# Patient Record
Sex: FEMALE | Race: WHITE | Employment: OTHER | URBAN - METROPOLITAN AREA
[De-identification: names, ages, dates, MRNs, and addresses within clinical notes are randomized per-mention and may not be internally consistent; named-entity substitution may affect disease eponyms.]

---

## 2017-07-19 ENCOUNTER — HOSPITAL ENCOUNTER (OUTPATIENT)
Dept: OTHER | Age: 82
Discharge: HOME | End: 2017-07-19
Attending: INTERNAL MEDICINE | Admitting: INTERNAL MEDICINE

## 2017-07-19 DIAGNOSIS — M25.552 PAIN IN LEFT HIP: ICD-10-CM

## 2017-08-01 ENCOUNTER — TELEPHONE (OUTPATIENT)
Dept: INTERNAL MEDICINE CLINIC | Age: 82
End: 2017-08-01

## 2018-03-15 ENCOUNTER — OFFICE VISIT (OUTPATIENT)
Dept: FAMILY MEDICINE CLINIC | Age: 83
End: 2018-03-15

## 2018-03-15 VITALS
SYSTOLIC BLOOD PRESSURE: 120 MMHG | DIASTOLIC BLOOD PRESSURE: 72 MMHG | BODY MASS INDEX: 29.52 KG/M2 | WEIGHT: 160.4 LBS | HEIGHT: 62 IN

## 2018-03-15 DIAGNOSIS — M85.89 OSTEOPENIA OF MULTIPLE SITES: ICD-10-CM

## 2018-03-15 DIAGNOSIS — I10 ESSENTIAL HYPERTENSION: Primary | ICD-10-CM

## 2018-03-15 DIAGNOSIS — E55.9 VITAMIN D DEFICIENCY: ICD-10-CM

## 2018-03-15 DIAGNOSIS — I71.21 THORACIC ASCENDING AORTIC ANEURYSM: ICD-10-CM

## 2018-03-15 DIAGNOSIS — L40.9 PSORIASIS: ICD-10-CM

## 2018-03-15 DIAGNOSIS — M19.90 ARTHRITIS: ICD-10-CM

## 2018-03-15 DIAGNOSIS — K21.9 GASTROESOPHAGEAL REFLUX DISEASE, ESOPHAGITIS PRESENCE NOT SPECIFIED: ICD-10-CM

## 2018-03-15 DIAGNOSIS — Z13.220 LIPID SCREENING: ICD-10-CM

## 2018-03-15 DIAGNOSIS — E53.8 B12 DEFICIENCY: ICD-10-CM

## 2018-03-15 DIAGNOSIS — Z13.820 OSTEOPOROSIS SCREENING: ICD-10-CM

## 2018-03-15 DIAGNOSIS — N30.10 INTERSTITIAL CYSTITIS: ICD-10-CM

## 2018-03-15 DIAGNOSIS — M15.9 PRIMARY OSTEOARTHRITIS INVOLVING MULTIPLE JOINTS: ICD-10-CM

## 2018-03-15 PROBLEM — M15.0 PRIMARY OSTEOARTHRITIS INVOLVING MULTIPLE JOINTS: Status: ACTIVE | Noted: 2018-03-15

## 2018-03-15 PROCEDURE — 4040F PNEUMOC VAC/ADMIN/RCVD: CPT | Performed by: FAMILY MEDICINE

## 2018-03-15 PROCEDURE — G8399 PT W/DXA RESULTS DOCUMENT: HCPCS | Performed by: FAMILY MEDICINE

## 2018-03-15 PROCEDURE — G8427 DOCREV CUR MEDS BY ELIG CLIN: HCPCS | Performed by: FAMILY MEDICINE

## 2018-03-15 PROCEDURE — 1090F PRES/ABSN URINE INCON ASSESS: CPT | Performed by: FAMILY MEDICINE

## 2018-03-15 PROCEDURE — 1123F ACP DISCUSS/DSCN MKR DOCD: CPT | Performed by: FAMILY MEDICINE

## 2018-03-15 PROCEDURE — G8419 CALC BMI OUT NRM PARAM NOF/U: HCPCS | Performed by: FAMILY MEDICINE

## 2018-03-15 PROCEDURE — 99204 OFFICE O/P NEW MOD 45 MIN: CPT | Performed by: FAMILY MEDICINE

## 2018-03-15 PROCEDURE — G8482 FLU IMMUNIZE ORDER/ADMIN: HCPCS | Performed by: FAMILY MEDICINE

## 2018-03-15 PROCEDURE — 1036F TOBACCO NON-USER: CPT | Performed by: FAMILY MEDICINE

## 2018-03-15 RX ORDER — CLOTRIMAZOLE AND BETAMETHASONE DIPROPIONATE 10; .64 MG/G; MG/G
CREAM TOPICAL PRN
COMMUNITY
End: 2020-10-27

## 2018-03-15 RX ORDER — CLOTRIMAZOLE AND BETAMETHASONE DIPROPIONATE 10; .64 MG/G; MG/G
CREAM TOPICAL
Qty: 30 G | Refills: 0 | Status: SHIPPED | OUTPATIENT
Start: 2018-03-15 | End: 2018-05-03 | Stop reason: ALTCHOICE

## 2018-03-15 ASSESSMENT — PATIENT HEALTH QUESTIONNAIRE - PHQ9
SUM OF ALL RESPONSES TO PHQ QUESTIONS 1-9: 0
1. LITTLE INTEREST OR PLEASURE IN DOING THINGS: 0
2. FEELING DOWN, DEPRESSED OR HOPELESS: 0
SUM OF ALL RESPONSES TO PHQ9 QUESTIONS 1 & 2: 0

## 2018-03-15 NOTE — PROGRESS NOTES
Gastroesophageal reflux disease, esophagitis presence not specified     4. Psoriasis     5. Arthritis     6. Interstitial cystitis     7.  Thoracic ascending aortic aneurysm (HCC)             Plan:      bp stable on toprol/ maxzide -no change  ppi prn for gerd - stacy precautions  Diet / exercise dw/ pt - IC diet w/o other tx presently for ic    apap 500 tid routinely  b12 supplement  Fasting labs soon  Refills as needed  Check for RA -   sx'atic tx for uri sx  Lidex cream for psoriasis  dexa scan soon - on vit d replacement regularly for osteopenia

## 2018-03-26 ENCOUNTER — HOSPITAL ENCOUNTER (OUTPATIENT)
Dept: GENERAL RADIOLOGY | Age: 83
Discharge: HOME | End: 2018-03-26
Attending: FAMILY MEDICINE | Admitting: FAMILY MEDICINE

## 2018-03-26 DIAGNOSIS — M85.89 OSTEOPENIA OF MULTIPLE SITES: ICD-10-CM

## 2018-03-26 DIAGNOSIS — M85.89 OTHER SPECIFIED DISORDERS OF BONE DENSITY AND STRUCTURE, MULTIPLE SITES: ICD-10-CM

## 2018-03-26 DIAGNOSIS — Z13.820 OSTEOPOROSIS SCREENING: ICD-10-CM

## 2018-03-26 RX ORDER — ALENDRONATE SODIUM 70 MG/1
70 TABLET ORAL
Qty: 4 TABLET | Refills: 11 | Status: SHIPPED | OUTPATIENT
Start: 2018-03-26 | End: 2018-05-09 | Stop reason: ALTCHOICE

## 2018-04-03 ENCOUNTER — NURSE ONLY (OUTPATIENT)
Dept: FAMILY MEDICINE CLINIC | Age: 83
End: 2018-04-03

## 2018-04-03 DIAGNOSIS — Z13.220 LIPID SCREENING: ICD-10-CM

## 2018-04-03 DIAGNOSIS — M85.89 OSTEOPENIA OF MULTIPLE SITES: ICD-10-CM

## 2018-04-03 DIAGNOSIS — E55.9 VITAMIN D DEFICIENCY: ICD-10-CM

## 2018-04-03 DIAGNOSIS — I10 ESSENTIAL HYPERTENSION: ICD-10-CM

## 2018-04-03 DIAGNOSIS — M19.90 ARTHRITIS: ICD-10-CM

## 2018-04-03 DIAGNOSIS — E53.8 B12 DEFICIENCY: ICD-10-CM

## 2018-04-03 LAB
A/G RATIO: 2.2 (ref 1.1–2.2)
ALBUMIN SERPL-MCNC: 4.4 G/DL (ref 3.4–5)
ALP BLD-CCNC: 62 U/L (ref 40–129)
ALT SERPL-CCNC: 20 U/L (ref 10–40)
ANION GAP SERPL CALCULATED.3IONS-SCNC: 13 MMOL/L (ref 3–16)
AST SERPL-CCNC: 18 U/L (ref 15–37)
BASOPHILS ABSOLUTE: 0 K/UL (ref 0–0.2)
BASOPHILS RELATIVE PERCENT: 0.9 %
BILIRUB SERPL-MCNC: 1.3 MG/DL (ref 0–1)
BUN BLDV-MCNC: 15 MG/DL (ref 7–20)
CALCIUM SERPL-MCNC: 9.6 MG/DL (ref 8.3–10.6)
CHLORIDE BLD-SCNC: 101 MMOL/L (ref 99–110)
CHOLESTEROL, TOTAL: 251 MG/DL (ref 0–199)
CO2: 28 MMOL/L (ref 21–32)
CREAT SERPL-MCNC: 0.6 MG/DL (ref 0.6–1.2)
EOSINOPHILS ABSOLUTE: 0.1 K/UL (ref 0–0.6)
EOSINOPHILS RELATIVE PERCENT: 1.6 %
GFR AFRICAN AMERICAN: >60
GFR NON-AFRICAN AMERICAN: >60
GLOBULIN: 2 G/DL
GLUCOSE BLD-MCNC: 93 MG/DL (ref 70–99)
HCT VFR BLD CALC: 41.9 % (ref 36–48)
HDLC SERPL-MCNC: 68 MG/DL (ref 40–60)
HEMOGLOBIN: 14.2 G/DL (ref 12–16)
LDL CHOLESTEROL CALCULATED: 148 MG/DL
LYMPHOCYTES ABSOLUTE: 2.4 K/UL (ref 1–5.1)
LYMPHOCYTES RELATIVE PERCENT: 46.9 %
MCH RBC QN AUTO: 31 PG (ref 26–34)
MCHC RBC AUTO-ENTMCNC: 33.8 G/DL (ref 31–36)
MCV RBC AUTO: 91.7 FL (ref 80–100)
MONOCYTES ABSOLUTE: 0.4 K/UL (ref 0–1.3)
MONOCYTES RELATIVE PERCENT: 8.2 %
NEUTROPHILS ABSOLUTE: 2.2 K/UL (ref 1.7–7.7)
NEUTROPHILS RELATIVE PERCENT: 42.4 %
PDW BLD-RTO: 13.9 % (ref 12.4–15.4)
PLATELET # BLD: 223 K/UL (ref 135–450)
PMV BLD AUTO: 9.3 FL (ref 5–10.5)
POTASSIUM SERPL-SCNC: 4 MMOL/L (ref 3.5–5.1)
RBC # BLD: 4.57 M/UL (ref 4–5.2)
RHEUMATOID FACTOR: <10 IU/ML
SEDIMENTATION RATE, ERYTHROCYTE: 9 MM/HR (ref 0–30)
SODIUM BLD-SCNC: 142 MMOL/L (ref 136–145)
TOTAL PROTEIN: 6.4 G/DL (ref 6.4–8.2)
TRIGL SERPL-MCNC: 177 MG/DL (ref 0–150)
VITAMIN B-12: 409 PG/ML (ref 211–911)
VITAMIN D 25-HYDROXY: 50.2 NG/ML
VLDLC SERPL CALC-MCNC: 35 MG/DL
WBC # BLD: 5.1 K/UL (ref 4–11)

## 2018-04-03 PROCEDURE — 36415 COLL VENOUS BLD VENIPUNCTURE: CPT | Performed by: FAMILY MEDICINE

## 2018-04-09 ENCOUNTER — TELEPHONE (OUTPATIENT)
Dept: FAMILY MEDICINE CLINIC | Age: 83
End: 2018-04-09

## 2018-04-23 ENCOUNTER — TELEPHONE (OUTPATIENT)
Dept: FAMILY MEDICINE CLINIC | Age: 83
End: 2018-04-23

## 2018-04-23 RX ORDER — TRIAMTERENE AND HYDROCHLOROTHIAZIDE 37.5; 25 MG/1; MG/1
1 TABLET ORAL DAILY
Qty: 30 TABLET | Refills: 2 | Status: SHIPPED | OUTPATIENT
Start: 2018-04-23 | End: 2018-05-09 | Stop reason: SDUPTHER

## 2018-04-23 RX ORDER — METOPROLOL SUCCINATE 50 MG/1
50 TABLET, EXTENDED RELEASE ORAL DAILY
Qty: 30 TABLET | Refills: 2 | Status: SHIPPED | OUTPATIENT
Start: 2018-04-23 | End: 2018-05-09 | Stop reason: SDUPTHER

## 2018-05-03 ENCOUNTER — OFFICE VISIT (OUTPATIENT)
Dept: FAMILY MEDICINE CLINIC | Age: 83
End: 2018-05-03

## 2018-05-03 VITALS
DIASTOLIC BLOOD PRESSURE: 86 MMHG | OXYGEN SATURATION: 97 % | WEIGHT: 156.2 LBS | BODY MASS INDEX: 28.74 KG/M2 | HEIGHT: 62 IN | RESPIRATION RATE: 16 BRPM | HEART RATE: 61 BPM | SYSTOLIC BLOOD PRESSURE: 136 MMHG

## 2018-05-03 DIAGNOSIS — R06.02 SOB (SHORTNESS OF BREATH): ICD-10-CM

## 2018-05-03 DIAGNOSIS — R07.89 OTHER CHEST PAIN: Primary | ICD-10-CM

## 2018-05-03 PROBLEM — R07.9 CHEST PAIN: Status: ACTIVE | Noted: 2018-05-03

## 2018-05-03 PROCEDURE — G8427 DOCREV CUR MEDS BY ELIG CLIN: HCPCS | Performed by: NURSE PRACTITIONER

## 2018-05-03 PROCEDURE — G8399 PT W/DXA RESULTS DOCUMENT: HCPCS | Performed by: NURSE PRACTITIONER

## 2018-05-03 PROCEDURE — 99214 OFFICE O/P EST MOD 30 MIN: CPT | Performed by: NURSE PRACTITIONER

## 2018-05-03 PROCEDURE — 4040F PNEUMOC VAC/ADMIN/RCVD: CPT | Performed by: NURSE PRACTITIONER

## 2018-05-03 PROCEDURE — 1123F ACP DISCUSS/DSCN MKR DOCD: CPT | Performed by: NURSE PRACTITIONER

## 2018-05-03 PROCEDURE — 93000 ELECTROCARDIOGRAM COMPLETE: CPT | Performed by: NURSE PRACTITIONER

## 2018-05-03 PROCEDURE — 1090F PRES/ABSN URINE INCON ASSESS: CPT | Performed by: NURSE PRACTITIONER

## 2018-05-03 PROCEDURE — G8419 CALC BMI OUT NRM PARAM NOF/U: HCPCS | Performed by: NURSE PRACTITIONER

## 2018-05-03 PROCEDURE — 1036F TOBACCO NON-USER: CPT | Performed by: NURSE PRACTITIONER

## 2018-05-09 ENCOUNTER — OFFICE VISIT (OUTPATIENT)
Dept: FAMILY MEDICINE CLINIC | Age: 83
End: 2018-05-09

## 2018-05-09 VITALS
HEIGHT: 62 IN | DIASTOLIC BLOOD PRESSURE: 72 MMHG | HEART RATE: 82 BPM | BODY MASS INDEX: 27.97 KG/M2 | WEIGHT: 152 LBS | SYSTOLIC BLOOD PRESSURE: 116 MMHG

## 2018-05-09 DIAGNOSIS — K63.5 POLYP OF COLON, UNSPECIFIED PART OF COLON, UNSPECIFIED TYPE: ICD-10-CM

## 2018-05-09 DIAGNOSIS — Z23 NEED FOR PNEUMOCOCCAL VACCINATION: ICD-10-CM

## 2018-05-09 DIAGNOSIS — N30.10 INTERSTITIAL CYSTITIS: ICD-10-CM

## 2018-05-09 DIAGNOSIS — M85.89 OSTEOPENIA OF MULTIPLE SITES: ICD-10-CM

## 2018-05-09 DIAGNOSIS — R07.9 CHEST PAIN, UNSPECIFIED TYPE: ICD-10-CM

## 2018-05-09 DIAGNOSIS — I25.110 CORONARY ARTERY DISEASE INVOLVING NATIVE CORONARY ARTERY OF NATIVE HEART WITH UNSTABLE ANGINA PECTORIS (HCC): Primary | ICD-10-CM

## 2018-05-09 DIAGNOSIS — N39.0 RECURRENT UTI: ICD-10-CM

## 2018-05-09 LAB
BILIRUBIN, POC: ABNORMAL
BLOOD URINE, POC: ABNORMAL
CLARITY, POC: CLEAR
COLOR, POC: CLEAR
GLUCOSE URINE, POC: ABNORMAL
KETONES, POC: ABNORMAL
LEUKOCYTE EST, POC: ABNORMAL
NITRITE, POC: ABNORMAL
PH, POC: 7.5
PROTEIN, POC: ABNORMAL
SPECIFIC GRAVITY, POC: 1.02
UROBILINOGEN, POC: 0.2

## 2018-05-09 PROCEDURE — 90670 PCV13 VACCINE IM: CPT | Performed by: FAMILY MEDICINE

## 2018-05-09 PROCEDURE — 4040F PNEUMOC VAC/ADMIN/RCVD: CPT | Performed by: FAMILY MEDICINE

## 2018-05-09 PROCEDURE — G8419 CALC BMI OUT NRM PARAM NOF/U: HCPCS | Performed by: FAMILY MEDICINE

## 2018-05-09 PROCEDURE — 1123F ACP DISCUSS/DSCN MKR DOCD: CPT | Performed by: FAMILY MEDICINE

## 2018-05-09 PROCEDURE — G8427 DOCREV CUR MEDS BY ELIG CLIN: HCPCS | Performed by: FAMILY MEDICINE

## 2018-05-09 PROCEDURE — G8598 ASA/ANTIPLAT THER USED: HCPCS | Performed by: FAMILY MEDICINE

## 2018-05-09 PROCEDURE — 1090F PRES/ABSN URINE INCON ASSESS: CPT | Performed by: FAMILY MEDICINE

## 2018-05-09 PROCEDURE — 99214 OFFICE O/P EST MOD 30 MIN: CPT | Performed by: FAMILY MEDICINE

## 2018-05-09 PROCEDURE — 81002 URINALYSIS NONAUTO W/O SCOPE: CPT | Performed by: FAMILY MEDICINE

## 2018-05-09 PROCEDURE — G8399 PT W/DXA RESULTS DOCUMENT: HCPCS | Performed by: FAMILY MEDICINE

## 2018-05-09 PROCEDURE — 1036F TOBACCO NON-USER: CPT | Performed by: FAMILY MEDICINE

## 2018-05-09 PROCEDURE — G0009 ADMIN PNEUMOCOCCAL VACCINE: HCPCS | Performed by: FAMILY MEDICINE

## 2018-05-09 RX ORDER — METOPROLOL SUCCINATE 50 MG/1
50 TABLET, EXTENDED RELEASE ORAL DAILY
Qty: 90 TABLET | Refills: 3 | Status: SHIPPED | OUTPATIENT
Start: 2018-05-09 | End: 2018-10-29 | Stop reason: SDUPTHER

## 2018-05-09 RX ORDER — TRIMETHOPRIM 100 MG/1
100 TABLET ORAL DAILY
Qty: 90 TABLET | Refills: 1 | Status: SHIPPED | OUTPATIENT
Start: 2018-05-09 | End: 2018-05-19

## 2018-05-09 RX ORDER — TRIAMTERENE AND HYDROCHLOROTHIAZIDE 37.5; 25 MG/1; MG/1
1 TABLET ORAL DAILY
Qty: 90 TABLET | Refills: 3 | Status: ON HOLD | OUTPATIENT
Start: 2018-05-09 | End: 2018-06-05 | Stop reason: HOSPADM

## 2018-05-11 LAB — URINE CULTURE, ROUTINE: NORMAL

## 2018-05-16 ENCOUNTER — TELEPHONE (OUTPATIENT)
Dept: FAMILY MEDICINE CLINIC | Age: 83
End: 2018-05-16

## 2018-05-16 RX ORDER — PANTOPRAZOLE SODIUM 40 MG/1
40 TABLET, DELAYED RELEASE ORAL
Qty: 90 TABLET | Refills: 1 | Status: SHIPPED | OUTPATIENT
Start: 2018-05-16 | End: 2018-07-19

## 2018-05-29 PROBLEM — N30.10 INTERSTITIAL CYSTITIS: Status: RESOLVED | Noted: 2018-03-15 | Resolved: 2018-05-29

## 2018-05-29 PROBLEM — I21.4 NSTEMI (NON-ST ELEVATED MYOCARDIAL INFARCTION) (HCC): Status: ACTIVE | Noted: 2018-05-29

## 2018-06-01 PROBLEM — I25.10 CAD IN NATIVE ARTERY: Status: ACTIVE | Noted: 2018-06-01

## 2018-06-06 ENCOUNTER — CARE COORDINATION (OUTPATIENT)
Dept: CASE MANAGEMENT | Age: 83
End: 2018-06-06

## 2018-06-06 ENCOUNTER — TELEPHONE (OUTPATIENT)
Dept: CARDIOTHORACIC SURGERY | Age: 83
End: 2018-06-06

## 2018-06-06 DIAGNOSIS — R07.9 CHEST PAIN, UNSPECIFIED TYPE: Primary | ICD-10-CM

## 2018-06-06 PROCEDURE — 1111F DSCHRG MED/CURRENT MED MERGE: CPT | Performed by: FAMILY MEDICINE

## 2018-06-08 ENCOUNTER — TELEPHONE (OUTPATIENT)
Dept: CARDIOTHORACIC SURGERY | Age: 83
End: 2018-06-08

## 2018-06-08 ENCOUNTER — HOSPITAL ENCOUNTER (OUTPATIENT)
Dept: OTHER | Age: 83
Discharge: HOME | End: 2018-06-08
Attending: THORACIC SURGERY (CARDIOTHORACIC VASCULAR SURGERY) | Admitting: THORACIC SURGERY (CARDIOTHORACIC VASCULAR SURGERY)

## 2018-06-08 LAB
ANION GAP SERPL CALCULATED.3IONS-SCNC: 13 MMOL/L (ref 3–16)
BUN BLDV-MCNC: 13 MG/DL (ref 7–20)
CALCIUM SERPL-MCNC: 9.6 MG/DL (ref 8.3–10.6)
CHLORIDE BLD-SCNC: 97 MMOL/L (ref 99–110)
CO2: 24 MMOL/L (ref 21–32)
CREAT SERPL-MCNC: <0.5 MG/DL (ref 0.6–1.2)
GFR AFRICAN AMERICAN: >60
GFR NON-AFRICAN AMERICAN: >60
GLUCOSE BLD-MCNC: 100 MG/DL (ref 70–99)
MAGNESIUM: 1.9 MG/DL (ref 1.8–2.4)
POTASSIUM SERPL-SCNC: 3.8 MMOL/L (ref 3.5–5.1)
SODIUM BLD-SCNC: 134 MMOL/L (ref 136–145)

## 2018-06-11 ENCOUNTER — HOSPITAL ENCOUNTER (OUTPATIENT)
Dept: OTHER | Age: 83
Discharge: HOME | End: 2018-06-11
Attending: THORACIC SURGERY (CARDIOTHORACIC VASCULAR SURGERY) | Admitting: THORACIC SURGERY (CARDIOTHORACIC VASCULAR SURGERY)

## 2018-06-11 ENCOUNTER — TELEPHONE (OUTPATIENT)
Dept: CARDIOTHORACIC SURGERY | Age: 83
End: 2018-06-11

## 2018-06-11 DIAGNOSIS — R06.02 SHORTNESS OF BREATH: ICD-10-CM

## 2018-06-11 DIAGNOSIS — R06.02 SHORTNESS OF BREATH: Primary | ICD-10-CM

## 2018-06-11 DIAGNOSIS — R50.9 FEVER, UNSPECIFIED FEVER CAUSE: ICD-10-CM

## 2018-06-11 DIAGNOSIS — N39.0 URINARY TRACT INFECTION WITHOUT HEMATURIA, SITE UNSPECIFIED: ICD-10-CM

## 2018-06-11 DIAGNOSIS — J90 PLEURAL EFFUSION: ICD-10-CM

## 2018-06-11 DIAGNOSIS — R30.0 DYSURIA: ICD-10-CM

## 2018-06-11 LAB
BACTERIA: ABNORMAL /HPF
BILIRUBIN URINE: NEGATIVE
BLOOD, URINE: ABNORMAL
CLARITY: ABNORMAL
COLOR: YELLOW
EPITHELIAL CELLS, UA: 4 /HPF (ref 0–5)
GLUCOSE URINE: NEGATIVE MG/DL
HYALINE CASTS: 1 /LPF (ref 0–8)
KETONES, URINE: NEGATIVE MG/DL
LEUKOCYTE ESTERASE, URINE: ABNORMAL
MICROSCOPIC EXAMINATION: YES
NITRITE, URINE: NEGATIVE
PH UA: 6.5
PROTEIN UA: NEGATIVE MG/DL
RBC UA: 17 /HPF (ref 0–4)
SPECIFIC GRAVITY UA: 1.01
URINE REFLEX TO CULTURE: YES
URINE TYPE: ABNORMAL
UROBILINOGEN, URINE: 0.2 E.U./DL
WBC UA: 6 /HPF (ref 0–5)

## 2018-06-11 RX ORDER — FUROSEMIDE 20 MG/1
20 TABLET ORAL DAILY
Qty: 7 TABLET | Refills: 0 | Status: SHIPPED | OUTPATIENT
Start: 2018-06-11 | End: 2018-07-19

## 2018-06-11 RX ORDER — POTASSIUM CHLORIDE 20 MEQ/1
20 TABLET, EXTENDED RELEASE ORAL DAILY
Qty: 7 TABLET | Refills: 0 | Status: SHIPPED | OUTPATIENT
Start: 2018-06-11 | End: 2018-07-19

## 2018-06-11 RX ORDER — SULFAMETHOXAZOLE AND TRIMETHOPRIM 800; 160 MG/1; MG/1
1 TABLET ORAL 2 TIMES DAILY
Qty: 10 TABLET | Refills: 0 | Status: SHIPPED | OUTPATIENT
Start: 2018-06-11 | End: 2018-06-16

## 2018-06-12 LAB — URINE CULTURE, ROUTINE: NORMAL

## 2018-06-13 ENCOUNTER — OFFICE VISIT (OUTPATIENT)
Dept: CARDIOTHORACIC SURGERY | Age: 83
End: 2018-06-13

## 2018-06-13 VITALS
SYSTOLIC BLOOD PRESSURE: 104 MMHG | TEMPERATURE: 97.6 F | BODY MASS INDEX: 28.6 KG/M2 | HEIGHT: 62 IN | WEIGHT: 155.4 LBS | DIASTOLIC BLOOD PRESSURE: 60 MMHG | OXYGEN SATURATION: 96 % | HEART RATE: 72 BPM

## 2018-06-13 DIAGNOSIS — Z09 FOLLOW-UP EXAMINATION FOLLOWING SURGERY: Primary | ICD-10-CM

## 2018-06-13 PROCEDURE — 99024 POSTOP FOLLOW-UP VISIT: CPT | Performed by: THORACIC SURGERY (CARDIOTHORACIC VASCULAR SURGERY)

## 2018-06-15 ENCOUNTER — CARE COORDINATION (OUTPATIENT)
Dept: CASE MANAGEMENT | Age: 83
End: 2018-06-15

## 2018-06-19 ENCOUNTER — CARE COORDINATION (OUTPATIENT)
Dept: CASE MANAGEMENT | Age: 83
End: 2018-06-19

## 2018-06-19 ENCOUNTER — HOSPITAL ENCOUNTER (OUTPATIENT)
Dept: OTHER | Age: 83
Discharge: HOME | End: 2018-06-19
Attending: NURSE PRACTITIONER | Admitting: NURSE PRACTITIONER

## 2018-06-19 ENCOUNTER — OFFICE VISIT (OUTPATIENT)
Dept: CARDIOLOGY CLINIC | Age: 83
End: 2018-06-19

## 2018-06-19 VITALS
OXYGEN SATURATION: 97 % | HEART RATE: 94 BPM | BODY MASS INDEX: 27.25 KG/M2 | WEIGHT: 149 LBS | SYSTOLIC BLOOD PRESSURE: 120 MMHG | DIASTOLIC BLOOD PRESSURE: 62 MMHG

## 2018-06-19 DIAGNOSIS — I25.10 CAD IN NATIVE ARTERY: ICD-10-CM

## 2018-06-19 DIAGNOSIS — I10 ESSENTIAL HYPERTENSION: ICD-10-CM

## 2018-06-19 DIAGNOSIS — E78.2 MIXED HYPERLIPIDEMIA: ICD-10-CM

## 2018-06-19 DIAGNOSIS — I21.4 NSTEMI (NON-ST ELEVATED MYOCARDIAL INFARCTION) (HCC): ICD-10-CM

## 2018-06-19 DIAGNOSIS — I48.91 ATRIAL FIBRILLATION, UNSPECIFIED TYPE (HCC): Primary | ICD-10-CM

## 2018-06-19 LAB
ANION GAP SERPL CALCULATED.3IONS-SCNC: 16 MMOL/L (ref 3–16)
BUN BLDV-MCNC: 18 MG/DL (ref 7–20)
CALCIUM SERPL-MCNC: 10 MG/DL (ref 8.3–10.6)
CHLORIDE BLD-SCNC: 103 MMOL/L (ref 99–110)
CO2: 22 MMOL/L (ref 21–32)
CREAT SERPL-MCNC: 0.6 MG/DL (ref 0.6–1.2)
GFR AFRICAN AMERICAN: >60
GFR NON-AFRICAN AMERICAN: >60
GLUCOSE BLD-MCNC: 93 MG/DL (ref 70–99)
HCT VFR BLD CALC: 35 % (ref 36–48)
HEMOGLOBIN: 11.7 G/DL (ref 12–16)
MAGNESIUM: 1.8 MG/DL (ref 1.8–2.4)
MCH RBC QN AUTO: 30.4 PG (ref 26–34)
MCHC RBC AUTO-ENTMCNC: 33.3 G/DL (ref 31–36)
MCV RBC AUTO: 91.2 FL (ref 80–100)
PDW BLD-RTO: 14.8 % (ref 12.4–15.4)
PLATELET # BLD: 708 K/UL (ref 135–450)
PMV BLD AUTO: 7.5 FL (ref 5–10.5)
POTASSIUM SERPL-SCNC: 4.1 MMOL/L (ref 3.5–5.1)
RBC # BLD: 3.84 M/UL (ref 4–5.2)
SODIUM BLD-SCNC: 141 MMOL/L (ref 136–145)
WBC # BLD: 9.4 K/UL (ref 4–11)

## 2018-06-19 PROCEDURE — 93000 ELECTROCARDIOGRAM COMPLETE: CPT | Performed by: NURSE PRACTITIONER

## 2018-06-19 PROCEDURE — 4040F PNEUMOC VAC/ADMIN/RCVD: CPT | Performed by: NURSE PRACTITIONER

## 2018-06-19 PROCEDURE — 1111F DSCHRG MED/CURRENT MED MERGE: CPT | Performed by: NURSE PRACTITIONER

## 2018-06-19 PROCEDURE — G8427 DOCREV CUR MEDS BY ELIG CLIN: HCPCS | Performed by: NURSE PRACTITIONER

## 2018-06-19 PROCEDURE — 1123F ACP DISCUSS/DSCN MKR DOCD: CPT | Performed by: NURSE PRACTITIONER

## 2018-06-19 PROCEDURE — 1036F TOBACCO NON-USER: CPT | Performed by: NURSE PRACTITIONER

## 2018-06-19 PROCEDURE — G8419 CALC BMI OUT NRM PARAM NOF/U: HCPCS | Performed by: NURSE PRACTITIONER

## 2018-06-19 PROCEDURE — 99214 OFFICE O/P EST MOD 30 MIN: CPT | Performed by: NURSE PRACTITIONER

## 2018-06-19 PROCEDURE — G8598 ASA/ANTIPLAT THER USED: HCPCS | Performed by: NURSE PRACTITIONER

## 2018-06-19 PROCEDURE — G8399 PT W/DXA RESULTS DOCUMENT: HCPCS | Performed by: NURSE PRACTITIONER

## 2018-06-19 PROCEDURE — 1090F PRES/ABSN URINE INCON ASSESS: CPT | Performed by: NURSE PRACTITIONER

## 2018-06-19 RX ORDER — TRAMADOL HYDROCHLORIDE 50 MG/1
50 TABLET ORAL EVERY 6 HOURS PRN
COMMUNITY
End: 2018-08-31 | Stop reason: ALTCHOICE

## 2018-06-26 ENCOUNTER — TELEPHONE (OUTPATIENT)
Dept: CARDIOLOGY CLINIC | Age: 83
End: 2018-06-26

## 2018-07-05 ENCOUNTER — TELEPHONE (OUTPATIENT)
Dept: CARDIOLOGY CLINIC | Age: 83
End: 2018-07-05

## 2018-07-05 DIAGNOSIS — I48.0 PAROXYSMAL ATRIAL FIBRILLATION (HCC): Primary | ICD-10-CM

## 2018-07-05 DIAGNOSIS — T46.2X5A ADVERSE EFFECT OF AMIODARONE, INITIAL ENCOUNTER: ICD-10-CM

## 2018-07-05 DIAGNOSIS — E78.2 MIXED HYPERLIPIDEMIA: ICD-10-CM

## 2018-07-07 ENCOUNTER — HOSPITAL ENCOUNTER (OUTPATIENT)
Dept: OTHER | Age: 83
Discharge: HOME | End: 2018-07-07
Attending: NURSE PRACTITIONER | Admitting: NURSE PRACTITIONER

## 2018-07-07 DIAGNOSIS — I48.0 PAROXYSMAL ATRIAL FIBRILLATION (HCC): ICD-10-CM

## 2018-07-07 DIAGNOSIS — E78.2 MIXED HYPERLIPIDEMIA: ICD-10-CM

## 2018-07-07 LAB
A/G RATIO: 1.6 (ref 1.1–2.2)
ALBUMIN SERPL-MCNC: 4 G/DL (ref 3.4–5)
ALP BLD-CCNC: 87 U/L (ref 40–129)
ALT SERPL-CCNC: 22 U/L (ref 10–40)
ANION GAP SERPL CALCULATED.3IONS-SCNC: 13 MMOL/L (ref 3–16)
AST SERPL-CCNC: 21 U/L (ref 15–37)
BILIRUB SERPL-MCNC: 0.6 MG/DL (ref 0–1)
BUN BLDV-MCNC: 12 MG/DL (ref 7–20)
CALCIUM SERPL-MCNC: 9.7 MG/DL (ref 8.3–10.6)
CHLORIDE BLD-SCNC: 107 MMOL/L (ref 99–110)
CHOLESTEROL, TOTAL: 130 MG/DL (ref 0–199)
CO2: 24 MMOL/L (ref 21–32)
CREAT SERPL-MCNC: 0.6 MG/DL (ref 0.6–1.2)
GFR AFRICAN AMERICAN: >60
GFR NON-AFRICAN AMERICAN: >60
GLOBULIN: 2.5 G/DL
GLUCOSE BLD-MCNC: 87 MG/DL (ref 70–99)
HCT VFR BLD CALC: 33.6 % (ref 36–48)
HDLC SERPL-MCNC: 59 MG/DL (ref 40–60)
HEMOGLOBIN: 11.5 G/DL (ref 12–16)
LDL CHOLESTEROL CALCULATED: 50 MG/DL
MCH RBC QN AUTO: 31.1 PG (ref 26–34)
MCHC RBC AUTO-ENTMCNC: 34.2 G/DL (ref 31–36)
MCV RBC AUTO: 90.9 FL (ref 80–100)
PDW BLD-RTO: 16.4 % (ref 12.4–15.4)
PLATELET # BLD: 287 K/UL (ref 135–450)
PMV BLD AUTO: 8.8 FL (ref 5–10.5)
POTASSIUM SERPL-SCNC: 3.9 MMOL/L (ref 3.5–5.1)
RBC # BLD: 3.69 M/UL (ref 4–5.2)
SODIUM BLD-SCNC: 144 MMOL/L (ref 136–145)
TOTAL PROTEIN: 6.5 G/DL (ref 6.4–8.2)
TRIGL SERPL-MCNC: 107 MG/DL (ref 0–150)
TSH SERPL DL<=0.05 MIU/L-ACNC: 4.59 UIU/ML (ref 0.27–4.2)
VLDLC SERPL CALC-MCNC: 21 MG/DL
WBC # BLD: 4.9 K/UL (ref 4–11)

## 2018-07-17 ENCOUNTER — OFFICE VISIT (OUTPATIENT)
Dept: FAMILY MEDICINE CLINIC | Age: 83
End: 2018-07-17

## 2018-07-17 ENCOUNTER — TELEPHONE (OUTPATIENT)
Dept: CARDIOTHORACIC SURGERY | Age: 83
End: 2018-07-17

## 2018-07-17 VITALS
BODY MASS INDEX: 27.94 KG/M2 | HEIGHT: 62 IN | OXYGEN SATURATION: 96 % | DIASTOLIC BLOOD PRESSURE: 84 MMHG | HEART RATE: 67 BPM | WEIGHT: 151.8 LBS | SYSTOLIC BLOOD PRESSURE: 128 MMHG | TEMPERATURE: 98.2 F | RESPIRATION RATE: 18 BRPM

## 2018-07-17 DIAGNOSIS — J06.9 VIRAL URI: Primary | ICD-10-CM

## 2018-07-17 PROCEDURE — 1101F PT FALLS ASSESS-DOCD LE1/YR: CPT | Performed by: NURSE PRACTITIONER

## 2018-07-17 PROCEDURE — G8419 CALC BMI OUT NRM PARAM NOF/U: HCPCS | Performed by: NURSE PRACTITIONER

## 2018-07-17 PROCEDURE — 1123F ACP DISCUSS/DSCN MKR DOCD: CPT | Performed by: NURSE PRACTITIONER

## 2018-07-17 PROCEDURE — G8598 ASA/ANTIPLAT THER USED: HCPCS | Performed by: NURSE PRACTITIONER

## 2018-07-17 PROCEDURE — 99213 OFFICE O/P EST LOW 20 MIN: CPT | Performed by: NURSE PRACTITIONER

## 2018-07-17 PROCEDURE — 1090F PRES/ABSN URINE INCON ASSESS: CPT | Performed by: NURSE PRACTITIONER

## 2018-07-17 PROCEDURE — G8399 PT W/DXA RESULTS DOCUMENT: HCPCS | Performed by: NURSE PRACTITIONER

## 2018-07-17 PROCEDURE — 1036F TOBACCO NON-USER: CPT | Performed by: NURSE PRACTITIONER

## 2018-07-17 PROCEDURE — G8427 DOCREV CUR MEDS BY ELIG CLIN: HCPCS | Performed by: NURSE PRACTITIONER

## 2018-07-17 PROCEDURE — 4040F PNEUMOC VAC/ADMIN/RCVD: CPT | Performed by: NURSE PRACTITIONER

## 2018-07-17 ASSESSMENT — ENCOUNTER SYMPTOMS
COUGH: 1
WHEEZING: 0

## 2018-07-17 NOTE — PATIENT INSTRUCTIONS
avoid nausea. Avoid Ibuprofen if you have high blood pressure, CHF, or kidney problems. 6.Gargle: Gargle in the back of the throat with the head tilted back and to the sides with a strong mouthwash ( Listerine or Scope) after meals and at bedtime at least 4 -5 times a day. This helps kill bacteria and viruses in the back of the throat and will shorten the duration and decrease the severity of your symptoms: sore throat, cough, ear popping,/ear pain, and possibly dizziness. 7. Smoking: Avoid smoking or exposure to second hand smoke. 8. Zinc: Zinc lozenges such as Cold [place], or Basic will help shorten the duration and lessen symptoms such as sore throat, cough, nasal congestion, runny nose, and post nasal drip. Use 1 lozenge every 2-4 hours ( after meals if stomach is sensitive). Children can use 10-15 mg. Or less 3-4 times a day or Zinc lollypops. In pregnancy limit to 50-60 mg. A day for 7 days as prenatals have Zinc also. With diarrhea use zinc pills 50 mg 1/2 to 1 pill 2x/day. 9. Vitamins: Vitamin C 500 mg. With breakfast and dinner. Children and pregnant women should drink citrus juices. This speeds healing and strengthens immune system. 10. Chest Symptoms: Vicks Vapor rub to the chest at bedtime. 11. Decongestants: Avoid all decongestants and antihistamine cold preparations in children. Try all of the above starting with day 1 of symptoms. If Strep throat symptoms appear call to be seen in the office as soon as possible and don't gargle on that day. Newborns, infants, or anyone with earaches or influenza may need to be seen quickly. Adults with fevers over 103 degrees or shortness of breath should call the office immediately. - doxycycline (ADOXA) 100 MG tablet; Take 1 tablet by mouth 2 times daily for 7 days. Use if not getting better. Tobacco abuse   Cut back as much as you can.

## 2018-07-17 NOTE — PROGRESS NOTES
SUBJECTIVE:    Patient ID: Jacky Claude is a 82 y.o. y.o. female. HPI      Review of Systems     OBJECTIVE:    Hospital Outpatient Visit on 07/07/2018   Component Date Value Ref Range Status    WBC 07/07/2018 4.9  4.0 - 11.0 K/uL Final    RBC 07/07/2018 3.69* 4.00 - 5.20 M/uL Final    Hemoglobin 07/07/2018 11.5* 12.0 - 16.0 g/dL Final    Hematocrit 07/07/2018 33.6* 36.0 - 48.0 % Final    MCV 07/07/2018 90.9  80.0 - 100.0 fL Final    MCH 07/07/2018 31.1  26.0 - 34.0 pg Final    MCHC 07/07/2018 34.2  31.0 - 36.0 g/dL Final    RDW 07/07/2018 16.4* 12.4 - 15.4 % Final    Platelets 41/52/9981 287  135 - 450 K/uL Final    MPV 07/07/2018 8.8  5.0 - 10.5 fL Final    Sodium 07/07/2018 144  136 - 145 mmol/L Final    Potassium 07/07/2018 3.9  3.5 - 5.1 mmol/L Final    Chloride 07/07/2018 107  99 - 110 mmol/L Final    CO2 07/07/2018 24  21 - 32 mmol/L Final    Anion Gap 07/07/2018 13  3 - 16 Final    Glucose 07/07/2018 87  70 - 99 mg/dL Final    BUN 07/07/2018 12  7 - 20 mg/dL Final    CREATININE 07/07/2018 0.6  0.6 - 1.2 mg/dL Final    GFR Non- 07/07/2018 >60  >60 Final    Comment: >60 mL/min/1.73m2 EGFR, calc. for ages 25 and older using the  MDRD formula (not corrected for weight), is valid for stable  renal function.  GFR  07/07/2018 >60  >60 Final    Comment: Chronic Kidney Disease: less than 60 ml/min/1.73 sq.m. Kidney Failure: less than 15 ml/min/1.73 sq.m. Results valid for patients 18 years and older.       Calcium 07/07/2018 9.7  8.3 - 10.6 mg/dL Final    Total Protein 07/07/2018 6.5  6.4 - 8.2 g/dL Final    Alb 07/07/2018 4.0  3.4 - 5.0 g/dL Final    Albumin/Globulin Ratio 07/07/2018 1.6  1.1 - 2.2 Final    Total Bilirubin 07/07/2018 0.6  0.0 - 1.0 mg/dL Final    Alkaline Phosphatase 07/07/2018 87  40 - 129 U/L Final    ALT 07/07/2018 22  10 - 40 U/L Final    AST 07/07/2018 21  15 - 37 U/L Final    Globulin 07/07/2018 2.5  g/dL Final

## 2018-07-17 NOTE — PROGRESS NOTES
SUBJECTIVE:    Patient ID: Monika Feng is a 82 y.o. y.o. female. HPI   cough   for the last several weeks worse the last two days- was told her lungs were clear and oxygen level was 95- 96- had bypass graft seven weeks ago and the cough is hurting her chest- she does have pnd- she does not take anythng for allergies she has not tried anything at home    Review of Systems   Respiratory: Positive for cough. Negative for wheezing. All other systems reviewed and are negative. OBJECTIVE:    Physical Exam   Constitutional: Vital signs are normal. She appears well-developed and well-nourished. She is active. HENT:   Mouth/Throat: Uvula is midline, oropharynx is clear and moist and mucous membranes are normal.   Pulmonary/Chest: Effort normal.   Lymphadenopathy:        Head (right side): No submental, no submandibular and no tonsillar adenopathy present. Head (left side): No submental, no submandibular and no tonsillar adenopathy present. Neurological: She is alert. Psychiatric: She has a normal mood and affect. Her speech is normal and behavior is normal. Judgment and thought content normal.       ASSESSMENT:     Diagnosis Orders   1. Viral URI       PLAN:  Melani Hood was seen today for uri.     Diagnoses and all orders for this visit:    Viral URI  RESP handout reviewed with pt - she is instructed to continue treating the symptoms  I informed pt that viral illnesses will not get any better with abx-and the risk of resistance  increases when not used properly  If symptoms persist or worsens > 14 days I will consider an abx ( day 2 at appt)  Pt instructed to continue treating the symptoms

## 2018-07-19 ENCOUNTER — OFFICE VISIT (OUTPATIENT)
Dept: CARDIOLOGY CLINIC | Age: 83
End: 2018-07-19

## 2018-07-19 VITALS
SYSTOLIC BLOOD PRESSURE: 118 MMHG | DIASTOLIC BLOOD PRESSURE: 68 MMHG | WEIGHT: 151 LBS | OXYGEN SATURATION: 98 % | BODY MASS INDEX: 27.79 KG/M2 | HEIGHT: 62 IN | HEART RATE: 67 BPM

## 2018-07-19 DIAGNOSIS — R06.02 SOB (SHORTNESS OF BREATH): Primary | ICD-10-CM

## 2018-07-19 PROCEDURE — 4040F PNEUMOC VAC/ADMIN/RCVD: CPT | Performed by: NURSE PRACTITIONER

## 2018-07-19 PROCEDURE — G8427 DOCREV CUR MEDS BY ELIG CLIN: HCPCS | Performed by: NURSE PRACTITIONER

## 2018-07-19 PROCEDURE — 1090F PRES/ABSN URINE INCON ASSESS: CPT | Performed by: NURSE PRACTITIONER

## 2018-07-19 PROCEDURE — G8598 ASA/ANTIPLAT THER USED: HCPCS | Performed by: NURSE PRACTITIONER

## 2018-07-19 PROCEDURE — 1123F ACP DISCUSS/DSCN MKR DOCD: CPT | Performed by: NURSE PRACTITIONER

## 2018-07-19 PROCEDURE — 99214 OFFICE O/P EST MOD 30 MIN: CPT | Performed by: NURSE PRACTITIONER

## 2018-07-19 PROCEDURE — G8419 CALC BMI OUT NRM PARAM NOF/U: HCPCS | Performed by: NURSE PRACTITIONER

## 2018-07-19 PROCEDURE — G8399 PT W/DXA RESULTS DOCUMENT: HCPCS | Performed by: NURSE PRACTITIONER

## 2018-07-19 PROCEDURE — 1036F TOBACCO NON-USER: CPT | Performed by: NURSE PRACTITIONER

## 2018-07-19 PROCEDURE — 1101F PT FALLS ASSESS-DOCD LE1/YR: CPT | Performed by: NURSE PRACTITIONER

## 2018-07-19 RX ORDER — GUAIFENESIN 600 MG/1
1200 TABLET, EXTENDED RELEASE ORAL 2 TIMES DAILY
COMMUNITY
End: 2018-08-29

## 2018-07-19 NOTE — PROGRESS NOTES
[facility]     Outpatient Follow Up Note    CHIEF COMPLAINT / HPI: Follow Up secondary to CAD s/p CABG/ hypertension/  Hyperlipidemia/ and atrial fibrillation     Rogerio Flores is 82 y.o. female who presents today for a routine follow up  related to the above mentioned issues. Subjective:   Since the time of last office visit, the patient admits their symptoms have changed. Patient is being seen for a follow up visit secondary to CAD s/p CABG/ HTN/ hyperlipidemia and post op atrial fibrillation. Patient stopped amiodarone on her own due to fatigue, generalized weakness, and hair loss. She stop wearing the event monitor. She is currently in regular rhythm. She denies any chest pain, palpitations, SOB , dizziness, or edema. With regard to medication therapy the patient has been compliant with prescribed regimen. They have tolerated therapy to date. Past Medical History:   Diagnosis Date    Arthritis     Cystitis, interstitial     GERD (gastroesophageal reflux disease)     GI bleeding     was a frequent aspirin user at that time    Heart burn     Hypertension     Interstitial cystitis     Patient is [employer]     no blood products    Psoriasis     Thoracic aortic aneurysm [facility])     cardiologist watching     Social History:    History   Smoking Status    Former Smoker    Years: 7.00    Types: Cigarettes    Quit date: 5/3/1960   Smokeless Tobacco    Never Used     Comment: AS A TEENAGER FOR ABOUT 6 YEARS      Current Medications:  Current Outpatient Prescriptions   Medication Sig Dispense Refill    traMADol (ULTRAM) 50 MG tablet Take 50 mg by mouth every 6 hours as needed for Pain. Sonny Carmona furosemide (LASIX) 20 MG tablet Take 1 tablet by mouth daily 7 tablet 0    potassium chloride (KLOR-CON M) 20 MEQ extended release tablet Take 1 tablet by mouth daily 7 tablet 0    atorvastatin (LIPITOR) 80 MG tablet Take 1 tablet by mouth nightly 30 tablet 3    lisinopril (PRINIVIL;ZESTRIL) 2.5 MG tablet Take 1 tablet by mouth daily 30 tablet 3    ferrous gluconate 324 (37.5 Fe) MG TABS Take 1 tablet by mouth daily (with breakfast) 825 tablet 0    folic acid (FOLVITE) 1 MG tablet Take 1 tablet by mouth daily 60 tablet 0    vitamin B-12 100 MCG tablet Take 1 tablet by mouth daily 60 tablet 0    docusate sodium (COLACE, DULCOLAX) 100 MG CAPS Take 100 mg by mouth 2 times daily 60 capsule 0    clopidogrel (PLAVIX) 75 MG tablet Take 1 tablet by mouth daily 30 tablet 3    vitamin E 1000 units capsule Take 1,000 Units by mouth daily      Cholecalciferol (VITAMIN D3) 2000 units CAPS Take 2,000 Units by mouth      Flaxseed, Linseed, (FLAX SEEDS PO) Take 1 tablet by mouth      pantoprazole (PROTONIX) 40 MG tablet Take 1 tablet by mouth every morning (before breakfast) 90 tablet 1    metoprolol succinate (TOPROL XL) 50 MG extended release tablet Take 1 tablet by mouth daily 90 tablet 3    aspirin 81 MG chewable tablet Take 1 tablet by mouth daily 30 tablet 3    clotrimazole-betamethasone (LOTRISONE) 1-0.05 % cream Apply topically as needed Apply topically 2 times daily.  Glucos-Chondroit-Collag-Hyal (GLUCOSAMINE CHONDROIT-COLLAGEN PO) Take  by mouth. No current facility-administered medications for this visit. REVIEW OF SYSTEMS:   CONSTITUTIONAL: No major weight gain or loss, fatigue, weakness, night sweats or fever. There's been no change in energy level, sleep pattern, or activity level. HEENT: No new vision difficulties or ringing in the ears. RESPIRATORY: No new SOB, PND, orthopnea or cough. CARDIOVASCULAR: See HPI  GI: No nausea, vomiting, diarrhea, constipation, abdominal pain or changes in bowel habits. : No urinary frequency, urgency, incontinence hematuria or dysuria. SKIN: No cyanosis or skin lesions. MUSCULOSKELETAL: No new muscle or joint pain. NEUROLOGICAL: No syncope or TIA-like symptoms.   PSYCHIATRIC: No anxiety, pain, insomnia or depression    Objective:   PHYSICAL EXAM:        VITALS:   Vitals:    07/19/18 1023   BP: 118/68   Pulse: 67   SpO2: 98%           CONSTITUTIONAL: Cooperative, no apparent distress, and appears well nourished / developed  NEUROLOGIC:  Awake and orientated to person, place and time. PSYCH: Calm affect. SKIN: Warm and dry. HEENT: Sclera non-icteric, normocephalic, neck supple, no elevation of JVP, normal carotid pulses with no bruits and thyroid normal size. LUNGS:  No increased work of breathing and clear to auscultation, no crackles or wheezing. CARDIOVASCULAR:  Regular rate and rhythm with no murmurs, gallops, rubs, or abnormal heart sounds, normal PMI. The apical impulses not displaced. Heart tones are crisp and normal    Cervical veins are not engorged                 JVP less than 8 cm H2O                                                                              The carotid upstroke is normal in amplitude and contour without delay or bruit    ABDOMEN:  Normal bowel sounds, non-distended and non-tender to palpation   EXT: No edema, no calf tenderness. Pulses are present bilaterally.     DATA:    Lab Results   Component Value Date    ALT 22 07/07/2018    AST 21 07/07/2018    ALKPHOS 87 07/07/2018    BILITOT 0.6 07/07/2018     Lab Results   Component Value Date    CREATININE 0.6 07/07/2018    BUN 12 07/07/2018     07/07/2018    K 3.9 07/07/2018     07/07/2018    CO2 24 07/07/2018     Lab Results   Component Value Date    TSH 4.59 (H) 07/07/2018    E7SIAVJ 10.0 12/10/2012     Lab Results   Component Value Date    WBC 4.9 07/07/2018    HGB 11.5 (L) 07/07/2018    HCT 33.6 (L) 07/07/2018    MCV 90.9 07/07/2018     07/07/2018     No components found for: CHLPL  Lab Results   Component Value Date    TRIG 107 07/07/2018    TRIG 177 (H) 04/03/2018    TRIG 170 (H) 12/21/2016     Lab Results   Component Value Date    HDL 59 07/07/2018    HDL 66 (H) 05/29/2018    HDL 68 (H) 2018     Lab Results   Component Value Date    LDLCALC 50 2018    LDLCALC 122 (H) 2018    LDLCALC 148 (H) 2018     Lab Results   Component Value Date    LABVLDL 21 2018    LABVLDL 28 2018    LABVLDL 35 2018     Radiology Review:  Pertinent images / reports were reviewed as a part of this visit and reveals the followin/30/18: Procedure:    Off pump CABG  X  2  Lima to LAD, SVG to D2    Diagnostics:    Procedure:     [facility] 18  Indication:      NSTEMI  Consent:        Verbal and/or written consent obtained prior to procedure. Sedation:        Minimal conscious sedation utilized for comfort. Complic:         None  EBL:                <10cc  Specimens:    None  Fluoro:            4.6 min  Contrast:        42 cc  Access:          RCF  Findings:                      LM       Normal              LAD     Mid 99% trifurcation involving D1 and D2 with GILBERTO 2 flow,               Cx        Normal              RCA     20% mid              LVG     50%, apical hk              EDP     17 mmHg  Intervention:  None  Recs:              Urgent CABG                          No procedural complications meriting CABG. Due to ongoing cp and severe LAD disease, IABP placed with improvement in cp     Last ECHO: 18   Ejection fraction is visually estimated to be 40%.  There is apical   hypokinesis noted.   Mild mitral regurgitation.   Mild-to-moderate tricuspid regurgitation.  Alexx Alexis is a trivial pericardial effusion noted.       Last EC18 sinus rhythm with T wave inversion anterior leads - patient denies any anginal symptoms and unable to schedule plain GXT due to mobility ( she walks with a cane)    Assessment:     ~ Coronary artery disease      s/p CABG X  2  Lima to LAD, SVG to D2   60%  50% LM-nl, Cx-nl, LAD-nl, RCA-nl  Severe trifurcational LAD disease   On ASA, Plavix, Lisinopril, Toprol XL, and Lipitor  Patient denies any anginal symptoms   Plan: continue current medications, Refer patient to cardiac rehab    ~ Hypertension     Today's B/P- 118/68     Plan: continue current medications    ~ Hyperlipidemia      On Lipitor 80 mg daily       5/30/18: HDL: 66, LDL: 122     ~ Post op Atrial Fibrillation      Patient stopped amiodarone due to side effects       On ASA and Plavix        Currently in regular rhythm       Event monitor results- pending        Plan : continue current meidcations    ~ [place]         Patient  is stable since hospital discharge. Plan:   Continue current medications  Refer patient to cardiac rehab  Follow up in three months     I have addressed the patient's cardiac risk factors and adjusted pharmacologic treatment as needed. In addition, I have reinforced the need for patient directed risk factor modification. Further evaluation will be based upon the patient's clinical course and testing results. All questions and concerns were addressed to the patient/family. Alternatives to  treatment were discussed. The patient  currently  is not smoking. The risks related to smoking were reviewed with the patient. Recommend maintaining a smoke-free lifestyle. Products available for smoking cessation were discussed. Daily weight, low sodium diet were discussed. Patient instructed to call the office with a weight gain: > 3 lbs over night or 5 lbs in one week; swelling, SOB/orthopnea/PND    Dual Antiplatelet therapy has been prescribed for this patient. Education conducted on adverse reactions including bleeding was discussed. The patient verbalizes understanding. Pt is on a BB  Pt is on an ace-i/ARB  Pt is on a statin    Saturated fat diet discussed  Exercise program discussed    Thank you for allowing to us to participate in the care of Arely Ramirez CNP

## 2018-07-23 PROCEDURE — 93228 REMOTE 30 DAY ECG REV/REPORT: CPT | Performed by: INTERNAL MEDICINE

## 2018-07-24 ENCOUNTER — TELEPHONE (OUTPATIENT)
Dept: CARDIOLOGY CLINIC | Age: 83
End: 2018-07-24

## 2018-07-24 DIAGNOSIS — I48.91 ATRIAL FIBRILLATION, UNSPECIFIED TYPE (HCC): ICD-10-CM

## 2018-08-01 RX ORDER — LISINOPRIL 2.5 MG/1
2.5 TABLET ORAL DAILY
Qty: 90 TABLET | Refills: 3 | Status: SHIPPED | OUTPATIENT
Start: 2018-08-01 | End: 2018-08-29 | Stop reason: SINTOL

## 2018-08-01 RX ORDER — ATORVASTATIN CALCIUM 80 MG/1
80 TABLET, FILM COATED ORAL NIGHTLY
Qty: 90 TABLET | Refills: 3 | Status: SHIPPED | OUTPATIENT
Start: 2018-08-01 | End: 2018-11-02 | Stop reason: SDUPTHER

## 2018-08-15 ENCOUNTER — OFFICE VISIT (OUTPATIENT)
Dept: CARDIOTHORACIC SURGERY | Age: 83
End: 2018-08-15

## 2018-08-15 VITALS
WEIGHT: 152.2 LBS | DIASTOLIC BLOOD PRESSURE: 70 MMHG | SYSTOLIC BLOOD PRESSURE: 110 MMHG | BODY MASS INDEX: 28.01 KG/M2 | HEART RATE: 56 BPM | TEMPERATURE: 98.3 F | OXYGEN SATURATION: 98 % | HEIGHT: 62 IN

## 2018-08-15 DIAGNOSIS — Z09 FOLLOW-UP EXAMINATION FOLLOWING SURGERY: Primary | ICD-10-CM

## 2018-08-15 PROCEDURE — 99024 POSTOP FOLLOW-UP VISIT: CPT | Performed by: THORACIC SURGERY (CARDIOTHORACIC VASCULAR SURGERY)

## 2018-08-15 RX ORDER — PANTOPRAZOLE SODIUM 40 MG/1
40 TABLET, DELAYED RELEASE ORAL DAILY
COMMUNITY
End: 2018-08-29

## 2018-08-27 ENCOUNTER — TELEPHONE (OUTPATIENT)
Dept: CARDIOLOGY CLINIC | Age: 83
End: 2018-08-27

## 2018-08-27 NOTE — TELEPHONE ENCOUNTER
Called cardiac rehab spoke with [facility] states that the patient's BP was 158/90  At rehab and that when she took her BP at home her BP was running  157/104, 153/93, and 157/95 before she came to rehab. States that her furosemide  has been discontinued.  Please advise

## 2018-08-27 NOTE — TELEPHONE ENCOUNTER
Nurse Calling states pt has gained 5lbs since last week and has been experiencing foot swelling, and also has concerns with pts blood pressure. Nurse wants to know what to do. Please call to advise,Thank You!

## 2018-08-29 ENCOUNTER — OFFICE VISIT (OUTPATIENT)
Dept: CARDIOLOGY CLINIC | Age: 83
End: 2018-08-29

## 2018-08-29 VITALS
SYSTOLIC BLOOD PRESSURE: 150 MMHG | OXYGEN SATURATION: 98 % | DIASTOLIC BLOOD PRESSURE: 90 MMHG | WEIGHT: 153.3 LBS | HEIGHT: 62 IN | BODY MASS INDEX: 28.21 KG/M2 | HEART RATE: 64 BPM

## 2018-08-29 DIAGNOSIS — I10 ESSENTIAL HYPERTENSION: Primary | ICD-10-CM

## 2018-08-29 DIAGNOSIS — E78.2 MIXED HYPERLIPIDEMIA: ICD-10-CM

## 2018-08-29 DIAGNOSIS — I25.10 CAD IN NATIVE ARTERY: ICD-10-CM

## 2018-08-29 DIAGNOSIS — I48.0 PAROXYSMAL ATRIAL FIBRILLATION (HCC): ICD-10-CM

## 2018-08-29 PROCEDURE — 1123F ACP DISCUSS/DSCN MKR DOCD: CPT | Performed by: NURSE PRACTITIONER

## 2018-08-29 PROCEDURE — 1101F PT FALLS ASSESS-DOCD LE1/YR: CPT | Performed by: NURSE PRACTITIONER

## 2018-08-29 PROCEDURE — G8427 DOCREV CUR MEDS BY ELIG CLIN: HCPCS | Performed by: NURSE PRACTITIONER

## 2018-08-29 PROCEDURE — G8399 PT W/DXA RESULTS DOCUMENT: HCPCS | Performed by: NURSE PRACTITIONER

## 2018-08-29 PROCEDURE — 4040F PNEUMOC VAC/ADMIN/RCVD: CPT | Performed by: NURSE PRACTITIONER

## 2018-08-29 PROCEDURE — G8419 CALC BMI OUT NRM PARAM NOF/U: HCPCS | Performed by: NURSE PRACTITIONER

## 2018-08-29 PROCEDURE — 99214 OFFICE O/P EST MOD 30 MIN: CPT | Performed by: NURSE PRACTITIONER

## 2018-08-29 PROCEDURE — 1036F TOBACCO NON-USER: CPT | Performed by: NURSE PRACTITIONER

## 2018-08-29 PROCEDURE — G8598 ASA/ANTIPLAT THER USED: HCPCS | Performed by: NURSE PRACTITIONER

## 2018-08-29 PROCEDURE — 1090F PRES/ABSN URINE INCON ASSESS: CPT | Performed by: NURSE PRACTITIONER

## 2018-08-29 RX ORDER — AMLODIPINE BESYLATE 5 MG/1
5 TABLET ORAL DAILY
Qty: 30 TABLET | Refills: 3 | Status: SHIPPED | OUTPATIENT
Start: 2018-08-29 | End: 2018-08-29

## 2018-08-29 RX ORDER — TRIAMTERENE AND HYDROCHLOROTHIAZIDE 37.5; 25 MG/1; MG/1
1 TABLET ORAL DAILY
Qty: 90 TABLET | Refills: 3
Start: 2018-08-29 | End: 2019-03-14 | Stop reason: DRUGHIGH

## 2018-08-29 NOTE — PROGRESS NOTES
atorvastatin (LIPITOR) 80 MG tablet Take 1 tablet by mouth nightly 90 tablet 3    guaiFENesin (MUCINEX) 600 MG extended release tablet Take 1,200 mg by mouth 2 times daily      traMADol (ULTRAM) 50 MG tablet Take 50 mg by mouth every 6 hours as needed for Pain. Thalia Dwyer ferrous gluconate 324 (37.5 Fe) MG TABS Take 1 tablet by mouth daily (with breakfast) (Patient taking differently: Take 324 mg by mouth ) 936 tablet 0    folic acid (FOLVITE) 1 MG tablet Take 1 tablet by mouth daily 60 tablet 0    vitamin B-12 100 MCG tablet Take 1 tablet by mouth daily 60 tablet 0    docusate sodium (COLACE, DULCOLAX) 100 MG CAPS Take 100 mg by mouth 2 times daily 60 capsule 0    clopidogrel (PLAVIX) 75 MG tablet Take 1 tablet by mouth daily 30 tablet 3    vitamin E 1000 units capsule Take 1,000 Units by mouth daily      Cholecalciferol (VITAMIN D3) 2000 units CAPS Take 2,000 Units by mouth      Flaxseed, Linseed, (FLAX SEEDS PO) Take 1 tablet by mouth      metoprolol succinate (TOPROL XL) 50 MG extended release tablet Take 1 tablet by mouth daily 90 tablet 3    aspirin 81 MG chewable tablet Take 1 tablet by mouth daily 30 tablet 3    clotrimazole-betamethasone (LOTRISONE) 1-0.05 % cream Apply topically as needed Apply topically 2 times daily.  Glucos-Chondroit-Collag-Hyal (GLUCOSAMINE CHONDROIT-COLLAGEN PO) Take  by mouth. No current facility-administered medications for this visit. REVIEW OF SYSTEMS:   CONSTITUTIONAL: No major weight gain or loss, fatigue, weakness, night sweats or fever. There's been no change in energy level, sleep pattern, or activity level. HEENT: No new vision difficulties or ringing in the ears. RESPIRATORY: No new SOB, PND, orthopnea or cough. CARDIOVASCULAR: See HPI  GI: No nausea, vomiting, diarrhea, constipation, abdominal pain or changes in bowel habits. : No urinary frequency, urgency, incontinence hematuria or dysuria.   SKIN: No cyanosis or skin

## 2018-08-31 ENCOUNTER — OFFICE VISIT (OUTPATIENT)
Dept: FAMILY MEDICINE CLINIC | Age: 83
End: 2018-08-31

## 2018-08-31 VITALS
HEART RATE: 78 BPM | SYSTOLIC BLOOD PRESSURE: 144 MMHG | BODY MASS INDEX: 27.79 KG/M2 | WEIGHT: 151 LBS | HEIGHT: 62 IN | DIASTOLIC BLOOD PRESSURE: 82 MMHG | RESPIRATION RATE: 16 BRPM

## 2018-08-31 DIAGNOSIS — R60.0 PEDAL EDEMA: ICD-10-CM

## 2018-08-31 DIAGNOSIS — I25.10 CORONARY ARTERY DISEASE INVOLVING NATIVE CORONARY ARTERY OF NATIVE HEART WITHOUT ANGINA PECTORIS: Primary | ICD-10-CM

## 2018-08-31 DIAGNOSIS — E78.00 HYPERCHOLESTEREMIA: ICD-10-CM

## 2018-08-31 DIAGNOSIS — R79.89 ELEVATED TSH: ICD-10-CM

## 2018-08-31 DIAGNOSIS — K59.00 CONSTIPATION, UNSPECIFIED CONSTIPATION TYPE: ICD-10-CM

## 2018-08-31 DIAGNOSIS — D64.9 MILD ANEMIA: ICD-10-CM

## 2018-08-31 DIAGNOSIS — R41.3 MEMORY CHANGE: ICD-10-CM

## 2018-08-31 DIAGNOSIS — R07.89 CHEST WALL PAIN: ICD-10-CM

## 2018-08-31 DIAGNOSIS — K21.9 GASTROESOPHAGEAL REFLUX DISEASE, ESOPHAGITIS PRESENCE NOT SPECIFIED: ICD-10-CM

## 2018-08-31 DIAGNOSIS — I10 ESSENTIAL HYPERTENSION: ICD-10-CM

## 2018-08-31 LAB
A/G RATIO: 2 (ref 1.1–2.2)
ALBUMIN SERPL-MCNC: 4.6 G/DL (ref 3.4–5)
ALP BLD-CCNC: 95 U/L (ref 40–129)
ALT SERPL-CCNC: 24 U/L (ref 10–40)
ANION GAP SERPL CALCULATED.3IONS-SCNC: 15 MMOL/L (ref 3–16)
AST SERPL-CCNC: 24 U/L (ref 15–37)
BASOPHILS ABSOLUTE: 0.1 K/UL (ref 0–0.2)
BASOPHILS RELATIVE PERCENT: 1 %
BILIRUB SERPL-MCNC: 1.1 MG/DL (ref 0–1)
BUN BLDV-MCNC: 12 MG/DL (ref 7–20)
CALCIUM SERPL-MCNC: 10.2 MG/DL (ref 8.3–10.6)
CHLORIDE BLD-SCNC: 101 MMOL/L (ref 99–110)
CO2: 23 MMOL/L (ref 21–32)
CREAT SERPL-MCNC: <0.5 MG/DL (ref 0.6–1.2)
EOSINOPHILS ABSOLUTE: 0.2 K/UL (ref 0–0.6)
EOSINOPHILS RELATIVE PERCENT: 2.8 %
FERRITIN: 187 NG/ML (ref 15–150)
GFR AFRICAN AMERICAN: >60
GFR NON-AFRICAN AMERICAN: >60
GLOBULIN: 2.3 G/DL
GLUCOSE BLD-MCNC: 91 MG/DL (ref 70–99)
HCT VFR BLD CALC: 39.3 % (ref 36–48)
HEMOGLOBIN: 13.1 G/DL (ref 12–16)
IRON SATURATION: 18 % (ref 15–50)
IRON: 53 UG/DL (ref 37–145)
LYMPHOCYTES ABSOLUTE: 2 K/UL (ref 1–5.1)
LYMPHOCYTES RELATIVE PERCENT: 34.7 %
MCH RBC QN AUTO: 29.9 PG (ref 26–34)
MCHC RBC AUTO-ENTMCNC: 33.4 G/DL (ref 31–36)
MCV RBC AUTO: 89.5 FL (ref 80–100)
MONOCYTES ABSOLUTE: 0.4 K/UL (ref 0–1.3)
MONOCYTES RELATIVE PERCENT: 7.2 %
NEUTROPHILS ABSOLUTE: 3.1 K/UL (ref 1.7–7.7)
NEUTROPHILS RELATIVE PERCENT: 54.3 %
PDW BLD-RTO: 16.4 % (ref 12.4–15.4)
PLATELET # BLD: 252 K/UL (ref 135–450)
PMV BLD AUTO: 8.5 FL (ref 5–10.5)
POTASSIUM SERPL-SCNC: 3.9 MMOL/L (ref 3.5–5.1)
RBC # BLD: 4.39 M/UL (ref 4–5.2)
SODIUM BLD-SCNC: 139 MMOL/L (ref 136–145)
T4 FREE: 1.4 NG/DL (ref 0.9–1.8)
TOTAL IRON BINDING CAPACITY: 293 UG/DL (ref 260–445)
TOTAL PROTEIN: 6.9 G/DL (ref 6.4–8.2)
TSH SERPL DL<=0.05 MIU/L-ACNC: 3.5 UIU/ML (ref 0.27–4.2)
VITAMIN B-12: 1267 PG/ML (ref 211–911)
WBC # BLD: 5.7 K/UL (ref 4–11)

## 2018-08-31 PROCEDURE — G8399 PT W/DXA RESULTS DOCUMENT: HCPCS | Performed by: FAMILY MEDICINE

## 2018-08-31 PROCEDURE — 1036F TOBACCO NON-USER: CPT | Performed by: FAMILY MEDICINE

## 2018-08-31 PROCEDURE — 36415 COLL VENOUS BLD VENIPUNCTURE: CPT | Performed by: FAMILY MEDICINE

## 2018-08-31 PROCEDURE — 1101F PT FALLS ASSESS-DOCD LE1/YR: CPT | Performed by: FAMILY MEDICINE

## 2018-08-31 PROCEDURE — 1123F ACP DISCUSS/DSCN MKR DOCD: CPT | Performed by: FAMILY MEDICINE

## 2018-08-31 PROCEDURE — 4040F PNEUMOC VAC/ADMIN/RCVD: CPT | Performed by: FAMILY MEDICINE

## 2018-08-31 PROCEDURE — G8598 ASA/ANTIPLAT THER USED: HCPCS | Performed by: FAMILY MEDICINE

## 2018-08-31 PROCEDURE — G8419 CALC BMI OUT NRM PARAM NOF/U: HCPCS | Performed by: FAMILY MEDICINE

## 2018-08-31 PROCEDURE — G8427 DOCREV CUR MEDS BY ELIG CLIN: HCPCS | Performed by: FAMILY MEDICINE

## 2018-08-31 PROCEDURE — 1090F PRES/ABSN URINE INCON ASSESS: CPT | Performed by: FAMILY MEDICINE

## 2018-08-31 PROCEDURE — 99214 OFFICE O/P EST MOD 30 MIN: CPT | Performed by: FAMILY MEDICINE

## 2018-08-31 RX ORDER — AMLODIPINE BESYLATE 5 MG/1
5 TABLET ORAL DAILY
COMMUNITY
End: 2018-11-06 | Stop reason: SDUPTHER

## 2018-08-31 NOTE — PROGRESS NOTES
Subjective:      Patient ID: Agustin Arce is a 82 y.o. female. HPI  Chief Complaint   Patient presents with    Hypertension     HTN ROUTINE FOLLOW UP BLOOD PRESSURE WAS HIGH A FEW TIMES THIS MONTH 8/28/ 177/96  AND THAT EVENING IT WENT /87 THEY NEXT DAY IT /102 CAME DAY /96     Other     FOLLOW UP FROM BIPASS SURGERY IN MAY EVERYTHING IS GOING WELL      Back to cardio rehab  Had 2 vessel bypass  Chest is very sore  Recent 2 vessel cabg in may for ami  Very slow recovery - very gradually increasing activity - put off cardiac rehab until now -just started  Slouching - pressure right of sternum - moves around a little. Stress test prior to hospitalization was negative  Seen by cardiologist earlier this week  bp had been running high  BP Readings from Last 3 Encounters:   08/31/18 (!) 144/82   08/29/18 (!) 150/90   08/15/18 110/70     Pulse Readings from Last 3 Encounters:   08/31/18 78   08/29/18 64   08/15/18 56     Wt Readings from Last 3 Encounters:   08/31/18 151 lb (68.5 kg)   08/29/18 153 lb 4.8 oz (69.5 kg)   08/15/18 152 lb 3.2 oz (69 kg)     Worried about stroke w/ high bp  Brief bout of afib after surgery  On acei, asa, toprol, plavix, lipitor - took off lisinopril - changed toprol to bid dose? Cough w/ acei/ arb  Started on maxzide 2 days ago - started amlodipine 5mg daily along w/ toprol xl 50 once daily now. Weight down couple pounds.   Limiting salt in diet  Still feels fatigued  Falls asleep late afternoon  Taking colace now for constipation - occ uses suppository  Not taking iron daily - takes twice weekly  Diarrhea from metamucil  Some swelling in feet  Some leg pain   Current Outpatient Prescriptions   Medication Sig Dispense Refill    amLODIPine (NORVASC) 5 MG tablet Take 5 mg by mouth daily      Fexofenadine HCl (ALLEGRA PO) Take by mouth      Calcium Polycarbophil (FIBER-CAPS PO) Take by mouth      triamterene-hydrochlorothiazide (MAXZIDE-25) 37.5-25 MG per tablet Take 1 tablet by mouth daily 90 tablet 3    Acetaminophen (TYLENOL 8 HOUR ARTHRITIS PAIN PO) Take 500 mg by mouth 4 times daily      atorvastatin (LIPITOR) 80 MG tablet Take 1 tablet by mouth nightly 90 tablet 3    docusate sodium (COLACE, DULCOLAX) 100 MG CAPS Take 100 mg by mouth 2 times daily 60 capsule 0    Cholecalciferol (VITAMIN D3) 2000 units CAPS Take 2,000 Units by mouth      metoprolol succinate (TOPROL XL) 50 MG extended release tablet Take 1 tablet by mouth daily (Patient taking differently: Take 50 mg by mouth 2 times daily ) 90 tablet 3    aspirin 81 MG chewable tablet Take 1 tablet by mouth daily 30 tablet 3    clotrimazole-betamethasone (LOTRISONE) 1-0.05 % cream Apply topically as needed Apply topically 2 times daily.  ferrous gluconate 324 (37.5 Fe) MG TABS Take 1 tablet by mouth daily (with breakfast) (Patient taking differently: Take 324 mg by mouth ) 709 tablet 0    folic acid (FOLVITE) 1 MG tablet Take 1 tablet by mouth daily 60 tablet 0    vitamin B-12 100 MCG tablet Take 1 tablet by mouth daily 60 tablet 0    vitamin E 1000 units capsule Take 1,000 Units by mouth daily      Glucos-Chondroit-Collag-Hyal (GLUCOSAMINE CHONDROIT-COLLAGEN PO) Take  by mouth. No current facility-administered medications for this visit. ? Peripheral neuropathy  Memory not as good  Denies depression/ anxiety - some stress in family - grandson  of od - son clean for 21 months  Another son w/ anxiety medicine - pt wants to avoid meds for now  Review of Systems    Objective:   Physical Exam   Constitutional: She is oriented to person, place, and time. She appears well-developed and well-nourished. No distress. HENT:   Head: Normocephalic and atraumatic. Mouth/Throat: Oropharynx is clear and moist.   Eyes: Conjunctivae are normal. No scleral icterus. Cardiovascular: Normal rate, regular rhythm and normal heart sounds. No murmur heard.   Pulmonary/Chest: Effort normal and breath sounds normal. No respiratory distress. She has no wheezes. She has no rales. She exhibits tenderness (tender right of sternum). Abdominal: Soft. Bowel sounds are normal. She exhibits no distension. There is no tenderness. Musculoskeletal: She exhibits edema (trace swelling feet/ ankles). Neurological: She is alert and oriented to person, place, and time. Skin: Skin is warm and dry. Psychiatric: She has a normal mood and affect. Assessment:       Diagnosis Orders   1. Coronary artery disease involving native coronary artery of native heart without angina pectoris     2. Chest wall pain     3. Essential hypertension  Comprehensive Metabolic Panel   4. Elevated TSH  TSH without Reflex    T4, Free   5. Hypercholesteremia     6. Mild anemia  CBC Auto Differential    Vitamin B12    Iron and TIBC    Ferritin   7. Constipation, unspecified constipation type     8. Pedal edema     9.  Gastroesophageal reflux disease, esophagitis presence not specified             Plan:      Cont toprol xl 50/d w/ maxzide 25/37.5 and norvasc 5/d - bp mildly high but overall better  F/u cardiology 1 week for bp check  Labs today    Diet/ exercise d/w pt - low salt - gradually increasing exercise/ cardiac rehab  Chest wall pain dw pt - likely post op pain  Metamucil low dose each day w/ colace prn  Off protonix - using zantac otc w/ plavix - seems to be working okay  On iron twice weekly - b12 500 daily pending lab testing  Elevate feet/ compression stockings for swelling -no overt chf findings  mmse today for memory change  Stress outlets d/w pt - active in [place]  Bhaskar Zeng MD

## 2018-09-01 ENCOUNTER — HOSPITAL ENCOUNTER (OUTPATIENT)
Dept: OTHER | Age: 83
Discharge: HOME | End: 2018-09-01
Attending: INTERNAL MEDICINE | Admitting: INTERNAL MEDICINE

## 2018-09-05 ENCOUNTER — NURSE ONLY (OUTPATIENT)
Dept: CARDIOLOGY CLINIC | Age: 83
End: 2018-09-05

## 2018-09-05 VITALS — DIASTOLIC BLOOD PRESSURE: 80 MMHG | SYSTOLIC BLOOD PRESSURE: 100 MMHG

## 2018-09-05 DIAGNOSIS — I10 ESSENTIAL HYPERTENSION: Primary | ICD-10-CM

## 2018-09-24 ENCOUNTER — HOSPITAL ENCOUNTER (OUTPATIENT)
Dept: CARDIAC REHAB | Age: 83
Setting detail: THERAPIES SERIES
Discharge: HOME | End: 2018-09-24
Payer: MEDICARE

## 2018-09-24 PROCEDURE — 93798 PHYS/QHP OP CAR RHAB W/ECG: CPT

## 2018-09-26 ENCOUNTER — APPOINTMENT (OUTPATIENT)
Dept: CARDIAC REHAB | Age: 83
End: 2018-09-26
Payer: MEDICARE

## 2018-09-28 ENCOUNTER — HOSPITAL ENCOUNTER (OUTPATIENT)
Dept: CARDIAC REHAB | Age: 83
Setting detail: THERAPIES SERIES
Discharge: HOME | End: 2018-09-28
Payer: MEDICARE

## 2018-09-28 PROCEDURE — 93798 PHYS/QHP OP CAR RHAB W/ECG: CPT

## 2018-10-01 ENCOUNTER — HOSPITAL ENCOUNTER (OUTPATIENT)
Dept: CARDIAC REHAB | Age: 83
Setting detail: THERAPIES SERIES
Discharge: HOME | End: 2018-10-01
Payer: MEDICARE

## 2018-10-01 PROCEDURE — 93798 PHYS/QHP OP CAR RHAB W/ECG: CPT

## 2018-10-03 ENCOUNTER — HOSPITAL ENCOUNTER (OUTPATIENT)
Dept: CARDIAC REHAB | Age: 83
Setting detail: THERAPIES SERIES
Discharge: HOME | End: 2018-10-03
Payer: MEDICARE

## 2018-10-03 PROCEDURE — 93798 PHYS/QHP OP CAR RHAB W/ECG: CPT

## 2018-10-04 ENCOUNTER — OFFICE VISIT (OUTPATIENT)
Dept: FAMILY MEDICINE CLINIC | Age: 83
End: 2018-10-04
Payer: MEDICARE

## 2018-10-04 VITALS
OXYGEN SATURATION: 97 % | TEMPERATURE: 98.7 F | BODY MASS INDEX: 28.12 KG/M2 | RESPIRATION RATE: 16 BRPM | HEIGHT: 62 IN | HEART RATE: 64 BPM | DIASTOLIC BLOOD PRESSURE: 70 MMHG | SYSTOLIC BLOOD PRESSURE: 130 MMHG | WEIGHT: 152.8 LBS

## 2018-10-04 DIAGNOSIS — I25.10 CAD IN NATIVE ARTERY: ICD-10-CM

## 2018-10-04 DIAGNOSIS — M15.9 PRIMARY OSTEOARTHRITIS INVOLVING MULTIPLE JOINTS: ICD-10-CM

## 2018-10-04 DIAGNOSIS — M67.432 GANGLION CYST OF DORSUM OF LEFT WRIST: Primary | ICD-10-CM

## 2018-10-04 PROCEDURE — G8399 PT W/DXA RESULTS DOCUMENT: HCPCS | Performed by: FAMILY MEDICINE

## 2018-10-04 PROCEDURE — 1036F TOBACCO NON-USER: CPT | Performed by: FAMILY MEDICINE

## 2018-10-04 PROCEDURE — G8419 CALC BMI OUT NRM PARAM NOF/U: HCPCS | Performed by: FAMILY MEDICINE

## 2018-10-04 PROCEDURE — 1101F PT FALLS ASSESS-DOCD LE1/YR: CPT | Performed by: FAMILY MEDICINE

## 2018-10-04 PROCEDURE — G8510 SCR DEP NEG, NO PLAN REQD: HCPCS | Performed by: FAMILY MEDICINE

## 2018-10-04 PROCEDURE — G8598 ASA/ANTIPLAT THER USED: HCPCS | Performed by: FAMILY MEDICINE

## 2018-10-04 PROCEDURE — G8427 DOCREV CUR MEDS BY ELIG CLIN: HCPCS | Performed by: FAMILY MEDICINE

## 2018-10-04 PROCEDURE — G8484 FLU IMMUNIZE NO ADMIN: HCPCS | Performed by: FAMILY MEDICINE

## 2018-10-04 PROCEDURE — 99213 OFFICE O/P EST LOW 20 MIN: CPT | Performed by: FAMILY MEDICINE

## 2018-10-04 PROCEDURE — 4040F PNEUMOC VAC/ADMIN/RCVD: CPT | Performed by: FAMILY MEDICINE

## 2018-10-04 PROCEDURE — 1090F PRES/ABSN URINE INCON ASSESS: CPT | Performed by: FAMILY MEDICINE

## 2018-10-04 PROCEDURE — 1123F ACP DISCUSS/DSCN MKR DOCD: CPT | Performed by: FAMILY MEDICINE

## 2018-10-04 RX ORDER — TRAMADOL HYDROCHLORIDE 50 MG/1
50 TABLET ORAL EVERY 8 HOURS PRN
Qty: 30 TABLET | Refills: 0 | Status: SHIPPED | OUTPATIENT
Start: 2018-10-04 | End: 2018-11-03

## 2018-10-04 ASSESSMENT — PATIENT HEALTH QUESTIONNAIRE - PHQ9
SUM OF ALL RESPONSES TO PHQ QUESTIONS 1-9: 0
2. FEELING DOWN, DEPRESSED OR HOPELESS: 0
1. LITTLE INTEREST OR PLEASURE IN DOING THINGS: 0
SUM OF ALL RESPONSES TO PHQ9 QUESTIONS 1 & 2: 0
SUM OF ALL RESPONSES TO PHQ QUESTIONS 1-9: 0

## 2018-10-04 NOTE — PATIENT INSTRUCTIONS
increasing pain.     · Your ganglion is getting larger.     · You still have pain or numbness from a ganglion. Where can you learn more? Go to [email or web address] and sign in to your [employer] account. Enter E058 in the [employer] box to learn more about \"Ganglions: Care Instructions. \"     If you do not have an account, please click on the \"Sign Up Now\" link. Current as of: November 29, 2017  Content Version: 11.7  © 9397-1258 [employer]. Care instructions adapted under license by [employer]. If you have questions about a medical condition or this instruction, always ask your healthcare professional. [address] any warranty or liability for your use of this information.

## 2018-10-05 ENCOUNTER — HOSPITAL ENCOUNTER (OUTPATIENT)
Dept: CARDIAC REHAB | Age: 83
Setting detail: THERAPIES SERIES
Discharge: HOME | End: 2018-10-05
Payer: MEDICARE

## 2018-10-05 PROCEDURE — 93798 PHYS/QHP OP CAR RHAB W/ECG: CPT

## 2018-10-08 ENCOUNTER — HOSPITAL ENCOUNTER (OUTPATIENT)
Dept: CARDIAC REHAB | Age: 83
Setting detail: THERAPIES SERIES
Discharge: HOME | End: 2018-10-08
Payer: MEDICARE

## 2018-10-08 PROCEDURE — 93798 PHYS/QHP OP CAR RHAB W/ECG: CPT

## 2018-10-10 ENCOUNTER — HOSPITAL ENCOUNTER (OUTPATIENT)
Dept: CARDIAC REHAB | Age: 83
Setting detail: THERAPIES SERIES
Discharge: HOME | End: 2018-10-10
Payer: MEDICARE

## 2018-10-10 PROCEDURE — 93798 PHYS/QHP OP CAR RHAB W/ECG: CPT

## 2018-10-12 ENCOUNTER — HOSPITAL ENCOUNTER (OUTPATIENT)
Dept: CARDIAC REHAB | Age: 83
Setting detail: THERAPIES SERIES
Discharge: HOME | End: 2018-10-12
Payer: MEDICARE

## 2018-10-12 PROCEDURE — 93798 PHYS/QHP OP CAR RHAB W/ECG: CPT

## 2018-10-15 ENCOUNTER — HOSPITAL ENCOUNTER (OUTPATIENT)
Dept: CARDIAC REHAB | Age: 83
Setting detail: THERAPIES SERIES
Discharge: HOME | End: 2018-10-15
Payer: MEDICARE

## 2018-10-15 PROCEDURE — 93798 PHYS/QHP OP CAR RHAB W/ECG: CPT

## 2018-10-17 ENCOUNTER — HOSPITAL ENCOUNTER (OUTPATIENT)
Dept: CARDIAC REHAB | Age: 83
Setting detail: THERAPIES SERIES
Discharge: HOME | End: 2018-10-17
Payer: MEDICARE

## 2018-10-17 PROCEDURE — 93798 PHYS/QHP OP CAR RHAB W/ECG: CPT

## 2018-10-19 ENCOUNTER — HOSPITAL ENCOUNTER (OUTPATIENT)
Dept: CARDIAC REHAB | Age: 83
Setting detail: THERAPIES SERIES
Discharge: HOME | End: 2018-10-19
Payer: MEDICARE

## 2018-10-19 PROCEDURE — 93798 PHYS/QHP OP CAR RHAB W/ECG: CPT

## 2018-10-22 ENCOUNTER — HOSPITAL ENCOUNTER (OUTPATIENT)
Dept: CARDIAC REHAB | Age: 83
Setting detail: THERAPIES SERIES
Discharge: HOME | End: 2018-10-22
Payer: MEDICARE

## 2018-10-22 ENCOUNTER — OFFICE VISIT (OUTPATIENT)
Dept: CARDIOLOGY CLINIC | Age: 83
End: 2018-10-22
Payer: MEDICARE

## 2018-10-22 VITALS
HEIGHT: 62 IN | HEART RATE: 61 BPM | WEIGHT: 154.4 LBS | OXYGEN SATURATION: 96 % | SYSTOLIC BLOOD PRESSURE: 132 MMHG | BODY MASS INDEX: 28.41 KG/M2 | DIASTOLIC BLOOD PRESSURE: 72 MMHG

## 2018-10-22 DIAGNOSIS — I48.0 PAROXYSMAL ATRIAL FIBRILLATION (HCC): Primary | ICD-10-CM

## 2018-10-22 DIAGNOSIS — I10 ESSENTIAL HYPERTENSION: ICD-10-CM

## 2018-10-22 DIAGNOSIS — E78.2 MIXED HYPERLIPIDEMIA: ICD-10-CM

## 2018-10-22 DIAGNOSIS — I25.10 CAD IN NATIVE ARTERY: ICD-10-CM

## 2018-10-22 PROCEDURE — 4040F PNEUMOC VAC/ADMIN/RCVD: CPT | Performed by: NURSE PRACTITIONER

## 2018-10-22 PROCEDURE — G8598 ASA/ANTIPLAT THER USED: HCPCS | Performed by: NURSE PRACTITIONER

## 2018-10-22 PROCEDURE — 1123F ACP DISCUSS/DSCN MKR DOCD: CPT | Performed by: NURSE PRACTITIONER

## 2018-10-22 PROCEDURE — G8399 PT W/DXA RESULTS DOCUMENT: HCPCS | Performed by: NURSE PRACTITIONER

## 2018-10-22 PROCEDURE — 99214 OFFICE O/P EST MOD 30 MIN: CPT | Performed by: NURSE PRACTITIONER

## 2018-10-22 PROCEDURE — G8419 CALC BMI OUT NRM PARAM NOF/U: HCPCS | Performed by: NURSE PRACTITIONER

## 2018-10-22 PROCEDURE — G8427 DOCREV CUR MEDS BY ELIG CLIN: HCPCS | Performed by: NURSE PRACTITIONER

## 2018-10-22 PROCEDURE — 1101F PT FALLS ASSESS-DOCD LE1/YR: CPT | Performed by: NURSE PRACTITIONER

## 2018-10-22 PROCEDURE — 1090F PRES/ABSN URINE INCON ASSESS: CPT | Performed by: NURSE PRACTITIONER

## 2018-10-22 PROCEDURE — G8484 FLU IMMUNIZE NO ADMIN: HCPCS | Performed by: NURSE PRACTITIONER

## 2018-10-22 PROCEDURE — 1036F TOBACCO NON-USER: CPT | Performed by: NURSE PRACTITIONER

## 2018-10-22 PROCEDURE — 93798 PHYS/QHP OP CAR RHAB W/ECG: CPT

## 2018-10-22 NOTE — PROGRESS NOTES
symptoms. PSYCHIATRIC: No anxiety, pain, insomnia or depression    Objective:   PHYSICAL EXAM:        VITALS:   Vitals:    10/22/18 1126   BP: 132/72   Pulse: 61   SpO2: 96%               CONSTITUTIONAL: Cooperative, no apparent distress, and appears well nourished / developed  NEUROLOGIC:  Awake and orientated to person, place and time. PSYCH: Calm affect. SKIN: Warm and dry. HEENT: Sclera non-icteric, normocephalic, neck supple, no elevation of JVP, normal carotid pulses with no bruits and thyroid normal size. LUNGS:  No increased work of breathing and clear to auscultation, no crackles or wheezing. CARDIOVASCULAR:  Regular rate and rhythm with no murmurs, gallops, rubs, or abnormal heart sounds, normal PMI. The apical impulses not displaced. Heart tones are crisp and normal    Cervical veins are not engorged                 JVP less than 8 cm H2O                                                                              The carotid upstroke is normal in amplitude and contour without delay or bruit    ABDOMEN:  Normal bowel sounds, non-distended and non-tender to palpation   EXT: No edema, no calf tenderness. Pulses are present bilaterally.     DATA:    Lab Results   Component Value Date    ALT 24 08/31/2018    AST 24 08/31/2018    ALKPHOS 95 08/31/2018    BILITOT 1.1 (H) 08/31/2018     Lab Results   Component Value Date    CREATININE <0.5 (L) 08/31/2018    BUN 12 08/31/2018     08/31/2018    K 3.9 08/31/2018     08/31/2018    CO2 23 08/31/2018     Lab Results   Component Value Date    TSH 3.50 08/31/2018    G1CNHFS 10.0 12/10/2012     Lab Results   Component Value Date    WBC 5.7 08/31/2018    HGB 13.1 08/31/2018    HCT 39.3 08/31/2018    MCV 89.5 08/31/2018     08/31/2018     No components found for: CHLPL  Lab Results   Component Value Date    TRIG 107 07/07/2018    TRIG 177 (H) 04/03/2018    TRIG 170 (H) 12/21/2016     Lab Results   Component Value Date HDL 59 2018    HDL 66 (H) 2018    HDL 68 (H) 2018     Lab Results   Component Value Date    LDLCALC 50 2018    LDLCALC 122 (H) 2018    LDLCALC 148 (H) 2018     Lab Results   Component Value Date    LABVLDL 21 2018    LABVLDL 28 2018    LABVLDL 35 2018     Radiology Review:  Pertinent images / reports were reviewed as a part of this visit and reveals the followin/30/18: Procedure:    Off pump CABG  X  2  Lima to LAD, SVG to D2    Diagnostics:    Procedure:     [facility] 18  Indication:      NSTEMI  Consent:        Verbal and/or written consent obtained prior to procedure. Sedation:        Minimal conscious sedation utilized for comfort. Complic:         None  EBL:                <10cc  Specimens:    None  Fluoro:            4.6 min  Contrast:        42 cc  Access:          RCF  Findings:                      LM       Normal              LAD     Mid 99% trifurcation involving D1 and D2 with GILBERTO 2 flow,               Cx        Normal              RCA     20% mid              LVG     50%, apical hk              EDP     17 mmHg  Intervention:  None  Recs:              Urgent CABG                          No procedural complications meriting CABG. Due to ongoing cp and severe LAD disease, IABP placed with improvement in cp     Last ECHO: 18   Ejection fraction is visually estimated to be 40%.  There is apical   hypokinesis noted.   Mild mitral regurgitation.   Mild-to-moderate tricuspid regurgitation.  Marla Peoples is a trivial pericardial effusion noted.       Last EC18 sinus rhythm with T wave inversion anterior leads - patient denies any anginal symptoms and unable to schedule plain GXT due to mobility ( she walks with a cane)    Assessment:     ~ Coronary artery disease      s/p CABG X  2  Lima to LAD, SVG to D2   60%  50% LM-nl, Cx-nl, LAD-nl, RCA-nl  Severe trifurcational LAD disease   On ASA, Toprol XL, and

## 2018-10-23 ENCOUNTER — TELEPHONE (OUTPATIENT)
Dept: FAMILY MEDICINE CLINIC | Age: 83
End: 2018-10-23

## 2018-10-24 ENCOUNTER — HOSPITAL ENCOUNTER (OUTPATIENT)
Dept: CARDIAC REHAB | Age: 83
Setting detail: THERAPIES SERIES
Discharge: HOME | End: 2018-10-24
Payer: MEDICARE

## 2018-10-24 PROCEDURE — 93798 PHYS/QHP OP CAR RHAB W/ECG: CPT

## 2018-10-26 ENCOUNTER — HOSPITAL ENCOUNTER (OUTPATIENT)
Dept: CARDIAC REHAB | Age: 83
Setting detail: THERAPIES SERIES
Discharge: HOME | End: 2018-10-26
Payer: MEDICARE

## 2018-10-26 PROCEDURE — 93798 PHYS/QHP OP CAR RHAB W/ECG: CPT

## 2018-10-29 ENCOUNTER — TELEPHONE (OUTPATIENT)
Dept: FAMILY MEDICINE CLINIC | Age: 83
End: 2018-10-29

## 2018-10-29 ENCOUNTER — HOSPITAL ENCOUNTER (OUTPATIENT)
Dept: CARDIAC REHAB | Age: 83
Setting detail: THERAPIES SERIES
Discharge: HOME | End: 2018-10-29
Payer: MEDICARE

## 2018-10-29 PROCEDURE — 93798 PHYS/QHP OP CAR RHAB W/ECG: CPT

## 2018-10-29 RX ORDER — METOPROLOL SUCCINATE 50 MG/1
50 TABLET, EXTENDED RELEASE ORAL DAILY
Qty: 14 TABLET | Refills: 0 | Status: SHIPPED | OUTPATIENT
Start: 2018-10-29 | End: 2019-03-14 | Stop reason: DRUGHIGH

## 2018-10-31 ENCOUNTER — HOSPITAL ENCOUNTER (OUTPATIENT)
Dept: CARDIAC REHAB | Age: 83
Setting detail: THERAPIES SERIES
Discharge: HOME | End: 2018-10-31
Payer: MEDICARE

## 2018-10-31 PROCEDURE — 93798 PHYS/QHP OP CAR RHAB W/ECG: CPT

## 2018-11-02 ENCOUNTER — HOSPITAL ENCOUNTER (OUTPATIENT)
Dept: CARDIAC REHAB | Age: 83
Setting detail: THERAPIES SERIES
Discharge: HOME | End: 2018-11-02
Payer: MEDICARE

## 2018-11-02 PROCEDURE — 93798 PHYS/QHP OP CAR RHAB W/ECG: CPT

## 2018-11-02 RX ORDER — ATORVASTATIN CALCIUM 80 MG/1
80 TABLET, FILM COATED ORAL NIGHTLY
Qty: 90 TABLET | Refills: 2 | Status: SHIPPED | OUTPATIENT
Start: 2018-11-02 | End: 2019-10-17 | Stop reason: SDUPTHER

## 2018-11-05 ENCOUNTER — HOSPITAL ENCOUNTER (OUTPATIENT)
Dept: CARDIAC REHAB | Age: 83
Setting detail: THERAPIES SERIES
Discharge: HOME | End: 2018-11-05
Payer: MEDICARE

## 2018-11-06 RX ORDER — AMLODIPINE BESYLATE 5 MG/1
5 TABLET ORAL DAILY
Qty: 90 TABLET | Refills: 3 | Status: SHIPPED | OUTPATIENT
Start: 2018-11-06 | End: 2018-11-15 | Stop reason: ALTCHOICE

## 2018-11-07 ENCOUNTER — HOSPITAL ENCOUNTER (OUTPATIENT)
Dept: CARDIAC REHAB | Age: 83
Setting detail: THERAPIES SERIES
Discharge: HOME | End: 2018-11-07
Payer: MEDICARE

## 2018-11-07 PROCEDURE — 93798 PHYS/QHP OP CAR RHAB W/ECG: CPT

## 2018-11-09 ENCOUNTER — HOSPITAL ENCOUNTER (OUTPATIENT)
Dept: CARDIAC REHAB | Age: 83
Setting detail: THERAPIES SERIES
Discharge: HOME | End: 2018-11-09
Payer: MEDICARE

## 2018-11-09 PROCEDURE — 93798 PHYS/QHP OP CAR RHAB W/ECG: CPT

## 2018-11-12 ENCOUNTER — HOSPITAL ENCOUNTER (OUTPATIENT)
Dept: CARDIAC REHAB | Age: 83
Setting detail: THERAPIES SERIES
Discharge: HOME | End: 2018-11-12
Payer: MEDICARE

## 2018-11-12 PROCEDURE — 93798 PHYS/QHP OP CAR RHAB W/ECG: CPT

## 2018-11-14 ENCOUNTER — HOSPITAL ENCOUNTER (OUTPATIENT)
Dept: CARDIAC REHAB | Age: 83
Setting detail: THERAPIES SERIES
Discharge: HOME | End: 2018-11-14
Payer: MEDICARE

## 2018-11-15 ENCOUNTER — OFFICE VISIT (OUTPATIENT)
Dept: FAMILY MEDICINE CLINIC | Age: 83
End: 2018-11-15
Payer: MEDICARE

## 2018-11-15 VITALS
SYSTOLIC BLOOD PRESSURE: 128 MMHG | HEART RATE: 72 BPM | RESPIRATION RATE: 20 BRPM | BODY MASS INDEX: 28.56 KG/M2 | HEIGHT: 62 IN | DIASTOLIC BLOOD PRESSURE: 84 MMHG | OXYGEN SATURATION: 98 % | WEIGHT: 155.2 LBS

## 2018-11-15 DIAGNOSIS — I25.10 CORONARY ARTERY DISEASE INVOLVING NATIVE CORONARY ARTERY OF NATIVE HEART WITHOUT ANGINA PECTORIS: Primary | ICD-10-CM

## 2018-11-15 DIAGNOSIS — I73.9 INTERMITTENT CLAUDICATION (HCC): ICD-10-CM

## 2018-11-15 DIAGNOSIS — R07.89 CHEST WALL PAIN: ICD-10-CM

## 2018-11-15 DIAGNOSIS — M85.80 OSTEOPENIA, UNSPECIFIED LOCATION: ICD-10-CM

## 2018-11-15 DIAGNOSIS — M19.90 ARTHRITIS: ICD-10-CM

## 2018-11-15 DIAGNOSIS — L40.9 PSORIASIS: ICD-10-CM

## 2018-11-15 DIAGNOSIS — I10 ESSENTIAL HYPERTENSION: ICD-10-CM

## 2018-11-15 PROCEDURE — G8399 PT W/DXA RESULTS DOCUMENT: HCPCS | Performed by: FAMILY MEDICINE

## 2018-11-15 PROCEDURE — 99214 OFFICE O/P EST MOD 30 MIN: CPT | Performed by: FAMILY MEDICINE

## 2018-11-15 PROCEDURE — G8598 ASA/ANTIPLAT THER USED: HCPCS | Performed by: FAMILY MEDICINE

## 2018-11-15 PROCEDURE — 1123F ACP DISCUSS/DSCN MKR DOCD: CPT | Performed by: FAMILY MEDICINE

## 2018-11-15 PROCEDURE — 1090F PRES/ABSN URINE INCON ASSESS: CPT | Performed by: FAMILY MEDICINE

## 2018-11-15 PROCEDURE — G8419 CALC BMI OUT NRM PARAM NOF/U: HCPCS | Performed by: FAMILY MEDICINE

## 2018-11-15 PROCEDURE — G8427 DOCREV CUR MEDS BY ELIG CLIN: HCPCS | Performed by: FAMILY MEDICINE

## 2018-11-15 PROCEDURE — G8482 FLU IMMUNIZE ORDER/ADMIN: HCPCS | Performed by: FAMILY MEDICINE

## 2018-11-15 PROCEDURE — 1101F PT FALLS ASSESS-DOCD LE1/YR: CPT | Performed by: FAMILY MEDICINE

## 2018-11-15 PROCEDURE — 4040F PNEUMOC VAC/ADMIN/RCVD: CPT | Performed by: FAMILY MEDICINE

## 2018-11-15 PROCEDURE — 1036F TOBACCO NON-USER: CPT | Performed by: FAMILY MEDICINE

## 2018-11-15 RX ORDER — LISINOPRIL 2.5 MG/1
1 TABLET ORAL DAILY
COMMUNITY
Start: 2018-11-06 | End: 2019-02-15

## 2018-11-15 ASSESSMENT — PATIENT HEALTH QUESTIONNAIRE - PHQ9
SUM OF ALL RESPONSES TO PHQ9 QUESTIONS 1 & 2: 0
2. FEELING DOWN, DEPRESSED OR HOPELESS: 0
1. LITTLE INTEREST OR PLEASURE IN DOING THINGS: 0
SUM OF ALL RESPONSES TO PHQ QUESTIONS 1-9: 0
SUM OF ALL RESPONSES TO PHQ QUESTIONS 1-9: 0

## 2018-11-16 ENCOUNTER — APPOINTMENT (OUTPATIENT)
Dept: CARDIAC REHAB | Age: 83
End: 2018-11-16
Payer: MEDICARE

## 2018-11-19 ENCOUNTER — APPOINTMENT (OUTPATIENT)
Dept: CARDIAC REHAB | Age: 83
End: 2018-11-19
Payer: MEDICARE

## 2018-11-21 ENCOUNTER — HOSPITAL ENCOUNTER (OUTPATIENT)
Dept: VASCULAR LAB | Age: 83
Discharge: HOME | End: 2018-11-21
Payer: MEDICARE

## 2018-11-21 ENCOUNTER — APPOINTMENT (OUTPATIENT)
Dept: CARDIAC REHAB | Age: 83
End: 2018-11-21
Payer: MEDICARE

## 2018-11-21 DIAGNOSIS — I73.9 INTERMITTENT CLAUDICATION (HCC): ICD-10-CM

## 2018-11-21 PROCEDURE — 93922 UPR/L XTREMITY ART 2 LEVELS: CPT

## 2018-11-26 ENCOUNTER — APPOINTMENT (OUTPATIENT)
Dept: CARDIAC REHAB | Age: 83
End: 2018-11-26
Payer: MEDICARE

## 2018-11-27 ENCOUNTER — TELEPHONE (OUTPATIENT)
Dept: FAMILY MEDICINE CLINIC | Age: 83
End: 2018-11-27

## 2018-11-28 ENCOUNTER — APPOINTMENT (OUTPATIENT)
Dept: CARDIAC REHAB | Age: 83
End: 2018-11-28
Payer: MEDICARE

## 2018-11-30 ENCOUNTER — APPOINTMENT (OUTPATIENT)
Dept: CARDIAC REHAB | Age: 83
End: 2018-11-30
Payer: MEDICARE

## 2019-02-15 ENCOUNTER — OFFICE VISIT (OUTPATIENT)
Dept: FAMILY MEDICINE CLINIC | Age: 84
End: 2019-02-15
Payer: MEDICARE

## 2019-02-15 VITALS
SYSTOLIC BLOOD PRESSURE: 128 MMHG | BODY MASS INDEX: 28.71 KG/M2 | WEIGHT: 156 LBS | HEIGHT: 62 IN | DIASTOLIC BLOOD PRESSURE: 76 MMHG | HEART RATE: 66 BPM | RESPIRATION RATE: 14 BRPM

## 2019-02-15 DIAGNOSIS — E80.6 HYPERBILIRUBINEMIA: ICD-10-CM

## 2019-02-15 DIAGNOSIS — G89.29 CHRONIC LEFT-SIDED LOW BACK PAIN WITHOUT SCIATICA: ICD-10-CM

## 2019-02-15 DIAGNOSIS — M70.61 TROCHANTERIC BURSITIS OF BOTH HIPS: ICD-10-CM

## 2019-02-15 DIAGNOSIS — I10 ESSENTIAL HYPERTENSION: ICD-10-CM

## 2019-02-15 DIAGNOSIS — M19.90 ARTHRITIS: ICD-10-CM

## 2019-02-15 DIAGNOSIS — I48.0 PAROXYSMAL ATRIAL FIBRILLATION (HCC): ICD-10-CM

## 2019-02-15 DIAGNOSIS — M70.62 TROCHANTERIC BURSITIS OF BOTH HIPS: ICD-10-CM

## 2019-02-15 DIAGNOSIS — M25.473 ANKLE SWELLING, UNSPECIFIED LATERALITY: ICD-10-CM

## 2019-02-15 DIAGNOSIS — I71.21 THORACIC ASCENDING AORTIC ANEURYSM: ICD-10-CM

## 2019-02-15 DIAGNOSIS — I25.10 CORONARY ARTERY DISEASE INVOLVING NATIVE CORONARY ARTERY OF NATIVE HEART WITHOUT ANGINA PECTORIS: ICD-10-CM

## 2019-02-15 DIAGNOSIS — M54.50 CHRONIC LEFT-SIDED LOW BACK PAIN WITHOUT SCIATICA: ICD-10-CM

## 2019-02-15 DIAGNOSIS — E78.00 HYPERCHOLESTEREMIA: Primary | ICD-10-CM

## 2019-02-15 LAB
A/G RATIO: 2.4 (ref 1.1–2.2)
ALBUMIN SERPL-MCNC: 4.8 G/DL (ref 3.4–5)
ALP BLD-CCNC: 70 U/L (ref 40–129)
ALT SERPL-CCNC: 17 U/L (ref 10–40)
ANION GAP SERPL CALCULATED.3IONS-SCNC: 13 MMOL/L (ref 3–16)
AST SERPL-CCNC: 20 U/L (ref 15–37)
BILIRUB SERPL-MCNC: 1.2 MG/DL (ref 0–1)
BUN BLDV-MCNC: 13 MG/DL (ref 7–20)
CALCIUM SERPL-MCNC: 10.2 MG/DL (ref 8.3–10.6)
CHLORIDE BLD-SCNC: 101 MMOL/L (ref 99–110)
CHOLESTEROL, TOTAL: 160 MG/DL (ref 0–199)
CO2: 26 MMOL/L (ref 21–32)
CREAT SERPL-MCNC: 0.6 MG/DL (ref 0.6–1.2)
GFR AFRICAN AMERICAN: >60
GFR NON-AFRICAN AMERICAN: >60
GLOBULIN: 2 G/DL
GLUCOSE BLD-MCNC: 86 MG/DL (ref 70–99)
HDLC SERPL-MCNC: 79 MG/DL (ref 40–60)
LDL CHOLESTEROL CALCULATED: 48 MG/DL
POTASSIUM SERPL-SCNC: 4 MMOL/L (ref 3.5–5.1)
SODIUM BLD-SCNC: 140 MMOL/L (ref 136–145)
TOTAL PROTEIN: 6.8 G/DL (ref 6.4–8.2)
TRIGL SERPL-MCNC: 167 MG/DL (ref 0–150)
VLDLC SERPL CALC-MCNC: 33 MG/DL

## 2019-02-15 PROCEDURE — 36415 COLL VENOUS BLD VENIPUNCTURE: CPT | Performed by: FAMILY MEDICINE

## 2019-02-15 PROCEDURE — 99214 OFFICE O/P EST MOD 30 MIN: CPT | Performed by: FAMILY MEDICINE

## 2019-02-15 PROCEDURE — 1101F PT FALLS ASSESS-DOCD LE1/YR: CPT | Performed by: FAMILY MEDICINE

## 2019-02-15 PROCEDURE — 4040F PNEUMOC VAC/ADMIN/RCVD: CPT | Performed by: FAMILY MEDICINE

## 2019-02-15 PROCEDURE — 1036F TOBACCO NON-USER: CPT | Performed by: FAMILY MEDICINE

## 2019-02-15 PROCEDURE — G8399 PT W/DXA RESULTS DOCUMENT: HCPCS | Performed by: FAMILY MEDICINE

## 2019-02-15 PROCEDURE — G8419 CALC BMI OUT NRM PARAM NOF/U: HCPCS | Performed by: FAMILY MEDICINE

## 2019-02-15 PROCEDURE — G8427 DOCREV CUR MEDS BY ELIG CLIN: HCPCS | Performed by: FAMILY MEDICINE

## 2019-02-15 PROCEDURE — 1123F ACP DISCUSS/DSCN MKR DOCD: CPT | Performed by: FAMILY MEDICINE

## 2019-02-15 PROCEDURE — G8482 FLU IMMUNIZE ORDER/ADMIN: HCPCS | Performed by: FAMILY MEDICINE

## 2019-02-15 PROCEDURE — 1090F PRES/ABSN URINE INCON ASSESS: CPT | Performed by: FAMILY MEDICINE

## 2019-02-15 PROCEDURE — G8599 NO ASA/ANTIPLAT THER USE RNG: HCPCS | Performed by: FAMILY MEDICINE

## 2019-02-15 RX ORDER — METOPROLOL SUCCINATE 100 MG/1
100 TABLET, EXTENDED RELEASE ORAL DAILY
Qty: 30 TABLET | Refills: 2 | Status: SHIPPED | OUTPATIENT
Start: 2019-02-15 | End: 2019-03-14 | Stop reason: SDUPTHER

## 2019-02-15 ASSESSMENT — PATIENT HEALTH QUESTIONNAIRE - PHQ9
SUM OF ALL RESPONSES TO PHQ QUESTIONS 1-9: 0
SUM OF ALL RESPONSES TO PHQ QUESTIONS 1-9: 0
2. FEELING DOWN, DEPRESSED OR HOPELESS: 0
SUM OF ALL RESPONSES TO PHQ9 QUESTIONS 1 & 2: 0
1. LITTLE INTEREST OR PLEASURE IN DOING THINGS: 0

## 2019-03-12 ENCOUNTER — TELEPHONE (OUTPATIENT)
Dept: FAMILY MEDICINE CLINIC | Age: 84
End: 2019-03-12

## 2019-03-14 ENCOUNTER — OFFICE VISIT (OUTPATIENT)
Dept: FAMILY MEDICINE CLINIC | Age: 84
End: 2019-03-14
Payer: MEDICARE

## 2019-03-14 VITALS
HEIGHT: 62 IN | SYSTOLIC BLOOD PRESSURE: 126 MMHG | HEART RATE: 53 BPM | WEIGHT: 164.8 LBS | BODY MASS INDEX: 30.33 KG/M2 | DIASTOLIC BLOOD PRESSURE: 74 MMHG | OXYGEN SATURATION: 98 %

## 2019-03-14 DIAGNOSIS — I10 ESSENTIAL HYPERTENSION: ICD-10-CM

## 2019-03-14 PROCEDURE — G8482 FLU IMMUNIZE ORDER/ADMIN: HCPCS | Performed by: FAMILY MEDICINE

## 2019-03-14 PROCEDURE — 1123F ACP DISCUSS/DSCN MKR DOCD: CPT | Performed by: FAMILY MEDICINE

## 2019-03-14 PROCEDURE — 1101F PT FALLS ASSESS-DOCD LE1/YR: CPT | Performed by: FAMILY MEDICINE

## 2019-03-14 PROCEDURE — 4040F PNEUMOC VAC/ADMIN/RCVD: CPT | Performed by: FAMILY MEDICINE

## 2019-03-14 PROCEDURE — G8417 CALC BMI ABV UP PARAM F/U: HCPCS | Performed by: FAMILY MEDICINE

## 2019-03-14 PROCEDURE — 99213 OFFICE O/P EST LOW 20 MIN: CPT | Performed by: FAMILY MEDICINE

## 2019-03-14 PROCEDURE — 1036F TOBACCO NON-USER: CPT | Performed by: FAMILY MEDICINE

## 2019-03-14 PROCEDURE — G8427 DOCREV CUR MEDS BY ELIG CLIN: HCPCS | Performed by: FAMILY MEDICINE

## 2019-03-14 PROCEDURE — G8399 PT W/DXA RESULTS DOCUMENT: HCPCS | Performed by: FAMILY MEDICINE

## 2019-03-14 PROCEDURE — 1090F PRES/ABSN URINE INCON ASSESS: CPT | Performed by: FAMILY MEDICINE

## 2019-03-14 PROCEDURE — G8599 NO ASA/ANTIPLAT THER USE RNG: HCPCS | Performed by: FAMILY MEDICINE

## 2019-03-14 RX ORDER — TRIAMTERENE AND HYDROCHLOROTHIAZIDE 37.5; 25 MG/1; MG/1
1 TABLET ORAL EVERY MORNING
Qty: 90 TABLET | Refills: 3 | Status: SHIPPED
Start: 2019-03-14 | End: 2019-08-08 | Stop reason: SDUPTHER

## 2019-03-14 RX ORDER — METOPROLOL SUCCINATE 50 MG/1
50 TABLET, EXTENDED RELEASE ORAL NIGHTLY
Qty: 30 TABLET | Refills: 0 | Status: SHIPPED
Start: 2019-03-14 | End: 2019-08-08 | Stop reason: SDUPTHER

## 2019-03-14 ASSESSMENT — ENCOUNTER SYMPTOMS
DIARRHEA: 0
SHORTNESS OF BREATH: 0
VOMITING: 0
ABDOMINAL PAIN: 0
COUGH: 0

## 2019-03-21 ENCOUNTER — OFFICE VISIT (OUTPATIENT)
Dept: FAMILY MEDICINE CLINIC | Age: 84
End: 2019-03-21
Payer: MEDICARE

## 2019-03-21 VITALS
HEART RATE: 60 BPM | HEIGHT: 62 IN | SYSTOLIC BLOOD PRESSURE: 130 MMHG | BODY MASS INDEX: 30.18 KG/M2 | WEIGHT: 164 LBS | RESPIRATION RATE: 16 BRPM | DIASTOLIC BLOOD PRESSURE: 74 MMHG

## 2019-03-21 DIAGNOSIS — M17.10 ARTHRITIS OF KNEE: ICD-10-CM

## 2019-03-21 DIAGNOSIS — I25.10 CORONARY ARTERY DISEASE INVOLVING NATIVE CORONARY ARTERY OF NATIVE HEART WITHOUT ANGINA PECTORIS: ICD-10-CM

## 2019-03-21 DIAGNOSIS — F51.04 CHRONIC INSOMNIA: ICD-10-CM

## 2019-03-21 DIAGNOSIS — R63.5 WEIGHT GAIN: ICD-10-CM

## 2019-03-21 DIAGNOSIS — I10 ESSENTIAL HYPERTENSION: Primary | ICD-10-CM

## 2019-03-21 DIAGNOSIS — R07.89 CHEST WALL PAIN: ICD-10-CM

## 2019-03-21 DIAGNOSIS — R60.0 PEDAL EDEMA: ICD-10-CM

## 2019-03-21 DIAGNOSIS — J31.0 CHRONIC RHINITIS: ICD-10-CM

## 2019-03-21 PROCEDURE — 99214 OFFICE O/P EST MOD 30 MIN: CPT | Performed by: FAMILY MEDICINE

## 2019-03-21 PROCEDURE — G8599 NO ASA/ANTIPLAT THER USE RNG: HCPCS | Performed by: FAMILY MEDICINE

## 2019-03-21 PROCEDURE — 4040F PNEUMOC VAC/ADMIN/RCVD: CPT | Performed by: FAMILY MEDICINE

## 2019-03-21 PROCEDURE — 1101F PT FALLS ASSESS-DOCD LE1/YR: CPT | Performed by: FAMILY MEDICINE

## 2019-03-21 PROCEDURE — G8427 DOCREV CUR MEDS BY ELIG CLIN: HCPCS | Performed by: FAMILY MEDICINE

## 2019-03-21 PROCEDURE — 1123F ACP DISCUSS/DSCN MKR DOCD: CPT | Performed by: FAMILY MEDICINE

## 2019-03-21 PROCEDURE — G8482 FLU IMMUNIZE ORDER/ADMIN: HCPCS | Performed by: FAMILY MEDICINE

## 2019-03-21 PROCEDURE — G8417 CALC BMI ABV UP PARAM F/U: HCPCS | Performed by: FAMILY MEDICINE

## 2019-03-21 PROCEDURE — 1090F PRES/ABSN URINE INCON ASSESS: CPT | Performed by: FAMILY MEDICINE

## 2019-03-21 PROCEDURE — 1036F TOBACCO NON-USER: CPT | Performed by: FAMILY MEDICINE

## 2019-03-21 PROCEDURE — G8399 PT W/DXA RESULTS DOCUMENT: HCPCS | Performed by: FAMILY MEDICINE

## 2019-04-22 ENCOUNTER — OFFICE VISIT (OUTPATIENT)
Dept: CARDIOLOGY CLINIC | Age: 84
End: 2019-04-22
Payer: MEDICARE

## 2019-04-22 VITALS
HEIGHT: 62 IN | SYSTOLIC BLOOD PRESSURE: 130 MMHG | BODY MASS INDEX: 31.28 KG/M2 | DIASTOLIC BLOOD PRESSURE: 70 MMHG | WEIGHT: 170 LBS | HEART RATE: 61 BPM | OXYGEN SATURATION: 98 %

## 2019-04-22 DIAGNOSIS — I25.10 CAD IN NATIVE ARTERY: ICD-10-CM

## 2019-04-22 DIAGNOSIS — I10 ESSENTIAL HYPERTENSION: Primary | ICD-10-CM

## 2019-04-22 DIAGNOSIS — E78.2 MIXED HYPERLIPIDEMIA: ICD-10-CM

## 2019-04-22 PROCEDURE — 1036F TOBACCO NON-USER: CPT | Performed by: NURSE PRACTITIONER

## 2019-04-22 PROCEDURE — 1090F PRES/ABSN URINE INCON ASSESS: CPT | Performed by: NURSE PRACTITIONER

## 2019-04-22 PROCEDURE — G8599 NO ASA/ANTIPLAT THER USE RNG: HCPCS | Performed by: NURSE PRACTITIONER

## 2019-04-22 PROCEDURE — G8417 CALC BMI ABV UP PARAM F/U: HCPCS | Performed by: NURSE PRACTITIONER

## 2019-04-22 PROCEDURE — 1123F ACP DISCUSS/DSCN MKR DOCD: CPT | Performed by: NURSE PRACTITIONER

## 2019-04-22 PROCEDURE — 4040F PNEUMOC VAC/ADMIN/RCVD: CPT | Performed by: NURSE PRACTITIONER

## 2019-04-22 PROCEDURE — 99214 OFFICE O/P EST MOD 30 MIN: CPT | Performed by: NURSE PRACTITIONER

## 2019-04-22 PROCEDURE — G8399 PT W/DXA RESULTS DOCUMENT: HCPCS | Performed by: NURSE PRACTITIONER

## 2019-04-22 PROCEDURE — G8427 DOCREV CUR MEDS BY ELIG CLIN: HCPCS | Performed by: NURSE PRACTITIONER

## 2019-04-22 RX ORDER — ERYTHROMYCIN 5 MG/G
OINTMENT OPHTHALMIC NIGHTLY
COMMUNITY
End: 2019-06-21 | Stop reason: ALTCHOICE

## 2019-04-22 NOTE — PROGRESS NOTES
Guerita Thorne     Outpatient Follow Up Note    CHIEF COMPLAINT / HPI: Follow Up secondary to CAD s/p CABG/ hypertension/  Hyperlipidemia/ and atrial fibrillation     Chapo Molina is 82 y.o. female who presents today for a routine follow up  related to the above mentioned issues. Subjective:   Since the time of last office visit, the patient admits their symptoms have not changed. Patient is being seen for a follow up visit secondary to CAD s/p CABG/ HTN/ hyperlipidemia and post op atrial fibrillation. At prior office visit on 18 patient stopped amiodarone on her own due to fatigue, generalized weakness, and hair loss. She stop wearing the event monitor. She is currently in regular rhythm. She denies any chest pain, palpitations, SOB , dizziness, or edema. With regard to medication therapy the patient has been compliant with prescribed regimen. They have tolerated therapy to date.      Past Medical History:   Diagnosis Date    Arthritis     Cystitis, interstitial     GERD (gastroesophageal reflux disease)     GI bleeding     was a frequent aspirin user at that time    Heart burn     Hypertension     Interstitial cystitis     Patient is Islam     no blood products    Psoriasis     Thoracic aortic aneurysm [facility])     cardiologist watching     Social History:    Social History     Tobacco Use   Smoking Status Former Smoker    Years: 7.00    Types: Cigarettes    Last attempt to quit: 5/3/1960    Years since quittin.0   Smokeless Tobacco Never Used   Tobacco Comment    AS A TEENAGER FOR ABOUT 6 YEARS      Current Medications:  Current Outpatient Medications   Medication Sig Dispense Refill    erythromycin (ROMYCIN) 5 MG/GM ophthalmic ointment Place into both eyes nightly      metoprolol succinate (TOPROL XL) 50 MG extended release tablet Take 1 tablet by mouth nightly 30 tablet 0    triamterene-hydrochlorothiazide (MAXZIDE-25) 37.5-25 MG per tablet Take 1 tablet by mouth every morning 90 tablet 3    atorvastatin (LIPITOR) 80 MG tablet Take 1 tablet by mouth nightly 90 tablet 2    Calcium Polycarbophil (FIBER-CAPS PO) Take by mouth      Acetaminophen (TYLENOL 8 HOUR ARTHRITIS PAIN PO) Take 500 mg by mouth 4 times daily      folic acid (FOLVITE) 1 MG tablet Take 1 tablet by mouth daily 60 tablet 0    vitamin B-12 100 MCG tablet Take 1 tablet by mouth daily 60 tablet 0    docusate sodium (COLACE, DULCOLAX) 100 MG CAPS Take 100 mg by mouth 2 times daily 60 capsule 0    vitamin E 1000 units capsule Take 1,000 Units by mouth daily      Cholecalciferol (VITAMIN D3) 2000 units CAPS Take 2,000 Units by mouth      aspirin 81 MG chewable tablet Take 1 tablet by mouth daily 30 tablet 3    clotrimazole-betamethasone (LOTRISONE) 1-0.05 % cream Apply topically as needed Apply topically 2 times daily. No current facility-administered medications for this visit. REVIEW OF SYSTEMS:   CONSTITUTIONAL: No major weight gain or loss, fatigue, weakness, night sweats or fever. There's been no change in energy level, sleep pattern, or activity level. HEENT: No new vision difficulties or ringing in the ears. RESPIRATORY: No new SOB, PND, orthopnea or cough. CARDIOVASCULAR: See HPI  GI: No nausea, vomiting, diarrhea, constipation, abdominal pain or changes in bowel habits. : No urinary frequency, urgency, incontinence hematuria or dysuria. SKIN: No cyanosis or skin lesions. MUSCULOSKELETAL: No new muscle or joint pain. NEUROLOGICAL: No syncope or TIA-like symptoms. PSYCHIATRIC: No anxiety, pain, insomnia or depression    Objective:   PHYSICAL EXAM:        VITALS:   Vitals:    04/22/19 0959   BP: 130/70   Pulse: 61   SpO2: 98%               CONSTITUTIONAL: Cooperative, no apparent distress, and appears well nourished / developed  NEUROLOGIC:  Awake and orientated to person, place and time. PSYCH: Calm affect. SKIN: Warm and dry.   HEENT: Sclera non-icteric, normocephalic, neck supple, no elevation of JVP, normal carotid pulses with no bruits and thyroid normal size. LUNGS:  No increased work of breathing and clear to auscultation, no crackles or wheezing. CARDIOVASCULAR:  Regular rate and rhythm with no murmurs, gallops, rubs, or abnormal heart sounds, normal PMI. The apical impulses not displaced. Heart tones are crisp and normal    Cervical veins are not engorged                 JVP less than 8 cm H2O                                                                              The carotid upstroke is normal in amplitude and contour without delay or bruit    ABDOMEN:  Normal bowel sounds, non-distended and non-tender to palpation   EXT: No edema, no calf tenderness. Pulses are present bilaterally.     DATA:    Lab Results   Component Value Date    ALT 17 02/15/2019    AST 20 02/15/2019    ALKPHOS 70 02/15/2019    BILITOT 1.2 (H) 02/15/2019     Lab Results   Component Value Date    CREATININE 0.6 02/15/2019    BUN 13 02/15/2019     02/15/2019    K 4.0 02/15/2019     02/15/2019    CO2 26 02/15/2019     Lab Results   Component Value Date    TSH 3.50 08/31/2018    [id number] 10.0 12/10/2012     Lab Results   Component Value Date    WBC 5.7 08/31/2018    HGB 13.1 08/31/2018    HCT 39.3 08/31/2018    MCV 89.5 08/31/2018     08/31/2018     No components found for: CHLPL  Lab Results   Component Value Date    TRIG 167 (H) 02/15/2019    TRIG 107 07/07/2018    TRIG 177 (H) 04/03/2018     Lab Results   Component Value Date    HDL 79 (H) 02/15/2019    HDL 59 07/07/2018    HDL 66 (H) 05/29/2018     Lab Results   Component Value Date    LDLCALC 48 02/15/2019    LDLCALC 50 07/07/2018    LDLCALC 122 (H) 05/29/2018     Lab Results   Component Value Date    LABVLDL 33 02/15/2019    LABVLDL 21 07/07/2018    LABVLDL 28 05/29/2018     Radiology Review:  Pertinent images / reports were reviewed as a part of this visit and reveals the afib        Plan : continue ASA     ~ Confucianism         Patient  is stable since last office visit. Plan:   Continue current medications  Follow up in six months     I have addressed the patient's cardiac risk factors and adjusted pharmacologic treatment as needed. In addition, I have reinforced the need for patient directed risk factor modification. Further evaluation will be based upon the patient's clinical course and testing results. All questions and concerns were addressed to the patient/family. Alternatives to  treatment were discussed. The patient  currently  is not smoking. The risks related to smoking were reviewed with the patient. Recommend maintaining a smoke-free lifestyle. Products available for smoking cessation were discussed. Daily weight, low sodium diet were discussed. Patient instructed to call the office with a weight gain: > 3 lbs over night or 5 lbs in one week; swelling, SOB/orthopnea/PND    Dual Antiplatelet therapy has been prescribed for this patient. Education conducted on adverse reactions including bleeding was discussed. The patient verbalizes understanding. Pt is on a BB  Pt is not on an ace-i/ARB- cough   Pt is on a statin    Saturated fat diet discussed  Exercise program discussed    Thank you for allowing to us to participate in the care of Jarad Ramires.       Ashley NIELSON

## 2019-04-22 NOTE — PROGRESS NOTES
[address]     Outpatient Follow Up Note    CHIEF COMPLAINT / HPI: Follow Up secondary to CAD s/p CABG/ hypertension/  Hyperlipidemia/ and atrial fibrillation     Chapo Molina is 82 y.o. female who presents today for a routine follow up  related to the above mentioned issues. Subjective:   Since the time of last office visit, the patient admits their symptoms have not changed. Patient is being seen for a follow up visit secondary to CAD s/p CABG/ HTN/ hyperlipidemia and post op atrial fibrillation. At prior office visit on 18 patient stopped amiodarone on her own due to fatigue, generalized weakness, and hair loss. She stop wearing the event monitor. She is currently in regular rhythm. She denies any chest pain, palpitations, SOB , dizziness, or edema. With regard to medication therapy the patient has been compliant with prescribed regimen. They have tolerated therapy to date.      Past Medical History:   Diagnosis Date    Arthritis     Cystitis, interstitial     GERD (gastroesophageal reflux disease)     GI bleeding     was a frequent aspirin user at that time    Heart burn     Hypertension     Interstitial cystitis     Patient is Voodoo     no blood products    Psoriasis     Thoracic aortic aneurysm [facility])     cardiologist watching     Social History:    Social History     Tobacco Use   Smoking Status Former Smoker    Years: 7.00    Types: Cigarettes    Last attempt to quit: 5/3/1960    Years since quittin.0   Smokeless Tobacco Never Used   Tobacco Comment    AS A TEENAGER FOR ABOUT 6 YEARS      Current Medications:  Current Outpatient Medications   Medication Sig Dispense Refill    erythromycin (ROMYCIN) 5 MG/GM ophthalmic ointment Place into both eyes nightly      metoprolol succinate (TOPROL XL) 50 MG extended release tablet Take 1 tablet by mouth nightly 30 tablet 0    triamterene-hydrochlorothiazide (MAXZIDE-25) 37.5-25 MG per tablet Take 1 tablet by mouth every morning 90 tablet 3    atorvastatin (LIPITOR) 80 MG tablet Take 1 tablet by mouth nightly 90 tablet 2    Calcium Polycarbophil (FIBER-CAPS PO) Take by mouth      Acetaminophen (TYLENOL 8 HOUR ARTHRITIS PAIN PO) Take 500 mg by mouth 4 times daily      folic acid (FOLVITE) 1 MG tablet Take 1 tablet by mouth daily 60 tablet 0    vitamin B-12 100 MCG tablet Take 1 tablet by mouth daily 60 tablet 0    docusate sodium (COLACE, DULCOLAX) 100 MG CAPS Take 100 mg by mouth 2 times daily 60 capsule 0    vitamin E 1000 units capsule Take 1,000 Units by mouth daily      Cholecalciferol (VITAMIN D3) 2000 units CAPS Take 2,000 Units by mouth      aspirin 81 MG chewable tablet Take 1 tablet by mouth daily 30 tablet 3    clotrimazole-betamethasone (LOTRISONE) 1-0.05 % cream Apply topically as needed Apply topically 2 times daily. No current facility-administered medications for this visit. REVIEW OF SYSTEMS:   CONSTITUTIONAL: No major weight gain or loss, fatigue, weakness, night sweats or fever. There's been no change in energy level, sleep pattern, or activity level. HEENT: No new vision difficulties or ringing in the ears. RESPIRATORY: No new SOB, PND, orthopnea or cough. CARDIOVASCULAR: See HPI  GI: No nausea, vomiting, diarrhea, constipation, abdominal pain or changes in bowel habits. : No urinary frequency, urgency, incontinence hematuria or dysuria. SKIN: No cyanosis or skin lesions. MUSCULOSKELETAL: No new muscle or joint pain. NEUROLOGICAL: No syncope or TIA-like symptoms. PSYCHIATRIC: No anxiety, pain, insomnia or depression    Objective:   PHYSICAL EXAM:        VITALS:   Vitals:    04/22/19 0959   BP: 130/70   Pulse: 61   SpO2: 98%               CONSTITUTIONAL: Cooperative, no apparent distress, and appears well nourished / developed  NEUROLOGIC:  Awake and orientated to person, place and time. PSYCH: Calm affect. SKIN: Warm and dry.   HEENT: Sclera non-icteric, normocephalic, neck supple, no elevation of JVP, normal carotid pulses with no bruits and thyroid normal size. LUNGS:  No increased work of breathing and clear to auscultation, no crackles or wheezing. CARDIOVASCULAR:  Regular rate and rhythm with no murmurs, gallops, rubs, or abnormal heart sounds, normal PMI. The apical impulses not displaced. Heart tones are crisp and normal    Cervical veins are not engorged                 JVP less than 8 cm H2O                                                                              The carotid upstroke is normal in amplitude and contour without delay or bruit    ABDOMEN:  Normal bowel sounds, non-distended and non-tender to palpation   EXT: No edema, no calf tenderness. Pulses are present bilaterally.     DATA:    Lab Results   Component Value Date    ALT 17 02/15/2019    AST 20 02/15/2019    ALKPHOS 70 02/15/2019    BILITOT 1.2 (H) 02/15/2019     Lab Results   Component Value Date    CREATININE 0.6 02/15/2019    BUN 13 02/15/2019     02/15/2019    K 4.0 02/15/2019     02/15/2019    CO2 26 02/15/2019     Lab Results   Component Value Date    TSH 3.50 08/31/2018    [id number] 10.0 12/10/2012     Lab Results   Component Value Date    WBC 5.7 08/31/2018    HGB 13.1 08/31/2018    HCT 39.3 08/31/2018    MCV 89.5 08/31/2018     08/31/2018     No components found for: CHLPL  Lab Results   Component Value Date    TRIG 167 (H) 02/15/2019    TRIG 107 07/07/2018    TRIG 177 (H) 04/03/2018     Lab Results   Component Value Date    HDL 79 (H) 02/15/2019    HDL 59 07/07/2018    HDL 66 (H) 05/29/2018     Lab Results   Component Value Date    LDLCALC 48 02/15/2019    LDLCALC 50 07/07/2018    LDLCALC 122 (H) 05/29/2018     Lab Results   Component Value Date    LABVLDL 33 02/15/2019    LABVLDL 21 07/07/2018    LABVLDL 28 05/29/2018     Radiology Review:  Pertinent images / reports were reviewed as a part of this visit and reveals the followin/30/18: Procedure:    Off pump CABG  X  2  Lima to LAD, SVG to D2    Diagnostics:    Procedure:     [facility] 18  Indication:      NSTEMI  Consent:        Verbal and/or written consent obtained prior to procedure. Sedation:        Minimal conscious sedation utilized for comfort. Complic:         None  EBL:                <10cc  Specimens:    None  Fluoro:            4.6 min  Contrast:        42 cc  Access:          RCF  Findings:                      LM       Normal              LAD     Mid 99% trifurcation involving D1 and D2 with GILBERTO 2 flow,               Cx        Normal              RCA     20% mid              LVG     50%, apical hk              EDP     17 mmHg  Intervention:  None  Recs:              Urgent CABG                          No procedural complications meriting CABG. Due to ongoing cp and severe LAD disease, IABP placed with improvement in cp     Last ECHO: 18   Ejection fraction is visually estimated to be 40%.  There is apical   hypokinesis noted.   Mild mitral regurgitation.   Mild-to-moderate tricuspid regurgitation.  Jerrica Thien is a trivial pericardial effusion noted.       Last EC18 sinus rhythm with T wave inversion anterior leads - patient denies any anginal symptoms and unable to schedule plain GXT due to mobility ( she walks with a cane)    Assessment:     ~ Coronary artery disease      s/p CABG X  2  Lima to LAD, SVG to D2   60%  50% LM-nl, Cx-nl, LAD-nl, RCA-nl  Severe trifurcational LAD disease   On ASA, Toprol XL, and Lipitor  Patient denies any anginal symptoms   Plan: continue current medications    ~ Hypertension     Today's B/P- 130/70     Plan: continue current medications    ~ Hyperlipidemia      On Lipitor 80 mg daily       : HDL: 59, LDL: 50     ~ Post op Atrial Fibrillation      Patient stopped amiodarone due to side effects       On ASA       Currently in regular rhythm       Event monitor results- no afib

## 2019-06-21 ENCOUNTER — OFFICE VISIT (OUTPATIENT)
Dept: FAMILY MEDICINE CLINIC | Age: 84
End: 2019-06-21
Payer: MEDICARE

## 2019-06-21 VITALS
HEIGHT: 62 IN | RESPIRATION RATE: 12 BRPM | SYSTOLIC BLOOD PRESSURE: 128 MMHG | BODY MASS INDEX: 30.91 KG/M2 | WEIGHT: 168 LBS | HEART RATE: 76 BPM | DIASTOLIC BLOOD PRESSURE: 74 MMHG

## 2019-06-21 DIAGNOSIS — E78.2 MIXED HYPERLIPIDEMIA: ICD-10-CM

## 2019-06-21 DIAGNOSIS — N30.10 INTERSTITIAL CYSTITIS: ICD-10-CM

## 2019-06-21 DIAGNOSIS — R20.9 DISTURBANCE OF SKIN SENSATION: ICD-10-CM

## 2019-06-21 DIAGNOSIS — I25.10 CORONARY ARTERY DISEASE INVOLVING NATIVE CORONARY ARTERY OF NATIVE HEART WITHOUT ANGINA PECTORIS: ICD-10-CM

## 2019-06-21 DIAGNOSIS — K59.00 CONSTIPATION, UNSPECIFIED CONSTIPATION TYPE: ICD-10-CM

## 2019-06-21 DIAGNOSIS — I10 ESSENTIAL HYPERTENSION: Primary | ICD-10-CM

## 2019-06-21 DIAGNOSIS — I48.0 PAROXYSMAL ATRIAL FIBRILLATION (HCC): ICD-10-CM

## 2019-06-21 DIAGNOSIS — L57.0 ACTINIC KERATOSES: ICD-10-CM

## 2019-06-21 DIAGNOSIS — I71.21 THORACIC ASCENDING AORTIC ANEURYSM: ICD-10-CM

## 2019-06-21 PROCEDURE — G8417 CALC BMI ABV UP PARAM F/U: HCPCS | Performed by: FAMILY MEDICINE

## 2019-06-21 PROCEDURE — G8427 DOCREV CUR MEDS BY ELIG CLIN: HCPCS | Performed by: FAMILY MEDICINE

## 2019-06-21 PROCEDURE — G8599 NO ASA/ANTIPLAT THER USE RNG: HCPCS | Performed by: FAMILY MEDICINE

## 2019-06-21 PROCEDURE — 17000 DESTRUCT PREMALG LESION: CPT | Performed by: FAMILY MEDICINE

## 2019-06-21 PROCEDURE — 1090F PRES/ABSN URINE INCON ASSESS: CPT | Performed by: FAMILY MEDICINE

## 2019-06-21 PROCEDURE — 4040F PNEUMOC VAC/ADMIN/RCVD: CPT | Performed by: FAMILY MEDICINE

## 2019-06-21 PROCEDURE — 99214 OFFICE O/P EST MOD 30 MIN: CPT | Performed by: FAMILY MEDICINE

## 2019-06-21 PROCEDURE — G8399 PT W/DXA RESULTS DOCUMENT: HCPCS | Performed by: FAMILY MEDICINE

## 2019-06-21 PROCEDURE — 1036F TOBACCO NON-USER: CPT | Performed by: FAMILY MEDICINE

## 2019-06-21 PROCEDURE — 1123F ACP DISCUSS/DSCN MKR DOCD: CPT | Performed by: FAMILY MEDICINE

## 2019-06-21 PROCEDURE — 17003 DESTRUCT PREMALG LES 2-14: CPT | Performed by: FAMILY MEDICINE

## 2019-06-21 NOTE — PROGRESS NOTES
Subjective:      Patient ID: Chari Phipps is a 82 y.o. female. HPI  Chief Complaint   Patient presents with    Hypertension     6 MO HTN ROUTINE FOLLOW UP [facility] GOAL UTD     Other     DISCUSSED AWV WITH PATIENT SHE DOES NOT WANT TO SCHEDULE THIS SHE HAS ALREADY TOLD HER INSURANCE SHE DOES NOT WANT TO      NOTHING NEW - ABOUT THE SAME AS LAST VISIT  SEEN BY EYE DOCTOR - SEES RETINA SPECIALIST IN AUGUST - OD - VISION ESS UNCHANGED.   BP Readings from Last 3 Encounters:   06/21/19 128/74   04/22/19 130/70   03/21/19 130/74     Pulse Readings from Last 3 Encounters:   06/21/19 76   04/22/19 61   03/21/19 60     Wt Readings from Last 3 Encounters:   06/21/19 168 lb (76.2 kg)   04/22/19 170 lb (77.1 kg)   03/21/19 164 lb (74.4 kg)     Using eye drops for inflammation  Not big eater - fairly good balance - veggies daily w/ meat - cereal for breakfast.  Bread w/ lunch - like chocolate - oft dark chocolate  Taking fiber daily - w/ colace nightly to maintain bm - uses suppository daily - has bm every other day  Gets low abd pain from IC - chronic - drinking fair amt of water  Review of Systems  No palpitations  Still some soreness left chest occ radiates to left axilla - random - nonexertional - since surgery 1 year ago  Past Surgical History:   Procedure Laterality Date    CARDIAC CATHETERIZATION  05/29/2018    Dr. Traci Reece - w/placement of IABP    CARDIAC CATHETERIZATION  2012    COLONOSCOPY  07/31/2015    Dr. Ana Maria Mata - w/polypectomy    CORONARY ARTERY BYPASS GRAFT  05/29/2018    Dr. Hardin Fall - off pump x2 (LIMA-LAD, L SVG-D2)    KNEE ARTHROSCOPY Right 02/2015    MOHS SURGERY Left     facial for skin CA    PARTIAL HYSTERECTOMY      ROTATOR CUFF REPAIR Right 2014    TRANSESOPHAGEAL ECHOCARDIOGRAM  05/29/2018    during CABG   had colonoscopy 5 years ago - senna caused cramping  Some swelling in right foot/ ankle  Seeing podiatry regularly  oa in feet - some pain w/ problems w/ nails  Still pain left buttocks area - can't lie on left side   Using otc muscle rub - biomed too expensive  Didn't take belsomra 2/2 to concerns w/ se  Taking melatonin 3mg daily and tylenol 500mg hs - sleeps good 4 hours of sleep  Keratosis right cheek growing and spot on nose - one spot bled  Unable to see derm for awhile  Needs to see ent regarding hearing issues  Worry about dm though last sugar normal  Noting decrease in memory  Objective:   Physical Exam   Constitutional: She is oriented to person, place, and time. She appears well-developed and well-nourished. No distress. HENT:   Head: Normocephalic and atraumatic. Eyes: Conjunctivae are normal. No scleral icterus. Cardiovascular: Normal rate, regular rhythm, normal heart sounds and intact distal pulses. No murmur heard. Pulmonary/Chest: Effort normal and breath sounds normal. No respiratory distress. She has no wheezes. She has no rales. Abdominal: Soft. Bowel sounds are normal. She exhibits no distension. There is no tenderness. Musculoskeletal: She exhibits edema (swelling right > left ankle 1+). Neurological: She is alert and oriented to person, place, and time. Skin: Skin is warm and dry. Psychiatric: She has a normal mood and affect. Assessment:       Diagnosis Orders   1. Essential hypertension     2. Constipation, unspecified constipation type     3. Paroxysmal atrial fibrillation (HCC)     4. Thoracic ascending aortic aneurysm ([address]     5. Coronary artery disease involving native coronary artery of native heart without angina pectoris     6. Mixed hyperlipidemia     7. Interstitial cystitis     8.  Actinic keratoses             Plan:      Fiber/ fluids and continue colace/ suppository nitroglycerin for bm   Stable  IC fairly stable - no specific treatment  Cont statin    F/u cardiology soon for check up  bp good on maxzide, metoprolol  F/u podiatry - support hose w/ elevation/ salt restriction  Thoracic aneurysm - more ectatic vessel - 4.1cm - unchanged from 2009  Melatonin 3 to 6mg nightly - sleep hygiene d/ w pt  Tylenol/ topical otc analgesics for pain  F/u derm/ ent  Cryo to ak on nose/ right Gwen Bustos MD

## 2019-08-07 ENCOUNTER — TELEPHONE (OUTPATIENT)
Dept: FAMILY MEDICINE CLINIC | Age: 84
End: 2019-08-07

## 2019-08-07 DIAGNOSIS — R17 JAUNDICE: Primary | ICD-10-CM

## 2019-08-08 ENCOUNTER — HOSPITAL ENCOUNTER (OUTPATIENT)
Age: 84
Discharge: HOME | End: 2019-08-08
Payer: MEDICARE

## 2019-08-08 DIAGNOSIS — I10 ESSENTIAL HYPERTENSION: ICD-10-CM

## 2019-08-08 DIAGNOSIS — R17 JAUNDICE: ICD-10-CM

## 2019-08-08 LAB
A/G RATIO: 2 (ref 1.1–2.2)
ALBUMIN SERPL-MCNC: 4.4 G/DL (ref 3.4–5)
ALP BLD-CCNC: 60 U/L (ref 40–129)
ALT SERPL-CCNC: 24 U/L (ref 10–40)
ANION GAP SERPL CALCULATED.3IONS-SCNC: 12 MMOL/L (ref 3–16)
AST SERPL-CCNC: 23 U/L (ref 15–37)
BILIRUB SERPL-MCNC: 1.1 MG/DL (ref 0–1)
BUN BLDV-MCNC: 13 MG/DL (ref 7–20)
CALCIUM SERPL-MCNC: 9.6 MG/DL (ref 8.3–10.6)
CHLORIDE BLD-SCNC: 107 MMOL/L (ref 99–110)
CO2: 24 MMOL/L (ref 21–32)
CREAT SERPL-MCNC: <0.5 MG/DL (ref 0.6–1.2)
GFR AFRICAN AMERICAN: >60
GFR NON-AFRICAN AMERICAN: >60
GLOBULIN: 2.2 G/DL
GLUCOSE BLD-MCNC: 89 MG/DL (ref 70–99)
POTASSIUM SERPL-SCNC: 4 MMOL/L (ref 3.5–5.1)
SODIUM BLD-SCNC: 143 MMOL/L (ref 136–145)
TOTAL PROTEIN: 6.6 G/DL (ref 6.4–8.2)

## 2019-08-08 PROCEDURE — 80053 COMPREHEN METABOLIC PANEL: CPT

## 2019-08-08 PROCEDURE — 36415 COLL VENOUS BLD VENIPUNCTURE: CPT

## 2019-08-08 RX ORDER — TRIAMTERENE AND HYDROCHLOROTHIAZIDE 37.5; 25 MG/1; MG/1
TABLET ORAL
Qty: 90 TABLET | Refills: 3 | Status: SHIPPED | OUTPATIENT
Start: 2019-08-08 | End: 2020-01-17 | Stop reason: SDUPTHER

## 2019-08-08 RX ORDER — METOPROLOL SUCCINATE 50 MG/1
TABLET, EXTENDED RELEASE ORAL
Qty: 90 TABLET | Refills: 3 | Status: SHIPPED | OUTPATIENT
Start: 2019-08-08 | End: 2020-01-17 | Stop reason: SDUPTHER

## 2019-10-21 RX ORDER — ATORVASTATIN CALCIUM 80 MG/1
TABLET, FILM COATED ORAL
Qty: 90 TABLET | Refills: 1 | Status: SHIPPED | OUTPATIENT
Start: 2019-10-21 | End: 2020-02-12 | Stop reason: SDUPTHER

## 2019-10-22 ENCOUNTER — OFFICE VISIT (OUTPATIENT)
Dept: FAMILY MEDICINE CLINIC | Age: 84
End: 2019-10-22
Payer: MEDICARE

## 2019-10-22 VITALS
WEIGHT: 162 LBS | HEIGHT: 62 IN | SYSTOLIC BLOOD PRESSURE: 120 MMHG | RESPIRATION RATE: 14 BRPM | BODY MASS INDEX: 29.81 KG/M2 | HEART RATE: 80 BPM | OXYGEN SATURATION: 99 % | DIASTOLIC BLOOD PRESSURE: 76 MMHG

## 2019-10-22 DIAGNOSIS — E78.2 MIXED HYPERLIPIDEMIA: ICD-10-CM

## 2019-10-22 DIAGNOSIS — L57.0 ACTINIC KERATOSES: ICD-10-CM

## 2019-10-22 DIAGNOSIS — Z00.00 ROUTINE GENERAL MEDICAL EXAMINATION AT A HEALTH CARE FACILITY: Primary | ICD-10-CM

## 2019-10-22 DIAGNOSIS — H91.93 BILATERAL HEARING LOSS, UNSPECIFIED HEARING LOSS TYPE: ICD-10-CM

## 2019-10-22 DIAGNOSIS — L40.9 PSORIASIS: ICD-10-CM

## 2019-10-22 DIAGNOSIS — I25.10 CORONARY ARTERY DISEASE INVOLVING NATIVE CORONARY ARTERY OF NATIVE HEART WITHOUT ANGINA PECTORIS: ICD-10-CM

## 2019-10-22 DIAGNOSIS — N30.10 INTERSTITIAL CYSTITIS: ICD-10-CM

## 2019-10-22 DIAGNOSIS — I10 ESSENTIAL HYPERTENSION: ICD-10-CM

## 2019-10-22 DIAGNOSIS — H92.03 OTALGIA OF BOTH EARS: ICD-10-CM

## 2019-10-22 DIAGNOSIS — M15.9 PRIMARY OSTEOARTHRITIS INVOLVING MULTIPLE JOINTS: ICD-10-CM

## 2019-10-22 DIAGNOSIS — M85.80 OSTEOPENIA, UNSPECIFIED LOCATION: ICD-10-CM

## 2019-10-22 DIAGNOSIS — K21.9 GASTROESOPHAGEAL REFLUX DISEASE, ESOPHAGITIS PRESENCE NOT SPECIFIED: ICD-10-CM

## 2019-10-22 PROCEDURE — 17000 DESTRUCT PREMALG LESION: CPT | Performed by: FAMILY MEDICINE

## 2019-10-22 PROCEDURE — 4040F PNEUMOC VAC/ADMIN/RCVD: CPT | Performed by: FAMILY MEDICINE

## 2019-10-22 PROCEDURE — 17003 DESTRUCT PREMALG LES 2-14: CPT | Performed by: FAMILY MEDICINE

## 2019-10-22 PROCEDURE — 1123F ACP DISCUSS/DSCN MKR DOCD: CPT | Performed by: FAMILY MEDICINE

## 2019-10-22 PROCEDURE — G8599 NO ASA/ANTIPLAT THER USE RNG: HCPCS | Performed by: FAMILY MEDICINE

## 2019-10-22 PROCEDURE — G0439 PPPS, SUBSEQ VISIT: HCPCS | Performed by: FAMILY MEDICINE

## 2019-10-22 PROCEDURE — G8482 FLU IMMUNIZE ORDER/ADMIN: HCPCS | Performed by: FAMILY MEDICINE

## 2019-10-22 RX ORDER — PANTOPRAZOLE SODIUM 40 MG/1
40 TABLET, DELAYED RELEASE ORAL
Qty: 30 TABLET | Refills: 5 | Status: SHIPPED | OUTPATIENT
Start: 2019-10-22 | End: 2020-02-13 | Stop reason: SDUPTHER

## 2019-10-22 ASSESSMENT — PATIENT HEALTH QUESTIONNAIRE - PHQ9
SUM OF ALL RESPONSES TO PHQ QUESTIONS 1-9: 0
SUM OF ALL RESPONSES TO PHQ QUESTIONS 1-9: 0

## 2019-10-22 ASSESSMENT — LIFESTYLE VARIABLES: HOW OFTEN DO YOU HAVE A DRINK CONTAINING ALCOHOL: 0

## 2020-01-14 ENCOUNTER — TELEPHONE (OUTPATIENT)
Dept: FAMILY MEDICINE CLINIC | Age: 85
End: 2020-01-14

## 2020-01-17 RX ORDER — TRIAMTERENE AND HYDROCHLOROTHIAZIDE 37.5; 25 MG/1; MG/1
1 TABLET ORAL DAILY
Qty: 90 TABLET | Refills: 3 | Status: ON HOLD | OUTPATIENT
Start: 2020-01-17 | End: 2020-02-20 | Stop reason: HOSPADM

## 2020-01-17 RX ORDER — METOPROLOL SUCCINATE 50 MG/1
TABLET, EXTENDED RELEASE ORAL
Qty: 90 TABLET | Refills: 3 | Status: SHIPPED | OUTPATIENT
Start: 2020-01-17 | End: 2020-12-15

## 2020-02-12 ENCOUNTER — HOSPITAL ENCOUNTER (OUTPATIENT)
Age: 85
Discharge: HOME | End: 2020-02-12
Payer: MEDICARE

## 2020-02-12 ENCOUNTER — HOSPITAL ENCOUNTER (OUTPATIENT)
Dept: GENERAL RADIOLOGY | Age: 85
Discharge: HOME | End: 2020-02-12
Payer: MEDICARE

## 2020-02-12 ENCOUNTER — OFFICE VISIT (OUTPATIENT)
Dept: FAMILY MEDICINE CLINIC | Age: 85
End: 2020-02-12
Payer: MEDICARE

## 2020-02-12 VITALS
SYSTOLIC BLOOD PRESSURE: 118 MMHG | HEART RATE: 66 BPM | RESPIRATION RATE: 18 BRPM | OXYGEN SATURATION: 97 % | BODY MASS INDEX: 30.29 KG/M2 | TEMPERATURE: 99.9 F | DIASTOLIC BLOOD PRESSURE: 76 MMHG | WEIGHT: 164.6 LBS | HEIGHT: 62 IN

## 2020-02-12 LAB
INFLUENZA A ANTIBODY: NORMAL
INFLUENZA B ANTIBODY: NORMAL
PRO-BNP: 204 PG/ML (ref 0–449)
S PYO AG THROAT QL: NORMAL

## 2020-02-12 PROCEDURE — 1123F ACP DISCUSS/DSCN MKR DOCD: CPT | Performed by: NURSE PRACTITIONER

## 2020-02-12 PROCEDURE — G8399 PT W/DXA RESULTS DOCUMENT: HCPCS | Performed by: NURSE PRACTITIONER

## 2020-02-12 PROCEDURE — 1036F TOBACCO NON-USER: CPT | Performed by: NURSE PRACTITIONER

## 2020-02-12 PROCEDURE — 87804 INFLUENZA ASSAY W/OPTIC: CPT | Performed by: NURSE PRACTITIONER

## 2020-02-12 PROCEDURE — 71046 X-RAY EXAM CHEST 2 VIEWS: CPT

## 2020-02-12 PROCEDURE — G8427 DOCREV CUR MEDS BY ELIG CLIN: HCPCS | Performed by: NURSE PRACTITIONER

## 2020-02-12 PROCEDURE — G8482 FLU IMMUNIZE ORDER/ADMIN: HCPCS | Performed by: NURSE PRACTITIONER

## 2020-02-12 PROCEDURE — 1090F PRES/ABSN URINE INCON ASSESS: CPT | Performed by: NURSE PRACTITIONER

## 2020-02-12 PROCEDURE — 36415 COLL VENOUS BLD VENIPUNCTURE: CPT | Performed by: NURSE PRACTITIONER

## 2020-02-12 PROCEDURE — 4040F PNEUMOC VAC/ADMIN/RCVD: CPT | Performed by: NURSE PRACTITIONER

## 2020-02-12 PROCEDURE — 87880 STREP A ASSAY W/OPTIC: CPT | Performed by: NURSE PRACTITIONER

## 2020-02-12 PROCEDURE — 99214 OFFICE O/P EST MOD 30 MIN: CPT | Performed by: NURSE PRACTITIONER

## 2020-02-12 PROCEDURE — G8417 CALC BMI ABV UP PARAM F/U: HCPCS | Performed by: NURSE PRACTITIONER

## 2020-02-12 RX ORDER — ATORVASTATIN CALCIUM 80 MG/1
80 TABLET, FILM COATED ORAL DAILY
Qty: 90 TABLET | Refills: 1 | Status: SHIPPED | OUTPATIENT
Start: 2020-02-12 | End: 2020-05-07 | Stop reason: SDUPTHER

## 2020-02-12 RX ORDER — PREDNISONE 10 MG/1
TABLET ORAL
Qty: 20 TABLET | Refills: 0 | Status: SHIPPED | OUTPATIENT
Start: 2020-02-12 | End: 2020-02-18 | Stop reason: ALTCHOICE

## 2020-02-12 RX ORDER — AZITHROMYCIN 250 MG/1
250 TABLET, FILM COATED ORAL SEE ADMIN INSTRUCTIONS
Qty: 6 TABLET | Refills: 0 | Status: SHIPPED | OUTPATIENT
Start: 2020-02-12 | End: 2020-02-17

## 2020-02-12 ASSESSMENT — ENCOUNTER SYMPTOMS
COUGH: 1
SHORTNESS OF BREATH: 0

## 2020-02-12 ASSESSMENT — PATIENT HEALTH QUESTIONNAIRE - PHQ9
SUM OF ALL RESPONSES TO PHQ QUESTIONS 1-9: 0
SUM OF ALL RESPONSES TO PHQ QUESTIONS 1-9: 0
1. LITTLE INTEREST OR PLEASURE IN DOING THINGS: 0
SUM OF ALL RESPONSES TO PHQ9 QUESTIONS 1 & 2: 0
2. FEELING DOWN, DEPRESSED OR HOPELESS: 0

## 2020-02-12 NOTE — PROGRESS NOTES
mg/dL Final    Alkaline Phosphatase 08/08/2019 60  40 - 129 U/L Final    ALT 08/08/2019 24  10 - 40 U/L Final    AST 08/08/2019 23  15 - 37 U/L Final    Globulin 08/08/2019 2.2  g/dL Final       Physical Exam  Constitutional:       General: She is awake. She is not in acute distress. Appearance: Normal appearance. She is well-developed, well-groomed and normal weight. She is not ill-appearing, toxic-appearing or diaphoretic. HENT:      Right Ear: Tympanic membrane normal.      Left Ear: Tympanic membrane normal.      Mouth/Throat:      Mouth: Mucous membranes are moist.      Pharynx: Oropharynx is clear. Uvula midline. Pulmonary:      Effort: Pulmonary effort is normal.      Breath sounds: Normal breath sounds and air entry. Lymphadenopathy:      Head:      Right side of head: No submental, submandibular or tonsillar adenopathy. Left side of head: No submental, submandibular or tonsillar adenopathy. Neurological:      Mental Status: She is alert. Psychiatric:         Attention and Perception: Attention normal.         Mood and Affect: Mood normal.         Speech: Speech normal.         Behavior: Behavior normal. Behavior is cooperative. Thought Content: Thought content normal.         Cognition and Memory: Cognition normal.         Judgment: Judgment normal.     Sarah Jay was seen today for influenza. Diagnoses and all orders for this visit:    Viral URI  SOB (shortness of breath)  Cough  -     azithromycin (ZITHROMAX) 250 MG tablet; Take 1 tablet by mouth See Admin Instructions for 5 days 500mg on day 1 followed by 250mg on days 2 - 5  -     predniSONE (DELTASONE) 10 MG tablet; 3 tabs x 2 d 2 tabs x 2 d 1  tab x 2 d in am with food  avoid NSAIDs while on this medication   Brain Natriuretic Peptide; Future    Sore throat  -     POCT rapid strep A NEGATIVE      -     Brain Natriuretic Peptide; Future      Mixed hyperlipidemia  -     atorvastatin (LIPITOR) 80 MG tablet;  Take 1 tablet by

## 2020-02-12 NOTE — PATIENT INSTRUCTIONS
Instructions for Respiratory Infections (SAVE THIS SHEET)   For the first 7-14 days of symptoms follow instructions below, even before being seen in the office or even during treatment with antibiotics, until symptom free. 1. Water: Drink 1 ounce of water for every 2 pounds of body weight for adults need 64 Ounces of water per day. This will loosen mucus in the head and chest & improve the weak feeling of dehydration, allow the body to get germ fighting resources to the infection. Half can be juice or sugar free Crystal Light. Don't count drinks with caffeine or carbonation. Infants can have Pedialyte liquid or freezer pops. Avoid salt if you have high Blood Pressure, swelling in the feet or ankles or have heart problems. 2. Humidity: Humidify the air to 35-50% ( or until the windows fog over slightly). Can use a humidifier, vaporizer, boil water on the stove or put a coffee can full of water on the heater vents. This will loosen mucus from infections and allergies. 3. Sleep: Get 8-10 hours a night and rest during the evening after work or school. If you have trouble sleeping, adults can take Melatonin 3mg up to 3 tabs at bedtime ( not for children or pregnant women). If Mono is suspected then sleep during 9PM to 9AM time span (if possible.)   4. Cough: Take cough medicines with Guaifenesin ( to loosen chest or head congestion) and Dextromethorphan ( to decrease excess cough). Robitussin D.M. Syrup every 4-6 hrs or Mucinex D.M. Pills twice a day. Use the pediatric formulations for children over 6 months making sure they are alcohol & sugar free for children, pregnant women, and diabetics. 5. Pain And Fevers: Take Acetaminophen ( Tylenol) for fevers, aches, and headaches. 2-500 mg every 8 hours for adults. Appropriate doses at bedtime for children may help them sleep better. If pregnant take 1 -500 mg. (Tylenol) every 8 hours as needed.  Ibuprofen may be used if not pregnant, but should be given with food to avoid nausea. Avoid Ibuprofen if you have high blood pressure, CHF, or kidney problems. 6.Gargle: Gargle in the back of the throat with the head tilted back and to the sides with a strong mouthwash ( Listerine or Scope) after meals and at bedtime at least 4 -5 times a day. This helps kill bacteria and viruses in the back of the throat and will shorten the duration and decrease the severity of your symptoms: sore throat, cough, ear popping,/ear pain, and possibly dizziness. 7. Smoking: Avoid smoking or exposure to second hand smoke. 8. Zinc: Zinc lozenges such as Cold [place], or Basic will help shorten the duration and lessen symptoms such as sore throat, cough, nasal congestion, runny nose, and post nasal drip. Use 1 lozenge every 2-4 hours ( after meals if stomach is sensitive). Children can use 10-15 mg. Or less 3-4 times a day or Zinc lollypops. In pregnancy limit to 50-60 mg. A day for 7 days as prenatals have Zinc also. With diarrhea use zinc pills 50 mg 1/2 to 1 pill 2x/day. 9. Vitamins: Vitamin C 500 mg. With breakfast and dinner. Children and pregnant women should drink citrus juices. This speeds healing and strengthens immune system. 10. Chest Symptoms: Vicks Vapor rub to the chest at bedtime. 11. Decongestants: Avoid all decongestants and antihistamine cold preparations in children. Try all of the above starting with day 1 of symptoms. If Strep throat symptoms appear call to be seen in the office as soon as possible and don't gargle on that day. Newborns, infants, or anyone with earaches or influenza may need to be seen quickly. Adults with fevers over 103 degrees or shortness of breath should call the office immediately.

## 2020-02-13 RX ORDER — PANTOPRAZOLE SODIUM 40 MG/1
40 TABLET, DELAYED RELEASE ORAL
Qty: 30 TABLET | Refills: 5 | Status: SHIPPED | OUTPATIENT
Start: 2020-02-13 | End: 2020-08-14

## 2020-02-18 ENCOUNTER — OFFICE VISIT (OUTPATIENT)
Dept: FAMILY MEDICINE CLINIC | Age: 85
End: 2020-02-18
Payer: MEDICARE

## 2020-02-18 ENCOUNTER — APPOINTMENT (OUTPATIENT)
Dept: GENERAL RADIOLOGY | Age: 85
DRG: 641 | End: 2020-02-18
Payer: MEDICARE

## 2020-02-18 ENCOUNTER — APPOINTMENT (OUTPATIENT)
Dept: CT IMAGING | Age: 85
DRG: 641 | End: 2020-02-18
Payer: MEDICARE

## 2020-02-18 ENCOUNTER — HOSPITAL ENCOUNTER (INPATIENT)
Age: 85
LOS: 1 days | Discharge: HOME HEALTH | DRG: 641 | End: 2020-02-20
Attending: EMERGENCY MEDICINE | Admitting: INTERNAL MEDICINE
Payer: MEDICARE

## 2020-02-18 VITALS
RESPIRATION RATE: 24 BRPM | DIASTOLIC BLOOD PRESSURE: 86 MMHG | BODY MASS INDEX: 30.18 KG/M2 | OXYGEN SATURATION: 98 % | WEIGHT: 164 LBS | HEIGHT: 62 IN | HEART RATE: 64 BPM | SYSTOLIC BLOOD PRESSURE: 144 MMHG

## 2020-02-18 LAB
ANION GAP SERPL CALCULATED.3IONS-SCNC: 18 MMOL/L (ref 3–16)
BASOPHILS ABSOLUTE: 0.1 K/UL (ref 0–0.2)
BASOPHILS RELATIVE PERCENT: 0.8 %
BUN BLDV-MCNC: 14 MG/DL (ref 7–20)
CALCIUM SERPL-MCNC: 10.3 MG/DL (ref 8.3–10.6)
CHLORIDE BLD-SCNC: 99 MMOL/L (ref 99–110)
CO2: 21 MMOL/L (ref 21–32)
CREAT SERPL-MCNC: 0.6 MG/DL (ref 0.6–1.2)
EKG ATRIAL RATE: 60 BPM
EKG DIAGNOSIS: NORMAL
EKG Q-T INTERVAL: 440 MS
EKG QRS DURATION: 136 MS
EKG QTC CALCULATION (BAZETT): 442 MS
EKG R AXIS: -45 DEGREES
EKG T AXIS: 90 DEGREES
EKG VENTRICULAR RATE: 61 BPM
EOSINOPHILS ABSOLUTE: 0.1 K/UL (ref 0–0.6)
EOSINOPHILS RELATIVE PERCENT: 0.6 %
GFR AFRICAN AMERICAN: >60
GFR NON-AFRICAN AMERICAN: >60
GLUCOSE BLD-MCNC: 85 MG/DL (ref 70–99)
HCT VFR BLD CALC: 45.3 % (ref 36–48)
HEMOGLOBIN: 15.3 G/DL (ref 12–16)
LACTIC ACID, SEPSIS: 2.8 MMOL/L (ref 0.4–1.9)
LACTIC ACID, SEPSIS: 3.2 MMOL/L (ref 0.4–1.9)
LYMPHOCYTES ABSOLUTE: 4.3 K/UL (ref 1–5.1)
LYMPHOCYTES RELATIVE PERCENT: 36.2 %
MCH RBC QN AUTO: 31.1 PG (ref 26–34)
MCHC RBC AUTO-ENTMCNC: 33.9 G/DL (ref 31–36)
MCV RBC AUTO: 91.9 FL (ref 80–100)
MONOCYTES ABSOLUTE: 0.9 K/UL (ref 0–1.3)
MONOCYTES RELATIVE PERCENT: 7.2 %
NEUTROPHILS ABSOLUTE: 6.6 K/UL (ref 1.7–7.7)
NEUTROPHILS RELATIVE PERCENT: 55.2 %
PDW BLD-RTO: 13.8 % (ref 12.4–15.4)
PLATELET # BLD: 279 K/UL (ref 135–450)
PMV BLD AUTO: 8.2 FL (ref 5–10.5)
POTASSIUM SERPL-SCNC: 2.8 MMOL/L (ref 3.5–5.1)
PRO-BNP: 241 PG/ML (ref 0–449)
RAPID INFLUENZA  B AGN: NEGATIVE
RAPID INFLUENZA A AGN: NEGATIVE
RBC # BLD: 4.93 M/UL (ref 4–5.2)
SODIUM BLD-SCNC: 138 MMOL/L (ref 136–145)
TROPONIN: <0.01 NG/ML
WBC # BLD: 12 K/UL (ref 4–11)

## 2020-02-18 PROCEDURE — 1036F TOBACCO NON-USER: CPT | Performed by: FAMILY MEDICINE

## 2020-02-18 PROCEDURE — 87040 BLOOD CULTURE FOR BACTERIA: CPT

## 2020-02-18 PROCEDURE — 4040F PNEUMOC VAC/ADMIN/RCVD: CPT | Performed by: FAMILY MEDICINE

## 2020-02-18 PROCEDURE — 6370000000 HC RX 637 (ALT 250 FOR IP): Performed by: NURSE PRACTITIONER

## 2020-02-18 PROCEDURE — 80048 BASIC METABOLIC PNL TOTAL CA: CPT

## 2020-02-18 PROCEDURE — 71046 X-RAY EXAM CHEST 2 VIEWS: CPT

## 2020-02-18 PROCEDURE — G0378 HOSPITAL OBSERVATION PER HR: HCPCS

## 2020-02-18 PROCEDURE — G8399 PT W/DXA RESULTS DOCUMENT: HCPCS | Performed by: FAMILY MEDICINE

## 2020-02-18 PROCEDURE — 84484 ASSAY OF TROPONIN QUANT: CPT

## 2020-02-18 PROCEDURE — 93000 ELECTROCARDIOGRAM COMPLETE: CPT | Performed by: FAMILY MEDICINE

## 2020-02-18 PROCEDURE — 85025 COMPLETE CBC W/AUTO DIFF WBC: CPT

## 2020-02-18 PROCEDURE — 94761 N-INVAS EAR/PLS OXIMETRY MLT: CPT

## 2020-02-18 PROCEDURE — 96375 TX/PRO/DX INJ NEW DRUG ADDON: CPT

## 2020-02-18 PROCEDURE — 87804 INFLUENZA ASSAY W/OPTIC: CPT

## 2020-02-18 PROCEDURE — 2580000003 HC RX 258: Performed by: INTERNAL MEDICINE

## 2020-02-18 PROCEDURE — G8417 CALC BMI ABV UP PARAM F/U: HCPCS | Performed by: FAMILY MEDICINE

## 2020-02-18 PROCEDURE — 6360000002 HC RX W HCPCS: Performed by: NURSE PRACTITIONER

## 2020-02-18 PROCEDURE — G8427 DOCREV CUR MEDS BY ELIG CLIN: HCPCS | Performed by: FAMILY MEDICINE

## 2020-02-18 PROCEDURE — 1090F PRES/ABSN URINE INCON ASSESS: CPT | Performed by: FAMILY MEDICINE

## 2020-02-18 PROCEDURE — 99213 OFFICE O/P EST LOW 20 MIN: CPT | Performed by: FAMILY MEDICINE

## 2020-02-18 PROCEDURE — 96365 THER/PROPH/DIAG IV INF INIT: CPT

## 2020-02-18 PROCEDURE — 70450 CT HEAD/BRAIN W/O DYE: CPT

## 2020-02-18 PROCEDURE — G8482 FLU IMMUNIZE ORDER/ADMIN: HCPCS | Performed by: FAMILY MEDICINE

## 2020-02-18 PROCEDURE — 6370000000 HC RX 637 (ALT 250 FOR IP): Performed by: INTERNAL MEDICINE

## 2020-02-18 PROCEDURE — 2580000003 HC RX 258: Performed by: NURSE PRACTITIONER

## 2020-02-18 PROCEDURE — 93005 ELECTROCARDIOGRAM TRACING: CPT | Performed by: EMERGENCY MEDICINE

## 2020-02-18 PROCEDURE — 83880 ASSAY OF NATRIURETIC PEPTIDE: CPT

## 2020-02-18 PROCEDURE — 99285 EMERGENCY DEPT VISIT HI MDM: CPT

## 2020-02-18 PROCEDURE — 71250 CT THORAX DX C-: CPT

## 2020-02-18 PROCEDURE — 83605 ASSAY OF LACTIC ACID: CPT

## 2020-02-18 PROCEDURE — 1123F ACP DISCUSS/DSCN MKR DOCD: CPT | Performed by: FAMILY MEDICINE

## 2020-02-18 PROCEDURE — 36415 COLL VENOUS BLD VENIPUNCTURE: CPT

## 2020-02-18 PROCEDURE — 6370000000 HC RX 637 (ALT 250 FOR IP): Performed by: EMERGENCY MEDICINE

## 2020-02-18 PROCEDURE — 6360000002 HC RX W HCPCS: Performed by: INTERNAL MEDICINE

## 2020-02-18 PROCEDURE — 93010 ELECTROCARDIOGRAM REPORT: CPT | Performed by: INTERNAL MEDICINE

## 2020-02-18 PROCEDURE — 94640 AIRWAY INHALATION TREATMENT: CPT

## 2020-02-18 PROCEDURE — 96372 THER/PROPH/DIAG INJ SC/IM: CPT

## 2020-02-18 RX ORDER — ONDANSETRON 2 MG/ML
4 INJECTION INTRAMUSCULAR; INTRAVENOUS EVERY 6 HOURS PRN
Status: DISCONTINUED | OUTPATIENT
Start: 2020-02-18 | End: 2020-02-20 | Stop reason: HOSPADM

## 2020-02-18 RX ORDER — IPRATROPIUM BROMIDE AND ALBUTEROL SULFATE 2.5; .5 MG/3ML; MG/3ML
1 SOLUTION RESPIRATORY (INHALATION) ONCE
Status: COMPLETED | OUTPATIENT
Start: 2020-02-18 | End: 2020-02-18

## 2020-02-18 RX ORDER — CALCIUM POLYCARBOPHIL 625 MG 625 MG/1
625 TABLET ORAL DAILY PRN
Status: DISCONTINUED | OUTPATIENT
Start: 2020-02-18 | End: 2020-02-20 | Stop reason: HOSPADM

## 2020-02-18 RX ORDER — ACETAMINOPHEN 500 MG
500 TABLET ORAL EVERY 6 HOURS PRN
COMMUNITY

## 2020-02-18 RX ORDER — SODIUM CHLORIDE 0.9 % (FLUSH) 0.9 %
10 SYRINGE (ML) INJECTION EVERY 12 HOURS SCHEDULED
Status: DISCONTINUED | OUTPATIENT
Start: 2020-02-18 | End: 2020-02-20 | Stop reason: HOSPADM

## 2020-02-18 RX ORDER — ASPIRIN 325 MG
325 TABLET ORAL ONCE
Status: DISCONTINUED | OUTPATIENT
Start: 2020-02-18 | End: 2020-02-18

## 2020-02-18 RX ORDER — ASPIRIN 81 MG/1
324 TABLET, CHEWABLE ORAL ONCE
Status: COMPLETED | OUTPATIENT
Start: 2020-02-18 | End: 2020-02-18

## 2020-02-18 RX ORDER — DOXYCYCLINE HYCLATE 100 MG
100 TABLET ORAL EVERY 12 HOURS SCHEDULED
Status: DISCONTINUED | OUTPATIENT
Start: 2020-02-18 | End: 2020-02-19

## 2020-02-18 RX ORDER — 0.9 % SODIUM CHLORIDE 0.9 %
1000 INTRAVENOUS SOLUTION INTRAVENOUS ONCE
Status: COMPLETED | OUTPATIENT
Start: 2020-02-18 | End: 2020-02-18

## 2020-02-18 RX ORDER — DOCUSATE SODIUM 100 MG/1
100 CAPSULE, LIQUID FILLED ORAL 2 TIMES DAILY
Status: DISCONTINUED | OUTPATIENT
Start: 2020-02-18 | End: 2020-02-20 | Stop reason: HOSPADM

## 2020-02-18 RX ORDER — POTASSIUM CHLORIDE 750 MG/1
40 TABLET, FILM COATED, EXTENDED RELEASE ORAL ONCE
Status: COMPLETED | OUTPATIENT
Start: 2020-02-18 | End: 2020-02-18

## 2020-02-18 RX ORDER — NITROGLYCERIN 0.4 MG/1
0.4 TABLET SUBLINGUAL EVERY 5 MIN PRN
Status: DISCONTINUED | OUTPATIENT
Start: 2020-02-18 | End: 2020-02-18

## 2020-02-18 RX ORDER — POTASSIUM CHLORIDE 7.45 MG/ML
10 INJECTION INTRAVENOUS ONCE
Status: COMPLETED | OUTPATIENT
Start: 2020-02-18 | End: 2020-02-18

## 2020-02-18 RX ORDER — ATORVASTATIN CALCIUM 80 MG/1
80 TABLET, FILM COATED ORAL NIGHTLY
Status: DISCONTINUED | OUTPATIENT
Start: 2020-02-18 | End: 2020-02-20 | Stop reason: HOSPADM

## 2020-02-18 RX ORDER — METOPROLOL SUCCINATE 50 MG/1
50 TABLET, EXTENDED RELEASE ORAL DAILY
Status: DISCONTINUED | OUTPATIENT
Start: 2020-02-18 | End: 2020-02-20 | Stop reason: HOSPADM

## 2020-02-18 RX ORDER — ALBUTEROL SULFATE 2.5 MG/3ML
5 SOLUTION RESPIRATORY (INHALATION) ONCE
Status: DISCONTINUED | OUTPATIENT
Start: 2020-02-18 | End: 2020-02-18

## 2020-02-18 RX ORDER — SODIUM CHLORIDE 0.9 % (FLUSH) 0.9 %
10 SYRINGE (ML) INJECTION PRN
Status: DISCONTINUED | OUTPATIENT
Start: 2020-02-18 | End: 2020-02-20 | Stop reason: HOSPADM

## 2020-02-18 RX ORDER — VITAMIN B COMPLEX
2000 TABLET ORAL DAILY
Status: DISCONTINUED | OUTPATIENT
Start: 2020-02-18 | End: 2020-02-20 | Stop reason: HOSPADM

## 2020-02-18 RX ORDER — POTASSIUM CHLORIDE 20 MEQ/1
20 TABLET, EXTENDED RELEASE ORAL 2 TIMES DAILY WITH MEALS
Status: DISCONTINUED | OUTPATIENT
Start: 2020-02-18 | End: 2020-02-20 | Stop reason: HOSPADM

## 2020-02-18 RX ORDER — ASPIRIN 81 MG/1
81 TABLET, CHEWABLE ORAL DAILY
Status: DISCONTINUED | OUTPATIENT
Start: 2020-02-19 | End: 2020-02-20 | Stop reason: HOSPADM

## 2020-02-18 RX ORDER — UBIDECARENONE 75 MG
100 CAPSULE ORAL DAILY
Status: DISCONTINUED | OUTPATIENT
Start: 2020-02-18 | End: 2020-02-20 | Stop reason: HOSPADM

## 2020-02-18 RX ORDER — FOLIC ACID 1 MG/1
1 TABLET ORAL DAILY
Status: DISCONTINUED | OUTPATIENT
Start: 2020-02-18 | End: 2020-02-20 | Stop reason: HOSPADM

## 2020-02-18 RX ORDER — PANTOPRAZOLE SODIUM 40 MG/1
40 TABLET, DELAYED RELEASE ORAL
Status: DISCONTINUED | OUTPATIENT
Start: 2020-02-19 | End: 2020-02-20 | Stop reason: HOSPADM

## 2020-02-18 RX ORDER — BENZONATATE 100 MG/1
100 CAPSULE ORAL 3 TIMES DAILY PRN
Status: DISCONTINUED | OUTPATIENT
Start: 2020-02-18 | End: 2020-02-20 | Stop reason: HOSPADM

## 2020-02-18 RX ORDER — TRIAMTERENE AND HYDROCHLOROTHIAZIDE 37.5; 25 MG/1; MG/1
1 TABLET ORAL DAILY
Status: DISCONTINUED | OUTPATIENT
Start: 2020-02-18 | End: 2020-02-18

## 2020-02-18 RX ADMIN — VITAMIN D, TAB 1000IU (100/BT) 2000 UNITS: 25 TAB at 22:47

## 2020-02-18 RX ADMIN — FOLIC ACID 1 MG: 1 TABLET ORAL at 22:46

## 2020-02-18 RX ADMIN — ENOXAPARIN SODIUM 40 MG: 40 INJECTION SUBCUTANEOUS at 22:46

## 2020-02-18 RX ADMIN — POTASSIUM CHLORIDE 40 MEQ: 750 TABLET, FILM COATED, EXTENDED RELEASE ORAL at 17:10

## 2020-02-18 RX ADMIN — DOCUSATE SODIUM 100 MG: 100 CAPSULE ORAL at 22:47

## 2020-02-18 RX ADMIN — ONDANSETRON 4 MG: 2 INJECTION INTRAMUSCULAR; INTRAVENOUS at 22:56

## 2020-02-18 RX ADMIN — ASPIRIN 81 MG 324 MG: 81 TABLET ORAL at 15:12

## 2020-02-18 RX ADMIN — POTASSIUM CHLORIDE 20 MEQ: 1500 TABLET, EXTENDED RELEASE ORAL at 22:46

## 2020-02-18 RX ADMIN — IPRATROPIUM BROMIDE AND ALBUTEROL SULFATE 1 AMPULE: .5; 3 SOLUTION RESPIRATORY (INHALATION) at 14:39

## 2020-02-18 RX ADMIN — DOXYCYCLINE HYCLATE 100 MG: 100 TABLET, COATED ORAL at 22:47

## 2020-02-18 RX ADMIN — METOPROLOL SUCCINATE 50 MG: 50 TABLET, EXTENDED RELEASE ORAL at 22:47

## 2020-02-18 RX ADMIN — Medication 10 ML: at 22:47

## 2020-02-18 RX ADMIN — Medication 100 MCG: at 22:46

## 2020-02-18 RX ADMIN — ALBUTEROL SULFATE 5 MG: 2.5 SOLUTION RESPIRATORY (INHALATION) at 14:39

## 2020-02-18 RX ADMIN — POTASSIUM CHLORIDE 10 MEQ: 7.46 INJECTION, SOLUTION INTRAVENOUS at 15:14

## 2020-02-18 RX ADMIN — NITROGLYCERIN 0.4 MG: 0.4 TABLET, ORALLY DISINTEGRATING SUBLINGUAL at 17:11

## 2020-02-18 RX ADMIN — SODIUM CHLORIDE 1000 ML: 9 INJECTION, SOLUTION INTRAVENOUS at 15:14

## 2020-02-18 RX ADMIN — ATORVASTATIN CALCIUM 80 MG: 80 TABLET, FILM COATED ORAL at 22:46

## 2020-02-18 ASSESSMENT — ENCOUNTER SYMPTOMS
SHORTNESS OF BREATH: 1
SORE THROAT: 0
VOMITING: 0
RHINORRHEA: 0
ABDOMINAL PAIN: 0
DIARRHEA: 0
BLOOD IN STOOL: 0
NAUSEA: 0
CONSTIPATION: 0

## 2020-02-18 ASSESSMENT — PAIN SCALES - GENERAL
PAINLEVEL_OUTOF10: 2
PAINLEVEL_OUTOF10: 2
PAINLEVEL_OUTOF10: 4

## 2020-02-18 ASSESSMENT — PAIN DESCRIPTION - LOCATION
LOCATION: CHEST

## 2020-02-18 ASSESSMENT — PAIN DESCRIPTION - PAIN TYPE
TYPE: ACUTE PAIN

## 2020-02-18 ASSESSMENT — PAIN DESCRIPTION - DESCRIPTORS: DESCRIPTORS: PRESSURE

## 2020-02-18 NOTE — ED NOTES
Roudned on pt for comfort & care. Lab drawn off iv per protocol. Dinner ordered from low sodium diet. Water provided. Denies further needs.        Claudean Coho, RN  02/18/20 [id number]

## 2020-02-18 NOTE — H&P
admitted for further evaluation          REVIEW OF SYSTEMS:     10 point ROS was completed and negative unless noted above    Past Medical History:   has a past medical history of Arthritis, Cystitis, interstitial, GERD (gastroesophageal reflux disease), GI bleeding, Heart burn, Hypertension, Interstitial cystitis, Patient is Buddhism, Psoriasis, and Thoracic aortic aneurysm ([facility] [address] Past Surgical History:   has a past surgical history that includes partial hysterectomy (cervix not removed); Knee arthroscopy (Right, 02/2015); Rotator cuff repair (Right, 2014); Coronary artery bypass graft (05/29/2018); transesophageal echocardiogram (05/29/2018); Cardiac catheterization (05/29/2018); Colonoscopy (07/31/2015); Mohs surgery (Left); and Cardiac catheterization (2012). Medications:  No current facility-administered medications on file prior to encounter.       Current Outpatient Medications on File Prior to Encounter   Medication Sig Dispense Refill    acetaminophen (TYLENOL) 500 MG tablet Take 500 mg by mouth every 6 hours as needed for Pain      pantoprazole (PROTONIX) 40 MG tablet Take 1 tablet by mouth every morning (before breakfast) 30 tablet 5    atorvastatin (LIPITOR) 80 MG tablet Take 1 tablet by mouth daily 90 tablet 1    metoprolol succinate (TOPROL XL) 50 MG extended release tablet TAKE 1 TABLET EVERY DAY 90 tablet 3    triamterene-hydrochlorothiazide (MAXZIDE-25) 37.5-25 MG per tablet Take 1 tablet by mouth daily 90 tablet 3    Calcium Polycarbophil (FIBER-CAPS PO) Take 1 capsule by mouth daily as needed       folic acid (FOLVITE) 1 MG tablet Take 1 tablet by mouth daily 60 tablet 0    vitamin B-12 100 MCG tablet Take 1 tablet by mouth daily 60 tablet 0    docusate sodium (COLACE, DULCOLAX) 100 MG CAPS Take 100 mg by mouth 2 times daily 60 capsule 0    vitamin E 1000 units capsule Take 1,000 Units by mouth daily      Cholecalciferol (VITAMIN D3) 2000 units CAPS Take 2,000 Units by mouth daily       aspirin 81 MG chewable tablet Take 1 tablet by mouth daily 30 tablet 3    clotrimazole-betamethasone (LOTRISONE) 1-0.05 % cream Apply topically as needed          Allergies: Allergies   Allergen Reactions    Latex Itching    Mucinex [Guaifenesin Er] Anaphylaxis     Coughs more, dry cough, feels like she has bronchitis    Tetanus Toxoids      HAS REACTION- TOLD TO TAKE ALTERNATIVE    Redness and swelling to site    Morphine Nausea And Vomiting    Pneumococcal Vaccines Rash        Social History:  Patient Lives    reports that she quit smoking about 59 years ago. Her smoking use included cigarettes. She quit after 7.00 years of use. She has never used smokeless tobacco. She reports that she does not drink alcohol or use drugs. Family History:  family history includes Breast Cancer in her brother, father, and mother; COPD in her father; Tuberculosis in her brother and father. ,     Physical Exam:  /66   Pulse 59   Temp 97 °F (36.1 °C) (Oral)   Resp 23   Ht 5' 2\" (1.575 m)   Wt 160 lb (72.6 kg)   LMP  (Exact Date)   SpO2 96%   BMI 29.26 kg/m²     General appearance:  Appears comfortable. Well nourished  Eyes: Sclera clear, pupils equal  ENT: Moist mucus membranes, no thrush. Trachea midline. Cardiovascular: SCAR of CABG  Respiratory: Clear to auscultation bilaterally, no wheeze, good inspiratory effort  Gastrointestinal: Abdomen soft, non-tender, not distended, normal bowel sounds  Musculoskeletal: No cyanosis in digits, neck supple  Neurology: Cranial nerves grossly intact. Alert and oriented in time, place and person. No speech or motor deficits  Psychiatry: Appropriate affect.  Not agitated  Skin: Warm, dry, normal turgor, no rash  Brisk capillary refill, peripheral pulses palpable   Labs:  CBC:   Lab Results   Component Value Date    WBC 12.0 02/18/2020    RBC 4.93 02/18/2020    HGB 15.3 02/18/2020    HCT 45.3 02/18/2020    MCV 91.9 02/18/2020    MCH 31.1 02/18/2020

## 2020-02-18 NOTE — ED PROVIDER NOTES
aspirin chewable tablet 324 mg (324 mg Oral Given 2/18/20 1512)   potassium chloride 10 mEq/100 mL IVPB (Peripheral Line) (0 mEq Intravenous Stopped 2/18/20 1616)   ipratropium-albuterol (DUONEB) nebulizer solution 1 ampule (1 ampule Inhalation Given 2/18/20 1439)   0.9 % sodium chloride bolus (0 mLs Intravenous Stopped 2/18/20 1623)   albuterol (PROVENTIL) nebulizer solution 5 mg (5 mg Nebulization Given 2/18/20 1439)   potassium chloride (KLOR-CON) extended release tablet 40 mEq (40 mEq Oral Given 2/18/20 1710)     FINAL IMPRESSION:    1. Chest pain, unspecified type    2.  Hypokalemia       DISPOSITION Admitted 02/18/2020 06:07:45 PM       Deyanira Alanis MD  02/18/20 [id number]

## 2020-02-18 NOTE — ED PROVIDER NOTES
status: None    Intimate partner violence:     Fear of current or ex partner: None     Emotionally abused: None     Physically abused: None     Forced sexual activity: None   Other Topics Concern    None   Social History Narrative    None       SCREENINGS             PHYSICAL EXAM  (up to 7 for level 4, 8 or more for level 5)     ED Triage Vitals [02/18/20 1255]   BP Temp Temp Source Pulse Resp SpO2 Height Weight   (!) 180/67 97 °F (36.1 °C) Oral 74 20 97 % 5' 2\" (1.575 m) 160 lb (72.6 kg)       Physical Exam  Vitals signs and nursing note reviewed. Constitutional:       Appearance: She is well-developed. She is not diaphoretic. HENT:      Head: Normocephalic and atraumatic. Eyes:      General: No scleral icterus. Right eye: No discharge. Left eye: No discharge. Neck:      Musculoskeletal: Normal range of motion and neck supple. Cardiovascular:      Rate and Rhythm: Regular rhythm. Bradycardia present. Heart sounds: Normal heart sounds. No murmur. No friction rub. No gallop. Pulmonary:      Effort: Pulmonary effort is normal. No respiratory distress. Breath sounds: No stridor. Wheezing present. No rales. Chest:      Chest wall: No tenderness. Abdominal:      General: Bowel sounds are normal. There is no distension. Palpations: Abdomen is soft. There is no mass. Tenderness: There is no abdominal tenderness. There is no guarding or rebound. Musculoskeletal: Normal range of motion. General: No tenderness. Skin:     General: Skin is warm and dry. Coloration: Skin is not pale. Neurological:      Mental Status: She is alert and oriented to person, place, and time.       Coordination: Coordination normal.   Psychiatric:         Behavior: Behavior normal.         DIAGNOSTIC RESULTS   LABS:    Labs Reviewed   BASIC METABOLIC PANEL - Abnormal; Notable for the following components:       Result Value    Potassium 2.8 (*)     Anion Gap 18 (*)     All other werewithin normal range or not returned as of this dictation. EKG: All EKG's are interpreted by the Emergency Department Physician who either signs or Co-signs this chart in the absence of acardiologist.  Please see their note for interpretation of EKG. RADIOLOGY:   Interpretation per the Radiologist below, if available at the time of this note:    CT CHEST WO CONTRAST   Final Result   No acute abnormality visualized. XR CHEST STANDARD (2 VW)   Preliminary Result   No acute cardiopulmonary disease. NM MYOCARDIAL SPECT REST EXERCISE OR RX    (Results Pending)   CT HEAD WO CONTRAST    (Results Pending)     Xr Chest Standard (2 Vw)    Result Date: 2/18/2020  EXAMINATION: TWO X-RAY VIEWS OF THE CHEST 2/18/2020 1:14 pm COMPARISON: 02/12/2020, 06/11/2018 HISTORY: ORDERING SYSTEM PROVIDED HISTORY: Chest pain TECHNOLOGIST PROVIDED HISTORY: Reason for exam:-> Chest pain Reason for Exam: Chest Pain (Patient comes in today by [facility] for complaints of chest pain. Patient was at [address] [place] [address] when they called 911. Patient reports she always has chest pain but today got worse. Patient reports some trouble breathing with some shaking). Acuity: Unknown Type of Exam: Unknown FINDINGS: Frontal and lateral views of the chest were obtained. There is no acute skeletal abnormality. There is chronic bilateral shoulder arthrosis. The patient is status post CABG. The heart size and mediastinal contours are stable, and within limits. There is stable chronic biapical pleural-parenchymal scarring. Stable scattered calcified granulomata are noted. The lungs are otherwise clear, without evidence of acute airspace consolidation, pneumothorax, or pleural effusion. No acute cardiopulmonary disease.      Ct Chest Wo Contrast    Result Date: 2/18/2020  EXAMINATION: CT OF THE CHEST WITHOUT CONTRAST 2/18/2020 2:17 pm TECHNIQUE: CT of the chest was performed without the administration of intravenous contrast. Multiplanar reformatted images are provided for review. Dose modulation, iterative reconstruction, and/or weight based adjustment of the mA/kV was utilized to reduce the radiation dose to as low as reasonably achievable. COMPARISON: None. HISTORY: ORDERING SYSTEM PROVIDED HISTORY: cp TECHNOLOGIST PROVIDED HISTORY: Reason for exam:->cp Reason for Exam: Chest Pain (Pt. comes in today by [facility] for complaints of chest pain. Pt. was at [facility] when they called 911. Pt. reports she always has chest pain but today got worse. Pt. reports some trouble breathing with some shaking. ) Acuity: Unknown Type of Exam: Unknown FINDINGS: Mediastinum: The heart size is normal.  The patient is status post CABG. No pathologically enlarged lymph nodes are detected. A mild hiatal hernia is present. Lungs/pleura: No infiltrate or consolidation or effusion is identified. Upper Abdomen: There appears to be a left renal stone. Soft Tissues/Bones: Degenerative changes of the spine are present. No acute abnormality visualized. PROCEDURES   Unless otherwise noted below, none     Procedures    CRITICAL CARE TIME     There was a high probability of life-threatening clinical deterioration in the patient's condition requiring my urgent intervention. The total critical care time spent while evaluating and treating this patient was at least 32 minutes. This excludes time spent doing separately billable procedures. This includes time at the bedside, data interpretation, medication management, obtaining critical history from collateral sources if the patient is unable to provide it directly, and physician consultation. Specifics of interventions taken and potentially life-threatening diagnostic considerations are listed in the medical decision making.       CONSULTS:  IP CONSULT TO HOSPITALIST      EMERGENCY DEPARTMENT COURSE and DIFFERENTIAL DIAGNOSIS/MDM:   Vitals:    Vitals:    02/18/20 1630 02/18/20 1700 02/18/20 1707 02/18/20 1730   BP: 131/62 (!) 151/58 (!) 144/75 137/66   Pulse: 62 62 57 59   Resp: 24 15 18 23   Temp:       TempSrc:       SpO2: 98% 98% 98% 96%   Weight:       Height:           Aaron Ventura was given the following medications:  Medications   nitroGLYCERIN (NITROSTAT) SL tablet 0.4 mg (0.4 mg Sublingual Given 2/18/20 1711)   sodium chloride flush 0.9 % injection 10 mL (has no administration in time range)   sodium chloride flush 0.9 % injection 10 mL (has no administration in time range)   magnesium hydroxide (MILK OF MAGNESIA) 400 MG/5ML suspension 30 mL (has no administration in time range)   ondansetron (ZOFRAN) injection 4 mg (has no administration in time range)   aspirin chewable tablet 81 mg (has no administration in time range)   enoxaparin (LOVENOX) injection 40 mg (has no administration in time range)   benzonatate (TESSALON) capsule 100 mg (has no administration in time range)   doxycycline hyclate (VIBRA-TABS) tablet 100 mg (has no administration in time range)   aspirin chewable tablet 324 mg (324 mg Oral Given 2/18/20 1512)   potassium chloride 10 mEq/100 mL IVPB (Peripheral Line) (0 mEq Intravenous Stopped 2/18/20 1616)   ipratropium-albuterol (DUONEB) nebulizer solution 1 ampule (1 ampule Inhalation Given 2/18/20 1439)   0.9 % sodium chloride bolus (0 mLs Intravenous Stopped 2/18/20 1623)   albuterol (PROVENTIL) nebulizer solution 5 mg (5 mg Nebulization Given 2/18/20 1439)   potassium chloride (KLOR-CON) extended release tablet 40 mEq (40 mEq Oral Given 2/18/20 1710)       Aaron Ventura was evaluated in the emergency department with concern for chest pain and shortness of breath. According to the EMS team she was hypoxic when they arrived but she is not at this time on my evaluation. She is satting 99% on room air. Slight end expiratory wheeze on exam.  She will be treated with breathing treatments for this. She is also identified to be hypokalemic.

## 2020-02-18 NOTE — PROGRESS NOTES
Dr. Yvonne Jett 15, [place], [address]  Phone: [phone]    HPI:  Chief Complaint   Patient presents with    Shortness of Breath     SOB CONGESION SEEN ON 2/12 GIVEN STERIODS MESSED UP TAKING THEM SHE STARTED OFF ONLY TAKING 3 MEMORY AFTER THAT IS NOT VERY GOOD ON WHAT SHE TOOK SHE KNOWS SHE DID NOT TAKE ANY ON FRIDAY OR SATURDAY AND THEN DECIDED TO RESTART SUNDAY BY TAKING 2 BUT SHE HAS 7 PILLS, SHE IS DIZZY WEAK CANNOT GET UP AND DOWN FEELS FOGGY CANNOT SEE CLEARLY     Chest Pain     CHEST PAIN SHARP STABBING PAIN ONLY IN LEFT SIDE IT TAKES BREATH AWAY WHEN BREATHING PT IS DRIVING HER SELF BUT SHE HIT HER MAILBOX YESTERDAY        Jonathan Scanlon is a 82 y.o. female here for evaluation of shortness of breath and chest pain. Patient notes that she feels lightheaded and short of breath currently. She reports that she has a sharp chest pain in her left chest that is worse with taking a deep breath in. Patient notes that she had unstable angina at a hospital visit in May 2018 and visited the hospital again later in May for elevated troponin. She states that at the hospital visit on May 28, 2018, she was taken to the cath lab and it revealed multi-vessel CAD and she underwent CABG X 2 on 5/30/18 and she was in the hospital for 5 days after that. Patient reports that since that hospital visit, she has had a discomfort in her chest but not a pain. She reports that her current chest pain is made worse when bending over to  objects and with her sleeping position. Patient notes that her chest pain has moved from the center of her chest to the left side of her chest. She reports that she was seen in the office on 2/12 and she was given steroids and she messed up taking them. Patient notes that her memory is not good currently. She reports that she has a history of an thoracic ascending aortic aneurysm.      Vitals:  BP Readings from Last 3 Encounters: 02/18/20 (!) 144/86   02/12/20 118/76   10/22/19 120/76       Pulse Readings from Last 3 Encounters:   02/18/20 64   02/12/20 66   10/22/19 80        Wt Readings from Last 3 Encounters:   02/18/20 164 lb (74.4 kg)   02/12/20 164 lb 9.6 oz (74.7 kg)   10/22/19 162 lb (73.5 kg)        Medication Review:  Current Outpatient Medications   Medication Sig Dispense Refill    pantoprazole (PROTONIX) 40 MG tablet Take 1 tablet by mouth every morning (before breakfast) 30 tablet 5    atorvastatin (LIPITOR) 80 MG tablet Take 1 tablet by mouth daily 90 tablet 1    predniSONE (DELTASONE) 10 MG tablet 3 tabs x 2 d 2 tabs x 2 d 1  tab x 2 d in am with food  avoid NSAIDs while on this medication 20 tablet 0    metoprolol succinate (TOPROL XL) 50 MG extended release tablet TAKE 1 TABLET EVERY DAY 90 tablet 3    triamterene-hydrochlorothiazide (MAXZIDE-25) 37.5-25 MG per tablet Take 1 tablet by mouth daily 90 tablet 3    Calcium Polycarbophil (FIBER-CAPS PO) Take by mouth      Acetaminophen (TYLENOL 8 HOUR ARTHRITIS PAIN PO) Take 500 mg by mouth 4 times daily      vitamin B-12 100 MCG tablet Take 1 tablet by mouth daily 60 tablet 0    docusate sodium (COLACE, DULCOLAX) 100 MG CAPS Take 100 mg by mouth 2 times daily 60 capsule 0    vitamin E 1000 units capsule Take 1,000 Units by mouth daily      Cholecalciferol (VITAMIN D3) 2000 units CAPS Take 2,000 Units by mouth      aspirin 81 MG chewable tablet Take 1 tablet by mouth daily 30 tablet 3    clotrimazole-betamethasone (LOTRISONE) 1-0.05 % cream Apply topically as needed Apply topically 2 times daily.  folic acid (FOLVITE) 1 MG tablet Take 1 tablet by mouth daily 60 tablet 0     No current facility-administered medications for this visit. Review of Systems:   All others are negative, except as noted in the HPI.     Patient History:  Past Medical History:   Diagnosis Date    Arthritis     Cystitis, interstitial     GERD (gastroesophageal reflux disease)  GI bleeding     was a frequent aspirin user at that time    Heart burn     Hypertension     Interstitial cystitis     Patient is Faith     no blood products    Psoriasis     Thoracic aortic aneurysm [facility])     cardiologist watching        Past Surgical History:   Procedure Laterality Date    CARDIAC CATHETERIZATION  2018    Dr. Luc Rockwell w/placement of IABP    CARDIAC CATHETERIZATION  2012    COLONOSCOPY  2015    Dr. Jodi davis/polypectomy    CORONARY ARTERY BYPASS GRAFT  2018    Dr. Mary Kay Winters - off pump x2 (LIMA-LAD, L SVG-D2)    KNEE ARTHROSCOPY Right 2015    MOHS SURGERY Left     facial for skin CA    PARTIAL HYSTERECTOMY      ROTATOR CUFF REPAIR Right     TRANSESOPHAGEAL ECHOCARDIOGRAM  2018    during CABG        Social History     Socioeconomic History    Marital status:       Spouse name: Not on file    Number of children: Not on file    Years of education: Not on file    Highest education level: Not on file   Occupational History    Not on file   Social Needs    Financial resource strain: Not on file    Food insecurity:     Worry: Not on file     Inability: Not on file    Transportation needs:     Medical: Not on file     Non-medical: Not on file   Tobacco Use    Smoking status: Former Smoker     Years: 7.00     Types: Cigarettes     Last attempt to quit: 5/3/1960     Years since quittin.8    Smokeless tobacco: Never Used    Tobacco comment: AS A TEENAGER FOR ABOUT 6 YEARS    Substance and Sexual Activity    Alcohol use: No    Drug use: No    Sexual activity: Not on file   Lifestyle    Physical activity:     Days per week: Not on file     Minutes per session: Not on file    Stress: Not on file   Relationships    Social connections:     Talks on phone: Not on file     Gets together: Not on file     Attends Restoration service: Not on file     Active member of club or organization: Not on file     Attends meetings of clubs or organizations: Not on file     Relationship status: Not on file    Intimate partner violence:     Fear of current or ex partner: Not on file     Emotionally abused: Not on file     Physically abused: Not on file     Forced sexual activity: Not on file   Other Topics Concern    Not on file   Social History Narrative    Not on file        Family History   Problem Relation Age of Onset    Breast Cancer Mother         LUNG    Breast Cancer Father         LUNG    COPD Father     Tuberculosis Father     Breast Cancer Brother         LUNG    Tuberculosis Brother           Physical Exam:  Physical Exam  Vitals signs and nursing note reviewed. Constitutional:       General: She is not in acute distress. Appearance: She is well-developed. HENT:      Head: Normocephalic and atraumatic. Right Ear: External ear normal.      Left Ear: External ear normal.      Mouth/Throat:      Pharynx: No oropharyngeal exudate. Eyes:      General: No scleral icterus. Conjunctiva/sclera: Conjunctivae normal.   Cardiovascular:      Rate and Rhythm: Normal rate and regular rhythm. Heart sounds: Normal heart sounds. No murmur. Pulmonary:      Effort: Pulmonary effort is normal. No respiratory distress. Breath sounds: Normal breath sounds. No wheezing or rales. Comments: Increased respiratory effort  Chest:      Chest wall: Tenderness (Left chest) present. Abdominal:      General: There is no distension. Palpations: Abdomen is soft. Tenderness: There is no abdominal tenderness. Skin:     General: Skin is warm and dry. Neurological:      Mental Status: She is alert and oriented to person, place, and time.             Laboratory Studies:  Lab Results   Component Value Date    LABA1C 5.3 05/29/2018        Lab Results   Component Value Date    WBC 5.7 08/31/2018    HGB 13.1 08/31/2018    HCT 39.3 08/31/2018    MCV 89.5 08/31/2018     08/31/2018        Lab Results   Component Value Date     08/08/2019    K 4.0 08/08/2019     08/08/2019    CO2 24 08/08/2019    BUN 13 08/08/2019    CREATININE <0.5 (L) 08/08/2019    GLUCOSE 89 08/08/2019    CALCIUM 9.6 08/08/2019    PROT 6.6 08/08/2019    LABALBU 4.4 08/08/2019    BILITOT 1.1 (H) 08/08/2019    ALKPHOS 60 08/08/2019    AST 23 08/08/2019    ALT 24 08/08/2019    LABGLOM >60 08/08/2019    GFRAA >60 08/08/2019    AGRATIO 2.0 08/08/2019    GLOB 2.2 08/08/2019       Lab Results   Component Value Date    CHOL 160 02/15/2019    TRIG 167 (H) 02/15/2019    HDL 79 (H) 02/15/2019    LDLCALC 48 02/15/2019    LABVLDL 33 02/15/2019       Lab Results   Component Value Date    TSH 3.50 08/31/2018    S7MMPTP 10.0 12/10/2012    T4FREE 1.4 08/31/2018        Lab Results   Component Value Date    VITD25 50.2 04/03/2018    VITD25 24 (L) 08/25/2011            Health Maintenance Review:  Health Maintenance Due   Topic Date Due    Shingles Vaccine (1 of 2) 03/05/1987    Lipid screen  02/15/2020         Immunizations:  Immunization History   Administered Date(s) Administered    Influenza, High Dose (Fluzone 65 yrs and older) 09/01/2017, 09/28/2018, 09/04/2019    Pneumococcal Conjugate 13-valent ([id number]) 05/09/2018         Assessment:   1. Chest pain, unspecified type    2. SOB (shortness of breath)    3. Dizzy    4. Coronary artery disease involving native heart with angina pectoris, unspecified vessel or lesion type ([facility] [address]    5.  CHF (congestive heart failure), NYHA class I, acute on chronic, systolic (HCC)           Plan:  Chest pain, unspecified type  -     EKG 12 Lead  - Visit ER for further evaluation - ems called and tranported to hospital  No ekg concerns but concern w/ pt hx of cad/chf w/ sob/ chest pain - needs further urgent evaluation  Will notify daughter in law    SOB (shortness of breath)  - Visit ER for further evaluation     Dizzy  - Visit ER for further evaluation     Coronary artery disease involving native heart with angina pectoris, unspecified vessel or lesion type ([address]  -Per cardiology - stable on statin, bb, asa s/p CABG    CHF (congestive heart failure), NYHA class I, acute on chronic, systolic ([address]  - Visit ER for further evaluation               I have reviewed patient's pertinent medical history, relevant laboratory and imaging studies, and past/future health maintenance. Patient's medications have been reviewed and were discussed with the patient. Refills up-to-date. Discussed with the patient the importance of adhering to their current medication regimen as directed. Advised the patient that they should continue to work on eating a healthy balanced diet and staying active by exercising within their personal limits. Patient was advised to keep future appointments with their respective specialty care team(s). Patient had the opportunity to ask questions, all of which were answered to the best of my ability and with patient satisfaction. Patient advised to schedule a return visit for reevaluation in as needed. Patient understands and is agreeable with the care plan following today's visit. Patient is to schedule an appointment for any new or worsening symptoms. Requested Prescriptions      No prescriptions requested or ordered in this encounter      Orders Placed This Encounter   Procedures    EKG 12 Lead     Order Specific Question:   Reason for Exam?     Answer:   Chest pain           By signing my name below, I, Yonny Adams, attest that this documentation has been prepared under the direction and in the presence of Myles Aguillon MD.   Electronically Signed: Cameron Bone, 2/18/2020, 12:28 PM.      Hola Acevedo MD, personally performed the services described in this documentation. All medical record entries made by the cameron were at my direction and in my presence.   I have reviewed the chart and discharge instructions (if applicable) and agree that the record reflects my personal performance and is accurate and complete.  Leti Lala MD, 2/18/2020, 1:35 PM.

## 2020-02-19 ENCOUNTER — APPOINTMENT (OUTPATIENT)
Dept: MRI IMAGING | Age: 85
DRG: 641 | End: 2020-02-19
Payer: MEDICARE

## 2020-02-19 PROBLEM — E87.20 LACTIC ACIDOSIS: Status: ACTIVE | Noted: 2020-02-19

## 2020-02-19 PROBLEM — Z78.9 FAILURE OF OUTPATIENT TREATMENT: Status: ACTIVE | Noted: 2020-02-19

## 2020-02-19 PROBLEM — N39.0 UTI (URINARY TRACT INFECTION): Status: ACTIVE | Noted: 2020-02-19

## 2020-02-19 PROBLEM — D72.829 LEUKOCYTOSIS: Status: ACTIVE | Noted: 2020-02-19

## 2020-02-19 PROBLEM — E87.6 HYPOKALEMIA: Status: ACTIVE | Noted: 2020-02-19

## 2020-02-19 PROBLEM — J20.9 ACUTE BRONCHITIS: Status: ACTIVE | Noted: 2020-02-19

## 2020-02-19 LAB
ANION GAP SERPL CALCULATED.3IONS-SCNC: 10 MMOL/L (ref 3–16)
BILIRUBIN URINE: NEGATIVE
BLOOD, URINE: NEGATIVE
BUN BLDV-MCNC: 14 MG/DL (ref 7–20)
CALCIUM SERPL-MCNC: 9.1 MG/DL (ref 8.3–10.6)
CHLORIDE BLD-SCNC: 108 MMOL/L (ref 99–110)
CLARITY: CLEAR
CO2: 23 MMOL/L (ref 21–32)
COLOR: YELLOW
CREAT SERPL-MCNC: 0.6 MG/DL (ref 0.6–1.2)
EKG ATRIAL RATE: 50 BPM
EKG DIAGNOSIS: NORMAL
EKG P AXIS: 54 DEGREES
EKG P-R INTERVAL: 190 MS
EKG Q-T INTERVAL: 436 MS
EKG QRS DURATION: 146 MS
EKG QTC CALCULATION (BAZETT): 397 MS
EKG R AXIS: -53 DEGREES
EKG T AXIS: 75 DEGREES
EKG VENTRICULAR RATE: 50 BPM
EPITHELIAL CELLS, UA: 1 /HPF (ref 0–5)
GFR AFRICAN AMERICAN: >60
GFR NON-AFRICAN AMERICAN: >60
GLUCOSE BLD-MCNC: 88 MG/DL (ref 70–99)
GLUCOSE URINE: NEGATIVE MG/DL
HCT VFR BLD CALC: 41.4 % (ref 36–48)
HEMOGLOBIN: 13.7 G/DL (ref 12–16)
HYALINE CASTS: 1 /LPF (ref 0–8)
KETONES, URINE: NEGATIVE MG/DL
LEUKOCYTE ESTERASE, URINE: ABNORMAL
LV EF: 66 %
LVEF MODALITY: NORMAL
MAGNESIUM: 2 MG/DL (ref 1.8–2.4)
MCH RBC QN AUTO: 30.8 PG (ref 26–34)
MCHC RBC AUTO-ENTMCNC: 33.1 G/DL (ref 31–36)
MCV RBC AUTO: 93.1 FL (ref 80–100)
MICROSCOPIC EXAMINATION: YES
NEUTROPHILS RELATIVE PERCENT: 41.6 %
NITRITE, URINE: NEGATIVE
PDW BLD-RTO: 14.3 % (ref 12.4–15.4)
PH UA: 7 (ref 5–8)
PLATELET # BLD: 231 K/UL (ref 135–450)
PMV BLD AUTO: 8.1 FL (ref 5–10.5)
POTASSIUM SERPL-SCNC: 4.8 MMOL/L (ref 3.5–5.1)
PROTEIN UA: NEGATIVE MG/DL
RBC # BLD: 4.44 M/UL (ref 4–5.2)
RBC UA: 5 /HPF (ref 0–4)
SODIUM BLD-SCNC: 141 MMOL/L (ref 136–145)
SPECIFIC GRAVITY UA: 1.02 (ref 1–1.03)
URINE REFLEX TO CULTURE: YES
URINE TYPE: ABNORMAL
UROBILINOGEN, URINE: 1 E.U./DL
WBC # BLD: 9.3 K/UL (ref 4–11)
WBC UA: 3 /HPF (ref 0–5)

## 2020-02-19 PROCEDURE — 94760 N-INVAS EAR/PLS OXIMETRY 1: CPT

## 2020-02-19 PROCEDURE — 78452 HT MUSCLE IMAGE SPECT MULT: CPT | Performed by: INTERNAL MEDICINE

## 2020-02-19 PROCEDURE — 96376 TX/PRO/DX INJ SAME DRUG ADON: CPT

## 2020-02-19 PROCEDURE — 99223 1ST HOSP IP/OBS HIGH 75: CPT | Performed by: INTERNAL MEDICINE

## 2020-02-19 PROCEDURE — 97530 THERAPEUTIC ACTIVITIES: CPT

## 2020-02-19 PROCEDURE — 3430000000 HC RX DIAGNOSTIC RADIOPHARMACEUTICAL: Performed by: INTERNAL MEDICINE

## 2020-02-19 PROCEDURE — 6370000000 HC RX 637 (ALT 250 FOR IP): Performed by: INTERNAL MEDICINE

## 2020-02-19 PROCEDURE — 6360000002 HC RX W HCPCS: Performed by: INTERNAL MEDICINE

## 2020-02-19 PROCEDURE — 93010 ELECTROCARDIOGRAM REPORT: CPT | Performed by: INTERNAL MEDICINE

## 2020-02-19 PROCEDURE — 80048 BASIC METABOLIC PNL TOTAL CA: CPT

## 2020-02-19 PROCEDURE — 97116 GAIT TRAINING THERAPY: CPT

## 2020-02-19 PROCEDURE — 92526 ORAL FUNCTION THERAPY: CPT

## 2020-02-19 PROCEDURE — 1200000000 HC SEMI PRIVATE

## 2020-02-19 PROCEDURE — 83735 ASSAY OF MAGNESIUM: CPT

## 2020-02-19 PROCEDURE — 2580000003 HC RX 258: Performed by: INTERNAL MEDICINE

## 2020-02-19 PROCEDURE — 96372 THER/PROPH/DIAG INJ SC/IM: CPT

## 2020-02-19 PROCEDURE — A9502 TC99M TETROFOSMIN: HCPCS | Performed by: INTERNAL MEDICINE

## 2020-02-19 PROCEDURE — 97165 OT EVAL LOW COMPLEX 30 MIN: CPT

## 2020-02-19 PROCEDURE — 93005 ELECTROCARDIOGRAM TRACING: CPT | Performed by: INTERNAL MEDICINE

## 2020-02-19 PROCEDURE — 93017 CV STRESS TEST TRACING ONLY: CPT | Performed by: INTERNAL MEDICINE

## 2020-02-19 PROCEDURE — 70551 MRI BRAIN STEM W/O DYE: CPT

## 2020-02-19 PROCEDURE — 85027 COMPLETE CBC AUTOMATED: CPT

## 2020-02-19 PROCEDURE — 97161 PT EVAL LOW COMPLEX 20 MIN: CPT

## 2020-02-19 PROCEDURE — 87086 URINE CULTURE/COLONY COUNT: CPT

## 2020-02-19 PROCEDURE — 81001 URINALYSIS AUTO W/SCOPE: CPT

## 2020-02-19 PROCEDURE — 92610 EVALUATE SWALLOWING FUNCTION: CPT

## 2020-02-19 PROCEDURE — 96375 TX/PRO/DX INJ NEW DRUG ADDON: CPT

## 2020-02-19 RX ORDER — AZITHROMYCIN 250 MG/1
250 TABLET, FILM COATED ORAL DAILY
Status: DISCONTINUED | OUTPATIENT
Start: 2020-02-20 | End: 2020-02-20 | Stop reason: HOSPADM

## 2020-02-19 RX ORDER — AMINOPHYLLINE DIHYDRATE 25 MG/ML
100 INJECTION, SOLUTION INTRAVENOUS ONCE
Status: COMPLETED | OUTPATIENT
Start: 2020-02-19 | End: 2020-02-19

## 2020-02-19 RX ORDER — ACETAMINOPHEN 325 MG/1
650 TABLET ORAL EVERY 4 HOURS PRN
Status: DISCONTINUED | OUTPATIENT
Start: 2020-02-19 | End: 2020-02-20 | Stop reason: HOSPADM

## 2020-02-19 RX ORDER — AZITHROMYCIN 250 MG/1
500 TABLET, FILM COATED ORAL DAILY
Status: COMPLETED | OUTPATIENT
Start: 2020-02-19 | End: 2020-02-19

## 2020-02-19 RX ORDER — MAGNESIUM SULFATE 1 G/100ML
1 INJECTION INTRAVENOUS PRN
Status: DISCONTINUED | OUTPATIENT
Start: 2020-02-19 | End: 2020-02-20 | Stop reason: HOSPADM

## 2020-02-19 RX ORDER — KETOROLAC TROMETHAMINE 30 MG/ML
15 INJECTION, SOLUTION INTRAMUSCULAR; INTRAVENOUS EVERY 6 HOURS
Status: DISCONTINUED | OUTPATIENT
Start: 2020-02-19 | End: 2020-02-20 | Stop reason: HOSPADM

## 2020-02-19 RX ORDER — POTASSIUM CHLORIDE 7.45 MG/ML
10 INJECTION INTRAVENOUS PRN
Status: DISCONTINUED | OUTPATIENT
Start: 2020-02-19 | End: 2020-02-20 | Stop reason: HOSPADM

## 2020-02-19 RX ADMIN — ONDANSETRON 4 MG: 2 INJECTION INTRAMUSCULAR; INTRAVENOUS at 11:47

## 2020-02-19 RX ADMIN — Medication 100 MCG: at 07:58

## 2020-02-19 RX ADMIN — PANTOPRAZOLE SODIUM 40 MG: 40 TABLET, DELAYED RELEASE ORAL at 07:58

## 2020-02-19 RX ADMIN — TETROFOSMIN 10 MILLICURIE: 1.38 INJECTION, POWDER, LYOPHILIZED, FOR SOLUTION INTRAVENOUS at 09:05

## 2020-02-19 RX ADMIN — ASPIRIN 81 MG 81 MG: 81 TABLET ORAL at 07:58

## 2020-02-19 RX ADMIN — TETROFOSMIN 30 MILLICURIE: 1.38 INJECTION, POWDER, LYOPHILIZED, FOR SOLUTION INTRAVENOUS at 10:08

## 2020-02-19 RX ADMIN — Medication 1 G: at 15:06

## 2020-02-19 RX ADMIN — KETOROLAC TROMETHAMINE 15 MG: 30 INJECTION, SOLUTION INTRAMUSCULAR; INTRAVENOUS at 15:12

## 2020-02-19 RX ADMIN — POTASSIUM CHLORIDE 20 MEQ: 1500 TABLET, EXTENDED RELEASE ORAL at 15:06

## 2020-02-19 RX ADMIN — DOXYCYCLINE HYCLATE 100 MG: 100 TABLET, COATED ORAL at 08:04

## 2020-02-19 RX ADMIN — ATORVASTATIN CALCIUM 80 MG: 80 TABLET, FILM COATED ORAL at 20:12

## 2020-02-19 RX ADMIN — KETOROLAC TROMETHAMINE 15 MG: 30 INJECTION, SOLUTION INTRAMUSCULAR; INTRAVENOUS at 20:13

## 2020-02-19 RX ADMIN — FOLIC ACID 1 MG: 1 TABLET ORAL at 07:58

## 2020-02-19 RX ADMIN — Medication 10 ML: at 07:59

## 2020-02-19 RX ADMIN — REGADENOSON 0.4 MG: 0.08 INJECTION, SOLUTION INTRAVENOUS at 09:56

## 2020-02-19 RX ADMIN — ENOXAPARIN SODIUM 40 MG: 40 INJECTION SUBCUTANEOUS at 20:13

## 2020-02-19 RX ADMIN — AMINOPHYLLINE 100 MG: 25 INJECTION, SOLUTION INTRAVENOUS at 10:13

## 2020-02-19 RX ADMIN — Medication 10 ML: at 20:13

## 2020-02-19 RX ADMIN — VITAMIN D, TAB 1000IU (100/BT) 2000 UNITS: 25 TAB at 07:58

## 2020-02-19 RX ADMIN — POTASSIUM CHLORIDE 20 MEQ: 1500 TABLET, EXTENDED RELEASE ORAL at 07:58

## 2020-02-19 RX ADMIN — DOCUSATE SODIUM 100 MG: 100 CAPSULE ORAL at 20:12

## 2020-02-19 RX ADMIN — AZITHROMYCIN MONOHYDRATE 500 MG: 250 TABLET ORAL at 15:06

## 2020-02-19 RX ADMIN — DOCUSATE SODIUM 100 MG: 100 CAPSULE ORAL at 08:04

## 2020-02-19 ASSESSMENT — PAIN SCALES - GENERAL
PAINLEVEL_OUTOF10: 4
PAINLEVEL_OUTOF10: 2
PAINLEVEL_OUTOF10: 0
PAINLEVEL_OUTOF10: 2
PAINLEVEL_OUTOF10: 2
PAINLEVEL_OUTOF10: 3
PAINLEVEL_OUTOF10: 2
PAINLEVEL_OUTOF10: 3

## 2020-02-19 ASSESSMENT — PAIN DESCRIPTION - LOCATION
LOCATION: CHEST

## 2020-02-19 ASSESSMENT — PAIN DESCRIPTION - PAIN TYPE
TYPE: CHRONIC PAIN
TYPE: CHRONIC PAIN
TYPE: ACUTE PAIN
TYPE: CHRONIC PAIN

## 2020-02-19 NOTE — CARE COORDINATION
Discharge Planning Assessment    SW discharge planner met with patient to discuss reason for admission, current living situation, and potential needs at the time of discharge. Pt in ED d/t chest pain    Demographics/Insurance verified:  Yes    Current type of dwelling:  House - no difficulty w/stairs    Living arrangements:  Alone    Level of function/Support:  Pt reports she is independent with ADLs and very active in the community. Has supportive children who live nearby, and supportive neighbors. PCP:  Dr. Deanna Espinal    Last Visit to PCP:  Today     DME:  vanda Pagan    Active with any community resources/agencies/skilled home care:  None. Pt reported she did not qualify for [facility] and does not have any in-home service needs at this time. Medication compliance issues:  No    Financial issues that could impact healthcare:  No    Tentative discharge plan:  Home most likely. No needs identified. Discussed with patient and/or family that on the day of discharge home tentative time of discharge will be between 10 AM and noon. Transportation at the time of discharge:  Children can assist home.     Electronically signed by GRETTA Vincent, RUEL on 2/18/2020 at 7:36 PM

## 2020-02-19 NOTE — PROGRESS NOTES
family  Additional Comments: Pt reported that she fell 2 weeks ago, spilled coffee, slipped and fell on side       Objective   Vision: Impaired  Vision Exceptions: Wears glasses at all times  Hearing: Exceptions to [facility]  Hearing Exceptions: Hard of hearing/hearing concerns;Bilateral hearing aid(has not used hearing aids)    Orientation  Overall Orientation Status: Within Functional Limits  Observation/Palpation  Posture: Good  Balance  Sitting Balance: Supervision  Standing Balance: Contact guard assistance  Functional Mobility  Functional - Mobility Device: No device  Activity: Other  Assist Level: Contact guard assistance  Functional Mobility Comments: Functional mobility in hallway with CGA, slight LOB x4, but quick to self-correct ~150ft total  ADL  LE Dressing: Stand by assistance(Pt sat EOB to doff/don socks)  Additional Comments: Pt reported that she sits on small shower chair/tub transfer bench at home to dress since her bed is soft. Tone RUE  RUE Tone: Normotonic  Tone LUE  LUE Tone: Normotonic     Bed mobility  Supine to Sit: Stand by assistance  Transfers  Sit to stand: Stand by assistance(from EOB)  Stand to sit: Stand by assistance(to chair)  Transfer Comments: no AD     Cognition  Overall Cognitive Status: Exceptions  Arousal/Alertness: Appropriate responses to stimuli  Following Commands:  Follows one step commands with repetition  Attention Span: Attends with cues to redirect  Safety Judgement: Decreased awareness of need for assistance;Decreased awareness of need for safety  Problem Solving: Assistance required to generate solutions  Insights: Decreased awareness of deficits  Sequencing: Requires cues for some        Sensation  Overall Sensation Status: WFL(Pt reported no loss of sensation, but difficulties with arthritis)        LUE AROM (degrees)  LUE AROM : WFL  RUE AROM (degrees)  RUE General AROM: ~145 degrees with ability to touch back of head; pt reported that she limits use of R UE due to rotator cuff injury/surgery  LUE Strength  Gross LUE Strength: WFL  RUE Strength  Gross RUE Strength: WFL                   Plan   Plan  Times per week: 3-5  Times per day: Daily  Current Treatment Recommendations: Functional Mobility Training, Patient/Caregiver Education & Training, Endurance Training, Equipment Evaluation, Education, & procurement, Self-Care / ADL, Safety Education & Training    AM-PAC Score        AM-PAC Inpatient Daily Activity Raw Score: 18 (02/19/20 1500)  AM-PAC Inpatient ADL T-Scale Score : 38.66 (02/19/20 1500)  ADL Inpatient CMS 0-100% Score: 46.65 (02/19/20 1500)  ADL Inpatient CMS G-Code Modifier : CK (02/19/20 1500)    Goals  Short term goals  Time Frame for Short term goals: discharge  Short term goal 1: Mod I functional ADL mobility  Short term goal 2: Mod I functional ADL transfer  Short term goal 3: Mod I LB dressing  Short term goal 4: Mod I toileting  Long term goals  Time Frame for Long term goals : LTG=STG  Patient Goals   Patient goals : return home and visit with family       Therapy Time   Individual Concurrent Group Co-treatment   Time In 1401         Time Out 1441         Minutes 40              Timed Code Treatment Minutes:   25 Minutes    Total Treatment Minutes:  Corbin Hampton, [address], [address] [place] [address] directly observed and directed this treatment.   KAL Guzmán/L [id number]

## 2020-02-19 NOTE — ED NOTES
Pt report given to Letitia Alaniz RN. Pt will be transported shortly.      Sweetie Lara RN  02/18/20 1950

## 2020-02-19 NOTE — PROGRESS NOTES
[facility]    2/19/2020 1:40 PM        Name: Tatianna Winters . Admitted: 2/18/2020  Primary Care Provider: Rosa Maria Harry MD (Tel: [phone])    Brief Course:  83 yo F with history of CAD s/p CABG, HTN, thoracic aortic aneurysm, Afib, [place] who came to ER with complaints of cough and CP. Patient had failed to improve with PO steroids by PCP. Admitted as inpatient for CP, acute bronchitis, critical hypokalemia, failure of outpatient treatment and CP. Found to have UTI and started on IV Rocephin. MRI Brain acutely negative. SPECT negative on 2/19. CC: CP, cough, weakness    Subjective:  . Patient feels weak and tired. Still with CP. Coughing. No abdominal pain, nausea, vomiting, diarrhea, melena or hematochezia. Denies SOB. No HA or syncope.     Reviewed interval ancillary notes    Current Medications  acetaminophen (TYLENOL) tablet 650 mg, Q4H PRN  cefTRIAXone (ROCEPHIN) 1 g in sterile water 10 mL IV syringe, Q24H  azithromycin (ZITHROMAX) tablet 500 mg, Daily    Followed by  Yahaira Méndez ON 2/20/2020] azithromycin (ZITHROMAX) tablet 250 mg, Daily  atorvastatin (LIPITOR) tablet 80 mg, Nightly  polycarbophil (FIBERCON) tablet 625 mg, Daily PRN  Vitamin D (CHOLECALCIFEROL) tablet 2,000 Units, Daily  docusate sodium (COLACE) capsule 879 mg, BID  folic acid (FOLVITE) tablet 1 mg, Daily  metoprolol succinate (TOPROL XL) extended release tablet 50 mg, Daily  pantoprazole (PROTONIX) tablet 40 mg, QAM AC  vitamin B-12 (CYANOCOBALAMIN) tablet 100 mcg, Daily  sodium chloride flush 0.9 % injection 10 mL, 2 times per day  sodium chloride flush 0.9 % injection 10 mL, PRN  magnesium hydroxide (MILK OF MAGNESIA) 400 MG/5ML suspension 30 mL, Daily PRN  ondansetron (ZOFRAN) injection 4 mg, Q6H PRN  aspirin chewable tablet 81 mg, Daily  enoxaparin (LOVENOX) injection 40 mg, Nightly  benzonatate

## 2020-02-19 NOTE — PROGRESS NOTES
To room 3312 via cart from ED. Oriented to room and call light system. Assessment complete. Call light in reach will continue to monitor.

## 2020-02-19 NOTE — CONSULTS
[facility]   Cardiac Consultation    Referring Provider:  Rena Arnett MD     Chief Complaint   Patient presents with    Chest Pain     Pt. comes in today by [facility] for complaints of chest pain. Pt. was at [facility] when they called 911. Pt. reports she always has chest pain but today got worse. Pt. reports some trouble breathing with some shaking. History of Present Illness:  81 yo with hx of ischemic CM s/p CABG 2/2018 with chronic post operative chest wall tenderness presents with recent cough, fever treated with AB RX and steroids. Denies exertional chest discomfort, SOB, change in exercise tolerance, palpitations or syncope. No radiation, N,V.   Since these sx started, there has been a progression in her chronic positional musculoskeletal chest wall pain. Currently has exacerbation of chest discomfort with cough in lower bilateral parasternal tenderness. .  ECG, enzymes and Myoview test are normal.    Past Medical History:   has a past medical history of Arthritis, Cystitis, interstitial, GERD (gastroesophageal reflux disease), GI bleeding, Heart burn, Hypertension, Interstitial cystitis, Patient is Jewish, Psoriasis, and Thoracic aortic aneurysm ([facility] [address] Surgical History:   has a past surgical history that includes partial hysterectomy (cervix not removed); Knee arthroscopy (Right, 02/2015); Rotator cuff repair (Right, 2014); Coronary artery bypass graft (05/29/2018); transesophageal echocardiogram (05/29/2018); Cardiac catheterization (05/29/2018); Colonoscopy (07/31/2015); Mohs surgery (Left); and Cardiac catheterization (2012). Social History:   reports that she quit smoking about 59 years ago. Her smoking use included cigarettes. She quit after 7.00 years of use. She has never used smokeless tobacco. She reports that she does not drink alcohol or use drugs.      Family History:  family history includes Breast Cancer in her brother, father, and Murmur  · Peripheral pulses are symmetrical and full  Extremities:  · There is no clubbing, cyanosis of the extremities. · No edema  · Femoral Arteries: 2+ and equal  · Pedal Pulses: 2+ and equal   Abdomen:  · No masses or tenderness  · Liver/Spleen: No Abnormalities Noted  Neurological/Psychiatric:  · Alert and oriented in all spheres  · Moves all extremities well  · Exhibits normal gait balance and coordination  · No abnormalities of mood, affect, memory, mentation, or behavior are noted    All testing and labs listed below were personally reviewed. Assessment:     1. Chest pain,Clinically musculoskeletal and exacerbated by cough. 2. Hypokalemia    3. CAD with prior CABG and chronic post operative chest wall syndrome. Plan:  ICE to sore chest wall--splint with coughing  Echo- Evaluate LV FXN and PE  OK to D/C home in AM if Echo is negative from a cardiac stance. Thank you for allowing me to participate in the care of this individual.      Alexi Oswald M.D., [facility].

## 2020-02-19 NOTE — PROGRESS NOTES
CLINICAL BEDSIDE SWALLOWING EVALUATION  Speech Therapy Department    Patient Name:  Emiliana Joseph  :  1935  Pain level: Pt did not report pain. Medical Diagnosis:   Chest pain [R07.9]    HPI: Deborah Wang a 82 y. o. female here for evaluation of shortness of breath and chest pain. Patient notes that she feels lightheaded and short of breath currently. She reports that she has a sharp chest pain in her left chest that is worse with taking a deep breath in. Patient notes that she had unstable angina at a hospital visit in May 2018 and visited the hospital again later in May for elevated troponin. She states that at the hospital visit on May 28, 2018, she was taken to the cath lab and it revealed multi-vessel CAD and she underwent CABG X 2 on 18 and she was in the hospital for 5 days after that. Patient reports that since that hospital visit, she has had a discomfort in her chest but not a pain. She reports that her current chest pain is made worse when bending over to  objects and with her sleeping position. Patient notes that her chest pain has moved from the center of her chest to the left side of her chest. She reports that she was seen in the office on  and she was given steroids and she messed up taking them. Patient notes that her memory is not good currently. She reports that she has a history of an thoracic ascending aortic aneurysm.     Chest X-ray 20:  Impression   No acute cardiopulmonary disease.         Treatment Diagnosis: Mild Oropharyngeal Dysphagia    Impressions: Pt repositioned in bed upon SLP arrival. Oral motor exam was unremarkable. Various textures were provided to assess swallow function. Pt presents with a mild oropharyngeal dysphagia characterized by prolonged mastication, suspected bolus loss, mildly delayed swallow initiation, and mildly reduced laryngeal elevation upon manual palpation.  Thin liquids revealed suspected mild bolus loss with a mildly delayed swallow initiation. Soft and regular solids revealed mildly prolonged mastication with effective oral clearance. Mildly reduced laryngeal elevation was assessed with all trials. An occasional cough was present prior to and during trials however not texture specific and not likely to be overt s/s of aspiration/penetration. Pt reports having had a cough for a couple weeks. No further throat clearing or wet vocal quality were noted with any trials. At this time, recommend a regular texture diet with thin liquids. Dietary Recommendations:  Recommend regular texture diet with thin liquids, meds as tolerated     Strategies: 90 degree positioning with all p.o. intake; small bites/sips; alternate textures through meal; reduce rate of intake    Treatment/Goals: Speech therapy for dysphagia tx 3-5 times per week. ST.) Pt will tolerate recommended diet without s/s of aspiration     Oral motor Exam:  Dentition: upper partial plate, missing some lower teeth   Labial/Facial: within functional limits  Lingual: within functional limits  Voice: within functional limits     Oral Phase:   Prolonged mastication  Suspected premature bolus loss to pharynx    Pharyngeal Phase:  Apparent pharyngeal pooling  Mildly delayed swallow initiation  Reduced laryngeal elevation via palpation   Suspected decreased pharyngeal clearing    Patient/Family Education: Education, results and recommendations given to the Pt and nurse, who verbalized understanding    Timed Code Treatment: 0 minutes     Total Treatment Time: 23 minutes    Discharge Recommendations: Speech Therapy for Speech/Dysphagia treatment at discharge, pending diet tolerance. Mirian BARBER,  Clinician    The speech-language pathologist was present, directed the patient's care, made skilled judgment and was responsible for assessment and treatment.     Mandy Felipe M.A., [address] [place] [address]  Speech-Language Pathologist

## 2020-02-19 NOTE — PROGRESS NOTES
Pt back from stress test, VSS exec HR 50, has scheduled metoprolol. Dr. Renetta Sinha made aware of HR, added HR parameters for metoprolol, will HOLD at this time. Will continue to monitor.

## 2020-02-20 VITALS
RESPIRATION RATE: 16 BRPM | OXYGEN SATURATION: 97 % | BODY MASS INDEX: 29.11 KG/M2 | DIASTOLIC BLOOD PRESSURE: 77 MMHG | HEART RATE: 62 BPM | WEIGHT: 158.2 LBS | HEIGHT: 62 IN | TEMPERATURE: 96.9 F | SYSTOLIC BLOOD PRESSURE: 151 MMHG

## 2020-02-20 LAB
ANION GAP SERPL CALCULATED.3IONS-SCNC: 10 MMOL/L (ref 3–16)
BASOPHILS ABSOLUTE: 0.1 K/UL (ref 0–0.2)
BASOPHILS RELATIVE PERCENT: 0.8 %
BUN BLDV-MCNC: 20 MG/DL (ref 7–20)
CALCIUM SERPL-MCNC: 8.9 MG/DL (ref 8.3–10.6)
CHLORIDE BLD-SCNC: 108 MMOL/L (ref 99–110)
CO2: 19 MMOL/L (ref 21–32)
CREAT SERPL-MCNC: 0.6 MG/DL (ref 0.6–1.2)
EOSINOPHILS ABSOLUTE: 0.3 K/UL (ref 0–0.6)
EOSINOPHILS RELATIVE PERCENT: 4.2 %
GFR AFRICAN AMERICAN: >60
GFR NON-AFRICAN AMERICAN: >60
GLUCOSE BLD-MCNC: 96 MG/DL (ref 70–99)
HCT VFR BLD CALC: 41.5 % (ref 36–48)
HEMOGLOBIN: 13.8 G/DL (ref 12–16)
LV EF: 63 %
LVEF MODALITY: NORMAL
LYMPHOCYTES ABSOLUTE: 3.3 K/UL (ref 1–5.1)
LYMPHOCYTES RELATIVE PERCENT: 40.3 %
MAGNESIUM: 1.9 MG/DL (ref 1.8–2.4)
MCH RBC QN AUTO: 31 PG (ref 26–34)
MCHC RBC AUTO-ENTMCNC: 33.4 G/DL (ref 31–36)
MCV RBC AUTO: 93 FL (ref 80–100)
MONOCYTES ABSOLUTE: 0.6 K/UL (ref 0–1.3)
MONOCYTES RELATIVE PERCENT: 7.9 %
NEUTROPHILS ABSOLUTE: 3.8 K/UL (ref 1.7–7.7)
NEUTROPHILS RELATIVE PERCENT: 46.8 %
PDW BLD-RTO: 14.1 % (ref 12.4–15.4)
PLATELET # BLD: 216 K/UL (ref 135–450)
PMV BLD AUTO: 7.8 FL (ref 5–10.5)
POTASSIUM SERPL-SCNC: 4.1 MMOL/L (ref 3.5–5.1)
RBC # BLD: 4.46 M/UL (ref 4–5.2)
REPORT: NORMAL
RESPIRATORY PANEL PCR: NORMAL
SODIUM BLD-SCNC: 137 MMOL/L (ref 136–145)
URINE CULTURE, ROUTINE: NORMAL
WBC # BLD: 8.1 K/UL (ref 4–11)

## 2020-02-20 PROCEDURE — 6370000000 HC RX 637 (ALT 250 FOR IP): Performed by: INTERNAL MEDICINE

## 2020-02-20 PROCEDURE — 85025 COMPLETE CBC W/AUTO DIFF WBC: CPT

## 2020-02-20 PROCEDURE — 80048 BASIC METABOLIC PNL TOTAL CA: CPT

## 2020-02-20 PROCEDURE — 6360000002 HC RX W HCPCS: Performed by: INTERNAL MEDICINE

## 2020-02-20 PROCEDURE — 99231 SBSQ HOSP IP/OBS SF/LOW 25: CPT | Performed by: INTERNAL MEDICINE

## 2020-02-20 PROCEDURE — 0100U HC RESPIRPTHGN MULT REV TRANS & AMP PRB TECH 21 TRGT: CPT

## 2020-02-20 PROCEDURE — 2580000003 HC RX 258: Performed by: INTERNAL MEDICINE

## 2020-02-20 PROCEDURE — 36415 COLL VENOUS BLD VENIPUNCTURE: CPT

## 2020-02-20 PROCEDURE — 93306 TTE W/DOPPLER COMPLETE: CPT

## 2020-02-20 PROCEDURE — 83735 ASSAY OF MAGNESIUM: CPT

## 2020-02-20 PROCEDURE — 96375 TX/PRO/DX INJ NEW DRUG ADDON: CPT

## 2020-02-20 RX ORDER — BENZONATATE 100 MG/1
100 CAPSULE ORAL 3 TIMES DAILY PRN
Qty: 21 CAPSULE | Refills: 0 | Status: SHIPPED | OUTPATIENT
Start: 2020-02-20 | End: 2020-02-27

## 2020-02-20 RX ORDER — AMOXICILLIN AND CLAVULANATE POTASSIUM 875; 125 MG/1; MG/1
1 TABLET, FILM COATED ORAL 2 TIMES DAILY
Qty: 12 TABLET | Refills: 0 | Status: SHIPPED | OUTPATIENT
Start: 2020-02-20 | End: 2020-02-26

## 2020-02-20 RX ADMIN — KETOROLAC TROMETHAMINE 15 MG: 30 INJECTION, SOLUTION INTRAMUSCULAR; INTRAVENOUS at 08:50

## 2020-02-20 RX ADMIN — KETOROLAC TROMETHAMINE 15 MG: 30 INJECTION, SOLUTION INTRAMUSCULAR; INTRAVENOUS at 02:13

## 2020-02-20 RX ADMIN — METOPROLOL SUCCINATE 50 MG: 50 TABLET, EXTENDED RELEASE ORAL at 08:51

## 2020-02-20 RX ADMIN — FOLIC ACID 1 MG: 1 TABLET ORAL at 08:51

## 2020-02-20 RX ADMIN — DOCUSATE SODIUM 100 MG: 100 CAPSULE ORAL at 08:51

## 2020-02-20 RX ADMIN — ASPIRIN 81 MG 81 MG: 81 TABLET ORAL at 08:51

## 2020-02-20 RX ADMIN — AZITHROMYCIN MONOHYDRATE 250 MG: 250 TABLET ORAL at 08:51

## 2020-02-20 RX ADMIN — Medication 10 ML: at 08:52

## 2020-02-20 RX ADMIN — PANTOPRAZOLE SODIUM 40 MG: 40 TABLET, DELAYED RELEASE ORAL at 05:11

## 2020-02-20 RX ADMIN — VITAMIN D, TAB 1000IU (100/BT) 2000 UNITS: 25 TAB at 08:50

## 2020-02-20 RX ADMIN — POTASSIUM CHLORIDE 20 MEQ: 1500 TABLET, EXTENDED RELEASE ORAL at 08:51

## 2020-02-20 ASSESSMENT — PAIN SCALES - GENERAL
PAINLEVEL_OUTOF10: 0
PAINLEVEL_OUTOF10: 2

## 2020-02-20 NOTE — DISCHARGE INSTR - COC
[facility]  [phone]       PHYSICIAN SECTION    Prognosis: Good    Condition at Discharge: Stable    Rehab Potential (if transferring to Rehab): Good    Recommended Labs or Other Treatments After Discharge:     Physician Certification: I certify the above information and transfer of Aurora Palm  is necessary for the continuing treatment of the diagnosis listed and that she requires [address] for less 30 days.      Update Admission H&P: No change in H&P    PHYSICIAN SIGNATURE:  Electronically signed by Ghada Howard MD on 2/20/20 at 12:27 PM

## 2020-02-20 NOTE — CARE COORDINATION
Pt has previously used [facility]. Demographic sheet and home care order fax to [phone].   Henry Tilley RN, BSN  [phone]

## 2020-02-20 NOTE — DISCHARGE SUMMARY
Hospital Medicine Discharge Summary    Patient: Munir Leal     Gender: female  : 1935   Age: 82 y.o. MRN: [id number]    Admitting Physician: Martínez Pearce MD  Discharge Physician: Martínez Pearce MD     Code Status: Full Code     Admit Date: 2020   Discharge Date:   20    Disposition:  Home    Discharge Diagnoses: Active Hospital Problems    Diagnosis Date Noted    Leukocytosis [D72.829] 2020    Lactic acidosis [E87.2] 2020    Failure of outpatient treatment [Z78.9] 2020    Patient is Mosque [Z78.9] 2020    Hypokalemia [E87.6] 2020    Acute bronchitis [J20.9] 2020    UTI (urinary tract infection) [N39.0] 2020    Hypertension [I10]     Atrial fibrillation ([address] [I48.91] 2018    CAD in native artery [I25.10] 2018    Chest pain [R07.9] 2018    Gastroesophageal reflux disease [K21.9] 03/15/2018    Primary osteoarthritis involving multiple joints [M15.0] 03/15/2018    Essential hypertension [I10] 03/15/2018    Thoracic ascending aortic aneurysm ([address] [I71.2] 03/15/2018       Follow-up appointments:  one week    Outpatient to do list: F/U with PCP and Cardio    Condition at Discharge:  [facility] [place] [facility]:    83 yo F with history of CAD s/p CABG, HTN, thoracic aortic aneurysm, Afib, Mosque who came to ER with complaints of cough and CP. Patient had failed to improve with PO steroids by PCP. Admitted as inpatient for CP, acute bronchitis, critical hypokalemia, failure of outpatient treatment and CP. Found to have UTI and started on IV Rocephin. MRI Brain acutely negative. SPECT negative on . Echo with normal EF. Followed by Cardiology. CP improved with Toradol, suspect secondary to cough and irritation of sternotomy. If persists, may benefit from seeing her CTS to consider intervention for sternotomy wires. Will finish course of Augmentin for acute bronchitis.   PCP should follow up respiratory molecular panel as patient has daughter with breast cancer on chemo and should avoid seeing her if panel is positive.       Discharge Medications:   Current Discharge Medication List      START taking these medications    Details   benzonatate (TESSALON) 100 MG capsule Take 1 capsule by mouth 3 times daily as needed for Cough  Qty: 21 capsule, Refills: 0      amoxicillin-clavulanate (AUGMENTIN) 875-125 MG per tablet Take 1 tablet by mouth 2 times daily for 6 days  Qty: 12 tablet, Refills: 0           Current Discharge Medication List        Current Discharge Medication List      CONTINUE these medications which have NOT CHANGED    Details   acetaminophen (TYLENOL) 500 MG tablet Take 500 mg by mouth every 6 hours as needed for Pain      pantoprazole (PROTONIX) 40 MG tablet Take 1 tablet by mouth every morning (before breakfast)  Qty: 30 tablet, Refills: 5      atorvastatin (LIPITOR) 80 MG tablet Take 1 tablet by mouth daily  Qty: 90 tablet, Refills: 1    Associated Diagnoses: Mixed hyperlipidemia      metoprolol succinate (TOPROL XL) 50 MG extended release tablet TAKE 1 TABLET EVERY DAY  Qty: 90 tablet, Refills: 3    Associated Diagnoses: Essential hypertension      Calcium Polycarbophil (FIBER-CAPS PO) Take 1 capsule by mouth daily as needed       folic acid (FOLVITE) 1 MG tablet Take 1 tablet by mouth daily  Qty: 60 tablet, Refills: 0      vitamin B-12 100 MCG tablet Take 1 tablet by mouth daily  Qty: 60 tablet, Refills: 0      docusate sodium (COLACE, DULCOLAX) 100 MG CAPS Take 100 mg by mouth 2 times daily  Qty: 60 capsule, Refills: 0      vitamin E 1000 units capsule Take 1,000 Units by mouth daily      Cholecalciferol (VITAMIN D3) 2000 units CAPS Take 2,000 Units by mouth daily       aspirin 81 MG chewable tablet Take 1 tablet by mouth daily  Qty: 30 tablet, Refills: 3      clotrimazole-betamethasone (LOTRISONE) 1-0.05 % cream Apply topically as needed            Current Discharge Medication List      STOP taking these medications       triamterene-hydrochlorothiazide (MAXZIDE-25) 37.5-25 MG per tablet Comments:   Reason for Stopping:                 Discharge Exam:    BP (!) 151/77   Pulse 62   Temp 96.9 °F (36.1 °C) (Temporal)   Resp 16   Ht 5' 2\" (1.575 m)   Wt 158 lb 3.2 oz (71.8 kg)   LMP  (Exact Date)   SpO2 97%   BMI 28.94 kg/m²   General appearance:  Appears comfortable, NAD  Eyes: Sclera clear. Pupils equal.  ENT: Moist oral mucosa. Trachea midline, no adenopathy. Cardiovascular: Regular rhythm, normal S1, S2. No murmur. No edema in lower extremities  Respiratory: Not using accessory muscles. Good inspiratory effort. Clear to auscultation bilaterally, no wheeze or crackles. GI: Abdomen soft, no tenderness, not distended, normal bowel sounds  Musculoskeletal: Reproducible CP on palpation of sternotomy improved  Neurology: Grossly intact. No speech or motor deficits  Psych: Normal affect. Alert and oriented in time, place and person  Skin: Warm, dry, normal turgor  Extremity exam shows brisk capillary refill. Peripheral pulses are palpable in lower extremities        Labs:  For convenience and continuity at follow-up the following most recent labs are provided:    Lab Results   Component Value Date    WBC 8.1 02/20/2020    HGB 13.8 02/20/2020    HCT 41.5 02/20/2020    MCV 93.0 02/20/2020     02/20/2020     02/20/2020    K 4.1 02/20/2020    K 3.2 05/30/2018     02/20/2020    CO2 19 02/20/2020    BUN 20 02/20/2020    CREATININE 0.6 02/20/2020    CALCIUM 8.9 02/20/2020    ALKPHOS 60 08/08/2019    ALT 24 08/08/2019    AST 23 08/08/2019    BILITOT 1.1 08/08/2019    BILIDIR 0.23 10/28/2010    LABALBU 4.4 08/08/2019    LDLCALC 48 02/15/2019    TRIG 167 02/15/2019     Lab Results   Component Value Date    INR 1.11 05/30/2018    INR 1.04 05/29/2018    INR 0.96 05/28/2018       Radiology:  Xr Chest Standard (2 Vw)    Result Date: 2/19/2020  EXAMINATION: TWO X-RAY and controlled hypercholesterolemia,  treated and controlled hypertension, family history of premature CAD,  diabetes mellitus, dyslipidemia, prior heart failure and ( years not  smokin). Conclusions   Summary  There is normal isotope uptake at stress and rest. There is no evidence of  myocardial ischemia or scar. Normal LV function. Left ventricular ejection fraction of 66 %. Overall findings represent a low risk scan. Stress Protocols   Resting ECG  Normal sinus rhythm. LBBB. Resting HR:55 bpm  Resting BP:123/76 mmHg  Stress Protocol:Pharmacologic - Lexiscan's  Peak HR:90 bpm                   HR/BP product:9990  Peak BP:111/80 mmHg  Predicted HR: 138 bpm  % of predicted HR: 65  Test duration: 4 min  Reason for termination:Completed   ECG Findings  Normal sinus rhythm. Non-diagnostic due to LBBB . Symptoms  Developed symptoms likely related to [address] [place] [address]. There was stress induced nausea. Symptoms resolved with aminophylline. Complications  Procedure complication was none. Procedure Medications   - Lexiscan I.V. 0.4 mg.10 sec  Imaging Protocols   - One Day   Rest                          Stress   Isotope:Myoview/Tetrofosmin   Isotope: Myoview/Tetrofosmin  Isotope dose:10.88 mCi        Isotope dose:29.2 mCi  Administration Route:I.V.      Administration Route:I.V.  Date:2020 09:06         Date:2020 10:08                                 Technique:      Gated  Imaging Results    Stress ejection    Ejection fraction:66 %    EDV :62 ml    ESV :21 ml    Stroke volume :41 ml    LV mass :109 gr  Medical History   Additional Medical History   Past Medical History:  Diagnosis Date  - Arthritis  - Cystitis, interstitial  - GERD (gastroesophageal reflux disease)  - GI bleeding  was a frequent aspirin user at that time  - Heart burn  - Hypertension  - Interstitial cystitis  - Patient is [place]  no blood products  - Psoriasis  - Thoracic aortic aneurysm  cardiologist watching   Past

## 2020-02-20 NOTE — CARE COORDINATION
Spoke to [facility] at [facility] and they can accept patient. Patient notified that they will be contacting her, pt verbalizes understanding. JAKE/AVS faxed to [facility] at [phone].     Patient discharging 2/20/2020 to home   All discharge needs met per case management    Claritza Schumacher RN, BSN  [phone]

## 2020-02-20 NOTE — PROGRESS NOTES
Dr. Adriane Davidson notified that respiratory panel will not be resulted until tomorrow. Stated that patient is still able to discharge and to follow up with PCP to receive results.

## 2020-02-20 NOTE — PROGRESS NOTES
Data- discharge order received, pt verbalized agreement to discharge, needs for [address] with [facility] for PT/OT/nursing, JAKE reviewed and signed by MD, to be completed by RN. Action- AVS prepared, discharge instructions prepared and given to patient and daughter in law, medication information packet given r/t NEW or CHANGED prescriptions, pt verbalized understanding further self-review. D/C instruction summary: Diet- cardiac, Activity- as tolerated, follow up with Primary Care Physician Zachary Devine -[phone] appointment within 2 weeks, immunizations reviewed and updated, medications prescriptions to be filled by patient. Inpatient surgical procedural reviewed: n/a. Contact information provided to above agencies used. Response- Case Management/ reported faxing completed JAKE and AVS to needed HHC/DME services stated above. Pt belongings gathered, IV removed, pt dressed by self. Disposition is home with HHC/DME as stated above, transported with daughter in law, taken to lobby via w/c with RN, no complications.

## 2020-02-20 NOTE — PROGRESS NOTES
Shift assessment completed. Medications given per MAR. Pain controlled with scheduled Toradol. Patient denies any needs at this time. The care plan and education has been reviewed and mutually agreed upon with the patient.

## 2020-02-20 NOTE — PROGRESS NOTES
ejection fraction of 66 %. Overall findings represent a low risk scan.        Assessment:      Chest pain:  Muscular from CABG  Cardiac eval negative  OK to d/c from cardiac standpoint  Discussed with nurse      Alden Zavaleta MD, 2/20/2020 2:18 PM

## 2020-02-21 NOTE — PROGRESS NOTES
Speech Language Pathology    Discharge  Name: Mary Storey  : 1935  Medical Diagnosis: Chest pain [R07.9]  Chest pain [R07.9]  Chest pain [R07.9]  Treatment Diagnosis: Dysphagia    Speech Therapy/Dysphagia  Pt discharged to home on 2020. Bedside Swallow Eval completed 2020 (see report). No goals met prior to discharge. Recommend: Continued Dysphagia treatment at home, with goals and treatment per Plan of Care.     DIET LEVEL AT DISCHARGE:  Recommend regular texture diet with thin liquids, meds as tolerated     GOALS ADDRESSED:  1.) Pt will tolerate recommended diet without s/s of aspiration (GOAL NOT MET)      Negar Shaw MA CCC-SLP #[id number]  Speech Language Pathologist

## 2020-02-22 LAB
BLOOD CULTURE, ROUTINE: NORMAL
CULTURE, BLOOD 2: NORMAL

## 2020-02-24 ENCOUNTER — TELEPHONE (OUTPATIENT)
Dept: FAMILY MEDICINE CLINIC | Age: 85
End: 2020-02-24

## 2020-02-26 ENCOUNTER — TELEPHONE (OUTPATIENT)
Dept: FAMILY MEDICINE CLINIC | Age: 85
End: 2020-02-26

## 2020-03-03 ENCOUNTER — OFFICE VISIT (OUTPATIENT)
Dept: FAMILY MEDICINE CLINIC | Age: 85
End: 2020-03-03
Payer: MEDICARE

## 2020-03-03 VITALS
HEART RATE: 62 BPM | HEIGHT: 62 IN | WEIGHT: 169 LBS | BODY MASS INDEX: 31.1 KG/M2 | DIASTOLIC BLOOD PRESSURE: 80 MMHG | OXYGEN SATURATION: 99 % | SYSTOLIC BLOOD PRESSURE: 144 MMHG | RESPIRATION RATE: 12 BRPM

## 2020-03-03 PROCEDURE — 1036F TOBACCO NON-USER: CPT | Performed by: FAMILY MEDICINE

## 2020-03-03 PROCEDURE — 1111F DSCHRG MED/CURRENT MED MERGE: CPT | Performed by: FAMILY MEDICINE

## 2020-03-03 PROCEDURE — 1123F ACP DISCUSS/DSCN MKR DOCD: CPT | Performed by: FAMILY MEDICINE

## 2020-03-03 PROCEDURE — G8482 FLU IMMUNIZE ORDER/ADMIN: HCPCS | Performed by: FAMILY MEDICINE

## 2020-03-03 PROCEDURE — 4040F PNEUMOC VAC/ADMIN/RCVD: CPT | Performed by: FAMILY MEDICINE

## 2020-03-03 PROCEDURE — G8399 PT W/DXA RESULTS DOCUMENT: HCPCS | Performed by: FAMILY MEDICINE

## 2020-03-03 PROCEDURE — 99214 OFFICE O/P EST MOD 30 MIN: CPT | Performed by: FAMILY MEDICINE

## 2020-03-03 PROCEDURE — 1090F PRES/ABSN URINE INCON ASSESS: CPT | Performed by: FAMILY MEDICINE

## 2020-03-03 PROCEDURE — G8427 DOCREV CUR MEDS BY ELIG CLIN: HCPCS | Performed by: FAMILY MEDICINE

## 2020-03-03 PROCEDURE — G8417 CALC BMI ABV UP PARAM F/U: HCPCS | Performed by: FAMILY MEDICINE

## 2020-03-03 NOTE — PROGRESS NOTES
Subjective:      Patient ID: Swapna Strong is a 82 y.o. female. HPI  [address] 2/18 -2/20 FOR BRONCHITIS W/ CHEST PAIN/ TIGHTNESS  STILL SOME CHEST TIGHTNESS - HAS GUEVARA PERES FROM HOSPITAL BUT NOT TAKING LAST COUPLE DAYS  FINISHED AUGMENTIN ABX - SOME DIARRHEA  COUGH SEEMS TO HAVE DECREASED - NEVER REALLY BAD  NOT LOOSE COUGH  Tender over sternum - has appt w/ cardio - dr. Magalys Ford - seen by him in hospital  Chief Complaint   Patient presents with    Follow-Up from [address] [place] [address] EMS FROM HERE 2/18      BP Readings from Last 3 Encounters:   03/03/20 (!) 144/80   02/20/20 (!) 151/77   02/18/20 (!) 144/86     Pulse Readings from Last 3 Encounters:   03/03/20 62   02/20/20 62   02/18/20 64     Wt Readings from Last 3 Encounters:   03/03/20 169 lb (76.7 kg)   02/19/20 158 lb 3.2 oz (71.8 kg)   02/18/20 164 lb (74.4 kg)     Using walker in house - legs knees to toes - feel pain -throbbing  Feet more swollen - off maxzide 2/2 low K - made her feel loopy. Back on protonix after stopping augmentin - had some heart burn but resolved after taking 1-2 protonix  Weight up but sitting around more frequently -able to get out and drive on own - feels wobbly  Off balance - using cane when goes out  Driving okay -   Had home health nurse visit x1 but no further services since hospitalization  A lot of pnd  Eyes a little more blurry than usual  Some pain inneck - no dysphagia  Review of Systems    Objective:   Physical Exam  Constitutional:       General: She is not in acute distress. Appearance: She is well-developed. HENT:      Head: Normocephalic and atraumatic. Mouth/Throat:      Pharynx: No oropharyngeal exudate. Comments: Boggy uvula o/w clear  Eyes:      General: No scleral icterus. Conjunctiva/sclera: Conjunctivae normal.      Comments: Tearing evident bilat eyes   Neck:      Thyroid: No thyromegaly.    Cardiovascular:      Rate and Rhythm: Normal rate and regular rhythm. Heart sounds: Normal heart sounds. No murmur. Pulmonary:      Effort: Pulmonary effort is normal. No respiratory distress. Breath sounds: Normal breath sounds. No wheezing or rales. Abdominal:      General: Bowel sounds are normal. There is no distension. Palpations: Abdomen is soft. Tenderness: There is no abdominal tenderness. Lymphadenopathy:      Cervical: No cervical adenopathy. Skin:     General: Skin is warm and dry. Neurological:      Mental Status: She is alert and oriented to person, place, and time. Assessment:       Diagnosis Orders   1. Acute bronchitis, unspecified organism     2. Hypokalemia     3. Gastroesophageal reflux disease, esophagitis presence not specified     4. General weakness     5. Essential hypertension     6.  Coronary artery disease involving native coronary artery of native heart without angina pectoris             Plan:      Consider PT for weakness - use of cane/ walker d/w pt  Restart maxzide daily for swelling/ bp  Check K again on AWV in April - eat fruits/ veggies daily    Restart flonase daily for persistent nasal sx - tessalon prn - hydrate well - off augmentin for bronchitis - improved - gi tract okay  Resume ppi for gerd for now w/ dietary precautions  On statin  Cont toprol/ asa  F/u cardio for cad - seems to be chest wall pain / more than angina presently  Clif Duarte MD

## 2020-03-20 PROBLEM — N39.0 UTI (URINARY TRACT INFECTION): Status: RESOLVED | Noted: 2020-02-19 | Resolved: 2020-03-20

## 2020-05-07 RX ORDER — ATORVASTATIN CALCIUM 80 MG/1
80 TABLET, FILM COATED ORAL DAILY
Qty: 90 TABLET | Refills: 1 | Status: SHIPPED | OUTPATIENT
Start: 2020-05-07 | End: 2020-10-07

## 2020-05-13 RX ORDER — TRIAMTERENE AND HYDROCHLOROTHIAZIDE 37.5; 25 MG/1; MG/1
1 TABLET ORAL DAILY
Qty: 90 TABLET | Refills: 1 | Status: SHIPPED | OUTPATIENT
Start: 2020-05-13 | End: 2020-09-09

## 2020-07-20 ENCOUNTER — OFFICE VISIT (OUTPATIENT)
Dept: ENT CLINIC | Age: 85
End: 2020-07-20
Payer: MEDICARE

## 2020-07-20 VITALS — DIASTOLIC BLOOD PRESSURE: 84 MMHG | TEMPERATURE: 98.8 F | SYSTOLIC BLOOD PRESSURE: 174 MMHG | HEART RATE: 60 BPM

## 2020-07-20 PROCEDURE — G8399 PT W/DXA RESULTS DOCUMENT: HCPCS | Performed by: OTOLARYNGOLOGY

## 2020-07-20 PROCEDURE — 1123F ACP DISCUSS/DSCN MKR DOCD: CPT | Performed by: OTOLARYNGOLOGY

## 2020-07-20 PROCEDURE — 1090F PRES/ABSN URINE INCON ASSESS: CPT | Performed by: OTOLARYNGOLOGY

## 2020-07-20 PROCEDURE — 99203 OFFICE O/P NEW LOW 30 MIN: CPT | Performed by: OTOLARYNGOLOGY

## 2020-07-20 PROCEDURE — G8427 DOCREV CUR MEDS BY ELIG CLIN: HCPCS | Performed by: OTOLARYNGOLOGY

## 2020-07-20 PROCEDURE — 4130F TOPICAL PREP RX AOE: CPT | Performed by: OTOLARYNGOLOGY

## 2020-07-20 PROCEDURE — G8417 CALC BMI ABV UP PARAM F/U: HCPCS | Performed by: OTOLARYNGOLOGY

## 2020-07-20 PROCEDURE — 4040F PNEUMOC VAC/ADMIN/RCVD: CPT | Performed by: OTOLARYNGOLOGY

## 2020-07-20 PROCEDURE — 1036F TOBACCO NON-USER: CPT | Performed by: OTOLARYNGOLOGY

## 2020-07-20 RX ORDER — FLUOCINOLONE ACETONIDE 0.11 MG/ML
3 OIL AURICULAR (OTIC) 2 TIMES DAILY
Qty: 10 ML | Refills: 0 | Status: SHIPPED | OUTPATIENT
Start: 2020-07-20 | End: 2020-07-25

## 2020-07-20 NOTE — PROGRESS NOTES
Patient presents with bilateral autophony which he suspects is from an absence of cerumen in her ears. There is known psoriatic issues involving her knuckles and thin skin. She has seen a dermatologist in the past.     In the past there has been difficulty wearing her hearing aids due to the discomfort at the aditus. She is in the process of changing from latex at the ear buds to acrylic     The patient also feels there is a connection between nasal stuffiness and the discomfort in her ears. This has been present for many years and shows no seasonal or environmental variability. Denies epistaxis, anosmia, or facial hypesthesias. No recent dental problems. No past history of nasal surgery or nasal trauma. No family history of nasal polyps or allergy.       She is also followed for  Leukocytosis [D72.829] 02/19/2020     Lactic acidosis [E87.2] 02/19/2020    Failure of outpatient treatment [Z78.9] 02/19/2020    Patient is Nondenominational [Z78.9] 02/19/2020    Hypokalemia [E87.6] 02/19/2020    Acute bronchitis [J20.9] 02/19/2020    UTI (urinary tract infection) [N39.0] 02/19/2020    Hypertension [I10]      Atrial fibrillation ([facility] [address] [I48.91] 06/19/2018    CAD in native artery [I25.10] 06/01/2018    Chest pain [R07.9] 05/03/2018    Gastroesophageal reflux disease [K21.9] 03/15/2018    Primary osteoarthritis involving multiple joints [M15.0] 03/15/2018    Essential hypertension [I10] 03/15/2018    Thoracic ascending aortic aneurysm (HCC) [I71.2] 03/15/2018       Current medications include  benzonatate (TESSALON) 100 MG capsule Take 1 capsule by mouth 3 times daily as needed for Cough  Qty: 21 capsule, Refills: 0           Details   acetaminophen (TYLENOL) 500 MG tablet Take 500 mg by mouth every 6 hours as needed for Pain       pantoprazole (PROTONIX) 40 MG tablet Take 1 tablet by mouth every morning (before breakfast)  Qty: 30 tablet, Refills: 5       atorvastatin (LIPITOR) 80 MG tablet Take 1 tablet by mouth daily  Qty: 90 tablet, Refills: 1     Associated Diagnoses: Mixed hyperlipidemia       metoprolol succinate (TOPROL XL) 50 MG extended release tablet TAKE 1 TABLET EVERY DAY  Qty: 90 tablet, Refills: 3     Associated Diagnoses: Essential hypertension       Calcium Polycarbophil (FIBER-CAPS PO) Take 1 capsule by mouth daily as needed        folic acid (FOLVITE) 1 MG tablet Take 1 tablet by mouth daily  Qty: 60 tablet, Refills: 0       vitamin B-12 100 MCG tablet Take 1 tablet by mouth daily  Qty: 60 tablet, Refills: 0       docusate sodium (COLACE, DULCOLAX) 100 MG CAPS Take 100 mg by mouth 2 times daily  Qty: 60 capsule, Refills: 0       vitamin E 1000 units capsule Take 1,000 Units by mouth daily       Cholecalciferol (VITAMIN D3) 2000 units CAPS Take 2,000 Units by mouth daily        aspirin 81 MG chewable tablet Take 1 tablet by mouth daily  Qty: 30 tablet, Refills: 3       clotrimazole-betamethasone (LOTRISONE) 1-0.05 % cream Apply topically as needed                   Medical History Collapse by Default   Collapse by Default        Date Unknown Arthritis   Date Unknown Cystitis, interstitial   Date Unknown GERD (gastroesophageal reflux disease)   Date Unknown GI bleeding    Date Unknown Heart burn   Date Unknown Hypertension   Date Unknown Interstitial cystitis   Date Unknown Patient is Evangelical    Date Unknown Psoriasis   Date Unknown Thoracic aortic aneurysm ([facility] [address]    Hipolito as Reviewed Reviewed by RN on 2/18/2020   Expand to view 10 items    Surgical History Collapse by Default   Collapse by Default        05/29/2018 Coronary artery bypass graft    05/29/2018 Transesophageal echocardiogram    05/29/2018 Cardiac catheterization    07/31/2015 Colonoscopy    02/2015 Knee arthroscopy (Right)   2014 Rotator cuff repair (Right)   2012 Cardiac catheterization   Date Unknown Mohs surgery (Left)    Date Unknown Partial hysterectomy   Berta Beal as Reviewed Reviewed by RN on 2/18/2020   Expand to view 9 items    Family History Collapse by Default   Collapse by Default        Mother () Breast Cancer             Father () Breast Cancer      COPD     Tuberculosis            Brother () Breast Cancer      Tuberculosis                  Review of systems  No fever, chills, constitutional symptoms  Denies change in vision  Denies difficulty chewing or swallowing. No sores have been seen in the mouth. There has been no hoarseness or change in voice. There is no stridor or difficulty breathing. There is no past history of anterior neck or throat trauma. Exam:    The patient appears well developed and well nourished.; oriented to person, place, and time. The head is normocephalic and atraumatic; no facial scars or weakness are noted. The facial skeleton is normal.The voice has a normal quality and tonality to it. Eyes: Conjunctivae and lids normal. Full EOM. The skin is warm and dry. No pallor. Both ear canals are notably dry with a total absence of cerumen. I see no buildup of squamous debris. Both eardrums are normal with good aeration of middle ear space     The external nose is unremarkable including the dorsum, columella, nasion, and alae. The turbinates respond well to vasoconstriction. The middle and inferior meatuses appeared clear. There is no polyp, ulceration, or masses visualized either naris. The septum is not deviated or deflected to a significant degree. The mirror exam of the nasopharynx shows no mucosal disease or obstruction. The lips and oral cavity are normal with no discrete mucosal disease, and normally hydrated. Tongue mobility is normal. The fungiform and vallate papillae are normal appearing. Dentition is unremarkable including the gingiva The soft palate and uvula are of normal configuration, with no displacement of the palatoglossal or palatopharyngeal folds, and no obstruction to the oropharynx.  The posterior pharyngeal wall is normal appearing without exudate or drainage. Salivary glands: The parotid and submandibular glands are normal in appearance. There are no palpable stones or masses. Clear secretions emanate from both Carol's and Blackford's ducts. There is no periglandular adenopathy. The neck is supple with full range of motion. The bony and cartilaginous landmarks are normal to palpation. There is no suspicious mass or adenopathy. The thyroid gland is normal to palpation, and the trachea is midline. Chest reveals normal effort, no stridor. No tachypnea.  No respiratory distress  The cranial nerves are intact    Impression:  Eczematous otitis infection  Mild chronic rhinitis, low index suspicion for allergies at this time    Plan:  Fluocinolone ointment  Budesonide spray  Adequate hydration reviewed  Return in 2 to 3 weeks

## 2020-08-03 ENCOUNTER — OFFICE VISIT (OUTPATIENT)
Dept: ENT CLINIC | Age: 85
End: 2020-08-03
Payer: MEDICARE

## 2020-08-03 VITALS — SYSTOLIC BLOOD PRESSURE: 167 MMHG | TEMPERATURE: 97.5 F | HEART RATE: 65 BPM | DIASTOLIC BLOOD PRESSURE: 79 MMHG

## 2020-08-03 PROCEDURE — G8399 PT W/DXA RESULTS DOCUMENT: HCPCS | Performed by: OTOLARYNGOLOGY

## 2020-08-03 PROCEDURE — G8417 CALC BMI ABV UP PARAM F/U: HCPCS | Performed by: OTOLARYNGOLOGY

## 2020-08-03 PROCEDURE — 4130F TOPICAL PREP RX AOE: CPT | Performed by: OTOLARYNGOLOGY

## 2020-08-03 PROCEDURE — 1090F PRES/ABSN URINE INCON ASSESS: CPT | Performed by: OTOLARYNGOLOGY

## 2020-08-03 PROCEDURE — 4040F PNEUMOC VAC/ADMIN/RCVD: CPT | Performed by: OTOLARYNGOLOGY

## 2020-08-03 PROCEDURE — G8427 DOCREV CUR MEDS BY ELIG CLIN: HCPCS | Performed by: OTOLARYNGOLOGY

## 2020-08-03 PROCEDURE — 1123F ACP DISCUSS/DSCN MKR DOCD: CPT | Performed by: OTOLARYNGOLOGY

## 2020-08-03 PROCEDURE — 1036F TOBACCO NON-USER: CPT | Performed by: OTOLARYNGOLOGY

## 2020-08-03 PROCEDURE — 99212 OFFICE O/P EST SF 10 MIN: CPT | Performed by: OTOLARYNGOLOGY

## 2020-08-03 RX ORDER — PREDNISOLONE ACETATE 10 MG/ML
SUSPENSION/ DROPS OPHTHALMIC
Qty: 10 ML | Refills: 1 | Status: SHIPPED | OUTPATIENT
Start: 2020-08-03 | End: 2020-10-27 | Stop reason: ALTCHOICE

## 2020-08-03 NOTE — PROGRESS NOTES
The patient is following up after a 2-week trial fluocinolone for what was suspected to be eczematous otitis externa. She believes that the irritation sensation has noticeably improved. She does however also complain of some fullness in and around the jaw joint, particular upon arising in the morning. She has been told in the past by her dentist that the teeth are wearing in such a way as to suspect bruxism. More recently she has found herself grinding during the waking hours as well. There have been no other new health issues since her last visit. The ear canals again remain free of cerumen. The erythema noted previously and now appears approximately 60 to 70% improved. There is no further scaling or debris present. Both eardrums appear normal with good aeration the middle ear space    Full ROM of TMJ without crepitus either audible or palpable    The patient will now use Pred forte on a maintenance schedule  She has a dentist appointment next month and I encouraged her to review her jaw and clenching issues with her dentist at that time    I spent a total of 10  minutes with this patient.  More than 50% of the visit was spent counseling and coordination of care and physical exam.

## 2020-08-25 ENCOUNTER — OFFICE VISIT (OUTPATIENT)
Dept: FAMILY MEDICINE CLINIC | Age: 85
End: 2020-08-25
Payer: MEDICARE

## 2020-08-25 VITALS
HEART RATE: 86 BPM | OXYGEN SATURATION: 97 % | BODY MASS INDEX: 31.46 KG/M2 | DIASTOLIC BLOOD PRESSURE: 82 MMHG | WEIGHT: 172 LBS | TEMPERATURE: 98.4 F | SYSTOLIC BLOOD PRESSURE: 134 MMHG

## 2020-08-25 LAB
A/G RATIO: 2.4 (ref 1.1–2.2)
ALBUMIN SERPL-MCNC: 4.3 G/DL (ref 3.4–5)
ALP BLD-CCNC: 76 U/L (ref 40–129)
ALT SERPL-CCNC: 20 U/L (ref 10–40)
ANION GAP SERPL CALCULATED.3IONS-SCNC: 12 MMOL/L (ref 3–16)
AST SERPL-CCNC: 24 U/L (ref 15–37)
BILIRUB SERPL-MCNC: 0.6 MG/DL (ref 0–1)
BILIRUBIN, POC: ABNORMAL
BLOOD URINE, POC: ABNORMAL
BUN BLDV-MCNC: 18 MG/DL (ref 7–20)
CALCIUM SERPL-MCNC: 9.9 MG/DL (ref 8.3–10.6)
CHLORIDE BLD-SCNC: 101 MMOL/L (ref 99–110)
CLARITY, POC: ABNORMAL
CO2: 25 MMOL/L (ref 21–32)
COLOR, POC: YELLOW
CREAT SERPL-MCNC: 0.6 MG/DL (ref 0.6–1.2)
GFR AFRICAN AMERICAN: >60
GFR NON-AFRICAN AMERICAN: >60
GLOBULIN: 1.8 G/DL
GLUCOSE BLD-MCNC: 93 MG/DL (ref 70–99)
GLUCOSE URINE, POC: ABNORMAL
KETONES, POC: ABNORMAL
LEUKOCYTE EST, POC: ABNORMAL
NITRITE, POC: POSITIVE
PH, POC: 6.5
POTASSIUM SERPL-SCNC: 4.1 MMOL/L (ref 3.5–5.1)
PROTEIN, POC: ABNORMAL
SODIUM BLD-SCNC: 138 MMOL/L (ref 136–145)
SPECIFIC GRAVITY, POC: 1.01
TOTAL PROTEIN: 6.1 G/DL (ref 6.4–8.2)
UROBILINOGEN, POC: 0.2

## 2020-08-25 PROCEDURE — 1036F TOBACCO NON-USER: CPT | Performed by: NURSE PRACTITIONER

## 2020-08-25 PROCEDURE — 36415 COLL VENOUS BLD VENIPUNCTURE: CPT | Performed by: NURSE PRACTITIONER

## 2020-08-25 PROCEDURE — 99213 OFFICE O/P EST LOW 20 MIN: CPT | Performed by: NURSE PRACTITIONER

## 2020-08-25 PROCEDURE — 10060 I&D ABSCESS SIMPLE/SINGLE: CPT | Performed by: NURSE PRACTITIONER

## 2020-08-25 PROCEDURE — 81002 URINALYSIS NONAUTO W/O SCOPE: CPT | Performed by: NURSE PRACTITIONER

## 2020-08-25 PROCEDURE — 1123F ACP DISCUSS/DSCN MKR DOCD: CPT | Performed by: NURSE PRACTITIONER

## 2020-08-25 PROCEDURE — G8399 PT W/DXA RESULTS DOCUMENT: HCPCS | Performed by: NURSE PRACTITIONER

## 2020-08-25 PROCEDURE — G8427 DOCREV CUR MEDS BY ELIG CLIN: HCPCS | Performed by: NURSE PRACTITIONER

## 2020-08-25 PROCEDURE — 1090F PRES/ABSN URINE INCON ASSESS: CPT | Performed by: NURSE PRACTITIONER

## 2020-08-25 PROCEDURE — 4040F PNEUMOC VAC/ADMIN/RCVD: CPT | Performed by: NURSE PRACTITIONER

## 2020-08-25 PROCEDURE — G8417 CALC BMI ABV UP PARAM F/U: HCPCS | Performed by: NURSE PRACTITIONER

## 2020-08-25 RX ORDER — SULFAMETHOXAZOLE AND TRIMETHOPRIM 800; 160 MG/1; MG/1
1 TABLET ORAL 2 TIMES DAILY
Qty: 20 TABLET | Refills: 0 | Status: SHIPPED | OUTPATIENT
Start: 2020-08-25 | End: 2020-09-04

## 2020-08-25 NOTE — PATIENT INSTRUCTIONS
Patient Education        Urinary Tract Infection in Women: Care Instructions  Your Care Instructions     A urinary tract infection, or UTI, is a general term for an infection anywhere between the kidneys and the urethra (where urine comes out). Most UTIs are bladder infections. They often cause pain or burning when you urinate. UTIs are caused by bacteria and can be cured with antibiotics. Be sure to complete your treatment so that the infection goes away. Follow-up care is a key part of your treatment and safety. Be sure to make and go to all appointments, and call your doctor if you are having problems. It's also a good idea to know your test results and keep a list of the medicines you take. How can you care for yourself at home? · Take your antibiotics as directed. Do not stop taking them just because you feel better. You need to take the full course of antibiotics. · Drink extra water and other fluids for the next day or two. This may help wash out the bacteria that are causing the infection. (If you have kidney, heart, or liver disease and have to limit fluids, talk with your doctor before you increase your fluid intake.)  · Avoid drinks that are carbonated or have caffeine. They can irritate the bladder. · Urinate often. Try to empty your bladder each time. · To relieve pain, take a hot bath or lay a heating pad set on low over your lower belly or genital area. Never go to sleep with a heating pad in place. To prevent UTIs  · Drink plenty of water each day. This helps you urinate often, which clears bacteria from your system. (If you have kidney, heart, or liver disease and have to limit fluids, talk with your doctor before you increase your fluid intake.)  · Urinate when you need to. · Urinate right after you have sex. · Change sanitary pads often. · Avoid douches, bubble baths, feminine hygiene sprays, and other feminine hygiene products that have deodorants.   · After going to the bathroom, having problems. It's also a good idea to know your test results and keep a list of the medicines you take. How can you care for yourself at home? · Do not scratch or rub the skin where the sting or bite occurred. · Put a cold pack or ice cube on the area. Put a thin cloth between the ice and your skin. For some people, a paste of baking soda mixed with a little water helps relieve pain and decrease the reaction. · Take an over-the-counter antihistamine, such as diphenhydramine (Benadryl) or loratadine (Claritin), to relieve swelling, redness, and itching. Calamine lotion or hydrocortisone cream may also help. Do not give antihistamines to your child unless you have checked with the doctor first.  · Be safe with medicines. If your doctor prescribed medicine for your allergy, take it exactly as prescribed. Call your doctor if you think you are having a problem with your medicine. You will get more details on the specific medicines your doctor prescribes. · Your doctor may prescribe a shot of epinephrine to carry with you in case you have a severe reaction. Learn how and when to give yourself the shot, and keep it with you at all times. Make sure it has not . · Go to the emergency room anytime you have a severe reaction. Go even if you have given yourself epinephrine and are feeling better. Symptoms can come back. When should you call for help? [id number] anytime you think you may need emergency care. For example, call if:  · You have symptoms of a severe allergic reaction. These may include:  ? Sudden raised, red areas (hives) all over your body. ? Swelling of the throat, mouth, lips, or tongue. ? Trouble breathing. ? Passing out (losing consciousness). Or you may feel very lightheaded or suddenly feel weak, confused, or restless.   Call your doctor now or seek immediate medical care if:  · You have symptoms of an allergic reaction not right at the sting or bite, such as:  ? A rash or small area of

## 2020-08-25 NOTE — PROGRESS NOTES
SUBJECTIVE:    Patient ID: La Olivares is a 82 y.o. y.o. female. HPI  Chief Complaint   Patient presents with    Insect Bite     PT HAS AN INSECT BITE ON HER LEFT LOWER LEG, X 1 WEEK     Urinary Tract Infection     PT C/O OF UTI, DYSURIA, URINARY FREGUENCY      PT C/O INSECT BITE WHEN SHE GOES OUT INTO HER YARD- BITE ABOUT ONE WEEK AGO THINKS IT WAS A JIGGER- - RIGHT LEG HAS A SMALL RED SPOT THAT IS TENDER TO TOUCH- IT IS NOT DRAINING - SHE HAS HAS USED NEOSPORIN- SOAP AND WATER HAS NOT HELPED MUCH SHE HAS NOT HAD ANY FEVERS    SHE ALSO C/O DYSURIA NOT DAILY -SHE HAS TRIED TO DRINK MORE WATER  THAT USUALLY HELPS- BURNING SENSATION NOTED PRIOR TO   URINATING- FOR THE LAST WEEK  Review of Systems   Genitourinary: Positive for dysuria. Skin:        INSECT BITE RIGHT LOWER  LEG        OBJECTIVE:    Vitals:    08/25/20 1443   BP: 134/82   Pulse: 86   Temp: 98.4 °F (36.9 °C)   SpO2: 97%     Physical Exam  Constitutional:       General: She is awake. She is not in acute distress. Appearance: Normal appearance. She is well-developed, well-groomed and normal weight. She is not ill-appearing, toxic-appearing or diaphoretic. Abdominal:      Tenderness: There is no abdominal tenderness. Skin:     Comments: RIGHT LOWER LEG- ERASER HEAD SIZED HARD LESION NOTED SLIGHT REDNESS NOTED AROUND PERIMETER NO DRAINAGE NOTED- SEE PICTURE IN MEDIA   Neurological:      Mental Status: She is alert. GCS: GCS eye subscore is 4. GCS verbal subscore is 5. GCS motor subscore is 6. Psychiatric:         Attention and Perception: Attention and perception normal.         Mood and Affect: Mood and affect normal.         Speech: Speech normal.         Behavior: Behavior normal. Behavior is cooperative. Thought Content: Thought content normal.         Cognition and Memory: Cognition and memory normal.         Yisel Riley was seen today for insect bite and urinary tract infection.     Diagnoses and all orders for this visit:    UTI symptoms  Urinary tract infection without hematuria, site unspecified  -     POCT Urinalysis no Micro  -     Culture, Urine; Future    Insect bite of right lower extremity, initial encounter  -     sulfamethoxazole-trimethoprim (BACTRIM DS) 800-160 MG per tablet; Take 1 tablet by mouth 2 times daily for 10 days  WOUND CULTURE  Procedure dw pt- risks/benefits pt agrees to proceed  Site cleansed with betadine and alcohol  # 11 blade used to nati   Covered with abx ointment and dry sterile gauze  Pt tolerated without difficulty  Instructed pt to cleanse with soap and water- watching for increased redness- streaking - fevers- if any noted to f/u in office    Diabetes mellitus screening  -     COMPREHENSIVE METABOLIC PANEL;  Future

## 2020-08-27 LAB
ORGANISM: ABNORMAL
URINE CULTURE, ROUTINE: ABNORMAL

## 2020-08-27 RX ORDER — CIPROFLOXACIN 250 MG/1
250 TABLET, FILM COATED ORAL 2 TIMES DAILY
Qty: 14 TABLET | Refills: 0 | Status: SHIPPED | OUTPATIENT
Start: 2020-08-27 | End: 2020-09-03

## 2020-08-28 LAB
GRAM STAIN RESULT: NORMAL
WOUND/ABSCESS: NORMAL

## 2020-08-31 ENCOUNTER — OFFICE VISIT (OUTPATIENT)
Dept: ENT CLINIC | Age: 85
End: 2020-08-31
Payer: MEDICARE

## 2020-08-31 VITALS — TEMPERATURE: 97.1 F | DIASTOLIC BLOOD PRESSURE: 76 MMHG | SYSTOLIC BLOOD PRESSURE: 136 MMHG | HEART RATE: 73 BPM

## 2020-08-31 PROCEDURE — G8427 DOCREV CUR MEDS BY ELIG CLIN: HCPCS | Performed by: OTOLARYNGOLOGY

## 2020-08-31 PROCEDURE — 99211 OFF/OP EST MAY X REQ PHY/QHP: CPT | Performed by: OTOLARYNGOLOGY

## 2020-08-31 PROCEDURE — G8417 CALC BMI ABV UP PARAM F/U: HCPCS | Performed by: OTOLARYNGOLOGY

## 2020-09-01 NOTE — PROGRESS NOTES
Follow up for dry ears and eczematous changes  No further complaints  Ears remain without cerumen, but no further erythema or scaling  TMs normal bilaterally    Patient to see DDS regarding new plates and dental alignment    Return as needed

## 2020-09-15 ENCOUNTER — TELEPHONE (OUTPATIENT)
Dept: FAMILY MEDICINE CLINIC | Age: 85
End: 2020-09-15

## 2020-09-15 NOTE — TELEPHONE ENCOUNTER
SHE HAS A SCRIPT FOR CIPRO AT THE PHARMACY AND SHE WANTS TO FILL THAT     SHE HAS AN INSECT BITE POSSIBLY A SPIDER BITE - HAS A RED [place] AROUND IT.  L/V 08/25/20    SHE ALSO HAD A BLADDER INFECTION THAT NEVER WENT AWAY  - L/V 08/25/20    BACTRIM WAS CALLED IN WITH NO REFILLS     SHE JUST WANTS SOMETHING CALLED IN FOR TH UTI - THE BITE SHE WILL WAIT ON.        [facility] [address] [place] [address] [phone] - F [phone]    PT @  [phone]

## 2020-10-27 ENCOUNTER — OFFICE VISIT (OUTPATIENT)
Dept: FAMILY MEDICINE CLINIC | Age: 85
End: 2020-10-27
Payer: MEDICARE

## 2020-10-27 VITALS
HEIGHT: 62 IN | DIASTOLIC BLOOD PRESSURE: 86 MMHG | BODY MASS INDEX: 32.17 KG/M2 | HEART RATE: 66 BPM | OXYGEN SATURATION: 98 % | WEIGHT: 174.8 LBS | SYSTOLIC BLOOD PRESSURE: 132 MMHG

## 2020-10-27 PROCEDURE — 1123F ACP DISCUSS/DSCN MKR DOCD: CPT | Performed by: NURSE PRACTITIONER

## 2020-10-27 PROCEDURE — G0439 PPPS, SUBSEQ VISIT: HCPCS | Performed by: NURSE PRACTITIONER

## 2020-10-27 PROCEDURE — G8484 FLU IMMUNIZE NO ADMIN: HCPCS | Performed by: NURSE PRACTITIONER

## 2020-10-27 PROCEDURE — 4040F PNEUMOC VAC/ADMIN/RCVD: CPT | Performed by: NURSE PRACTITIONER

## 2020-10-27 ASSESSMENT — PATIENT HEALTH QUESTIONNAIRE - PHQ9
SUM OF ALL RESPONSES TO PHQ QUESTIONS 1-9: 0
SUM OF ALL RESPONSES TO PHQ QUESTIONS 1-9: 0
1. LITTLE INTEREST OR PLEASURE IN DOING THINGS: 0
SUM OF ALL RESPONSES TO PHQ9 QUESTIONS 1 & 2: 0
2. FEELING DOWN, DEPRESSED OR HOPELESS: 0
SUM OF ALL RESPONSES TO PHQ QUESTIONS 1-9: 0

## 2020-10-27 ASSESSMENT — LIFESTYLE VARIABLES: HOW OFTEN DO YOU HAVE A DRINK CONTAINING ALCOHOL: 0

## 2020-10-27 NOTE — PATIENT INSTRUCTIONS
Personalized Preventive Plan for Maribeth White - 10/27/2020  Medicare offers a range of preventive health benefits. Some of the tests and screenings are paid in full while other may be subject to a deductible, co-insurance, and/or copay. Some of these benefits include a comprehensive review of your medical history including lifestyle, illnesses that may run in your family, and various assessments and screenings as appropriate. After reviewing your medical record and screening and assessments performed today your provider may have ordered immunizations, labs, imaging, and/or referrals for you. A list of these orders (if applicable) as well as your Preventive Care list are included within your After Visit Summary for your review. Other Preventive Recommendations:    · A preventive eye exam performed by an eye specialist is recommended every 1-2 years to screen for glaucoma; cataracts, macular degeneration, and other eye disorders. · A preventive dental visit is recommended every 6 months. · Try to get at least 150 minutes of exercise per week or 10,000 steps per day on a pedometer . · Order or download the FREE \"Exercise & Physical Activity: Your Everyday Guide\" from [employer]. Call 4-850.408.4883 or search [employer] online. · You need 8752-4531 mg of calcium and 2098-4121 IU of vitamin D per day. It is possible to meet your calcium requirement with diet alone, but a vitamin D supplement is usually necessary to meet this goal.  · When exposed to the sun, use a sunscreen that protects against both UVA and UVB radiation with an SPF of 30 or greater. Reapply every 2 to 3 hours or after sweating, drying off with a towel, or swimming. · Always wear a seat belt when traveling in a car. Always wear a helmet when riding a bicycle or motorcycle.

## 2020-10-27 NOTE — PROGRESS NOTES
mouth 2 times daily Yes RIGOBERTO Bolaños - CNP   Cholecalciferol (VITAMIN D3) 2000 units CAPS Take 2,000 Units by mouth daily  Yes Historical Provider, MD   aspirin 81 MG chewable tablet Take 1 tablet by mouth daily Yes Ciara Chou MD       Past Medical History:   Diagnosis Date    Arthritis     Cystitis, interstitial     GERD (gastroesophageal reflux disease)     GI bleeding     was a frequent aspirin user at that time    Heart burn     Hypertension     Interstitial cystitis     Patient is Mosque     no blood products    Psoriasis     Thoracic aortic aneurysm [facility])     cardiologist watching       Past Surgical History:   Procedure Laterality Date    CARDIAC CATHETERIZATION  05/29/2018    Dr. Batsheva Cleveland - w/placement of IABP    CARDIAC CATHETERIZATION  2012    COLONOSCOPY  07/31/2015    Dr. Telly Mack - ryan/polypectomy    CORONARY ARTERY BYPASS GRAFT  05/29/2018    Dr. Tara Mathias - off pump x2 (LIMA-LAD, L SVG-D2)    KNEE ARTHROSCOPY Right 02/2015    MOHS SURGERY Left     facial for skin CA    PARTIAL HYSTERECTOMY      ROTATOR CUFF REPAIR Right 2014    TRANSESOPHAGEAL ECHOCARDIOGRAM  05/29/2018    during CABG       Family History   Problem Relation Age of Onset    Breast Cancer Mother         LUNG    Breast Cancer Father         LUNG    COPD Father     Tuberculosis Father     Breast Cancer Brother         LUNG    Tuberculosis Brother        CareTeam (Including outside providers/suppliers regularly involved in providing care):   Patient Care Team:  Jayro Osorio MD as PCP - General (Family Medicine)  Jayro Osorio MD as PCP - [facility] Empaneled Provider    Wt Readings from Last 3 Encounters:   10/27/20 174 lb 12.8 oz (79.3 kg)   08/25/20 172 lb (78 kg)   03/03/20 169 lb (76.7 kg)     Vitals:    10/27/20 1302   BP: 132/86   Site: Left Upper Arm   Position: Sitting   Cuff Size: Medium Adult   Pulse: 66   SpO2: 98%   Weight: 174 lb 12.8 oz (79.3 kg)   Height: 5' 2\" (1.575 m)     Body mass index is 31.97 kg/m². Based upon direct observation of the patient, evaluation of cognition reveals recent and remote memory intact. General Appearance: alert and oriented to person, place and time, well-developed and well-nourished, in no acute distress  Skin: warm and dry, no rash or erythema-   Neck: neck supple and non tender without mass, no thyromegaly or thyroid nodules, no cervical lymphadenopathy   Pulmonary/Chest: clear to auscultation bilaterally- no wheezes, rales or rhonchi, normal air movement, no respiratory distress  Cardiovascular: normal rate, normal S1 and S2 and no murmurs  Extremities: no cyanosis, clubbing or edema  Neurologic: gait and coordination normal and speech normal    Patient's complete Health Risk Assessment and screening values have been reviewed and are found in Flowsheets. The following problems were reviewed today and where indicated follow up appointments were made and/or referrals ordered. Positive Risk Factor Screenings with Interventions:     General Health and ACP:  General  In general, how would you say your health is?: Very Good  In the past 7 days, have you experienced any of the following?  New or Increased Pain, New or Increased Fatigue, Loneliness, Social Isolation, Stress or Anger?: (!) New or Increased Pain  Do you get the social and emotional support that you need?: Yes  Do you have a Living Will?: (!) NO SHE HAS A DPA   Advance Directives     Power of  Living Will ACP-Advance Directive ACP-Power of     Not on File [place] on 06/06/18 Filed [address] [facility] Risk Interventions:  · NO INTERVENTIONS NEEDED AT THIS TIME    Health Habits/Nutrition:  Health Habits/Nutrition  Do you exercise for at least 20 minutes 2-3 times per week?: (!) No  Have you lost any weight without trying in the past 3 months?: No  Do you eat fewer than 2 meals per day?: No  Have you seen a dentist within the past year?: Yes  Body mass index: (!) 31.97  Health Habits/Nutrition Interventions:  · Inadequate physical activity:  patient agrees to exercise for at least 150 minutes/week    Hearing/Vision:  No exam data present  Hearing/Vision  Do you or your family notice any trouble with your hearing?: (!) Yes 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 BUT SHE DOES NOT WEAR THEM BECAUSE SHE HAS A LATEX ALLERGY  Do you have difficulty driving, watching TV, or doing any of your daily activities because of your eyesight?: No  Have you had an eye exam within the past year?: Yes  Hearing/Vision Interventions:  · Vision concerns:  ophthalmology/optometry referral provided PT [address]    Safety:  Safety  Do you have working smoke detectors?: Yes  Have all throw rugs been removed or fastened?: (!) No SHE HAS ONE  IN HER BATHROOM - HAS TWO HANDRAILS LEADING TO BASEMENT - HAS  GOTTEN  RID OF LOTS OF HER FURNITURE SO SHE CAN MOVE ABOUT EASILY  Do you have non-slip mats or surfaces in all bathtubs/showers?: Yes  Do all of your stairways have a railing or banister?: Yes  Are your doorways, halls and stairs free of clutter?: Yes  Do you always fasten your seatbelt when you are in a car?: Yes  Safety Interventions:  · Home safety tips provided    Personalized Preventive Plan   Current Health Maintenance Status  Immunization History   Administered Date(s) Administered    Influenza, High Dose (Fluzone 65 yrs and older) 09/01/2017, 09/28/2018, 09/04/2019    Pneumococcal Conjugate 13-valent (Mardel Lucas) 05/09/2018        Health Maintenance   Topic Date Due    Shingles Vaccine (1 of 2) 03/05/1987    Annual Wellness Visit (AWV)  05/29/2019    Lipid screen  02/15/2020    Potassium monitoring  08/25/2021    Creatinine monitoring  08/25/2021    DEXA (modify frequency per FRAX score)  Completed    Flu vaccine  Completed    Hepatitis A vaccine  Aged Out    Hepatitis B vaccine  Aged Out    Hib vaccine  Aged Out    Meningococcal (ACWY) vaccine  Aged Out     Recommendations for Preventive Services Due: see orders and patient instructions/AVS.  . Recommended screening schedule for the next 5-10 years is provided to the patient in written form: see Patient Instructions/AVS.    There are no diagnoses linked to this encounter. Sherre Lesches was seen today for medicare awv.     Diagnoses and all orders for this visit:    Routine general medical examination at a health care facility  Shingles vaccine declined  Health maintenance UTD  Dexa scan result 2018 reviewed with pt- continue supplements  Encouraged to increase weight bearing exercises as well  Mixed hyperlipidemia  She is not fasting today  She will schedule an appt to have labs drawn here as she does not want to go to the hospital  Decreased hearing of both ears  Audiometry in office  Will refer to audiologist 1/21 per per request    Essential hypertension  Continue toprol as prescribed

## 2020-11-03 PROBLEM — I10 ESSENTIAL HYPERTENSION: Status: RESOLVED | Noted: 2018-03-15 | Resolved: 2020-11-03

## 2020-11-09 RX ORDER — PANTOPRAZOLE SODIUM 40 MG/1
TABLET, DELAYED RELEASE ORAL
Qty: 30 TABLET | Refills: 11 | Status: SHIPPED | OUTPATIENT
Start: 2020-11-09 | End: 2021-05-17 | Stop reason: SDUPTHER

## 2020-12-15 RX ORDER — METOPROLOL SUCCINATE 50 MG/1
TABLET, EXTENDED RELEASE ORAL
Qty: 90 TABLET | Refills: 0 | Status: SHIPPED | OUTPATIENT
Start: 2020-12-15 | End: 2021-03-08

## 2021-03-05 DIAGNOSIS — I10 ESSENTIAL HYPERTENSION: ICD-10-CM

## 2021-03-08 RX ORDER — METOPROLOL SUCCINATE 50 MG/1
TABLET, EXTENDED RELEASE ORAL
Qty: 90 TABLET | Refills: 0 | Status: SHIPPED | OUTPATIENT
Start: 2021-03-08 | End: 2021-05-17

## 2021-04-13 ENCOUNTER — HOSPITAL ENCOUNTER (OUTPATIENT)
Dept: VASCULAR LAB | Age: 86
Discharge: HOME | End: 2021-04-13
Payer: MEDICARE

## 2021-04-13 ENCOUNTER — OFFICE VISIT (OUTPATIENT)
Dept: FAMILY MEDICINE CLINIC | Age: 86
End: 2021-04-13
Payer: MEDICARE

## 2021-04-13 ENCOUNTER — TELEPHONE (OUTPATIENT)
Dept: FAMILY MEDICINE CLINIC | Age: 86
End: 2021-04-13

## 2021-04-13 ENCOUNTER — HOSPITAL ENCOUNTER (OUTPATIENT)
Dept: CT IMAGING | Age: 86
Discharge: HOME | End: 2021-04-13
Payer: MEDICARE

## 2021-04-13 VITALS
WEIGHT: 194 LBS | RESPIRATION RATE: 24 BRPM | HEART RATE: 66 BPM | HEIGHT: 62 IN | DIASTOLIC BLOOD PRESSURE: 84 MMHG | SYSTOLIC BLOOD PRESSURE: 158 MMHG | BODY MASS INDEX: 35.7 KG/M2

## 2021-04-13 DIAGNOSIS — M54.42 CHRONIC BILATERAL LOW BACK PAIN WITH LEFT-SIDED SCIATICA: Primary | ICD-10-CM

## 2021-04-13 DIAGNOSIS — I10 ESSENTIAL HYPERTENSION: ICD-10-CM

## 2021-04-13 DIAGNOSIS — R06.02 SOB (SHORTNESS OF BREATH): ICD-10-CM

## 2021-04-13 DIAGNOSIS — M79.89 RIGHT LEG SWELLING: ICD-10-CM

## 2021-04-13 DIAGNOSIS — G89.29 CHRONIC BILATERAL LOW BACK PAIN WITH LEFT-SIDED SCIATICA: Primary | ICD-10-CM

## 2021-04-13 DIAGNOSIS — G47.00 INSOMNIA, UNSPECIFIED TYPE: ICD-10-CM

## 2021-04-13 DIAGNOSIS — R53.83 FATIGUE, UNSPECIFIED TYPE: ICD-10-CM

## 2021-04-13 LAB
A/G RATIO: 2.2 (ref 1.1–2.2)
ALBUMIN SERPL-MCNC: 4.3 G/DL (ref 3.4–5)
ALP BLD-CCNC: 84 U/L (ref 40–129)
ALT SERPL-CCNC: 24 U/L (ref 10–40)
ANION GAP SERPL CALCULATED.3IONS-SCNC: 13 MMOL/L (ref 3–16)
AST SERPL-CCNC: 23 U/L (ref 15–37)
BASOPHILS ABSOLUTE: 0.1 K/UL (ref 0–0.2)
BASOPHILS RELATIVE PERCENT: 1.1 %
BILIRUB SERPL-MCNC: 1.2 MG/DL (ref 0–1)
BUN BLDV-MCNC: 12 MG/DL (ref 7–20)
CALCIUM SERPL-MCNC: 9.5 MG/DL (ref 8.3–10.6)
CHLORIDE BLD-SCNC: 103 MMOL/L (ref 99–110)
CO2: 24 MMOL/L (ref 21–32)
CREAT SERPL-MCNC: 0.6 MG/DL (ref 0.6–1.2)
EOSINOPHILS ABSOLUTE: 0.2 K/UL (ref 0–0.6)
EOSINOPHILS RELATIVE PERCENT: 3 %
GFR AFRICAN AMERICAN: >60
GFR AFRICAN AMERICAN: >60
GFR NON-AFRICAN AMERICAN: >60
GFR NON-AFRICAN AMERICAN: >60
GLOBULIN: 2 G/DL
GLUCOSE BLD-MCNC: 83 MG/DL (ref 70–99)
HCT VFR BLD CALC: 40 % (ref 36–48)
HEMOGLOBIN: 13.6 G/DL (ref 12–16)
LYMPHOCYTES ABSOLUTE: 1.9 K/UL (ref 1–5.1)
LYMPHOCYTES RELATIVE PERCENT: 38.3 %
MCH RBC QN AUTO: 31.5 PG (ref 26–34)
MCHC RBC AUTO-ENTMCNC: 34 G/DL (ref 31–36)
MCV RBC AUTO: 92.8 FL (ref 80–100)
MONOCYTES ABSOLUTE: 0.5 K/UL (ref 0–1.3)
MONOCYTES RELATIVE PERCENT: 10.6 %
NEUTROPHILS ABSOLUTE: 2.4 K/UL (ref 1.7–7.7)
NEUTROPHILS RELATIVE PERCENT: 47 %
PDW BLD-RTO: 14.6 % (ref 12.4–15.4)
PERFORMED ON: NORMAL
PLATELET # BLD: 214 K/UL (ref 135–450)
PMV BLD AUTO: 8.7 FL (ref 5–10.5)
POC CREATININE: 0.8 MG/DL (ref 0.6–1.2)
POC SAMPLE TYPE: NORMAL
POTASSIUM SERPL-SCNC: 4.2 MMOL/L (ref 3.5–5.1)
PRO-BNP: 195 PG/ML (ref 0–449)
RBC # BLD: 4.31 M/UL (ref 4–5.2)
SODIUM BLD-SCNC: 140 MMOL/L (ref 136–145)
TOTAL PROTEIN: 6.3 G/DL (ref 6.4–8.2)
TSH REFLEX: 1.7 UIU/ML (ref 0.27–4.2)
VITAMIN B-12: 1587 PG/ML (ref 211–911)
WBC # BLD: 5.1 K/UL (ref 4–11)

## 2021-04-13 PROCEDURE — 36415 COLL VENOUS BLD VENIPUNCTURE: CPT | Performed by: FAMILY MEDICINE

## 2021-04-13 PROCEDURE — 1123F ACP DISCUSS/DSCN MKR DOCD: CPT | Performed by: FAMILY MEDICINE

## 2021-04-13 PROCEDURE — 4040F PNEUMOC VAC/ADMIN/RCVD: CPT | Performed by: FAMILY MEDICINE

## 2021-04-13 PROCEDURE — G8417 CALC BMI ABV UP PARAM F/U: HCPCS | Performed by: FAMILY MEDICINE

## 2021-04-13 PROCEDURE — G8399 PT W/DXA RESULTS DOCUMENT: HCPCS | Performed by: FAMILY MEDICINE

## 2021-04-13 PROCEDURE — G8427 DOCREV CUR MEDS BY ELIG CLIN: HCPCS | Performed by: FAMILY MEDICINE

## 2021-04-13 PROCEDURE — 99215 OFFICE O/P EST HI 40 MIN: CPT | Performed by: FAMILY MEDICINE

## 2021-04-13 PROCEDURE — 1090F PRES/ABSN URINE INCON ASSESS: CPT | Performed by: FAMILY MEDICINE

## 2021-04-13 PROCEDURE — 71260 CT THORAX DX C+: CPT

## 2021-04-13 PROCEDURE — 1036F TOBACCO NON-USER: CPT | Performed by: FAMILY MEDICINE

## 2021-04-13 PROCEDURE — 93971 EXTREMITY STUDY: CPT

## 2021-04-13 PROCEDURE — 82565 ASSAY OF CREATININE: CPT

## 2021-04-13 PROCEDURE — 6360000004 HC RX CONTRAST MEDICATION: Performed by: FAMILY MEDICINE

## 2021-04-13 RX ADMIN — IOPAMIDOL 75 ML: 755 INJECTION, SOLUTION INTRAVENOUS at 14:04

## 2021-04-13 ASSESSMENT — PATIENT HEALTH QUESTIONNAIRE - PHQ9
2. FEELING DOWN, DEPRESSED OR HOPELESS: 0
SUM OF ALL RESPONSES TO PHQ9 QUESTIONS 1 & 2: 0

## 2021-04-13 NOTE — TELEPHONE ENCOUNTER
PT @  [phone]    PT HAD THE J&J  VACCINE ON 03/27 - SHE HAS BEEN EXPERIENCING TIREDNESS, SOB, AND LEG PAIN EVER SINCE - DO YOU WANT HER TO COME IN OR WHAT ADVICE DO YOU HAVE?

## 2021-04-13 NOTE — PROGRESS NOTES
[facility] Medicine  Clinic Note    Date: 4/13/2021                                               Subjective:     Chief Complaint   Patient presents with    Other     POSS REACTION TO [address], SOB AND LEG PAIN, SHE HAS ALWAYS HAD ISSUES WITH ARTHRITIS AND HER LEGS WILL GIVE OUT. SHE HAS A LOT OF FATIGUE BUT SHE ALSO DOES NOT SLEEP MORE THAN 3 HOURS AT NIGHT     Back Pain     LOWER BACK PAIN AS WELL HAS BEEN GOING ON GOES DOWN LEFT LEG      HPI  Vaccine on 3/27  Gained 20# plus since October. lvef on echo one year ago - 60-65%  LEFT ARM STILL SORE FROM VACCINE  Some nausea/ wonkyness for few hours after vaccine but 1 week after vaccine - got fatigued   in bed after 10 - sleeps 4 hours straight, then up for 2-3 hours - may fall back to sleep for an hour or so. Back/ leg pain worsening - may need to go back to ortho - reconstructive ortho  More swelling/ tight in legs -always on right ankle - now worse and left side as well  Swelling above knee  More inactive is main reason for weight gain - some pain in left shoulder and mid back as well. Sharp pain in mid back - different from usual low back pain. Generic tylenol helps some - taking 650 q6h. Still some chest pain/ sob at times - ? Stable angina  cabg nearly 3 years ago. - cp since that time  Some increase in balance problems lately  Hx of phlebitis in leg but not DVT  PGM w/ blood clots. Some cough -dry  No orthopnea.          Patient Active Problem List    Diagnosis Date Noted    Anemia      Priority: High    Leukocytosis 02/19/2020    Lactic acidosis 02/19/2020    Failure of outpatient treatment 02/19/2020    Patient is Roman Catholic 02/19/2020    Hypokalemia 02/19/2020    Acute bronchitis 02/19/2020    Hypertension     Ganglion cyst of dorsum of left wrist 10/04/2018    Mixed hyperlipidemia 06/19/2018    Atrial fibrillation ([facility] [address] 06/19/2018    CAD in native artery 06/01/2018    NSTEMI (non-ST elevated myocardial infarction) ([facility] 05/29/2018    Chest pain 05/03/2018    Gastroesophageal reflux disease 03/15/2018    Primary osteoarthritis involving multiple joints 03/15/2018    Psoriasis 03/15/2018    Interstitial cystitis 03/15/2018    Thoracic ascending aortic aneurysm ([facility] 03/15/2018    History of colonic polyps 07/02/2015    Varicose veins of legs 01/23/2015    Rotator cuff tear, right 03/25/2013    Vitamin B 12 deficiency 01/07/2013    Obesity 12/12/2012     Past Medical History:   Diagnosis Date    Arthritis     Cystitis, interstitial     GERD (gastroesophageal reflux disease)     GI bleeding     was a frequent aspirin user at that time    Heart burn     Hypertension     Interstitial cystitis     Patient is Episcopalian     no blood products    Psoriasis     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 Joradn Lazcano - w/placement of IABP   [address]  2012    COLONOSCOPY  07/31/2015    Dr. Taylor davis/polypectomy    CORONARY ARTERY BYPASS GRAFT  05/29/2018    Dr. Velasquez Sutton - off pump x2 (LIMA-LAD, L SVG-D2)    KNEE ARTHROSCOPY Right 02/2015    MOHS SURGERY Left     facial for skin CA    PARTIAL HYSTERECTOMY      ROTATOR CUFF REPAIR Right 2014    TRANSESOPHAGEAL ECHOCARDIOGRAM  05/29/2018    during CABG     Office Visit on 08/25/2020   Component Date Value Ref Range Status    Color, UA 08/25/2020 YELLOW   Final    Clarity, UA 08/25/2020 CLOUDY   Final    Glucose, UA POC 08/25/2020 NEG   Final    Bilirubin, UA 08/25/2020 NEG   Final    Ketones, UA 08/25/2020 NEG   Final    Spec Grav, UA 08/25/2020 1.010   Final    Blood, UA POC 08/25/2020 MODERATE   Final    pH, UA 08/25/2020 6.5   Final    Protein, UA POC 08/25/2020 NEG   Final    Urobilinogen, UA 08/25/2020 0.2   Final    Leukocytes, UA 08/25/2020 SMALL Final    Nitrite, UA 08/25/2020 POSITIVE   Final    Sodium 08/25/2020 138  136 - 145 mmol/L Final    Potassium 08/25/2020 4.1  3.5 - 5.1 mmol/L Final    Chloride 08/25/2020 101  99 - 110 mmol/L Final    CO2 08/25/2020 25  21 - 32 mmol/L Final    Anion Gap 08/25/2020 12  3 - 16 Final    Glucose 08/25/2020 93  70 - 99 mg/dL Final    BUN 08/25/2020 18  7 - 20 mg/dL Final    CREATININE 08/25/2020 0.6  0.6 - 1.2 mg/dL Final    GFR Non- 08/25/2020 >60  >60 Final    Comment: >60 mL/min/1.73m2 EGFR, calc. for ages 25 and older using the  MDRD formula (not corrected for weight), is valid for stable  renal function.  GFR  08/25/2020 >60  >60 Final    Comment: Chronic Kidney Disease: less than 60 ml/min/1.73 sq.m. Kidney Failure: less than 15 ml/min/1.73 sq.m. Results valid for patients 18 years and older.       Calcium 08/25/2020 9.9  8.3 - 10.6 mg/dL Final    Total Protein 08/25/2020 6.1* 6.4 - 8.2 g/dL Final    Albumin 08/25/2020 4.3  3.4 - 5.0 g/dL Final    Albumin/Globulin Ratio 08/25/2020 2.4* 1.1 - 2.2 Final    Total Bilirubin 08/25/2020 0.6  0.0 - 1.0 mg/dL Final    Alkaline Phosphatase 08/25/2020 76  40 - 129 U/L Final    ALT 08/25/2020 20  10 - 40 U/L Final    AST 08/25/2020 24  15 - 37 U/L Final    Globulin 08/25/2020 1.8  g/dL Final    WOUND/ABSCESS 08/25/2020 No growth 60 to 72 hours   Final    Gram Stain Result 08/25/2020    Final                    Value:No WBC's seen  No Epithelial Cells seen  No organisms seen      Organism 08/25/2020 Citrobacter amalonaticus*  Final    Urine Culture, Routine 08/25/2020 >100,000 CFU/ml   Final     Family History   Problem Relation Age of Onset    Breast Cancer Mother         LUNG    Breast Cancer Father         LUNG    COPD Father     Tuberculosis Father     Breast Cancer Brother         LUNG    Tuberculosis Brother      Current Outpatient Medications   Medication Sig Dispense Refill    metoprolol succinate (TOPROL XL) 50 MG extended release tablet TAKE 1 TABLET BY MOUTH  DAILY 90 tablet 0    pantoprazole (PROTONIX) 40 MG tablet TAKE 1 TABLET BY MOUTH EVERY MORNING BEFORE BREAKFAST 30 tablet 11    atorvastatin (LIPITOR) 80 MG tablet Take 1 tablet by mouth daily 90 tablet 1    triamterene-hydroCHLOROthiazide (MAXZIDE-25) 37.5-25 MG per tablet TAKE 1 TABLET BY MOUTH  DAILY 90 tablet 3    budesonide (RHINOCORT AQUA) 32 MCG/ACT nasal spray 2 sprays by Each Nostril route 2 times daily 1 Bottle 1    acetaminophen (TYLENOL) 500 MG tablet Take 500 mg by mouth every 6 hours as needed for Pain      vitamin B-12 100 MCG tablet Take 1 tablet by mouth daily 60 tablet 0    docusate sodium (COLACE, DULCOLAX) 100 MG CAPS Take 100 mg by mouth 2 times daily 60 capsule 0    Cholecalciferol (VITAMIN D3) 2000 units CAPS Take 2,000 Units by mouth daily       aspirin 81 MG chewable tablet Take 1 tablet by mouth daily 30 tablet 3     No current facility-administered medications for this visit.       Allergies   Allergen Reactions    Latex Itching    Mucinex [Guaifenesin Er] Anaphylaxis     Coughs more, dry cough, feels like she has bronchitis    Tetanus Toxoids      HAS REACTION- TOLD TO TAKE ALTERNATIVE    Redness and swelling to site    Morphine Nausea And Vomiting    Pneumococcal Vaccines Rash       Review of Systems    Objective:  Ht 5' 2\" (1.575 m)   Wt 194 lb (88 kg)   LMP  (Exact Date)   Breastfeeding No   BMI 35.48 kg/m²     BP Readings from Last 3 Encounters:   10/27/20 132/86   08/31/20 136/76   08/25/20 134/82     BP Readings from Last 3 Encounters:   04/13/21 (!) 158/84   10/27/20 132/86   08/31/20 136/76     Pulse Readings from Last 3 Encounters:   04/13/21 66   10/27/20 66   08/31/20 73         Pulse Readings from Last 3 Encounters:   10/27/20 66   08/31/20 73   08/25/20 86       Wt Readings from Last 3 Encounters:   04/13/21 194 lb (88 kg)   10/27/20 174 lb 12.8 oz (79.3 kg)   08/25/20 172 lb (78 kg) Physical Exam  Constitutional:       Appearance: Normal appearance. HENT:      Head: Normocephalic. Cardiovascular:      Rate and Rhythm: Normal rate and regular rhythm. Pulses: Normal pulses. Heart sounds: Normal heart sounds. Pulmonary:      Effort: Respiratory distress (mildly tachypneic) present. Breath sounds: No wheezing or rales. Abdominal:      General: There is no distension. Palpations: Abdomen is soft. Musculoskeletal:      Comments: Significant edema right > left leg  Reduced distal pulses   Neurological:      Mental Status: She is alert. Assessment/Plan:   There are no diagnoses linked to this encounter. Diagnosis Orders   1. Chronic bilateral low back pain with left-sided sciatica     2. Essential hypertension  Comprehensive Metabolic Panel   3. Fatigue, unspecified type  CBC Auto Differential    Comprehensive Metabolic Panel    TSH with Reflex    Vitamin B12   4. SOB (shortness of breath)  Brain Natriuretic Peptide    CBC Auto Differential    CTA PULMONARY W CONTRAST   5. Right leg swelling  CTA PULMONARY W CONTRAST    VL Extremity Venous Right   6. Insomnia, unspecified type       Based on wells criteria - high risk for PE - stat cta pulm and venous doppler right leg  Check labs for fatigue/ bnp  F/u pending results  Melatonin for sleep 3-5mg nightly encouraged  Cont tylenol for pain in back  Consider f/u ortho  Hold on prednisone 2/2 recent covid vaccine / pending studies above - wants to avoid steroids, per pt. Consider this for continued pain suspicious for radiculopathy  Elevate legs, compression, limit salt, activity encouraged as long   Cane encouraged for balance -? Related to swelling  No follow-ups on file.     Baljinder Poster, DO  4/13/2021  11:17 AM

## 2021-04-15 DIAGNOSIS — R60.9 SWELLING: Primary | ICD-10-CM

## 2021-04-15 RX ORDER — FUROSEMIDE 20 MG/1
20 TABLET ORAL DAILY
Qty: 5 TABLET | Refills: 0 | Status: SHIPPED | OUTPATIENT
Start: 2021-04-15 | End: 2021-04-19

## 2021-04-15 RX ORDER — POTASSIUM CHLORIDE 750 MG/1
10 TABLET, EXTENDED RELEASE ORAL DAILY
Qty: 5 TABLET | Refills: 0 | Status: SHIPPED | OUTPATIENT
Start: 2021-04-15 | End: 2021-04-19

## 2021-04-18 DIAGNOSIS — R60.9 SWELLING: ICD-10-CM

## 2021-04-19 RX ORDER — FUROSEMIDE 20 MG/1
20 TABLET ORAL DAILY
Qty: 5 TABLET | Refills: 0 | Status: ON HOLD | OUTPATIENT
Start: 2021-04-19 | End: 2021-07-05

## 2021-04-19 RX ORDER — POTASSIUM CHLORIDE 750 MG/1
10 TABLET, EXTENDED RELEASE ORAL DAILY
Qty: 5 TABLET | Refills: 0 | Status: ON HOLD | OUTPATIENT
Start: 2021-04-19 | End: 2021-07-05

## 2021-04-21 DIAGNOSIS — E78.2 MIXED HYPERLIPIDEMIA: ICD-10-CM

## 2021-04-21 RX ORDER — ATORVASTATIN CALCIUM 80 MG/1
80 TABLET, FILM COATED ORAL DAILY
Qty: 90 TABLET | Refills: 0 | Status: ON HOLD | OUTPATIENT
Start: 2021-04-21 | End: 2021-07-05

## 2021-05-17 DIAGNOSIS — I10 ESSENTIAL HYPERTENSION: ICD-10-CM

## 2021-05-17 RX ORDER — METOPROLOL SUCCINATE 50 MG/1
TABLET, EXTENDED RELEASE ORAL
Qty: 90 TABLET | Refills: 0 | Status: SHIPPED | OUTPATIENT
Start: 2021-05-17 | End: 2021-05-19

## 2021-05-17 RX ORDER — PANTOPRAZOLE SODIUM 40 MG/1
TABLET, DELAYED RELEASE ORAL
Qty: 90 TABLET | Refills: 0 | Status: SHIPPED | OUTPATIENT
Start: 2021-05-17 | End: 2021-07-23

## 2021-05-19 DIAGNOSIS — I10 ESSENTIAL HYPERTENSION: ICD-10-CM

## 2021-05-19 RX ORDER — METOPROLOL SUCCINATE 50 MG/1
TABLET, EXTENDED RELEASE ORAL
Qty: 90 TABLET | Refills: 0 | Status: SHIPPED | OUTPATIENT
Start: 2021-05-19 | End: 2021-07-28

## 2021-07-05 ENCOUNTER — APPOINTMENT (OUTPATIENT)
Dept: CT IMAGING | Age: 86
DRG: 287 | End: 2021-07-05
Payer: MEDICARE

## 2021-07-05 ENCOUNTER — HOSPITAL ENCOUNTER (INPATIENT)
Age: 86
LOS: 3 days | Discharge: HOME | DRG: 287 | End: 2021-07-08
Attending: EMERGENCY MEDICINE | Admitting: HOSPITALIST
Payer: MEDICARE

## 2021-07-05 ENCOUNTER — APPOINTMENT (OUTPATIENT)
Dept: GENERAL RADIOLOGY | Age: 86
DRG: 287 | End: 2021-07-05
Payer: MEDICARE

## 2021-07-05 DIAGNOSIS — R07.9 CHEST PAIN, UNSPECIFIED TYPE: Primary | ICD-10-CM

## 2021-07-05 DIAGNOSIS — I21.4 NSTEMI (NON-ST ELEVATED MYOCARDIAL INFARCTION) (HCC): ICD-10-CM

## 2021-07-05 DIAGNOSIS — N20.1 URETEROLITHIASIS: ICD-10-CM

## 2021-07-05 DIAGNOSIS — Z95.1 HX OF CABG: ICD-10-CM

## 2021-07-05 DIAGNOSIS — R82.71 BACTERIURIA: ICD-10-CM

## 2021-07-05 DIAGNOSIS — E80.6 BILIRUBINEMIA: ICD-10-CM

## 2021-07-05 LAB
ALBUMIN SERPL-MCNC: 4.2 G/DL (ref 3.4–5)
ALP BLD-CCNC: 74 U/L (ref 40–129)
ALT SERPL-CCNC: 28 U/L (ref 10–40)
ANION GAP SERPL CALCULATED.3IONS-SCNC: 11 MMOL/L (ref 3–16)
APTT: 32.6 SEC (ref 26.2–38.6)
AST SERPL-CCNC: 33 U/L (ref 15–37)
BACTERIA: ABNORMAL /HPF
BASOPHILS ABSOLUTE: 0 K/UL (ref 0–0.2)
BASOPHILS RELATIVE PERCENT: 0.4 %
BILIRUB SERPL-MCNC: 2.1 MG/DL (ref 0–1)
BILIRUBIN DIRECT: 0.4 MG/DL (ref 0–0.3)
BILIRUBIN URINE: NEGATIVE
BILIRUBIN, INDIRECT: 1.7 MG/DL (ref 0–1)
BLOOD, URINE: ABNORMAL
BUN BLDV-MCNC: 11 MG/DL (ref 7–20)
CALCIUM SERPL-MCNC: 9.9 MG/DL (ref 8.3–10.6)
CHLORIDE BLD-SCNC: 102 MMOL/L (ref 99–110)
CLARITY: CLEAR
CO2: 26 MMOL/L (ref 21–32)
COLOR: YELLOW
CREAT SERPL-MCNC: <0.5 MG/DL (ref 0.6–1.2)
EOSINOPHILS ABSOLUTE: 0.1 K/UL (ref 0–0.6)
EOSINOPHILS RELATIVE PERCENT: 1.5 %
EPITHELIAL CELLS, UA: 1 /HPF (ref 0–5)
GFR AFRICAN AMERICAN: >60
GFR NON-AFRICAN AMERICAN: >60
GLUCOSE BLD-MCNC: 88 MG/DL (ref 70–99)
GLUCOSE URINE: NEGATIVE MG/DL
HCT VFR BLD CALC: 41.9 % (ref 36–48)
HEMOGLOBIN: 14 G/DL (ref 12–16)
HYALINE CASTS: 0 /LPF (ref 0–8)
INR BLD: 0.96 (ref 0.88–1.12)
KETONES, URINE: NEGATIVE MG/DL
LEUKOCYTE ESTERASE, URINE: ABNORMAL
LIPASE: 37 U/L (ref 13–60)
LYMPHOCYTES ABSOLUTE: 2.4 K/UL (ref 1–5.1)
LYMPHOCYTES RELATIVE PERCENT: 39 %
MCH RBC QN AUTO: 31.3 PG (ref 26–34)
MCHC RBC AUTO-ENTMCNC: 33.5 G/DL (ref 31–36)
MCV RBC AUTO: 93.4 FL (ref 80–100)
MICROSCOPIC EXAMINATION: YES
MONOCYTES ABSOLUTE: 0.6 K/UL (ref 0–1.3)
MONOCYTES RELATIVE PERCENT: 9.7 %
NEUTROPHILS ABSOLUTE: 3.1 K/UL (ref 1.7–7.7)
NEUTROPHILS RELATIVE PERCENT: 49.4 %
NITRITE, URINE: NEGATIVE
PDW BLD-RTO: 13.7 % (ref 12.4–15.4)
PH UA: 8.5 (ref 5–8)
PLATELET # BLD: 253 K/UL (ref 135–450)
PMV BLD AUTO: 7.9 FL (ref 5–10.5)
POTASSIUM SERPL-SCNC: 3.5 MMOL/L (ref 3.5–5.1)
PRO-BNP: 626 PG/ML (ref 0–449)
PROTEIN UA: NEGATIVE MG/DL
PROTHROMBIN TIME: 10.8 SEC (ref 9.9–12.7)
RBC # BLD: 4.48 M/UL (ref 4–5.2)
RBC UA: ABNORMAL /HPF (ref 0–4)
SODIUM BLD-SCNC: 139 MMOL/L (ref 136–145)
SPECIFIC GRAVITY UA: 1.01 (ref 1–1.03)
TOTAL PROTEIN: 6.6 G/DL (ref 6.4–8.2)
TROPONIN: 0.05 NG/ML
TROPONIN: 0.05 NG/ML
URINE TYPE: ABNORMAL
UROBILINOGEN, URINE: 1 E.U./DL
WBC # BLD: 6.2 K/UL (ref 4–11)
WBC UA: 14 /HPF (ref 0–5)
WBC UA: ABNORMAL /HPF (ref 0–5)

## 2021-07-05 PROCEDURE — 87077 CULTURE AEROBIC IDENTIFY: CPT

## 2021-07-05 PROCEDURE — 83880 ASSAY OF NATRIURETIC PEPTIDE: CPT

## 2021-07-05 PROCEDURE — U0003 INFECTIOUS AGENT DETECTION BY NUCLEIC ACID (DNA OR RNA); SEVERE ACUTE RESPIRATORY SYNDROME CORONAVIRUS 2 (SARS-COV-2) (CORONAVIRUS DISEASE [COVID-19]), AMPLIFIED PROBE TECHNIQUE, MAKING USE OF HIGH THROUGHPUT TECHNOLOGIES AS DESCRIBED BY CMS-2020-01-R: HCPCS

## 2021-07-05 PROCEDURE — 6370000000 HC RX 637 (ALT 250 FOR IP): Performed by: EMERGENCY MEDICINE

## 2021-07-05 PROCEDURE — 6360000002 HC RX W HCPCS: Performed by: EMERGENCY MEDICINE

## 2021-07-05 PROCEDURE — 93005 ELECTROCARDIOGRAM TRACING: CPT | Performed by: EMERGENCY MEDICINE

## 2021-07-05 PROCEDURE — 2580000003 HC RX 258: Performed by: HOSPITALIST

## 2021-07-05 PROCEDURE — 85730 THROMBOPLASTIN TIME PARTIAL: CPT

## 2021-07-05 PROCEDURE — 96372 THER/PROPH/DIAG INJ SC/IM: CPT

## 2021-07-05 PROCEDURE — 81001 URINALYSIS AUTO W/SCOPE: CPT

## 2021-07-05 PROCEDURE — 2580000003 HC RX 258: Performed by: EMERGENCY MEDICINE

## 2021-07-05 PROCEDURE — 87186 SC STD MICRODIL/AGAR DIL: CPT

## 2021-07-05 PROCEDURE — 99283 EMERGENCY DEPT VISIT LOW MDM: CPT

## 2021-07-05 PROCEDURE — 80048 BASIC METABOLIC PNL TOTAL CA: CPT

## 2021-07-05 PROCEDURE — 85025 COMPLETE CBC W/AUTO DIFF WBC: CPT

## 2021-07-05 PROCEDURE — 1200000000 HC SEMI PRIVATE

## 2021-07-05 PROCEDURE — G0378 HOSPITAL OBSERVATION PER HR: HCPCS

## 2021-07-05 PROCEDURE — U0005 INFEC AGEN DETEC AMPLI PROBE: HCPCS

## 2021-07-05 PROCEDURE — 80076 HEPATIC FUNCTION PANEL: CPT

## 2021-07-05 PROCEDURE — 36415 COLL VENOUS BLD VENIPUNCTURE: CPT

## 2021-07-05 PROCEDURE — 74174 CTA ABD&PLVS W/CONTRAST: CPT

## 2021-07-05 PROCEDURE — 6360000002 HC RX W HCPCS: Performed by: HOSPITALIST

## 2021-07-05 PROCEDURE — 84484 ASSAY OF TROPONIN QUANT: CPT

## 2021-07-05 PROCEDURE — 96365 THER/PROPH/DIAG IV INF INIT: CPT

## 2021-07-05 PROCEDURE — 85610 PROTHROMBIN TIME: CPT

## 2021-07-05 PROCEDURE — 83690 ASSAY OF LIPASE: CPT

## 2021-07-05 PROCEDURE — 96375 TX/PRO/DX INJ NEW DRUG ADDON: CPT

## 2021-07-05 PROCEDURE — 87086 URINE CULTURE/COLONY COUNT: CPT

## 2021-07-05 PROCEDURE — 6360000004 HC RX CONTRAST MEDICATION: Performed by: EMERGENCY MEDICINE

## 2021-07-05 PROCEDURE — 71046 X-RAY EXAM CHEST 2 VIEWS: CPT

## 2021-07-05 PROCEDURE — 71250 CT THORAX DX C-: CPT

## 2021-07-05 RX ORDER — FUROSEMIDE 20 MG/1
1 TABLET ORAL DAILY
Status: DISCONTINUED | OUTPATIENT
Start: 2021-07-05 | End: 2021-07-05

## 2021-07-05 RX ORDER — SODIUM CHLORIDE 0.9 % (FLUSH) 0.9 %
5-40 SYRINGE (ML) INJECTION EVERY 12 HOURS SCHEDULED
Status: DISCONTINUED | OUTPATIENT
Start: 2021-07-05 | End: 2021-07-08 | Stop reason: HOSPADM

## 2021-07-05 RX ORDER — PANTOPRAZOLE SODIUM 40 MG/1
40 TABLET, DELAYED RELEASE ORAL
Status: DISCONTINUED | OUTPATIENT
Start: 2021-07-06 | End: 2021-07-08 | Stop reason: HOSPADM

## 2021-07-05 RX ORDER — ATORVASTATIN CALCIUM 80 MG/1
80 TABLET, FILM COATED ORAL DAILY
Status: DISCONTINUED | OUTPATIENT
Start: 2021-07-06 | End: 2021-07-08 | Stop reason: HOSPADM

## 2021-07-05 RX ORDER — ASPIRIN 325 MG
325 TABLET ORAL ONCE
Status: COMPLETED | OUTPATIENT
Start: 2021-07-05 | End: 2021-07-05

## 2021-07-05 RX ORDER — POLYETHYLENE GLYCOL 3350 17 G/17G
17 POWDER, FOR SOLUTION ORAL DAILY PRN
Status: DISCONTINUED | OUTPATIENT
Start: 2021-07-05 | End: 2021-07-08 | Stop reason: HOSPADM

## 2021-07-05 RX ORDER — ATORVASTATIN CALCIUM 80 MG/1
80 TABLET, FILM COATED ORAL DAILY
COMMUNITY
End: 2021-07-19

## 2021-07-05 RX ORDER — METOPROLOL SUCCINATE 50 MG/1
50 TABLET, EXTENDED RELEASE ORAL DAILY
Status: DISCONTINUED | OUTPATIENT
Start: 2021-07-06 | End: 2021-07-08 | Stop reason: HOSPADM

## 2021-07-05 RX ORDER — ACETAMINOPHEN 500 MG
500 TABLET ORAL EVERY 6 HOURS PRN
Status: DISCONTINUED | OUTPATIENT
Start: 2021-07-05 | End: 2021-07-05

## 2021-07-05 RX ORDER — ONDANSETRON 4 MG/1
4 TABLET, ORALLY DISINTEGRATING ORAL EVERY 8 HOURS PRN
Status: DISCONTINUED | OUTPATIENT
Start: 2021-07-05 | End: 2021-07-08 | Stop reason: HOSPADM

## 2021-07-05 RX ORDER — SODIUM CHLORIDE 0.9 % (FLUSH) 0.9 %
5-40 SYRINGE (ML) INJECTION PRN
Status: DISCONTINUED | OUTPATIENT
Start: 2021-07-05 | End: 2021-07-07 | Stop reason: SDUPTHER

## 2021-07-05 RX ORDER — ACETAMINOPHEN 325 MG/1
650 TABLET ORAL EVERY 6 HOURS PRN
Status: DISCONTINUED | OUTPATIENT
Start: 2021-07-05 | End: 2021-07-08 | Stop reason: HOSPADM

## 2021-07-05 RX ORDER — ONDANSETRON 2 MG/ML
4 INJECTION INTRAMUSCULAR; INTRAVENOUS EVERY 6 HOURS PRN
Status: DISCONTINUED | OUTPATIENT
Start: 2021-07-05 | End: 2021-07-08 | Stop reason: HOSPADM

## 2021-07-05 RX ORDER — ASPIRIN 81 MG/1
81 TABLET, CHEWABLE ORAL DAILY
Status: DISCONTINUED | OUTPATIENT
Start: 2021-07-06 | End: 2021-07-08 | Stop reason: HOSPADM

## 2021-07-05 RX ORDER — POTASSIUM CHLORIDE 750 MG/1
1 TABLET, EXTENDED RELEASE ORAL DAILY
Status: DISCONTINUED | OUTPATIENT
Start: 2021-07-05 | End: 2021-07-05 | Stop reason: ALTCHOICE

## 2021-07-05 RX ORDER — SODIUM CHLORIDE 9 MG/ML
25 INJECTION, SOLUTION INTRAVENOUS PRN
Status: DISCONTINUED | OUTPATIENT
Start: 2021-07-05 | End: 2021-07-07 | Stop reason: SDUPTHER

## 2021-07-05 RX ORDER — ACETAMINOPHEN 650 MG/1
650 SUPPOSITORY RECTAL EVERY 6 HOURS PRN
Status: DISCONTINUED | OUTPATIENT
Start: 2021-07-05 | End: 2021-07-08 | Stop reason: HOSPADM

## 2021-07-05 RX ORDER — MORPHINE SULFATE 2 MG/ML
2 INJECTION, SOLUTION INTRAMUSCULAR; INTRAVENOUS EVERY 4 HOURS PRN
Status: DISCONTINUED | OUTPATIENT
Start: 2021-07-05 | End: 2021-07-08 | Stop reason: HOSPADM

## 2021-07-05 RX ADMIN — Medication 10 ML: at 21:12

## 2021-07-05 RX ADMIN — ENOXAPARIN SODIUM 40 MG: 40 INJECTION SUBCUTANEOUS at 21:11

## 2021-07-05 RX ADMIN — Medication 1000 MG: at 21:11

## 2021-07-05 RX ADMIN — CEFAZOLIN SODIUM 1000 MG: 1 INJECTION, POWDER, FOR SOLUTION INTRAMUSCULAR; INTRAVENOUS at 17:50

## 2021-07-05 RX ADMIN — IOPAMIDOL 75 ML: 755 INJECTION, SOLUTION INTRAVENOUS at 16:11

## 2021-07-05 RX ADMIN — ASPIRIN 325 MG ORAL TABLET 325 MG: 325 PILL ORAL at 15:31

## 2021-07-05 ASSESSMENT — PAIN SCALES - GENERAL
PAINLEVEL_OUTOF10: 2

## 2021-07-05 ASSESSMENT — PAIN DESCRIPTION - ORIENTATION
ORIENTATION: MID
ORIENTATION: LOWER
ORIENTATION: LOWER

## 2021-07-05 ASSESSMENT — PAIN DESCRIPTION - FREQUENCY
FREQUENCY: CONTINUOUS

## 2021-07-05 ASSESSMENT — PAIN DESCRIPTION - LOCATION
LOCATION: BACK
LOCATION: CHEST
LOCATION: BACK

## 2021-07-05 ASSESSMENT — PAIN DESCRIPTION - DESCRIPTORS
DESCRIPTORS: DISCOMFORT

## 2021-07-05 ASSESSMENT — PAIN DESCRIPTION - PAIN TYPE
TYPE: CHRONIC PAIN
TYPE: ACUTE PAIN

## 2021-07-05 ASSESSMENT — PAIN DESCRIPTION - PROGRESSION: CLINICAL_PROGRESSION: GRADUALLY WORSENING

## 2021-07-05 NOTE — ED NOTES
Bedside report given to [facility] RN. V/U. Patient being transported via rn and wheelchair.       Phillip Dietrich RN  07/05/21 [id number]

## 2021-07-05 NOTE — ED TRIAGE NOTES
Pt drove self to ER, pt is having nausea vomiting sob weak chest discomfort, states this resembles the last time she had to have open heart surgery

## 2021-07-05 NOTE — H&P
HOSPITALISTS HISTORY AND PHYSICAL    7/5/2021 5:17 PM    Patient Information:  Arleta Meckel is a 82 y.o. female [id number]  PCP:  Dorian Oleary MD (Tel: [phone] )    Chief complaint:    Chief Complaint   Patient presents with    Shortness of Breath     nausea and vomiting on saturday, sunday woke up weak, no appetite  feels sob, and chest discomfort       History of Present Illness:  Reta Cornejo is a 82 y.o. female who presented with planes of nausea vomiting and shortness of breath and chest discomfort. Onset of symptoms was Sunday woke up feeling weak with no appetite started having shortness of breath chest pain. Initially epigastric area now having some left-sided chest pain. Patient since shortness of breath and pain is worse on exertion better with rest.  Patient started with occasional palpitation. Similar to previous previous anginal pain that she had been she underwent bypass. No fevers chills. No dysuria. No sick contacts no diarrhea  REVIEW OF SYSTEMS:   Constitutional: Negative for fever,chills or night sweats  ENT: Negative for rhinorrhea, epistaxis, hoarseness, sore throat. Respiratory: Negative for shortness of breath,wheezing  Cardiovascular: Negative for chest pain, palpitations   Gastrointestinal: Negative for nausea, vomiting, diarrhea  Genitourinary: Negative for polyuria, dysuria   Hematologic/Lymphatic: Negative for bleeding tendency, easy bruising  Musculoskeletal: Negative for myalgias and arthralgias  Neurologic: Negative for confusion,dysarthria. Skin: Negative for itching,rash, good capillary refill. Psychiatric: Negative for depression,anxiety, agitation. Endocrine: Negative for polydipsia,polyuria,heat /cold intolerance.     Past Medical History:   has a past medical history of Arthritis, Cystitis, interstitial, GERD (gastroesophageal reflux disease), GI bleeding, Heart burn, Hypertension, Interstitial cystitis, Patient is Religion, Psoriasis, Sebaceous carcinoma, and Thoracic aortic aneurysm ([facility] [address] Past Surgical History:   has a past surgical history that includes partial hysterectomy (cervix not removed); Knee arthroscopy (Right, 02/2015); Rotator cuff repair (Right, 2014); Coronary artery bypass graft (05/29/2018); transesophageal echocardiogram (05/29/2018); Cardiac catheterization (05/29/2018); Colonoscopy (07/31/2015); Mohs surgery (Left); and Cardiac catheterization (2012). Medications:  No current facility-administered medications on file prior to encounter. Current Outpatient Medications on File Prior to Encounter   Medication Sig Dispense Refill    metoprolol succinate (TOPROL XL) 50 MG extended release tablet TAKE 1 TABLET BY MOUTH  DAILY 90 tablet 0    pantoprazole (PROTONIX) 40 MG tablet TAKE 1 TABLET BY MOUTH EVERY MORNING BEFORE BREAKFAST 90 tablet 0    atorvastatin (LIPITOR) 80 MG tablet TAKE 1 TABLET BY MOUTH  DAILY 90 tablet 0    furosemide (LASIX) 20 MG tablet TAKE 1 TABLET BY MOUTH DAILY 5 tablet 0    potassium chloride (KLOR-CON M) 10 MEQ extended release tablet TAKE 1 TABLET BY MOUTH DAILY 5 tablet 0    triamterene-hydroCHLOROthiazide (MAXZIDE-25) 37.5-25 MG per tablet TAKE 1 TABLET BY MOUTH  DAILY 90 tablet 3    budesonide (RHINOCORT AQUA) 32 MCG/ACT nasal spray 2 sprays by Each Nostril route 2 times daily 1 Bottle 1    acetaminophen (TYLENOL) 500 MG tablet Take 500 mg by mouth every 6 hours as needed for Pain      vitamin B-12 100 MCG tablet Take 1 tablet by mouth daily 60 tablet 0    docusate sodium (COLACE, DULCOLAX) 100 MG CAPS Take 100 mg by mouth 2 times daily 60 capsule 0    Cholecalciferol (VITAMIN D3) 2000 units CAPS Take 2,000 Units by mouth daily       aspirin 81 MG chewable tablet Take 1 tablet by mouth daily 30 tablet 3       Allergies:   Allergies   Allergen Reactions    Latex Itching    Mucinex [Guaifenesin Er] Anaphylaxis     Coughs more, dry cough, feels like she has bronchitis    Tetanus Toxoids      HAS REACTION- TOLD TO TAKE ALTERNATIVE    Redness and swelling to site    Morphine Nausea And Vomiting    Pneumococcal Vaccines Rash        Social History:   reports that she quit smoking about 61 years ago. Her smoking use included cigarettes. She quit after 7.00 years of use. She has never used smokeless tobacco. She reports that she does not drink alcohol and does not use drugs. Family History:  family history includes Breast Cancer in her brother, father, and mother; COPD in her father; Tuberculosis in her brother and father. ,     Physical Exam:  BP (!) 166/73   Pulse 75   Temp 97 °F (36.1 °C) (Infrared)   Resp 21   Ht 5' 2\" (1.575 m)   Wt 178 lb (80.7 kg)   SpO2 96%   BMI 32.56 kg/m²     General appearance:  Appears comfortable. Well nourished  Eyes: Sclera clear, pupils equal  ENT: Moist mucus membranes, no thrush. Trachea midline. Cardiovascular: Regular rhythm, normal S1, S2. No murmur, gallop, rub. No edema in lower extremities  Respiratory: Clear to auscultation bilaterally, no wheeze, good inspiratory effort  Gastrointestinal: Abdomen soft, non-tender, not distended, normal bowel sounds  Musculoskeletal: No cyanosis in digits, neck supple  Neurology: Cranial nerves grossly intact. Alert and oriented in time, place and person. No speech or motor deficits  Psychiatry: Appropriate affect.  Not agitated  Skin: Warm, dry, normal turgor, no rash    Labs:  CBC:   Lab Results   Component Value Date    WBC 6.2 07/05/2021    RBC 4.48 07/05/2021    HGB 14.0 07/05/2021    HCT 41.9 07/05/2021    MCV 93.4 07/05/2021    MCH 31.3 07/05/2021    MCHC 33.5 07/05/2021    RDW 13.7 07/05/2021     07/05/2021    MPV 7.9 07/05/2021     BMP:    Lab Results   Component Value Date     07/05/2021    K 3.5 07/05/2021    K 3.2 05/30/2018     07/05/2021    CO2 26 07/05/2021    BUN 11 07/05/2021    CREATININE <0.5 07/05/2021    CALCIUM 9.9 07/05/2021    GFRAA >60 07/05/2021    GFRAA >60 12/10/2012    LABGLOM >60 07/05/2021    GLUCOSE 88 07/05/2021       Chest Xray:   EKG:    I visualized CXR images and EKG strips. Discussed  with     Problem List  Active Problems:    Chest pain  Resolved Problems:    * No resolved hospital problems. *        Assessment/Plan:   Chest pain exertional dyspnea. -Known cardiac history with CABG  -Initial troponin elevated mildly to 0.05.  -Cardiology consult from the ER.  -Plan troponin normal aspirin statin beta-blocker  -Further recs per cardiology    Elevated troponin  -As noted above no EKG changes        Left ureteral stone with mild hydronephrosis  -No significant pain.  -Urology consulted    UTI  -Start Rocephin.             May Peabody, MD    7/5/2021 5:17 PM

## 2021-07-05 NOTE — ED PROVIDER NOTES
Laterality Date    CARDIAC CATHETERIZATION  05/29/2018    Dr. Mario Resendezo - w/placement of IABP    CARDIAC CATHETERIZATION  2012    COLONOSCOPY  07/31/2015    Dr. Levy Crockett - w/polypectomy    CORONARY ARTERY BYPASS GRAFT  05/29/2018    Dr. Tere Figueroa - off pump x2 (LIMA-LAD, L SVG-D2)    KNEE ARTHROSCOPY Right 02/2015    MOHS SURGERY Left     facial for skin CA    PARTIAL HYSTERECTOMY      ROTATOR CUFF REPAIR Right 2014    TRANSESOPHAGEAL ECHOCARDIOGRAM  05/29/2018    during CABG         CURRENTMEDICATIONS       Previous Medications    ACETAMINOPHEN (TYLENOL) 500 MG TABLET    Take 500 mg by mouth every 6 hours as needed for Pain    ASPIRIN 81 MG CHEWABLE TABLET    Take 1 tablet by mouth daily    ATORVASTATIN (LIPITOR) 80 MG TABLET    TAKE 1 TABLET BY MOUTH  DAILY    BUDESONIDE (RHINOCORT AQUA) 32 MCG/ACT NASAL SPRAY    2 sprays by Each Nostril route 2 times daily    CHOLECALCIFEROL (VITAMIN D3) 2000 UNITS CAPS    Take 2,000 Units by mouth daily     DOCUSATE SODIUM (COLACE, DULCOLAX) 100 MG CAPS    Take 100 mg by mouth 2 times daily    FUROSEMIDE (LASIX) 20 MG TABLET    TAKE 1 TABLET BY MOUTH DAILY    METOPROLOL SUCCINATE (TOPROL XL) 50 MG EXTENDED RELEASE TABLET    TAKE 1 TABLET BY MOUTH  DAILY    PANTOPRAZOLE (PROTONIX) 40 MG TABLET    TAKE 1 TABLET BY MOUTH EVERY MORNING BEFORE BREAKFAST    POTASSIUM CHLORIDE (KLOR-CON M) 10 MEQ EXTENDED RELEASE TABLET    TAKE 1 TABLET BY MOUTH DAILY    TRIAMTERENE-HYDROCHLOROTHIAZIDE (MAXZIDE-25) 37.5-25 MG PER TABLET    TAKE 1 TABLET BY MOUTH  DAILY    VITAMIN B-12 100 MCG TABLET    Take 1 tablet by mouth daily         ALLERGIES     Latex, Mucinex [guaifenesin er], Tetanus toxoids, Morphine, and Pneumococcal vaccines    FAMILYHISTORY       Family History   Problem Relation Age of Onset    Breast Cancer Mother         LUNG    Breast Cancer Father         LUNG    COPD Father     Tuberculosis Father     Breast Cancer Brother         LUNG    Tuberculosis Brother SOCIAL HISTORY       Social History     Socioeconomic History    Marital status:      Spouse name: None    Number of children: None    Years of education: None    Highest education level: None   Occupational History    None   Tobacco Use    Smoking status: Former Smoker     Years: 7.00     Types: Cigarettes     Quit date: 5/3/1960     Years since quittin.2    Smokeless tobacco: Never Used    Tobacco comment: AS A TEENAGER FOR ABOUT 6 YEARS    Vaping Use    Vaping Use: Never used   Substance and Sexual Activity    Alcohol use: No    Drug use: No    Sexual activity: None   Other Topics Concern    None   Social History Narrative    None     Social Determinants of Health     Financial Resource Strain:     Difficulty of Paying Living Expenses:    Food Insecurity:     Worried About Running Out of Food in the Last Year:     Ran Out of Food in the Last Year:    Transportation Needs:     Lack of Transportation (Medical):  Lack of Transportation (Non-Medical):    Physical Activity:     Days of Exercise per Week:     Minutes of Exercise per Session:    Stress:     Feeling of Stress :    Social Connections:     Frequency of Communication with Friends and Family:     Frequency of Social Gatherings with Friends and Family:     Attends Rastafarian Services:     Active Member of Clubs or Organizations:     Attends Club or Organization Meetings:     Marital Status:    Intimate Partner Violence:     Fear of Current or Ex-Partner:     Emotionally Abused:     Physically Abused:     Sexually Abused:        SCREENINGS             PHYSICAL EXAM    (up to 7 for level 4, 8 or more for level 5)     ED Triage Vitals [21 1350]   BP Temp Temp Source Pulse Resp SpO2 Height Weight   (!) 158/84 97 °F (36.1 °C) Infrared 75 18 97 % 5' 2\" (1.575 m) 178 lb (80.7 kg)       Physical Exam     General Appearance:  Alert, cooperative, no distress, appears stated age.    Head:  Normocephalic, without obvious abnormality, atraumatic. Eyes:  conjunctiva/corneas clear, EOM's intact. Sclera anicteric. ENT: Mucous membranes moist.   Neck: Supple, symmetrical, trachea midline, no adenopathy. No jugular venous distention. Lungs:   No Respiratory Distress. Chest Wall:  Atraumatic. Old median sternotomy scar   Heart:  RRR no m/c/g/r   Abdomen:   Soft, NT, ND   Extremities: Full range of motion. Pulses: Symmetric x4   Skin:  No rashes or lesions to exposed skin. Neurologic: Alert and oriented X 3. Motor grossly normal.  Speech clear. DIAGNOSTIC RESULTS   LABS:    Labs Reviewed   BASIC METABOLIC PANEL - Abnormal; Notable for the following components:       Result Value    CREATININE <0.5 (*)     All other components within normal limits    Narrative:     Performed at:  [facility] [address], [place], [address] [facility]   Phone [phone]   TROPONIN - Abnormal; Notable for the following components:    Troponin 0.05 (*)     All other components within normal limits    Narrative:     Performed at:  [facility] [address], [place], [address]   Phone [phone]   HEPATIC FUNCTION PANEL - Abnormal; Notable for the following components:     Total Bilirubin 2.1 (*)     Bilirubin, Direct 0.4 (*)     Bilirubin, Indirect 1.7 (*)     All other components within normal limits    Narrative:     Performed at:  [facility] [address], [place], [address]   Phone [phone]   BRAIN NATRIURETIC PEPTIDE - Abnormal; Notable for the following components:    Pro- (*)     All other components within normal limits    Narrative:     Performed at:  [facility] [address], [place], [address]   Phone [phone]   URINALYSIS - Abnormal; Notable for the following components:    Blood, Urine MODERATE (*)     pH, UA 8.5 (*)     Leukocyte Esterase, Urine LARGE (*)     All other components within normal limits    Narrative:     Performed at:  [facility] [address],  [address]   Phone [phone]   MICROSCOPIC URINALYSIS - Abnormal; Notable for the following components:    Bacteria, UA RARE (*)     WBC, UA 14 (*)     All other components within normal limits    Narrative:     Performed at:  [facility] [address],  [place]   Phone [phone]   CULTURE, URINE   CBC WITH AUTO DIFFERENTIAL    Narrative:     Performed at:  [facility] [address],  [place]   Phone [phone]   PROTIME-INR    Narrative:     Performed at:  [facility] [address],  [place]   Phone [phone]   APTT    Narrative:     Performed at:  [facility] [address],  [place]   Phone [phone]   LIPASE    Narrative:     Performed at:  [facility] [address],  [place]   Phone [phone]   TROPONIN       All other labs were within normal range or not returned as of this dictation. EKG: All EKG's are interpreted by the Emergency Department Physician in the absence of a cardiologist.  Please see their note for interpretation of EKG. EKG is reviewed by myself. Dated today 0345 74 47 21. Rate 70s. Sinus rhythm. LVH pattern with intraventricular conduction delay. No significant change from 2/18/2020.     RADIOLOGY:   Non-plain film images such as CT, Ultrasound and MRI are read by the radiologist. Plain radiographic images are visualized andpreliminarily interpreted by the  ED Provider with the below findings:        Interpretation perthe Radiologist below, if available at the time of this note:    CTA CHEST ABDOMEN PELVIS W CONTRAST   Final Result   Mildly dilated ascending aorta, likely unchanged compared to prior given   technical factors. No intimal flap seen to suggest dissection. No   pneumonia. No edema. No pneumothorax. Small stone seen in the proximal left ureter, near the left UPJ. There is   mild left-sided hydronephrosis. No stones remain in the left kidney      Cholelithiasis. No cholecystitis      Diverticulosis. No diverticulitis         CT CHEST WO CONTRAST   Final Result   Mildly dilated ascending aorta, likely unchanged compared to prior given   technical factors. No intimal flap seen to suggest dissection. No   pneumonia. No edema. No pneumothorax. Small stone seen in the proximal left ureter, near the left UPJ. There is   mild left-sided hydronephrosis. No stones remain in the left kidney      Cholelithiasis. No cholecystitis      Diverticulosis. No diverticulitis         XR CHEST (2 VW)   Final Result   No acute cardiac or pulmonary disease. No results found.         PROCEDURES   Unless otherwise noted below, none     Procedures    CRITICAL CARE TIME   N/A    CONSULTS:  IP CONSULT TO HOSPITALIST  IP CONSULT TO CARDIOLOGY  IP CONSULT TO UROLOGY      EMERGENCY DEPARTMENT COURSE and DIFFERENTIAL DIAGNOSIS/MDM:   Vitals:    Vitals:    07/05/21 1350 07/05/21 1530   BP: (!) 158/84 (!) 166/73   Pulse: 75 75   Resp: 18 21   Temp: 97 °F (36.1 °C)    TempSrc: Infrared    SpO2: 97% 96%   Weight: 178 lb (80.7 kg)    Height: 5' 2\" (1.575 m)        Patient was given thefollowing medications:  Medications   ceFAZolin (ANCEF) 1,000 mg in dextrose 5 % 50 mL IVPB (mini-bag) (has no administration in time range)   aspirin tablet 325 mg (325 mg Oral Given 7/5/21 1531)   iopamidol (ISOVUE-370) 76 % injection 75 mL (75 mLs Intravenous Given 7/5/21 1611)       68-year-old female with a history of coronary disease and prior CABG here today with chief complaints of complaints of nausea vomiting and epigastric abdominal pain now some chest pain and anginal equivalent that she reports. Benign clinical examination. She also a known history of thoracic aortic aneurysm. I will check labs including LFTs and obtain some CT imaging of abdomen and chest.  R/o pancreatitis, enteritis, diverticulitis, TAA/TAD, etc.    I reviewed her lab work. Troponin 0.05. No EKG changes but this is a positive troponin under our assays. I will repeat troponin for 6:00 tonight and start aspirin. LFTs is demonstrate elevated bilirubin compared to baseline. Her CT of the abdomen shows a left-sided UPJ stone. UA with few white cells and bacteria. Start some Ancef for. I do not think this is the inciting event that brought her in today. Will consult urology. Given the above, will admit. [address] Time, separate of other billable procedures  Indication and intervention as above. FINAL IMPRESSION      1. Chest pain, unspecified type    2. NSTEMI (non-ST elevated myocardial infarction) ([facility] [address]    3. Bilirubinemia    4. Hx of CABG    5. Ureterolithiasis    6. Bacteriuria          DISPOSITION/PLAN   DISPOSITION        PATIENT REFERREDTO:  No follow-up provider specified.     DISCHARGE MEDICATIONS:  New Prescriptions    No medications on file       DISCONTINUED MEDICATIONS:  Discontinued Medications    No medications on file              (Please note that portions ofthis note were completed with a voice recognition program.  Efforts were made to edit the dictations but occasionally words are mis-transcribed.)    Иван López MD (electronically signed)           Иван López MD  07/05/21 3675

## 2021-07-06 LAB
A/G RATIO: 1.7 (ref 1.1–2.2)
ALBUMIN SERPL-MCNC: 3.9 G/DL (ref 3.4–5)
ALP BLD-CCNC: 65 U/L (ref 40–129)
ALT SERPL-CCNC: 23 U/L (ref 10–40)
ANION GAP SERPL CALCULATED.3IONS-SCNC: 9 MMOL/L (ref 3–16)
AST SERPL-CCNC: 27 U/L (ref 15–37)
BILIRUB SERPL-MCNC: 1.4 MG/DL (ref 0–1)
BUN BLDV-MCNC: 9 MG/DL (ref 7–20)
CALCIUM SERPL-MCNC: 9.4 MG/DL (ref 8.3–10.6)
CHLORIDE BLD-SCNC: 105 MMOL/L (ref 99–110)
CO2: 27 MMOL/L (ref 21–32)
CREAT SERPL-MCNC: <0.5 MG/DL (ref 0.6–1.2)
GFR AFRICAN AMERICAN: >60
GFR NON-AFRICAN AMERICAN: >60
GLOBULIN: 2.3 G/DL
GLUCOSE BLD-MCNC: 89 MG/DL (ref 70–99)
HCT VFR BLD CALC: 41.5 % (ref 36–48)
HEMOGLOBIN: 13.9 G/DL (ref 12–16)
MCH RBC QN AUTO: 31.2 PG (ref 26–34)
MCHC RBC AUTO-ENTMCNC: 33.4 G/DL (ref 31–36)
MCV RBC AUTO: 93.3 FL (ref 80–100)
PDW BLD-RTO: 13.8 % (ref 12.4–15.4)
PLATELET # BLD: 234 K/UL (ref 135–450)
PMV BLD AUTO: 7.7 FL (ref 5–10.5)
POTASSIUM REFLEX MAGNESIUM: 3.6 MMOL/L (ref 3.5–5.1)
RBC # BLD: 4.44 M/UL (ref 4–5.2)
SARS-COV-2: NOT DETECTED
SODIUM BLD-SCNC: 141 MMOL/L (ref 136–145)
TOTAL PROTEIN: 6.2 G/DL (ref 6.4–8.2)
TROPONIN: 0.05 NG/ML
WBC # BLD: 5.3 K/UL (ref 4–11)

## 2021-07-06 PROCEDURE — 36415 COLL VENOUS BLD VENIPUNCTURE: CPT

## 2021-07-06 PROCEDURE — 6370000000 HC RX 637 (ALT 250 FOR IP): Performed by: HOSPITALIST

## 2021-07-06 PROCEDURE — 96372 THER/PROPH/DIAG INJ SC/IM: CPT

## 2021-07-06 PROCEDURE — G0378 HOSPITAL OBSERVATION PER HR: HCPCS

## 2021-07-06 PROCEDURE — 2580000003 HC RX 258: Performed by: HOSPITALIST

## 2021-07-06 PROCEDURE — 85027 COMPLETE CBC AUTOMATED: CPT

## 2021-07-06 PROCEDURE — 84484 ASSAY OF TROPONIN QUANT: CPT

## 2021-07-06 PROCEDURE — 80053 COMPREHEN METABOLIC PANEL: CPT

## 2021-07-06 PROCEDURE — 96376 TX/PRO/DX INJ SAME DRUG ADON: CPT

## 2021-07-06 PROCEDURE — 6360000002 HC RX W HCPCS: Performed by: HOSPITALIST

## 2021-07-06 PROCEDURE — 1200000000 HC SEMI PRIVATE

## 2021-07-06 PROCEDURE — 6370000000 HC RX 637 (ALT 250 FOR IP): Performed by: NURSE PRACTITIONER

## 2021-07-06 PROCEDURE — 99222 1ST HOSP IP/OBS MODERATE 55: CPT | Performed by: INTERNAL MEDICINE

## 2021-07-06 RX ORDER — TRIAMTERENE AND HYDROCHLOROTHIAZIDE 37.5; 25 MG/1; MG/1
1 TABLET ORAL DAILY
Status: DISCONTINUED | OUTPATIENT
Start: 2021-07-06 | End: 2021-07-08 | Stop reason: HOSPADM

## 2021-07-06 RX ORDER — DOCUSATE SODIUM 100 MG/1
100 CAPSULE, LIQUID FILLED ORAL 2 TIMES DAILY
Status: DISCONTINUED | OUTPATIENT
Start: 2021-07-06 | End: 2021-07-08 | Stop reason: HOSPADM

## 2021-07-06 RX ORDER — TAMSULOSIN HYDROCHLORIDE 0.4 MG/1
0.4 CAPSULE ORAL DAILY
Status: DISCONTINUED | OUTPATIENT
Start: 2021-07-06 | End: 2021-07-08 | Stop reason: HOSPADM

## 2021-07-06 RX ADMIN — ASPIRIN 81 MG: 81 TABLET, CHEWABLE ORAL at 10:18

## 2021-07-06 RX ADMIN — METOPROLOL SUCCINATE 50 MG: 50 TABLET, EXTENDED RELEASE ORAL at 10:19

## 2021-07-06 RX ADMIN — TRIAMTERENE AND HYDROCHLOROTHIAZIDE 1 TABLET: 37.5; 25 TABLET ORAL at 10:19

## 2021-07-06 RX ADMIN — DOCUSATE SODIUM 100 MG: 100 CAPSULE ORAL at 10:19

## 2021-07-06 RX ADMIN — TAMSULOSIN HYDROCHLORIDE 0.4 MG: 0.4 CAPSULE ORAL at 10:18

## 2021-07-06 RX ADMIN — ACETAMINOPHEN 650 MG: 325 TABLET ORAL at 21:03

## 2021-07-06 RX ADMIN — Medication 1000 MG: at 21:03

## 2021-07-06 RX ADMIN — DOCUSATE SODIUM 100 MG: 100 CAPSULE ORAL at 21:03

## 2021-07-06 RX ADMIN — ATORVASTATIN CALCIUM 80 MG: 80 TABLET, FILM COATED ORAL at 10:18

## 2021-07-06 RX ADMIN — Medication 10 ML: at 10:23

## 2021-07-06 RX ADMIN — Medication 10 ML: at 21:04

## 2021-07-06 RX ADMIN — ENOXAPARIN SODIUM 40 MG: 40 INJECTION SUBCUTANEOUS at 10:18

## 2021-07-06 ASSESSMENT — PAIN DESCRIPTION - PAIN TYPE: TYPE: CHRONIC PAIN

## 2021-07-06 ASSESSMENT — PAIN SCALES - GENERAL
PAINLEVEL_OUTOF10: 2
PAINLEVEL_OUTOF10: 5
PAINLEVEL_OUTOF10: 0

## 2021-07-06 ASSESSMENT — PAIN DESCRIPTION - LOCATION: LOCATION: BACK

## 2021-07-06 NOTE — PROGRESS NOTES
[facility]    7/6/2021 9:19 AM        Name: Clyde Posadas . Admitted: 7/5/2021  Primary Care Provider: Keith Solano MD (Tel: [phone])                        Subjective:  . No acute events overnight. Resting well. Pain control. Diet ok. Labs reviewed  Denies any chest pain sob. Reviewed interval ancillary notes    Current Medications  triamterene-hydroCHLOROthiazide (MAXZIDE-25) 37.5-25 MG per tablet 1 tablet, Daily  docusate sodium (COLACE) capsule 100 mg, BID  aspirin chewable tablet 81 mg, Daily  atorvastatin (LIPITOR) tablet 80 mg, Daily  metoprolol succinate (TOPROL XL) extended release tablet 50 mg, Daily  pantoprazole (PROTONIX) tablet 40 mg, QAM AC  cefTRIAXone (ROCEPHIN) 1000 mg in sterile water 10 mL IV syringe, Q24H  sodium chloride flush 0.9 % injection 5-40 mL, 2 times per day  sodium chloride flush 0.9 % injection 5-40 mL, PRN  0.9 % sodium chloride infusion, PRN  enoxaparin (LOVENOX) injection 40 mg, Daily  ondansetron (ZOFRAN-ODT) disintegrating tablet 4 mg, Q8H PRN   Or  ondansetron (ZOFRAN) injection 4 mg, Q6H PRN  polyethylene glycol (GLYCOLAX) packet 17 g, Daily PRN  acetaminophen (TYLENOL) tablet 650 mg, Q6H PRN   Or  acetaminophen (TYLENOL) suppository 650 mg, Q6H PRN  morphine (PF) injection 2 mg, Q4H PRN        Objective:  BP (!) 177/90   Pulse 90   Temp 98.3 °F (36.8 °C) (Temporal)   Resp 14   Ht 5' 2\" (1.575 m)   Wt 179 lb (81.2 kg)   SpO2 97%   BMI 32.74 kg/m²   No intake or output data in the 24 hours ending 07/06/21 0919   Wt Readings from Last 3 Encounters:   07/06/21 179 lb (81.2 kg)   04/13/21 194 lb (88 kg)   10/27/20 174 lb 12.8 oz (79.3 kg)       General appearance:  Appears comfortable  Eyes: Sclera clear. Pupils equal.  ENT: Moist oral mucosa. Trachea midline, no adenopathy.   Cardiovascular: Regular rhythm, normal S1, S2. No murmur. No edema in lower extremities  Respiratory: Not using accessory muscles. Good inspiratory effort. Clear to auscultation bilaterally, no wheeze or crackles. GI: Abdomen soft, no tenderness, not distended, normal bowel sounds  Musculoskeletal: No cyanosis in digits, neck supple  Neurology: CN 2-12 grossly intact. No speech or motor deficits  Psych: Normal affect. Alert and oriented in time, place and person  Skin: Warm, dry, normal turgor    Labs and Tests:  CBC:   Recent Labs     07/05/21  1419 07/06/21  0622   WBC 6.2 5.3   HGB 14.0 13.9    234     BMP:    Recent Labs     07/05/21  1419 07/06/21 0622    141   K 3.5 3.6    105   CO2 26 27   BUN 11 9   CREATININE <0.5* <0.5*   GLUCOSE 88 89     Hepatic:   Recent Labs     07/05/21  1419 07/06/21 0622   AST 33 27   ALT 28 23   BILITOT 2.1* 1.4*   ALKPHOS 74 65       Discussed care with family and patient             Spent 30  minutes with patient and family at bedside and on unit reviewing medical records and labs, spent greater than 50% time counseling patient and family on diagnosis and plan   Problem List  Active Problems:    Chest pain  Resolved Problems:    * No resolved hospital problems.  *       Assessment & Plan:   Chest pain  -With shortness of breath.    -Initially a troponin of 0.05 which has remained flat with no EKG changes  -Patient pain is mostly on the right side with reproducible  -But does endorse shortness of breath.  -Awaiting cardiology recommendation for any further testing    Left ureteral stone with mild hydronephrosis  - just having minimal pain no other concerns  -Urology consulted await the recommendation    UTI  -Continue Rocephin for now    CAD  atrial fibrillation.  -No anticoagulation only on aspirin    Diet: Diet NPO  Code:Full Code  DVT PPX lovenox       Juliet Obando MD   7/6/2021 9:19 AM

## 2021-07-06 NOTE — CONSULTS
partial hysterectomy (cervix not removed); Knee arthroscopy (Right, 02/2015); Rotator cuff repair (Right, 2014); Coronary artery bypass graft (05/29/2018); transesophageal echocardiogram (05/29/2018); Cardiac catheterization (05/29/2018); Colonoscopy (07/31/2015); Mohs surgery (Left); and Cardiac catheterization (2012). Allergies: Allergies   Allergen Reactions    Latex Itching    Mucinex [Guaifenesin Er] Anaphylaxis     Coughs more, dry cough, feels like she has bronchitis    Tetanus Toxoids      HAS REACTION- TOLD TO TAKE ALTERNATIVE    Redness and swelling to site    Morphine Nausea And Vomiting    Pneumococcal Vaccines Rash        Social History:  She reports that she quit smoking about 61 years ago. Her smoking use included cigarettes. She quit after 7.00 years of use. She has never used smokeless tobacco. She reports that she does not drink alcohol and does not use drugs. Family History:  family history includes Breast Cancer in her brother, father, and mother; COPD in her father; Tuberculosis in her brother and father. Medications:  Scheduled Meds:   triamterene-hydroCHLOROthiazide  1 tablet Oral Daily    docusate sodium  100 mg Oral BID    aspirin  81 mg Oral Daily    atorvastatin  80 mg Oral Daily    metoprolol succinate  50 mg Oral Daily    pantoprazole  40 mg Oral QAM AC    cefTRIAXone (ROCEPHIN) IV  1,000 mg Intravenous Q24H    sodium chloride flush  5-40 mL Intravenous 2 times per day    enoxaparin  40 mg Subcutaneous Daily     Continuous Infusions:   sodium chloride       PRN Meds:sodium chloride flush, sodium chloride, ondansetron **OR** ondansetron, polyethylene glycol, acetaminophen **OR** acetaminophen, morphine    Review of Systems:  Pertinent positives/negatives reviewed in HPI. All other systems reviewed and negative, unless noted below.     Constitutional: Negative  Genitourinary: left scapula pain last night, otherwise Negative  HEENT: Negative   Cardiovascular: Chest pain  Respiratory: Negative   Gastrointestinal: Negative   Musculoskeletal: Negative   Neurological: Negative   Psychiatric: Negative   Integumentary: Negative     PHYSICAL EXAM     Vitals:    07/06/21 0737   BP: (!) 177/90   Pulse: 90   Resp: 14   Temp: 98.3 °F (36.8 °C)   SpO2:      Constitutional: NAD, well-developed, well-nourished. HEENT: MMM. Hearing intact. Neck: no thyroid masses appreciated. Trachea is midline. Neck appears unremarkable. Lymph: no palpable adenopathy in supraclavicular, or axillary lymph nodes. Cardiovascular: Regular rate. No peripheral edema. ABDOMEN: Abdomen soft, non-tender, non-distended. No enlarged liver or spleen. No hernias noted. Stool occult blood not indicated. Respiratory: Respirations are even and non-labored. No audible breath sounds. Genitourinary: no CVAT, pelvic deferred. Psychiatric: A + O x 3, normal affect. Insight appears intact. Muskuloskeletal: COURTNEY x 4  Skin: Pink, warm and dry. No rashes on face and arms. No intake or output data in the 24 hours ending 07/06/21 0908    LABS     CBC:   Lab Results   Component Value Date    WBC 5.3 07/06/2021    RBC 4.44 07/06/2021    HGB 13.9 07/06/2021    HCT 41.5 07/06/2021    MCV 93.3 07/06/2021    MCH 31.2 07/06/2021    MCHC 33.4 07/06/2021    RDW 13.8 07/06/2021     07/06/2021    MPV 7.7 07/06/2021     BMP:   Lab Results   Component Value Date     07/06/2021    K 3.6 07/06/2021     07/06/2021    CO2 27 07/06/2021    BUN 9 07/06/2021    CREATININE <0.5 07/06/2021    GLUCOSE 89 07/06/2021    CALCIUM 9.4 07/06/2021     Urinalysis:   Lab Results   Component Value Date    COLORU YELLOW 07/05/2021    GLUCOSEU Negative 07/05/2021    BLOODU MODERATE 07/05/2021    NITRU Negative 07/05/2021    LEUKOCYTESUR LARGE 07/05/2021     Urine culture: No results for input(s): Gilbert Dunks in the last 72 hours.      IMAGING     XR CHEST (2 VW)    Result Date: 7/5/2021  EXAMINATION: TWO XRAY VIEWS OF THE CHEST 7/5/2021 2:25 pm COMPARISON: Prior study(s) most recent chest CT 04/13/2021. HISTORY: ORDERING SYSTEM PROVIDED HISTORY: chest pain TECHNOLOGIST PROVIDED HISTORY: Reason for exam:->chest pain Reason for Exam: Shortness of Breath (nausea and vomiting on saturday, sunday woke up weak, no appetite  feels sob, and chest discomfort) Acuity: Unknown Type of Exam: Unknown FINDINGS: The heart and pulmonary vessels are normal.  Lungs are clear with moderate hyperinflation. Sternotomy wires are seen from prior surgery. Bones are unremarkable for age. No acute cardiac or pulmonary disease. CT CHEST WO CONTRAST    Result Date: 7/5/2021  EXAMINATION: CTA OF THE CHEST, ABDOMEN AND PELVIS WITH CONTRAST; CT OF THE CHEST WITHOUT CONTRAST 7/5/2021 4:07 pm: TECHNIQUE: CTA of the chest, abdomen and pelvis was performed after the administration of intravenous contrast.  Multiplanar reformatted images are provided for review. MIP images are provided for review. Dose modulation, iterative reconstruction, and/or weight based adjustment of the mA/kV was utilized to reduce the radiation dose to as low as reasonably achievable.; CT of the chest was performed without the administration of intravenous contrast. Multiplanar reformatted images are provided for review. Dose modulation, iterative reconstruction, and/or weight based adjustment of the mA/kV was utilized to reduce the radiation dose to as low as reasonably achievable.  COMPARISON: Chest CT 04/13/2021 HISTORY: ORDERING SYSTEM PROVIDED HISTORY: R/o TAD; known TAA TECHNOLOGIST PROVIDED HISTORY: Reason for exam:->R/o TAD; known TAA Reason for Exam: Shortness of Breath (nausea and vomiting on saturday, sunday woke up weak, no appetite  feels sob, and chest discomfort) Acuity: Acute Type of Exam: Initial; ORDERING SYSTEM PROVIDED HISTORY: chst pain TECHNOLOGIST PROVIDED HISTORY: Reason for exam:->chst pain Decision Support Exception - unselect if not a suspected or confirmed emergency medical condition->Emergency Medical Condition (MA) FINDINGS: CTA CHEST: Hypodense nodules are seen in the thyroid measuring 10 mm in size. No specific imaging follow-up recommended based on size. There is evidence of prior sternotomy and coronary artery bypass graft surgery Ascending thoracic aorta measures 4.4 cm. No intimal flap seen. Atherosclerotic change seen in descending thoracic aorta. Small mediastinal nodes are noted. No pericardial effusion is seen. Small hiatal hernia seen. There is nonspecific thickening at the GE junction. Scarring is seen in the apices, incidentally noted. No pneumonia. No edema. No pleural effusion. No pneumothorax. .  Scarring is seen in the right lower lobe adjacent to prominent osteophyte. Spurring is seen in the spine. Spurring is seen in the shoulder joints. CTA ABDOMEN: No splenomegaly. No perisplenic fluid There is mild thickening and nodularity of the adrenal glands, most likely due to adenoma or hyperplasia. There is mild pelvicaliectasis, likely due to distended bladder. Hypodense nodules are seen in the right kidney, measuring 1.5 cm, or less, likely cyst There is a small stone seen in the proximal left ureter measuring 3 mm. There is mild pelvicaliectasis on the left. A few hypodense nodules are seen in the left kidney, compatible with simple cyst.  A few hypodense nodules are seen in the left kidney, most likely hemorrhagic cysts, given their marked hyperdense appearance on noncontrast images. No intrahepatic ductal dilatation. Gallstones are seen No peripancreatic fluid. No peripancreatic inflammatory change. No significant small bowel distention noted. Moderate stool seen in the colon. Scattered colonic diverticula are seen. .  No bowel obstruction. Appendix is identified and normal in caliber Celiac artery appears patent. Superior mesenteric artery appears patent. No intimal flap seen in abdominal aorta. Renal arteries appear patent.   No retroperitoneal adenopathy CTA PELVIS: No free fluid in pelvis. No pelvic adenopathy. Iliac vessels appear patent. Spurring is seen in the spine. Spurring is seen in the hips. Mildly dilated ascending aorta, likely unchanged compared to prior given technical factors. No intimal flap seen to suggest dissection. No pneumonia. No edema. No pneumothorax. Small stone seen in the proximal left ureter, near the left UPJ. There is mild left-sided hydronephrosis. No stones remain in the left kidney Cholelithiasis. No cholecystitis Diverticulosis. No diverticulitis     CTA CHEST ABDOMEN PELVIS W CONTRAST    Result Date: 7/5/2021  EXAMINATION: CTA OF THE CHEST, ABDOMEN AND PELVIS WITH CONTRAST; CT OF THE CHEST WITHOUT CONTRAST 7/5/2021 4:07 pm: TECHNIQUE: CTA of the chest, abdomen and pelvis was performed after the administration of intravenous contrast.  Multiplanar reformatted images are provided for review. MIP images are provided for review. Dose modulation, iterative reconstruction, and/or weight based adjustment of the mA/kV was utilized to reduce the radiation dose to as low as reasonably achievable.; CT of the chest was performed without the administration of intravenous contrast. Multiplanar reformatted images are provided for review. Dose modulation, iterative reconstruction, and/or weight based adjustment of the mA/kV was utilized to reduce the radiation dose to as low as reasonably achievable.  COMPARISON: Chest CT 04/13/2021 HISTORY: ORDERING SYSTEM PROVIDED HISTORY: R/o TAD; known TAA TECHNOLOGIST PROVIDED HISTORY: Reason for exam:->R/o TAD; known TAA Reason for Exam: Shortness of Breath (nausea and vomiting on saturday, sunday woke up weak, no appetite  feels sob, and chest discomfort) Acuity: Acute Type of Exam: Initial; ORDERING SYSTEM PROVIDED HISTORY: [facility] pain TECHNOLOGIST PROVIDED HISTORY: Reason for exam:[facility] Decision Support Exception - unselect if not a suspected or confirmed retroperitoneal adenopathy CTA PELVIS: No free fluid in pelvis. No pelvic adenopathy. Iliac vessels appear patent. Spurring is seen in the spine. Spurring is seen in the hips. Mildly dilated ascending aorta, likely unchanged compared to prior given technical factors. No intimal flap seen to suggest dissection. No pneumonia. No edema. No pneumothorax. Small stone seen in the proximal left ureter, near the left UPJ. There is mild left-sided hydronephrosis. No stones remain in the left kidney Cholelithiasis. No cholecystitis Diverticulosis.   No diverticulitis            Electronically signed by: RIGOBERTO Harden CNP, 7/6/2021   [facility]  Office contact: [phone]

## 2021-07-06 NOTE — CONSULTS
[address] [facility]  [phone]      Chief Complaint   Patient presents with    Shortness of Breath     nausea and vomiting on saturday, sunday woke up weak, no appetite  feels sob, and chest discomfort            History of Present Illness:  Elzbieta Zeng is a 82 y.o. patient who presented to the hospital with complaints of chest pain. She has several days of worsening sob/nv/chest tightness. She came to the ED. Troponin was elevated but she also had kidney stones. I have been asked to provide consultation regarding further management and testing. 81 yo with hx of ischemic CM s/p CABG 2/2018 with chronic post operative chest wall tenderness     Past Medical History:   has a past medical history of Arthritis, Cystitis, interstitial, GERD (gastroesophageal reflux disease), GI bleeding, Heart burn, Hypertension, Interstitial cystitis, Patient is Voodoo, Psoriasis, Sebaceous carcinoma, and Thoracic aortic aneurysm ([facility] [address] Surgical History:   has a past surgical history that includes partial hysterectomy (cervix not removed); Knee arthroscopy (Right, 02/2015); Rotator cuff repair (Right, 2014); Coronary artery bypass graft (05/29/2018); transesophageal echocardiogram (05/29/2018); Cardiac catheterization (05/29/2018); Colonoscopy (07/31/2015); Mohs surgery (Left); and Cardiac catheterization (2012). Social History:   reports that she quit smoking about 61 years ago. Her smoking use included cigarettes. She quit after 7.00 years of use. She has never used smokeless tobacco. She reports that she does not drink alcohol and does not use drugs. Family History:  family history includes Breast Cancer in her brother, father, and mother; COPD in her father; Tuberculosis in her brother and father. Home Medications:  Were reviewed and are listed in nursing record. and/or listed below  Prior to Admission medications    Medication Sig Start Date End Date Taking?  Authorizing Provider   atorvastatin (LIPITOR) 80 MG tablet Take 80 mg by mouth daily   Yes Historical Provider, MD   metoprolol succinate (TOPROL XL) 50 MG extended release tablet TAKE 1 TABLET BY MOUTH  DAILY 5/19/21  Yes Catrachita Andrade MD   pantoprazole (PROTONIX) 40 MG tablet TAKE 1 TABLET BY MOUTH EVERY MORNING BEFORE BREAKFAST 5/17/21  Yes Catrachita Andrade MD   triamterene-hydroCHLOROthiazide (MAXZIDE-25) 37.5-25 MG per tablet TAKE 1 TABLET BY MOUTH  DAILY 9/9/20  Yes RIGOBERTO Esteban CNP   budesonide (RHINOCORT AQUA) 32 MCG/ACT nasal spray 2 sprays by Each Nostril route 2 times daily 7/20/20  Yes Vanessa Clark MD   acetaminophen (TYLENOL) 500 MG tablet Take 500 mg by mouth every 6 hours as needed for Pain   Yes Historical Provider, MD   docusate sodium (COLACE, DULCOLAX) 100 MG CAPS Take 100 mg by mouth 2 times daily 6/5/18  Yes RIGOBERTO Zavala CNP   Cholecalciferol (VITAMIN D3) 2000 units CAPS Take 2,000 Units by mouth daily    Yes Historical Provider, MD   aspirin 81 MG chewable tablet Take 1 tablet by mouth daily 5/5/18  Yes Rony Ferrer MD   vitamin B-12 100 MCG tablet Take 1 tablet by mouth daily  Patient taking differently: Take 1,000 mcg by mouth daily  6/6/18 4/13/21  RIGOBERTO Zavala CNP        Current Medications:  Current Facility-Administered Medications   Medication Dose Route Frequency Provider Last Rate Last Admin    triamterene-hydroCHLOROthiazide (MAXZIDE-25) 37.5-25 MG per tablet 1 tablet  1 tablet Oral Daily Rony Ferrer MD   1 tablet at 07/06/21 1019    docusate sodium (COLACE) capsule 100 mg  100 mg Oral BID Moise Smith MD   100 mg at 07/06/21 1019    tamsulosin (FLOMAX) capsule 0.4 mg  0.4 mg Oral Daily Clearence RIGOBERTO Billy CNP   0.4 mg at 07/06/21 1018    aspirin chewable tablet 81 mg  81 mg Oral Daily Moise Smith MD   81 mg at 07/06/21 1018    atorvastatin (LIPITOR) tablet 80 mg  80 mg Oral Daily Moise Smith MD   80 mg at 07/06/21 1018    metoprolol succinate (TOPROL XL) extended release tablet 50 mg  50 mg Oral Daily Moise Smith MD   50 mg at 07/06/21 1019    pantoprazole (PROTONIX) tablet 40 mg  40 mg Oral QAM AC Moise Smith MD        cefTRIAXone (ROCEPHIN) 1000 mg in sterile water 10 mL IV syringe  1,000 mg Intravenous Q24H Maggie Amaya MD   1,000 mg at 07/05/21 2111    sodium chloride flush 0.9 % injection 5-40 mL  5-40 mL Intravenous 2 times per day Maggie Amaya MD   10 mL at 07/06/21 1023    sodium chloride flush 0.9 % injection 5-40 mL  5-40 mL Intravenous PRN Maggie Amaya MD        0.9 % sodium chloride infusion  25 mL Intravenous PRN Moise Smith MD        enoxaparin (LOVENOX) injection 40 mg  40 mg Subcutaneous Daily Moise Smith MD   40 mg at 07/06/21 1018    ondansetron (ZOFRAN-ODT) disintegrating tablet 4 mg  4 mg Oral Q8H PRN Moise Smith MD        Or    ondansetron (ZOFRAN) injection 4 mg  4 mg Intravenous Q6H PRN Moise Smith MD        polyethylene glycol (GLYCOLAX) packet 17 g  17 g Oral Daily PRN Moise Smith MD        acetaminophen (TYLENOL) tablet 650 mg  650 mg Oral Q6H PRN Moise Smith MD        Or   Dossie Katerina acetaminophen (TYLENOL) suppository 650 mg  650 mg Rectal Q6H PRN Maggie Amaya MD        morphine (PF) injection 2 mg  2 mg Intravenous Q4H PRN Moise Smith MD            Allergies:  Latex, Mucinex [guaifenesin er], Tetanus toxoids, Morphine, and Pneumococcal vaccines     Review of Systems:     · Constitutional: there has been no unanticipated weight loss. There's been no change in energy level, sleep pattern, or activity level. · Eyes: No visual changes or diplopia. No scleral icterus. · ENT: No Headaches, hearing loss or vertigo. No mouth sores or sore throat. · Cardiovascular: Reviewed in HPI  · Respiratory: No cough or wheezing, no sputum production. No hematemesis. · Gastrointestinal: No abdominal pain, appetite loss, blood in stools. No change in bowel or bladder habits.   · Genitourinary: No dysuria, trouble voiding, or hematuria. · Musculoskeletal:  No gait disturbance, weakness or joint complaints. · Integumentary: No rash or pruritis. · Neurological: No headache, diplopia, change in muscle strength, numbness or tingling. No change in gait, balance, coordination, mood, affect, memory, mentation, behavior. · Psychiatric: No anxiety, no depression. · Endocrine: No malaise, fatigue or temperature intolerance. No excessive thirst, fluid intake, or urination. No tremor. · Hematologic/Lymphatic: No abnormal bruising or bleeding, blood clots or swollen lymph nodes. · Allergic/Immunologic: No nasal congestion or hives.   ·     Physical Examination:    Vitals:    07/06/21 1230   BP: (!) 177/73   Pulse:    Resp:    Temp:    SpO2:     Weight: 179 lb (81.2 kg)         General Appearance:  Alert, cooperative, no distress, appears stated age   Head:  Normocephalic, without obvious abnormality, atraumatic   Eyes:  PERRL, conjunctiva/corneas clear       Nose: Nares normal, no drainage or sinus tenderness   Throat: Lips, mucosa, and tongue normal   Neck: Supple, symmetrical, trachea midline, no adenopathy, thyroid: not enlarged, symmetric, no tenderness/mass/nodules, no carotid bruit or JVD       Lungs:   Clear to auscultation bilaterally, respirations unlabored   Chest Wall:  No tenderness or deformity   Heart:  Regular rate and rhythm, S1, S2 normal, no murmur, rub or gallop   Abdomen:   Soft, non-tender, bowel sounds active all four quadrants,  no masses, no organomegaly           Extremities: Extremities normal, atraumatic, no cyanosis or edema   Pulses: 2+ and symmetric   Skin: Skin color, texture, turgor normal, no rashes or lesions   Pysch: Normal mood and affect   Neurologic: Normal gross motor and sensory exam.         Labs  CBC:   Lab Results   Component Value Date    WBC 5.3 07/06/2021    RBC 4.44 07/06/2021    HGB 13.9 07/06/2021    HCT 41.5 07/06/2021    MCV 93.3 07/06/2021    RDW 13.8 07/06/2021     07/06/2021     CMP:    Lab Results   Component Value Date     07/06/2021    K 3.6 07/06/2021     07/06/2021    CO2 27 07/06/2021    BUN 9 07/06/2021    CREATININE <0.5 07/06/2021    GFRAA >60 07/06/2021    GFRAA >60 12/10/2012    AGRATIO 1.7 07/06/2021    LABGLOM >60 07/06/2021    GLUCOSE 89 07/06/2021    PROT 6.2 07/06/2021    PROT 6.9 08/25/2011    CALCIUM 9.4 07/06/2021    BILITOT 1.4 07/06/2021    ALKPHOS 65 07/06/2021    AST 27 07/06/2021    ALT 23 07/06/2021     PT/INR:  No results found for: PTINR  Lab Results   Component Value Date    TROPONINI 0.05 (H) 07/06/2021       EKG:  I have reviewed EKG with the following interpretation:  Impression:     Normal sinus rhythmLeft axis deviationLeft ventricular hypertrophy with QRS widening and repolarization abnormality            Pt has CAD with following history:  ECHO   Normal left ventricle size and systolic function with an estimated ejection   fraction of 60-65%.   There is mild concentric left ventricular hypertrophy.   E'e is 8.9   Mild mitral regurgitation.   Trace aortic regurgitation.   Mild-to-moderate tricuspid regurgitation with estimated PASP of 28mmHg .   Right ventricular function appears mildly reduced. TAPSE is 1.46cm.   The left atrium is mildly dilated.     MYOVIEW  Summary  There is normal isotope uptake at stress and rest. There is no evidence of  myocardial ischemia or scar.  Normal LV function.  Left ventricular ejection fraction of 66 %.  Overall findings represent a low risk scan. All testing and labs listed below were personally reviewed.     Assessment  Patient Active Problem List   Diagnosis    Gastroesophageal reflux disease    Primary osteoarthritis involving multiple joints    Psoriasis    Interstitial cystitis    Thoracic ascending aortic aneurysm ([address]    Chest pain    Obesity    NSTEMI (non-ST elevated myocardial infarction) ([address]    CAD in native artery    Anemia    Mixed hyperlipidemia    Atrial fibrillation ([address]    Ganglion cyst of dorsum of left wrist    Vitamin B 12 deficiency    Rotator cuff tear, right    Varicose veins of legs    History of colonic polyps    Hypertension    Leukocytosis    Lactic acidosis    Failure of outpatient treatment    Patient is Scientologist    Hypokalemia    Acute bronchitis         Plan:    I had the opportunity to review the clinical symptoms and presentation of Elzbieta Zeng. Assessment/Plan:  Active Problems:    Chest pain  Plan: unstable angina. Similar to pain from prior to CABG. Elevated troponin. Possibly ACS. Elevated troponin could also be from urethral stone, demand ischemia. Based on these findings I recommend left heart cath for definitive evaluation of coronary arteries. Risks, benefits, expectations, and alternative treatments were discussed. Questions appropriately answered. Elzbieta Zeng agrees to proceed and verbalized understanding. Cont aspirin, statin. I will address the patient's cardiac risk factors and adjusted pharmacologic treatment as needed. In addition, I have reinforced the need for patient directed risk factor modification. Tobacco use was discussed with the patient and educated on the negative effects. I have asked the patient to not utilize these agents. Thank you for allowing to us to participate in the care or Elzbieta Zeng. Further evaluation will be based upon the patient's clinical course and testing results. All questions and concerns were addressed to the patient/family. Alternatives to my treatment were discussed. The note was completed using EMR. Every effort was made to ensure accuracy; however, inadvertent computerized transcription errors may be present.     Latoya Mckenzie MD, MD 7/6/2021 4:21 PM

## 2021-07-06 NOTE — PROGRESS NOTES
4 Eyes Skin Assessment     NAME:  Arabella Hackett  YOB: 1935  MEDICAL RECORD NUMBER:  [id number]    The patient is being assess for  Admission    I agree that 2 RN's have performed a thorough Head to Toe Skin Assessment on the patient. ALL assessment sites listed below have been assessed. Areas assessed by both nurses:    Head, Face, Ears, Shoulders, Back, Chest, Arms, Elbows, Hands, Sacrum. Buttock, Coccyx, Ischium and Legs. Feet and Heels        Does the Patient have a Wound?  No noted wound(s)       Tunde Prevention initiated:  No   Wound Care Orders initiated:  No    Pressure Injury (Stage 3,4, Unstageable, DTI, NWPT, and Complex wounds) if present place consult order under [de-identified] No    New and Established Ostomies if present place consult order under : No      Nurse 1 eSignature: Electronically signed by Marisa Shabazz RN on 7/5/21 at 10:18 PM EDT    **SHARE this note so that the co-signing nurse is able to place an eSignature**    Nurse 2 eSignature: Electronically signed by Osvaldo Ramírez RN on 7/5/21 at 11:30 PM EDT

## 2021-07-07 LAB
ANION GAP SERPL CALCULATED.3IONS-SCNC: 11 MMOL/L (ref 3–16)
BASOPHILS ABSOLUTE: 0 K/UL (ref 0–0.2)
BASOPHILS RELATIVE PERCENT: 0.7 %
BUN BLDV-MCNC: 13 MG/DL (ref 7–20)
CALCIUM SERPL-MCNC: 9.9 MG/DL (ref 8.3–10.6)
CHLORIDE BLD-SCNC: 102 MMOL/L (ref 99–110)
CO2: 25 MMOL/L (ref 21–32)
CREAT SERPL-MCNC: 0.6 MG/DL (ref 0.6–1.2)
EKG ATRIAL RATE: 74 BPM
EKG DIAGNOSIS: NORMAL
EKG P AXIS: 50 DEGREES
EKG P-R INTERVAL: 180 MS
EKG Q-T INTERVAL: 416 MS
EKG QRS DURATION: 132 MS
EKG QTC CALCULATION (BAZETT): 461 MS
EKG R AXIS: -47 DEGREES
EKG T AXIS: 89 DEGREES
EKG VENTRICULAR RATE: 74 BPM
EOSINOPHILS ABSOLUTE: 0.1 K/UL (ref 0–0.6)
EOSINOPHILS RELATIVE PERCENT: 2.1 %
GFR AFRICAN AMERICAN: >60
GFR NON-AFRICAN AMERICAN: >60
GLUCOSE BLD-MCNC: 98 MG/DL (ref 70–99)
HCT VFR BLD CALC: 43.4 % (ref 36–48)
HEMOGLOBIN: 14.8 G/DL (ref 12–16)
LEFT VENTRICULAR EJECTION FRACTION HIGH VALUE: 65 %
LYMPHOCYTES ABSOLUTE: 1.7 K/UL (ref 1–5.1)
LYMPHOCYTES RELATIVE PERCENT: 35.4 %
MCH RBC QN AUTO: 31.6 PG (ref 26–34)
MCHC RBC AUTO-ENTMCNC: 34.2 G/DL (ref 31–36)
MCV RBC AUTO: 92.4 FL (ref 80–100)
MONOCYTES ABSOLUTE: 0.5 K/UL (ref 0–1.3)
MONOCYTES RELATIVE PERCENT: 10.2 %
NEUTROPHILS ABSOLUTE: 2.5 K/UL (ref 1.7–7.7)
NEUTROPHILS RELATIVE PERCENT: 51.6 %
ORGANISM: ABNORMAL
PDW BLD-RTO: 13.8 % (ref 12.4–15.4)
PLATELET # BLD: 245 K/UL (ref 135–450)
PMV BLD AUTO: 8.3 FL (ref 5–10.5)
POTASSIUM SERPL-SCNC: 3.7 MMOL/L (ref 3.5–5.1)
RBC # BLD: 4.69 M/UL (ref 4–5.2)
SODIUM BLD-SCNC: 138 MMOL/L (ref 136–145)
URINE CULTURE, ROUTINE: ABNORMAL
URINE CULTURE, ROUTINE: ABNORMAL
WBC # BLD: 4.9 K/UL (ref 4–11)

## 2021-07-07 PROCEDURE — C1769 GUIDE WIRE: HCPCS

## 2021-07-07 PROCEDURE — G0378 HOSPITAL OBSERVATION PER HR: HCPCS

## 2021-07-07 PROCEDURE — 6370000000 HC RX 637 (ALT 250 FOR IP): Performed by: NURSE PRACTITIONER

## 2021-07-07 PROCEDURE — 6370000000 HC RX 637 (ALT 250 FOR IP): Performed by: HOSPITALIST

## 2021-07-07 PROCEDURE — 2500000003 HC RX 250 WO HCPCS

## 2021-07-07 PROCEDURE — 6360000002 HC RX W HCPCS

## 2021-07-07 PROCEDURE — 2580000003 HC RX 258

## 2021-07-07 PROCEDURE — 4A023N7 MEASUREMENT OF CARDIAC SAMPLING AND PRESSURE, LEFT HEART, PERCUTANEOUS APPROACH: ICD-10-PCS | Performed by: HOSPITALIST

## 2021-07-07 PROCEDURE — 99153 MOD SED SAME PHYS/QHP EA: CPT

## 2021-07-07 PROCEDURE — C1894 INTRO/SHEATH, NON-LASER: HCPCS

## 2021-07-07 PROCEDURE — 2580000003 HC RX 258: Performed by: HOSPITALIST

## 2021-07-07 PROCEDURE — 94760 N-INVAS EAR/PLS OXIMETRY 1: CPT

## 2021-07-07 PROCEDURE — 99233 SBSQ HOSP IP/OBS HIGH 50: CPT | Performed by: INTERNAL MEDICINE

## 2021-07-07 PROCEDURE — 93459 L HRT ART/GRFT ANGIO: CPT

## 2021-07-07 PROCEDURE — 1200000000 HC SEMI PRIVATE

## 2021-07-07 PROCEDURE — 93459 L HRT ART/GRFT ANGIO: CPT | Performed by: INTERNAL MEDICINE

## 2021-07-07 PROCEDURE — 80048 BASIC METABOLIC PNL TOTAL CA: CPT

## 2021-07-07 PROCEDURE — 99152 MOD SED SAME PHYS/QHP 5/>YRS: CPT | Performed by: INTERNAL MEDICINE

## 2021-07-07 PROCEDURE — 2709999900 HC NON-CHARGEABLE SUPPLY

## 2021-07-07 PROCEDURE — 36415 COLL VENOUS BLD VENIPUNCTURE: CPT

## 2021-07-07 PROCEDURE — C1760 CLOSURE DEV, VASC: HCPCS

## 2021-07-07 PROCEDURE — 99152 MOD SED SAME PHYS/QHP 5/>YRS: CPT

## 2021-07-07 PROCEDURE — 85025 COMPLETE CBC W/AUTO DIFF WBC: CPT

## 2021-07-07 PROCEDURE — 2580000003 HC RX 258: Performed by: INTERNAL MEDICINE

## 2021-07-07 PROCEDURE — B2111ZZ FLUOROSCOPY OF MULTIPLE CORONARY ARTERIES USING LOW OSMOLAR CONTRAST: ICD-10-PCS | Performed by: HOSPITALIST

## 2021-07-07 PROCEDURE — 93010 ELECTROCARDIOGRAM REPORT: CPT | Performed by: INTERNAL MEDICINE

## 2021-07-07 PROCEDURE — 6360000004 HC RX CONTRAST MEDICATION: Performed by: INTERNAL MEDICINE

## 2021-07-07 RX ORDER — SODIUM CHLORIDE 0.9 % (FLUSH) 0.9 %
5-40 SYRINGE (ML) INJECTION PRN
Status: DISCONTINUED | OUTPATIENT
Start: 2021-07-07 | End: 2021-07-07 | Stop reason: SDUPTHER

## 2021-07-07 RX ORDER — TAMSULOSIN HYDROCHLORIDE 0.4 MG/1
0.4 CAPSULE ORAL DAILY
Qty: 30 CAPSULE | Refills: 3 | Status: SHIPPED | OUTPATIENT
Start: 2021-07-08 | End: 2021-07-28

## 2021-07-07 RX ORDER — CIPROFLOXACIN 500 MG/1
500 TABLET, FILM COATED ORAL 2 TIMES DAILY
Qty: 10 TABLET | Refills: 0 | Status: SHIPPED | OUTPATIENT
Start: 2021-07-07 | End: 2021-07-07 | Stop reason: HOSPADM

## 2021-07-07 RX ORDER — AMOXICILLIN 250 MG/1
500 CAPSULE ORAL EVERY 8 HOURS SCHEDULED
Status: DISCONTINUED | OUTPATIENT
Start: 2021-07-07 | End: 2021-07-08 | Stop reason: HOSPADM

## 2021-07-07 RX ORDER — SODIUM CHLORIDE 9 MG/ML
INJECTION, SOLUTION INTRAVENOUS CONTINUOUS
Status: DISCONTINUED | OUTPATIENT
Start: 2021-07-07 | End: 2021-07-07 | Stop reason: SDUPTHER

## 2021-07-07 RX ORDER — OXYCODONE HYDROCHLORIDE AND ACETAMINOPHEN 5; 325 MG/1; MG/1
2 TABLET ORAL EVERY 4 HOURS PRN
Status: DISCONTINUED | OUTPATIENT
Start: 2021-07-07 | End: 2021-07-08 | Stop reason: HOSPADM

## 2021-07-07 RX ORDER — SODIUM CHLORIDE 0.9 % (FLUSH) 0.9 %
5-40 SYRINGE (ML) INJECTION PRN
Status: DISCONTINUED | OUTPATIENT
Start: 2021-07-07 | End: 2021-07-08 | Stop reason: HOSPADM

## 2021-07-07 RX ORDER — SODIUM CHLORIDE 9 MG/ML
25 INJECTION, SOLUTION INTRAVENOUS PRN
Status: DISCONTINUED | OUTPATIENT
Start: 2021-07-07 | End: 2021-07-08 | Stop reason: HOSPADM

## 2021-07-07 RX ORDER — SODIUM CHLORIDE 9 MG/ML
INJECTION, SOLUTION INTRAVENOUS CONTINUOUS
Status: DISCONTINUED | OUTPATIENT
Start: 2021-07-07 | End: 2021-07-08 | Stop reason: HOSPADM

## 2021-07-07 RX ORDER — SODIUM CHLORIDE 9 MG/ML
25 INJECTION, SOLUTION INTRAVENOUS PRN
Status: DISCONTINUED | OUTPATIENT
Start: 2021-07-07 | End: 2021-07-07 | Stop reason: SDUPTHER

## 2021-07-07 RX ORDER — OXYCODONE HYDROCHLORIDE AND ACETAMINOPHEN 5; 325 MG/1; MG/1
1 TABLET ORAL EVERY 4 HOURS PRN
Status: DISCONTINUED | OUTPATIENT
Start: 2021-07-07 | End: 2021-07-08 | Stop reason: HOSPADM

## 2021-07-07 RX ORDER — SODIUM CHLORIDE 0.9 % (FLUSH) 0.9 %
5-40 SYRINGE (ML) INJECTION EVERY 12 HOURS SCHEDULED
Status: DISCONTINUED | OUTPATIENT
Start: 2021-07-07 | End: 2021-07-07 | Stop reason: SDUPTHER

## 2021-07-07 RX ORDER — ACETAMINOPHEN 325 MG/1
650 TABLET ORAL EVERY 4 HOURS PRN
Status: DISCONTINUED | OUTPATIENT
Start: 2021-07-07 | End: 2021-07-07 | Stop reason: SDUPTHER

## 2021-07-07 RX ADMIN — AMOXICILLIN 500 MG: 250 CAPSULE ORAL at 21:47

## 2021-07-07 RX ADMIN — IOPAMIDOL 73 ML: 755 INJECTION, SOLUTION INTRAVENOUS at 16:49

## 2021-07-07 RX ADMIN — Medication 10 ML: at 10:18

## 2021-07-07 RX ADMIN — SODIUM CHLORIDE: 9 INJECTION, SOLUTION INTRAVENOUS at 17:17

## 2021-07-07 RX ADMIN — DOCUSATE SODIUM 100 MG: 100 CAPSULE ORAL at 10:16

## 2021-07-07 RX ADMIN — Medication 10 ML: at 19:49

## 2021-07-07 RX ADMIN — ATORVASTATIN CALCIUM 80 MG: 80 TABLET, FILM COATED ORAL at 10:16

## 2021-07-07 RX ADMIN — PANTOPRAZOLE SODIUM 40 MG: 40 TABLET, DELAYED RELEASE ORAL at 08:36

## 2021-07-07 RX ADMIN — TRIAMTERENE AND HYDROCHLOROTHIAZIDE 1 TABLET: 37.5; 25 TABLET ORAL at 10:15

## 2021-07-07 RX ADMIN — METOPROLOL SUCCINATE 50 MG: 50 TABLET, EXTENDED RELEASE ORAL at 10:15

## 2021-07-07 RX ADMIN — TAMSULOSIN HYDROCHLORIDE 0.4 MG: 0.4 CAPSULE ORAL at 10:16

## 2021-07-07 RX ADMIN — DOCUSATE SODIUM 100 MG: 100 CAPSULE ORAL at 19:48

## 2021-07-07 RX ADMIN — ASPIRIN 81 MG: 81 TABLET, CHEWABLE ORAL at 10:16

## 2021-07-07 ASSESSMENT — PAIN SCALES - GENERAL
PAINLEVEL_OUTOF10: 0
PAINLEVEL_OUTOF10: 2
PAINLEVEL_OUTOF10: 0

## 2021-07-07 NOTE — PRE SEDATION
Brief Pre-Op Note/Sedation Assessment      Reta Clarendon Hills  1935  [id number]      [id number]  10:22 AM    Planned Procedure: Cardiac Catheterization Procedure    Post Procedure Plan: Return to same level of care    Consent: I have discussed with the patient and/or the patient representative the indication, alternatives, and the possible risks and/or complications of the planned procedure and the anesthesia methods. The patient and/or patient representative appear to understand and agree to proceed. Chief Complaint: Chest Pain/Pressure  Anginal Equivalent  NSTEMI      Indications for Cath Procedure:  ACS > 24 hrs, New Onset Angina <= 2 months, Worsening Angina and Suspected CAD  Anginal Classification within 2 weeks:  CCS IV - Inability to perform any activity without angina or angina at rest, i.e., severe limitation  NYHA Heart Failure Class within 2 weeks: No symptoms  Is Cath Lab Visit Valve-related?: No  Surgical Risk: Intermediate  Functional Type: >= 4 METS with symptoms    Anti- Anginal Meds within 2 weeks:   Yes: Beta Blockers, Aspirin and Statin (Any)    Stress or Imaging Studies Performed:  None     Vital Signs:  BP (!) 122/59   Pulse 96   Temp 97.5 °F (36.4 °C) (Temporal)   Resp 14   Ht 5' 2\" (1.575 m)   Wt 177 lb 9.6 oz (80.6 kg)   SpO2 96%   BMI 32.48 kg/m²     Allergies:   Allergies   Allergen Reactions    Latex Itching    Mucinex [Guaifenesin Er] Anaphylaxis     Coughs more, dry cough, feels like she has bronchitis    Tetanus Toxoids      HAS REACTION- TOLD TO TAKE ALTERNATIVE    Redness and swelling to site    Morphine Nausea And Vomiting    Pneumococcal Vaccines Rash       Past Medical History:  Past Medical History:   Diagnosis Date    Arthritis     Cystitis, interstitial     GERD (gastroesophageal reflux disease)     GI bleeding     was a frequent aspirin user at that time    Heart burn     Hypertension     Interstitial cystitis     Patient is Face++ Witness no blood products    Psoriasis     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 Kaitlin Hudson - w/placement of IABP   [address]  2012    COLONOSCOPY  07/31/2015    Dr. Yovani Little - w/polypectomy    CORONARY ARTERY BYPASS GRAFT  05/29/2018    Dr. Raad Deleon - off pump x2 (LIMA-LAD, L SVG-D2)    KNEE ARTHROSCOPY Right 02/2015    MOHS SURGERY Left     facial for skin CA    PARTIAL HYSTERECTOMY      ROTATOR CUFF REPAIR Right 2014    TRANSESOPHAGEAL ECHOCARDIOGRAM  05/29/2018    during CABG         Medications:  Current Facility-Administered Medications   Medication Dose Route Frequency Provider Last Rate Last Admin    triamterene-hydroCHLOROthiazide (MAXZIDE-25) 37.5-25 MG per tablet 1 tablet  1 tablet Oral Daily Moise Smith MD   1 tablet at 07/07/21 1015    docusate sodium (COLACE) capsule 100 mg  100 mg Oral BID Moise Smith MD   100 mg at 07/07/21 1016    tamsulosin (FLOMAX) capsule 0.4 mg  0.4 mg Oral Daily RIGOBERTO Arellano - CNP   0.4 mg at 07/07/21 1016    aspirin chewable tablet 81 mg  81 mg Oral Daily Moise Smith MD   81 mg at 07/07/21 1016    atorvastatin (LIPITOR) tablet 80 mg  80 mg Oral Daily Moise Smith MD   80 mg at 07/07/21 1016    metoprolol succinate (TOPROL XL) extended release tablet 50 mg  50 mg Oral Daily Moise Smith MD   50 mg at 07/07/21 1015    pantoprazole (PROTONIX) tablet 40 mg  40 mg Oral QAM AC Moise Smith MD   40 mg at 07/07/21 0836    cefTRIAXone (ROCEPHIN) 1000 mg in sterile water 10 mL IV syringe  1,000 mg Intravenous Q24H Moise Smith MD   1,000 mg at 07/06/21 2103    sodium chloride flush 0.9 % injection 5-40 mL  5-40 mL Intravenous 2 times per day Lauro Lesch, MD   10 mL at 07/07/21 1018    sodium chloride flush 0.9 % injection 5-40 mL  5-40 mL Intravenous PRN

## 2021-07-07 NOTE — PROGRESS NOTES
Occupational/Physical Therapy  Demetra Alcantar      Received order for OT/PT evaluation. RN requested hold for now, as pt is to have cardiac cath. Will attempt again later as schedule allows and pt is appropriate for therapy evaluation. Thank you.  Bianca Willingham., OTR/L, [id number]  Brant Celis, [place], [id number]

## 2021-07-07 NOTE — PROGRESS NOTES
Urology Progress Note  [facility]    Provider: RIGOBERTO Weston CNP  Patient ID:  Admission Date: 2021 Name: Palmer Smyth Date: 2021 MRN: [id number]   Patient Location: [id number] : 1935  Attending: Aleida Khan MD Date of Service: 2021  PCP: James Giles MD     Diagnoses:  Left Proximal Ureteral Stone    Assessment/Plan:  81 yo F admitted for SOB/CP  CTA c/a/p in ED showing Left 3mm UPJ  Hemodynamically  UA looks ok, no leukocytosis  Denies pain today, no LCVAT     Recommendations:  - No urgent  intervention  -This 3mm stone has high probability of passing without intervention  -Pt is wanting to try to pass stone  -Start Flomax, continue at discharge x 2 weeks  -f/u in office x2 weeks to reevaluate left 3mm upj  -OK to discharge per  when medically clear    The patient had a chance to ask questions which were answered. she understands the above plan. Subjective: Precious Morataya is a 82 y.o. female. She was seen and examined this morning. Today we discussed discharge and f/u in the office to reevaluate stone. Discussed continuing Flomax at discharge. Objective:   Vitals:  Vitals:    21 0828   BP: (!) 122/59   Pulse: 96   Resp: 14   Temp: 97.5 °F (36.4 °C)   SpO2: 96%       Intake/Output Summary (Last 24 hours) at 2021 8471  Last data filed at 2021 1847  Gross per 24 hour   Intake --   Output 300 ml   Net -300 ml     Physical Exam:  Gen: Alert and oriented x3, no acute distress  CV: Regular rate   Resp: unlabored respirations  Abd: Soft, non-distended, non-tender, no masses  Ext: no peripheral edema noted, moves upper and lower extremities spontaneously  Skin: warmand well perfused, no rashes noted on the face, or arms.      Labs:  Lab Results   Component Value Date    WBC 5.3 2021    HGB 13.9 2021    HCT 41.5 2021    MCV 93.3 2021     2021     Lab Results   Component Value Date    CREATININE <0.5 (L) 07/06/2021    BUN 9 07/06/2021     07/06/2021    K 3.6 07/06/2021     07/06/2021    CO2 27 07/06/2021       RIGOBERTO Jacobo - NAGA   7/7/2021

## 2021-07-07 NOTE — PROGRESS NOTES
Pt assessment complete. VSS BP trending down. Meds given per MAR. Pt complains of mild hip pain from sitting any laying down too much Tylenol given at this time. No other concerns currently. Pt is reminded about NPO status at midnight. Call light within reach, will continue to monitor.

## 2021-07-07 NOTE — OP NOTE
Patient:  Clau Ayala   :   1935    Procedural Summary  ~Consent:   Obtained written and verbal consent      Risks/benefits explained in detail  ~Procedure:    Left Heart Catheterization  ~Medications:    Procedural sedation with minimal conscious sedation  ~Complications:   None  ~Blood Loss:    <10cc  ~Specimens:    None obtained  ~Pre-sedation re-evaluation: Performed immediately prior to procedure. Medication and Procedural Reconciliation:  An independent trained observer pushed medications at my direction. We monitored the patient's level of consciousness and vital signs/physiologic status throughout the procedure duration (see start and stop times below). Sedation: 2 mg Versed, 75 mcg Fentanyl  Sedation start: 1600  Sedation stop: 1628    Cardiac Cath LVG:  Anatomy:   LM-nml   LAD-mid 100% . Patent LIMA to distal LAD. Distal LAD diffuse disease up to 90%. SVG to D1 patent  Cx-nml  OM- nml  RCA-prox 40%, distal 40%  RPDA- nml  LVEF- 65  LVG- nml  LVEDP- 2    Contrast: 73  Flouro Time: 4.9  Access: R CFA. Ultrasound guidance used to determine aforementioned artery patency, size (>2mm), anatomic variations and ideal puncture location. Real-time ultrasound utilized concurrent with vascular needle entry into the artery. Image(s) permanently recorded and reported in the patient chart. Perc stick safe zone    Impression  ~Coronary Angiography w/ severe multi Vessel CAD  ~LVG with LVEF of 65 and no regional wall motion abnormalities      Recommendation  ~Aggressive medical treatment and risk factor modification  ~1. Medications reviewed, no changes at this time. 2. Post cath IVF. Bedrest.  4. Patient has been advised on the importance of regular exercise of at least 20-30 minutes daily alternating with aerobic and isometric activities. 5. Patient counseled about and offered assistance for smoking cessation   6. No indication for cardiac rehab  7.  Monitor overnight, OK to discharge home tomorrow .  Follow up in 2-3 months with cardiology          Latoya Mckenzie MD, MD 7/7/2021 4:39 PM

## 2021-07-08 VITALS
HEART RATE: 64 BPM | DIASTOLIC BLOOD PRESSURE: 82 MMHG | OXYGEN SATURATION: 96 % | TEMPERATURE: 97.6 F | RESPIRATION RATE: 18 BRPM | WEIGHT: 177.6 LBS | HEIGHT: 62 IN | SYSTOLIC BLOOD PRESSURE: 134 MMHG | BODY MASS INDEX: 32.68 KG/M2

## 2021-07-08 LAB
ANION GAP SERPL CALCULATED.3IONS-SCNC: 10 MMOL/L (ref 3–16)
BUN BLDV-MCNC: 16 MG/DL (ref 7–20)
CALCIUM SERPL-MCNC: 9.2 MG/DL (ref 8.3–10.6)
CHLORIDE BLD-SCNC: 107 MMOL/L (ref 99–110)
CHOLESTEROL, FASTING: 133 MG/DL (ref 0–199)
CO2: 23 MMOL/L (ref 21–32)
CREAT SERPL-MCNC: 0.5 MG/DL (ref 0.6–1.2)
GFR AFRICAN AMERICAN: >60
GFR NON-AFRICAN AMERICAN: >60
GLUCOSE BLD-MCNC: 96 MG/DL (ref 70–99)
HCT VFR BLD CALC: 39.4 % (ref 36–48)
HDLC SERPL-MCNC: 52 MG/DL (ref 40–60)
HEMOGLOBIN: 13.6 G/DL (ref 12–16)
LDL CHOLESTEROL CALCULATED: 54 MG/DL
MCH RBC QN AUTO: 31.7 PG (ref 26–34)
MCHC RBC AUTO-ENTMCNC: 34.6 G/DL (ref 31–36)
MCV RBC AUTO: 91.7 FL (ref 80–100)
PDW BLD-RTO: 14.1 % (ref 12.4–15.4)
PLATELET # BLD: 226 K/UL (ref 135–450)
PMV BLD AUTO: 8.1 FL (ref 5–10.5)
POTASSIUM SERPL-SCNC: 3.5 MMOL/L (ref 3.5–5.1)
RBC # BLD: 4.29 M/UL (ref 4–5.2)
SODIUM BLD-SCNC: 140 MMOL/L (ref 136–145)
TRIGLYCERIDE, FASTING: 135 MG/DL (ref 0–150)
VLDLC SERPL CALC-MCNC: 27 MG/DL
WBC # BLD: 5.5 K/UL (ref 4–11)

## 2021-07-08 PROCEDURE — G0378 HOSPITAL OBSERVATION PER HR: HCPCS

## 2021-07-08 PROCEDURE — 97530 THERAPEUTIC ACTIVITIES: CPT

## 2021-07-08 PROCEDURE — 97161 PT EVAL LOW COMPLEX 20 MIN: CPT

## 2021-07-08 PROCEDURE — 36415 COLL VENOUS BLD VENIPUNCTURE: CPT

## 2021-07-08 PROCEDURE — 97116 GAIT TRAINING THERAPY: CPT

## 2021-07-08 PROCEDURE — 6370000000 HC RX 637 (ALT 250 FOR IP): Performed by: HOSPITALIST

## 2021-07-08 PROCEDURE — 99233 SBSQ HOSP IP/OBS HIGH 50: CPT | Performed by: NURSE PRACTITIONER

## 2021-07-08 PROCEDURE — 6370000000 HC RX 637 (ALT 250 FOR IP): Performed by: NURSE PRACTITIONER

## 2021-07-08 PROCEDURE — 80061 LIPID PANEL: CPT

## 2021-07-08 PROCEDURE — 80048 BASIC METABOLIC PNL TOTAL CA: CPT

## 2021-07-08 PROCEDURE — 85027 COMPLETE CBC AUTOMATED: CPT

## 2021-07-08 PROCEDURE — 97535 SELF CARE MNGMENT TRAINING: CPT

## 2021-07-08 PROCEDURE — 97165 OT EVAL LOW COMPLEX 30 MIN: CPT

## 2021-07-08 RX ORDER — AMOXICILLIN 500 MG/1
500 CAPSULE ORAL EVERY 8 HOURS SCHEDULED
Qty: 15 CAPSULE | Refills: 0 | Status: SHIPPED | OUTPATIENT
Start: 2021-07-08 | End: 2021-07-13

## 2021-07-08 RX ADMIN — AMOXICILLIN 500 MG: 250 CAPSULE ORAL at 06:50

## 2021-07-08 RX ADMIN — DOCUSATE SODIUM 100 MG: 100 CAPSULE ORAL at 09:51

## 2021-07-08 RX ADMIN — PANTOPRAZOLE SODIUM 40 MG: 40 TABLET, DELAYED RELEASE ORAL at 06:50

## 2021-07-08 RX ADMIN — ASPIRIN 81 MG: 81 TABLET, CHEWABLE ORAL at 09:51

## 2021-07-08 RX ADMIN — ATORVASTATIN CALCIUM 80 MG: 80 TABLET, FILM COATED ORAL at 09:51

## 2021-07-08 RX ADMIN — TRIAMTERENE AND HYDROCHLOROTHIAZIDE 1 TABLET: 37.5; 25 TABLET ORAL at 09:51

## 2021-07-08 RX ADMIN — TAMSULOSIN HYDROCHLORIDE 0.4 MG: 0.4 CAPSULE ORAL at 09:51

## 2021-07-08 RX ADMIN — METOPROLOL SUCCINATE 50 MG: 50 TABLET, EXTENDED RELEASE ORAL at 09:51

## 2021-07-08 ASSESSMENT — PAIN SCALES - GENERAL: PAINLEVEL_OUTOF10: 0

## 2021-07-08 NOTE — PROGRESS NOTES
Physical Therapy    Facility/Department: [address] NURSING  Initial Assessment    NAME: Arabella Hackett  : 1935  MRN: [id number]    Date of Service: 2021    Discharge Recommendations:  Arabella Hackett scored a 19/24 on the AM-PAC short mobility form. Current research shows that an AM-PAC score of 18 or greater is typically associated with a discharge to the patient's home setting. Based on the patient's AM-PAC score and their current functional mobility deficits, it is recommended that the patient have 2-3 sessions per week of Physical Therapy at d/c to increase the patient's independence. At this time, this patient demonstrates the endurance and safety to discharge home with home services and a follow up treatment frequency of 2-3x/wk. Please see assessment section for further patient specific details. HOME HEALTH CARE: LEVEL 1 STANDARD    - Initial home health evaluation to occur within 24-48 hours, in patient home   - Therapy to evaluate with goal of regaining prior level of functioning   - Therapy to evaluate if patient has [address] needs for personal care    If patient discharges prior to next session this note will serve as a discharge summary. Please see below for the latest assessment towards goals. 2-3 sessions per week   PT Equipment Recommendations  Equipment Needed: No  Other: has rolling walker at home    Assessment   Body structures, Functions, Activity limitations: Decreased endurance;Decreased strength  Assessment: Pt showed the following limitations. Pt will benefit from skilled physical therapy to increase her endurance and strength to return to baseline independent functional mobility  Treatment Diagnosis: Decreased strength/endurance d/t chest pain exacerbations  Prognosis: Good  Decision Making: Low Complexity  PT Education: Goals; Equipment;PT Role;Plan of Care;General Safety  Patient Education: Pt verbalized understanding  Barriers to Learning: none  REQUIRES PT FOLLOW UP: Yes  Activity Tolerance  Activity Tolerance: Patient Tolerated treatment well;Patient limited by endurance       Patient Diagnosis(es): The primary encounter diagnosis was Chest pain, unspecified type. Diagnoses of NSTEMI (non-ST elevated myocardial infarction) ([facility] Bilirubinemia, Hx of CABG, Ureterolithiasis, and Bacteriuria were also pertinent to this visit. has a past medical history of Arthritis, Cystitis, interstitial, GERD (gastroesophageal reflux disease), GI bleeding, Heart burn, Hypertension, Interstitial cystitis, Patient is [employer], Psoriasis, Sebaceous carcinoma, and Thoracic aortic aneurysm ([facility] has a past surgical history that includes partial hysterectomy (cervix not removed); Knee arthroscopy (Right, 02/2015); Rotator cuff repair (Right, 2014); Coronary artery bypass graft (05/29/2018); transesophageal echocardiogram (05/29/2018); Cardiac catheterization (05/29/2018); Colonoscopy (07/31/2015); Mohs surgery (Left); and Cardiac catheterization (2012). Restrictions  Restrictions/Precautions  Restrictions/Precautions: Fall Risk (high fall risk)  Required Braces or Orthoses?: No  Position Activity Restriction  Other position/activity restrictions: Carmencita Ortega is a 82 y.o. female who presented with planes of nausea vomiting and shortness of breath and chest discomfort. Onset of symptoms was Sunday woke up feeling weak with no appetite started having shortness of breath chest pain. Initially epigastric area now having some left-sided chest pain. Patient since shortness of breath and pain is worse on exertion better with rest.  Patient started with occasional palpitation. Similar to previous previous anginal pain that she had been she underwent bypass.   Vision/Hearing  Vision: Impaired  Vision Exceptions: Wears glasses at all times  Hearing: Exceptions to [facility]  Hearing Exceptions: Bilateral hearing aid (does not wear them bc she is allergic to latex and has not gotten new ones yet)     Subjective  General  Chart Reviewed: Yes  Patient assessed for rehabilitation services?: Yes  Family / Caregiver Present: No  Diagnosis: Chest Pain  Follows Commands: Within Functional Limits  Other (Comment): Pt sitting EOB upon arrival. Pt agreeable to therapy  Subjective  Subjective: Pt states she feels close to normal but knows she is weak in her knees.   Pain Screening  Patient Currently in Pain: Denies  Vital Signs  Patient Currently in Pain: Denies       Orientation  Orientation  Overall Orientation Status: Within Functional Limits  Social/Functional History  Social/Functional History  Lives With: Alone  Type of Home: House  Home Layout: One level, Laundry in basement  Home Access: Stairs to enter with rails  Entrance Stairs - Number of Steps: 1-2  Bathroom Shower/Tub: Tub/Shower unit  Bathroom Toilet: Handicap height  Bathroom Equipment: Grab bars in shower, Shower chair  Bathroom Accessibility: Accessible  Home Equipment: 4 wheeled walker  Receives Help From: Family (6 children live nearby and provide assitance PRN)  ADL Assistance: [address] [place] [address] Avenue: Independent (children assist with yardwork)  Ambulation Assistance: Independent  Transfer Assistance: Independent  Active : Yes  Leisure & Hobbies: reading, watching tv, attending [employer] services on zoom  Additional Comments: denied recent falls  Cognition   Cognition  Overall Cognitive Status: WNL    Objective     Observation/Palpation  Posture: Good    PROM RLE (degrees)  RLE PROM: WFL  PROM LLE (degrees)  LLE PROM: WFL  PROM RUE (degrees)  RUE PROM: WFL  PROM LUE (degrees)  LUE PROM: WFL  Strength RLE  Strength RLE: WFL  Strength LLE  Strength LLE: WFL  Strength RUE  Strength RUE: WFL  Strength LUE  Strength LUE: WFL     Sensation  Overall Sensation Status: WFL     Transfers  Sit to Stand: Modified independent (2x)  Stand to sit: Modified independent (to chair 1x)  Ambulation  Ambulation?: Yes  More Ambulation?: No  Ambulation 1  Surface: level tile  Device: No Device  Assistance: Stand by assistance  Quality of Gait: Slight lean to the R- pt was aware and would correct herself  Gait Deviations: Slow Dania; Shuffles  Distance: 110ft from bed to stairwell and back + 10ft to sink + 10ft to chair  Comments: Pt was fatigued after walking up and down the stairs  Stairs/Curb  Stairs?: Yes  Stairs  # Steps : 12  Rails: Left ascending  Device: No Device  Assistance: Contact guard assistance  Comment: non-reciprocal     Balance  Posture: Good  Sitting - Static: Good  Sitting - Dynamic: Good (got dressed EOB)  Standing - Static: Good  Standing - Dynamic: Good (stood for 10 minutes at the sink)        Plan   Plan  Times per week: 3-5  Times per day: Daily  Current Treatment Recommendations: Strengthening, ROM, Balance Training, Gait Training, Functional Mobility Training  Safety Devices  Type of devices: All fall risk precautions in place, Call light within reach, Chair alarm in place, Left in chair, Patient at risk for falls, Nurse notified  Restraints  Initially in place: No      AM-PAC Score  AM-PAC Inpatient Mobility Raw Score : 19 (07/08/21 0944)  AM-PAC Inpatient T-Scale Score : 45.44 (07/08/21 0944)  Mobility Inpatient CMS 0-100% Score: 41.77 (07/08/21 0944)  Mobility Inpatient CMS G-Code Modifier : CK (07/08/21 0944)    Goals  Short term goals  Time Frame for Short term goals: prior to discharge  Short term goal 1: pt will be able to ambulate 150ft independently  Short term goal 2: pt will be able to walk up/down 1 flight of steps with B rails and mod I  Short term goal 3: Bed mobility with mod I  Patient Goals   Patient goals : to go home and get stronger       Therapy Time   Individual Concurrent Group Co-treatment   Time In 0829         Time Out 0926         Minutes 57            Variance: 43  Yary Marshall, SPT  Nicolas Santana PT Therapist observed and directed the above evaluation.  Thanks, Nicolas Santana PT,

## 2021-07-08 NOTE — PROGRESS NOTES
Shift assessment completed. Pt is a/o X 4. VSS. Medications administered, see MAR. POC discussed and all questions answered. Pt provided with fresh ice water. Pt has belongings and call light in reach. Bed is locked and in lowest position, bed alarm is active and pt agrees to call for assistance to ambulate. Pt denies further needs, will continue to monitor.

## 2021-07-08 NOTE — PROGRESS NOTES
Physician Progress Note      PATIENT:               Gwyn Mclaughlin  CSN #:                  [id number]  :                       1935  ADMIT DATE:       2021 1:41 PM  [address] DATE:  RESPONDING  PROVIDER #:        Kasia Ventura MD          QUERY TEXT:    Patient admitted with chest pain. If possible, please document in the   progress notes and discharge summary if you are evaluating and/or treating any   of the following: The medical record reflects the following:  Risk Factors: CAD, Hx. CABG  Clinical Indicators: Per Cardiology progress note 21 \"Elevated troponin    LHC 21 with severe MVD. Grafts patent. demand ischemia from urethral stone. \" Per H&P 21 \"Left ureteral stone   with mild hydronephrosis. .. UTI. \"  Treatment: Urology/Cardiology consults, LHC, ivf NS, iv Rocephin, sc Lovenox,   monitor labs  Options provided:  -- NSTEMI  -- Type 2 MI  -- Demand Ischemia with MI  -- Demand Ischemia only, no MI  -- Unstable Angina  -- Other - I will add my own diagnosis  -- Disagree - Not applicable / Not valid  -- Disagree - Clinically unable to determine / Unknown  -- Refer to Clinical Documentation Reviewer    PROVIDER RESPONSE TEXT:    This patient has demand ischemia only, no MI.     Query created by: Mari Thakkar on 2021 12:00 PM      Electronically signed by:  Kasia Ventura MD 2021 12:04 PM

## 2021-07-08 NOTE — PROGRESS NOTES
Occupational Therapy   Occupational Therapy Initial Assessment/Discharge Summary    Date: 2021   Patient Name: Gloria Rosenberg  MRN: [id number]     : 1935    Date of Service: 2021    Discharge Recommendations:  Gloria Rosenberg scored a 19/21 on the -[facility] ADL Inpatient form. At this time, no further OT is recommended upon discharge due to pt is at her baseline level of occupational function. Recommend patient returns to prior setting. Pt planning on OP PT.           OT Equipment Recommendations  Equipment Needed: No    Assessment   Assessment: Pt is at her baseline level of occcupational function. No further OT needs. She is safe to return home alone and plans HHPT. Decision Making: Low Complexity  History: As above  Exam: As above  Assistance / Modification: As above  OT Education: OT Role  Patient Education: D/C recommendation. Pt independently verbalized and demonstrated understanding. Barriers to Learning: None  REQUIRES OT FOLLOW UP: No  Activity Tolerance  Activity Tolerance: Patient Tolerated treatment well  Safety Devices  Safety Devices in place: Yes  Type of devices: All fall risk precautions in place; Left in chair;Call light within reach;Nurse notified; Chair alarm in place;Gait belt           Patient Diagnosis(es): The primary encounter diagnosis was Chest pain, unspecified type. Diagnoses of NSTEMI (non-ST elevated myocardial infarction) ([address] Bilirubinemia, Hx of CABG, Ureterolithiasis, and Bacteriuria were also pertinent to this visit. has a past medical history of Arthritis, Cystitis, interstitial, GERD (gastroesophageal reflux disease), GI bleeding, Heart burn, Hypertension, Interstitial cystitis, Patient is Religious, Psoriasis, Sebaceous carcinoma, and Thoracic aortic aneurysm ([address] has a past surgical history that includes partial hysterectomy (cervix not removed); Knee arthroscopy (Right, 2015); Rotator cuff repair (Right, );  Coronary artery bypass graft (05/29/2018); transesophageal echocardiogram (05/29/2018); Cardiac catheterization (05/29/2018); Colonoscopy (07/31/2015); Mohs surgery (Left); and Cardiac catheterization (2012). Restrictions  Restrictions/Precautions  Restrictions/Precautions: Fall Risk (high fall risk)  Required Braces or Orthoses?: No  Position Activity Restriction  Other position/activity restrictions: Willis Huertas is a 82 y.o. female who presented with planes of nausea vomiting and shortness of breath and chest discomfort. Onset of symptoms was Sunday woke up feeling weak with no appetite started having shortness of breath chest pain. Initially epigastric area now having some left-sided chest pain. Patient since shortness of breath and pain is worse on exertion better with rest.  Patient started with occasional palpitation. Similar to previous previous anginal pain that she had been she underwent bypass. Subjective   General  Chart Reviewed: Yes  Patient assessed for rehabilitation services?: Yes  Family / Caregiver Present: No  Diagnosis: Chest pain, s/p cardiac cath  Subjective  Subjective: Pt seated EOB on arrival, having just used the toilet and washed hands at the sink with aide present. Pt pleasant and agreeable to OT eval. Pt denies pain.   Patient Currently in Pain: Denies  Pain Assessment  Pain Assessment: 0-10  Pain Level: 0  Pre Treatment Pain Screening  Intervention List: Patient able to continue with treatment  Vital Signs  Oxygen Therapy  O2 Device: None (Room air)  Social/Functional History  Social/Functional History  Lives With: Alone  Type of Home: House  Home Layout: One level, Laundry in basement  Home Access: Stairs to enter with rails  Entrance Stairs - Number of Steps: 1-2  Bathroom Shower/Tub: Tub/Shower unit  Bathroom Toilet: Handicap height  Bathroom Equipment: Grab bars in shower, Shower chair  Bathroom Accessibility: Accessible  Home Equipment: 4 wheeled walker  Serg Ochoa From: Family (6 children live nearby and provide assitance PRN)  ADL Assistance: Independent  Homemaking Assistance: Independent (children assist with yardwork)  Ambulation Assistance: Independent  Transfer Assistance: Independent  Active : Yes  Leisure & Hobbies: reading, watching tv, attending [employer] services on zoom  Additional Comments: denied recent falls       Objective   Vision: Impaired  Vision Exceptions: Wears glasses at all times  Hearing: Exceptions to [facility]  Hearing Exceptions: Bilateral hearing aid (does not wear them bc she is allergic to latex and has not gotten new ones yet)    Orientation  Overall Orientation Status: Within Normal Limits  Observation/Palpation  Posture: Good  Balance  Standing Balance: Stand by assistance  Standing Balance  Time: ~2-3 min, ~3-4 min  Activity: functional mobility ~110 ft, up/down flight of stairs w/PT; functional mobility to/from sink for standing grooming  Functional Mobility  Functional - Mobility Device: No device  Activity: Other  Assist Level: Stand by assistance  Functional Mobility Comments: No LOB, small, shuffling steps  ADL  Grooming: Independent (oral care, comb hair, wash face)  UE Bathing: Setup  LE Bathing: Independent (ankur hygiene & upper legs only)  UE Dressing: Setup (don bra)  LE Dressing: Setup (don/doff underwear, doff socks, don sandals; verbal cues to sit to don underwear)  Toileting: None  Tone RUE  RUE Tone: Normotonic  Tone LUE  LUE Tone: Normotonic  Coordination  Movements Are Fluid And Coordinated: Yes  Quality of Movement Other  Comment: Able to open toothpaste tube, mouthwash bottle, but states has difficulty with small eyedrop bottles and has to cut them off. Bed mobility  Comment: Not assessed; pt seated on EOB on arrival and in chair at end of session.   Transfers  Stand Step Transfers: Supervision  Sit to stand: Modified independent  Stand to sit: Modified independent     Cognition  Overall Cognitive Status: WNL  Perception  Overall Perceptual Status: WFL     Sensation  Overall Sensation Status: WFL        LUE AROM (degrees)  LUE AROM : WFL  Left Hand AROM (degrees)  Left Hand AROM: WFL  RUE AROM (degrees)  RUE AROM : Exceptions  RUE General AROM: shoulder flexion ~110 degrees with pain  Right Hand PROM (degrees)  Right Hand PROM: WFL  LUE Strength  Gross LUE Strength: WFL  L Hand General: 5/5  RUE Strength  Gross RUE Strength: WFL  R Hand General: 5/5                   Plan   Plan  Plan Comment: D/C acute OT      AM-PAC Score        AM-[facility] Inpatient Daily Activity Raw Score: 24 (07/08/21 0945)  AM-PAC Inpatient ADL T-Scale Score : 57.54 (07/08/21 0945)  ADL Inpatient CMS 0-100% Score: 0 (07/08/21 0945)  ADL Inpatient CMS G-Code Modifier : CH (07/08/21 0945)    Goals: None          Therapy Time   Individual Concurrent Group Co-treatment   Time In 0829         Time Out 0926         Minutes 57               Timed Code Treatment Minutes:   42 min    Total Treatment Minutes:  57 min    CJeffery Wray [address], OT   Rachel Edge., OTR/L, [id number]

## 2021-07-08 NOTE — PROGRESS NOTES
Cardiac catheterization (2012). Social History:  Reviewed. reports that she quit smoking about 61 years ago. Her smoking use included cigarettes. She quit after 7.00 years of use. She has never used smokeless tobacco. She reports that she does not drink alcohol and does not use drugs. Allergies: Allergies   Allergen Reactions    Latex Itching    Mucinex [Guaifenesin Er] Anaphylaxis     Coughs more, dry cough, feels like she has bronchitis    Tetanus Toxoids      HAS REACTION- TOLD TO TAKE ALTERNATIVE    Redness and swelling to site    Morphine Nausea And Vomiting    Pneumococcal Vaccines Rash       Family History:  Reviewed. family history includes Breast Cancer in her brother, father, and mother; COPD in her father; Tuberculosis in her brother and father. Denies family history of sudden cardiac death, arrhythmia, premature CAD    Home Meds:  Prior to Visit Medications    Medication Sig Taking?  Authorizing Provider   amoxicillin (AMOXIL) 500 MG capsule Take 1 capsule by mouth every 8 hours for 5 days Yes Maggie Amaya MD   tamsulosin (FLOMAX) 0.4 MG capsule Take 1 capsule by mouth daily Yes Maggie Amaya MD   atorvastatin (LIPITOR) 80 MG tablet Take 80 mg by mouth daily Yes Historical Provider, MD   metoprolol succinate (TOPROL XL) 50 MG extended release tablet TAKE 1 TABLET BY MOUTH  DAILY Yes Tj Umana MD   pantoprazole (PROTONIX) 40 MG tablet TAKE 1 TABLET BY MOUTH EVERY MORNING BEFORE BREAKFAST Yes Tj Umana MD   triamterene-hydroCHLOROthiazide (MAXZIDE-25) 37.5-25 MG per tablet TAKE 1 TABLET BY MOUTH  DAILY Yes RIGOBERTO Stiles - CNP   budesonide (RHINOCORT AQUA) 32 MCG/ACT nasal spray 2 sprays by Each Nostril route 2 times daily Yes Milan Ambrose MD   acetaminophen (TYLENOL) 500 MG tablet Take 500 mg by mouth every 6 hours as needed for Pain Yes Historical Provider, MD   docusate sodium (COLACE, DULCOLAX) 100 MG CAPS Take 100 mg by mouth 2 times daily Yes RIGOBERTO Marroquin - CNP   Cholecalciferol (VITAMIN D3) 2000 units CAPS Take 2,000 Units by mouth daily  Yes Historical Provider, MD   aspirin 81 MG chewable tablet Take 1 tablet by mouth daily Yes Renetta Collazo MD   vitamin B-12 100 MCG tablet Take 1 tablet by mouth daily  Patient taking differently: Take 1,000 mcg by mouth daily   RIGOBERTO Cruz - CNP        Scheduled Meds:   amoxicillin  500 mg Oral 3 times per day    triamterene-hydroCHLOROthiazide  1 tablet Oral Daily    docusate sodium  100 mg Oral BID    tamsulosin  0.4 mg Oral Daily    aspirin  81 mg Oral Daily    atorvastatin  80 mg Oral Daily    metoprolol succinate  50 mg Oral Daily    pantoprazole  40 mg Oral QAM AC    sodium chloride flush  5-40 mL Intravenous 2 times per day    enoxaparin  40 mg Subcutaneous Daily     Continuous Infusions:   sodium chloride      sodium chloride Stopped (07/08/21 0652)     PRN Meds:sodium chloride flush, sodium chloride, oxyCODONE-acetaminophen **OR** oxyCODONE-acetaminophen, ondansetron **OR** ondansetron, polyethylene glycol, acetaminophen **OR** acetaminophen, morphine     Review of Systems:  · Constitutional: Negative for fever, night sweats, chills,  · Skin: Negative for rash, pruritus, bleeding, or bruising    · HEENT: Negative for vision changes, ringing in the ears, dysphagia  · Respiratory: Reviewed in HPI  · Cardiovascular: Reviewed in HPI  · Gastrointestinal: Negative for abdominal pain, N/V/D, constipation, or black/tarry stools  · Genito-Urinary: Negative for dysuria, incontinence, or hematuria  · Musculoskeletal: Negative for joint swelling, muscle pain, or injuries  · Neurological/Psych: Negative for confusion, seizures, headaches, or TIA-like symptoms. No anxiety or depression.      Physical Examination:  Vitals:    07/08/21 0828   BP: 134/82   Pulse: 64   Resp: 18   Temp: 97.6 °F (36.4 °C)   SpO2: 96%      In: 1350.8 [P.O.:480; I.V.:870.8]  Out: -    Wt Readings from Last 3 Encounters:   07/07/21 177 lb 9.6 oz (80.6 kg)   04/13/21 194 lb (88 kg)   10/27/20 174 lb 12.8 oz (79.3 kg)       Intake/Output Summary (Last 24 hours) at 7/8/2021 0939  Last data filed at 7/8/2021 2258  Gross per 24 hour   Intake 1350.75 ml   Output --   Net 1350.75 ml       Telemetry: d/c'd off tele at time of visit. Was unable to review. · Constitutional: Cooperative and in no apparent distress, and appears well nourished  · Skin: Warm and pink; no pallor, cyanosis, clubbing, or bruising. Right groin procedure site c/d/I. No hematoma or bruising. Good pulses, color, warmth, and sensation to that extremity. · HEENT: Symmetric and normocephalic. PERRL. Conjunctiva pink with clear sclera. Mucus membranes moist.   · Cardiovascular: Regular rate and rhythm. S1/S2 present without murmurs, no rubs or gallops. Peripheral pulses 2+, capillary refill < 3 seconds. No elevation of JVP. No peripheral edema  · Respiratory: Respirations symmetric and unlabored. Lungs clear to auscultation bilaterally, no wheezing, crackles, or rhonchi  · Gastrointestinal: Abdomen soft and round. Bowel sounds active without tenderness. · Musculoskeletal: Bilateral upper and lower extremity strength 5/5 with full ROM  · Neurologic/Psych: Awake and orientated to person, place and time. Calm affect, appropriate mood    Pertinent labs, diagnostic, device, and imaging results reviewed as a part of this visit    Labs:    BMP:   Recent Labs     07/06/21 0622 07/07/21  1138 07/08/21  0514    138 140   K 3.6 3.7 3.5    102 107   CO2 27 25 23   BUN 9 13 16   CREATININE <0.5* 0.6 0.5*     Estimated Creatinine Clearance: 82 mL/min (A) (based on SCr of 0.5 mg/dL (L)).    CBC:   Recent Labs     07/06/21 0622 07/07/21  1138 07/08/21  0514   WBC 5.3 4.9 5.5   HGB 13.9 14.8 13.6   HCT 41.5 43.4 39.4   MCV 93.3 92.4 91.7    245 226     Thyroid:   Lab Results   Component Value Date    TSH 3.50 08/31/2018    Q7DVBEB 10.0 12/10/2012     Lipids:   Lab Results   Component Value Date    CHOL 160 02/15/2019    HDL 79 02/15/2019    HDL 51 2011    TRIG 167 02/15/2019     LFTS:   Lab Results   Component Value Date    ALT 23 2021    AST 27 2021    ALKPHOS 65 2021    PROT 6.2 2021    PROT 6.9 2011    AGRATIO 1.7 2021    BILITOT 1.4 2021     Cardiac Enzymes:   Lab Results   Component Value Date    TROPONINI 0.05 2021    TROPONINI 0.05 2021    TROPONINI 0.05 2021     Coags:   Lab Results   Component Value Date    PROTIME 10.8 2021    INR 0.96 2021       EC21  Normal sinus rhythm  Left axis deviation  Left ventricular hypertrophy with QRS widening and repolarization abnormality    ECHO:  20  Summary   Normal left ventricle size and systolic function with an estimated ejection   fraction of 60-65%. There is mild concentric left ventricular hypertrophy. E'e is 8.9   Mild mitral regurgitation. Trace aortic regurgitation. Mild-to-moderate tricuspid regurgitation with estimated PASP of 28mmHg . Right ventricular function appears mildly reduced. TAPSE is 1.46cm. The left atrium is mildly dilated. Cardiac Cath LV21  Anatomy:   LM-nml   LAD-mid 100% . Patent LIMA to distal LAD. Distal LAD diffuse disease up to 90%. SVG to D1 patent  Cx-nml  OM- nml  RCA-prox 40%, distal 40%  RPDA- nml  LVEF- 65  LVG- nml  LVEDP- 2     Contrast: 73  Flouro Time: 4.9  Access: R CFA. Ultrasound guidance used to determine aforementioned artery patency, size (>2mm), anatomic variations and ideal puncture location. Real-time ultrasound utilized concurrent with vascular needle entry into the artery. Image(s) permanently recorded and reported in the patient chart.  Perc stick safe zone     Impression  ~Coronary Angiography w/ severe multi Vessel CAD  ~LVG with LVEF of 65 and no regional wall motion abnormalities    Recommendation  ~Aggressive medical treatment and risk factor modification  ~1. Medications reviewed, no changes at this time. 2. Post cath IVF. Bedrest.  4. Patient has been advised on the importance of regular exercise of at least 20-30 minutes daily alternating with aerobic and isometric activities. 5. Patient counseled about and offered assistance for smoking cessation   6. No indication for cardiac rehab  7. Monitor overnight, OK to discharge home tomorrow . Follow up in 2-3 months with cardiology    Problem List:   Patient Active Problem List    Diagnosis Date Noted    Anemia     Leukocytosis 02/19/2020    Lactic acidosis 02/19/2020    Failure of outpatient treatment 02/19/2020    Patient is Anabaptist 02/19/2020    Hypokalemia 02/19/2020    Acute bronchitis 02/19/2020    Hypertension     Ganglion cyst of dorsum of left wrist 10/04/2018    Mixed hyperlipidemia 06/19/2018    Atrial fibrillation ([address] 06/19/2018    CAD in native artery 06/01/2018    NSTEMI (non-ST elevated myocardial infarction) ([address] 05/29/2018    Chest pain 05/03/2018    Gastroesophageal reflux disease 03/15/2018    Primary osteoarthritis involving multiple joints 03/15/2018    Psoriasis 03/15/2018    Interstitial cystitis 03/15/2018    Thoracic ascending aortic aneurysm ([address] 03/15/2018    History of colonic polyps 07/02/2015    Varicose veins of legs 01/23/2015    Rotator cuff tear, right 03/25/2013    Vitamin B 12 deficiency 01/07/2013    Obesity 12/12/2012        Assessment and Plan:     Elevated troponin   ~ [facility] 7/7/21 with severe MVD. Grafts patent.   ~ demand ischemia from urethral stone     Coronary artery disease   ~ hx of CABG '18  ~ [facility] 7/7/21 with severe MVD. Patent LIMA>LAD and SVG>D1 grafts. LVEF 65%, no regional wall motion abnormalities   ~ continue aspirin, toprol, statin   ~ denies CP    Ureteral stone   ~ no intervention planned, started on Flomax   ~ per urology     Hyperlipidemia   ~ add on fasting lipids   ~ continue high intensity statin     Pt stable from a cardiac standpoint. Follow up in office in 2-3 months. Multiple medical conditions with risk of decompensation. All pertinent information and plan of care discussed with the physician. All questions and concerns were addressed to the patient. Alternatives to my treatment were discussed. I have discussed the above stated plan with patient and the nurse. The patient verbalized understanding and agreed with the plan. Thank you for allowing to us to participate in the care of Freddy Beltran. Total visit time > 35 minutes; > 50% spend counseling / coordinating care. I reviewed interval history, physical exam, review of data including labs, imaging, development and implementation of treatment plan and coordination of complex care. Counseled on risk factor modifications. Keily Galvan APRN-CNP  [address]   Office: [phone]    NOTE:  This report was transcribed using voice recognition software. Every effort was made to ensure accuracy; however, inadvertent computerized transcription errors may be present.

## 2021-07-08 NOTE — PROGRESS NOTES
Resting quietly in bed, eyes closed, respirations even, responded to verbal stimuli, pleasant. Denies having any nausea, dizziness or abdominal pain. Does c/o chest pressure with deep breathing, denies SOB. S/P cardiac cath, right groin site unremarkable. VSS.   Assessment completed, see [email or web address]

## 2021-07-09 NOTE — PROGRESS NOTES
Received orders for discharge to home. Reviewed discharge instructions to follow up with urology and cardiology in one week, pt to start taking atb and probiotics for 5 days. Information related to kidney stones and post LHC groin management reviewed. Pt as voiced understanding of the above. Tele and IV removed per protocol. Pt transport via W/C to personal vehicle with staff & all belongings.

## 2021-07-09 NOTE — DISCHARGE SUMMARY
[facility] DISCHARGE SUMMARY    Patient Demographics    Patient. Ehsan Sites  Date of Birth. 1935  MRN. [id number]     Primary care provider. Tj Umana MD  (Tel: [phone])    Admit date: 7/5/2021    Discharge date (blank if same as Note Date): 7/8/2021  Note Date: 7/9/2021     Reason for Hospitalization. Chief Complaint   Patient presents with    Shortness of Breath     nausea and vomiting on saturday, sunday woke up weak, no appetite  feels sob, and chest discomfort           Problem-based Hospital Course. Chest pain  -With elevated troponin. Patient underwent left heart cath which was normal.  -Patient medical management discharge stable    UTI with left ureteral stone with hydronephrosis  -Conservative management per urology IV antibiotics and transition to p.o. amoxicillin based on culture    CAD    Consults. IP CONSULT TO HOSPITALIST  IP CONSULT TO CARDIOLOGY  IP CONSULT TO UROLOGY  IP CONSULT TO CARDIOLOGY    Physical examination on discharge day. /82   Pulse 64   Temp 97.6 °F (36.4 °C) (Oral)   Resp 18   Ht 5' 2\" (1.575 m)   Wt 177 lb 9.6 oz (80.6 kg)   SpO2 96%   BMI 32.48 kg/m²   General appearance. Alert. Looks comfortable. HEENT. Sclera clear. Moist mucus membranes. Cardiovascular. Regular rate and rhythm, normal S1, S2. No murmur. Respiratory. Not using accessory muscles. Clear to auscultation bilaterally, no wheeze. Gastrointestinal. Abdomen soft, non-tender, not distended, normal bowel sounds  Neurology. Facial symmetry. No speech deficits. Moving all extremities equally. Extremities. No edema in lower extremities. Skin. Warm, dry, normal turgor    Condition at time of discharge stable     Medication instructions provided to patient at discharge.      Medication List      START taking these medications    amoxicillin 500 MG capsule  Commonly known as: AMOXIL  Take 1 capsule by mouth every 8 hours for 5 days  Notes to patient: ANTIBIOTIC  Use: to treat bacterial infections, (can not treat viral infections)  Side effects: upset stomach, nausea/vomiting, loose stools and loss of appetite. tamsulosin 0.4 MG capsule  Commonly known as: FLOMAX  Take 1 capsule by mouth daily  Notes to patient: Tamsulosin /Flomax  Use: treat an enlarged prostate or urinary retention  Side effects: Feeling dizzy, headache or low blood pressure        CHANGE how you take these medications    cyanocobalamin 100 MCG tablet  Take 1 tablet by mouth daily  What changed: how much to take  Notes to patient: Use: to help with some kinds of anemia or treat or prevent low vitamin B12  Side effects: headache, diarrhea, joint pain        CONTINUE taking these medications    acetaminophen 500 MG tablet  Commonly known as: TYLENOL  Notes to patient: Pain/fever reducer  Side effects: upset stomach   Do not take more than 4,000mg tylenol (acetamenophen) in a 24 hour period   Note: Percocet and Norco have tylenol (acetamenophen) in them     aspirin 81 MG chewable tablet  Take 1 tablet by mouth daily  Notes to patient: Use: reduce chance of heart attack. Side effects: upset stomach, bleeding. atorvastatin 80 MG tablet  Commonly known as: LIPITOR  Notes to patient: Use: lower bad cholesterol  Side effects: headache, muscle pain, constipation, diarrhea     budesonide 32 MCG/ACT nasal spray  Commonly known as: Rhinocort Aqua  2 sprays by Each Nostril route 2 times daily  Notes to patient: Steroid  It can treat Crohn's disease and ulcerative colitis in its oral form. When inhaled it can treat asthma attacks.   Side effects: increased blood sugar     docusate 100 MG Caps  Commonly known as: COLACE, DULCOLAX  Take 100 mg by mouth 2 times daily  Notes to patient: Docusate Sodium (Colace)  Use: stool softener, prevents or treats constipation  Side effects: usually none     metoprolol succinate 50 MG extended release tablet  Commonly known as: TOPROL XL  TAKE 1 TABLET BY MOUTH  DAILY  Notes to patient: Use: decreases the workload of the heart to allow heart to pump easier. Helps lower heart rate. Side effects: dizziness, fatigue     pantoprazole 40 MG tablet  Commonly known as: PROTONIX  TAKE 1 TABLET BY MOUTH EVERY MORNING BEFORE BREAKFAST  Notes to patient: Pantoprozole/ Protonix  Use: reduces stomach acid, protects the digestive tract  Side effects: headache or diarrhea     triamterene-hydroCHLOROthiazide 37.5-25 MG per tablet  Commonly known as: MAXZIDE-25  TAKE 1 TABLET BY MOUTH  DAILY  Notes to patient: Use: treat heart failure, fluid retention, lower blood pressure. Side effects: frequent urination, weakness, muscle cramps, increased sensitivity to light, nausea, and dizziness. Vitamin D3 50 MCG (2000 UT) Caps  Notes to patient: Dietary/vitamin supplement  Side effects: discolored urine            Where to Get Your Medications      These medications were sent to [address] [phone]  [address] [place] [id number]    Phone: [phone]   · amoxicillin 500 MG capsule  · tamsulosin 0.4 MG capsule         Discharge recommendations given to patient. Follow Up. pcp in 1 week   Disposition. home  Activity. activity as tolerated  Diet: No diet orders on file      Spent 45  minutes in discharge process.     Signed:  Linda Dowd MD     7/9/2021 11:26 AM

## 2021-07-12 ENCOUNTER — TELEPHONE (OUTPATIENT)
Dept: FAMILY MEDICINE CLINIC | Age: 86
End: 2021-07-12

## 2021-07-12 NOTE — TELEPHONE ENCOUNTER
[facility] Trans[facility] Initial Follow Up Call    Outreach made within 2 business days of discharge: Yes    Patient: Mayra Harris Patient : 1935   MRN: [id number]  Reason for Admission: There are no discharge diagnoses documented for the most recent discharge. Discharge Date: 21       Spoke with: Arnaldo Curtis    Discharge department/facility: [facility] Interactive Patient Contact:  Was patient able to fill all prescriptions:   Was patient instructed to bring all medications to the follow-up visit:   Is patient taking all medications as directed in the discharge summary?  Yes  Does patient understand their discharge instructions: Yes  Does patient have questions or concerns that need addressed prior to 7-14 day follow up office visit: no    Scheduled appointment with PCP within 7-14 days    Follow Up  Future Appointments   Date Time Provider Sybil Maldonado   2021  2:45 PM RIGOBERTO Beasley - CNP  Card[facility]       [place], [place]

## 2021-07-23 ENCOUNTER — TELEPHONE (OUTPATIENT)
Dept: FAMILY MEDICINE CLINIC | Age: 86
End: 2021-07-23

## 2021-07-23 ENCOUNTER — NURSE TRIAGE (OUTPATIENT)
Dept: OTHER | Facility: CLINIC | Age: 86
End: 2021-07-23

## 2021-07-23 RX ORDER — PANTOPRAZOLE SODIUM 40 MG/1
TABLET, DELAYED RELEASE ORAL
Qty: 90 TABLET | Refills: 2 | Status: SHIPPED | OUTPATIENT
Start: 2021-07-23 | End: 2022-02-10

## 2021-07-23 NOTE — TELEPHONE ENCOUNTER
----- Message from Samara Blackmon sent at 7/23/2021 11:07 AM EDT -----  Subject: Appointment Request    Reason for Call: Routine Hospital Follow Up    QUESTIONS  Type of Appointment? Established Patient  Reason for appointment request? Available appointments did not meet   patient need  Additional Information for Provider? Patient was discharged from [facility] on 7/15 for general illness and blood pressure. She needs a f/u   within a week. Screened green. ---------------------------------------------------------------------------  --------------  Chato FONG  What is the best way for the office to contact you? Do not leave any   message, patient will call back for answer  Preferred Call Back Phone Number? [phone]  ---------------------------------------------------------------------------  --------------  SCRIPT ANSWERS  Relationship to Patient? Self  Appointment reason? Well Care/Follow Ups  Select a Well Care/Follow Ups appointment reason? Adult Hospital Follow Up   [Inpatient Discharge, ClydeaRaleigh Griffiths [address]  (Patient requests to see provider urgently. )? No  (Has the patient been discharged from the hospital within 2 business days   AND does not have a Telephone Encounter  Follow Up From [address] [place] [address]   documented in [address]?)? No  Do you have any questions for your primary care provider that need to be   answered prior to your appointment? (Use RN Triage if question pertains to   anything on the red flag list)? No  (Patient needs follow up visit after hospital discharge) Book first   available appointment within 7 days OF DISCHARGE, if no appt, proceed to   book the next available time slot within 14 days OF DISCHARGE AND Send   Message to Provider. [address] Follow Up appointment cannot be booked   beyond 14 Days and should result in a Message to Provider. ? Yes   Have you been diagnosed with, awaiting test results for, or told that you   are suspected of having COVID-19 (Coronavirus)?  (If patient has tested   negative or was tested as a requirement for work, school, or travel and   not based on symptoms, answer no)? Yes  Did your symptoms begin within the past 14 days or was your positive test   result within the past 14 days? No  Do you currently have flu-like symptoms including fever or chills, cough,   shortness of breath, difficulty breathing, or new loss of taste or smell? No  Have you had close contact with someone with COVID-19 in the last 14 days? No  (Service Expert  click yes below to proceed with [employer] As Usual   Scheduling)?  Yes

## 2021-07-24 DIAGNOSIS — I10 ESSENTIAL HYPERTENSION: ICD-10-CM

## 2021-07-26 RX ORDER — TRIAMTERENE AND HYDROCHLOROTHIAZIDE 37.5; 25 MG/1; MG/1
1 TABLET ORAL DAILY
Qty: 90 TABLET | Refills: 3 | Status: SHIPPED | OUTPATIENT
Start: 2021-07-26 | End: 2022-06-27

## 2021-07-27 DIAGNOSIS — I10 ESSENTIAL HYPERTENSION: ICD-10-CM

## 2021-07-28 ENCOUNTER — OFFICE VISIT (OUTPATIENT)
Dept: FAMILY MEDICINE CLINIC | Age: 86
End: 2021-07-28
Payer: MEDICARE

## 2021-07-28 ENCOUNTER — TELEPHONE (OUTPATIENT)
Dept: FAMILY MEDICINE CLINIC | Age: 86
End: 2021-07-28

## 2021-07-28 VITALS
HEART RATE: 78 BPM | WEIGHT: 176 LBS | DIASTOLIC BLOOD PRESSURE: 80 MMHG | SYSTOLIC BLOOD PRESSURE: 120 MMHG | HEIGHT: 62 IN | OXYGEN SATURATION: 98 % | BODY MASS INDEX: 32.39 KG/M2

## 2021-07-28 DIAGNOSIS — N13.30 HYDRONEPHROSIS, UNSPECIFIED HYDRONEPHROSIS TYPE: ICD-10-CM

## 2021-07-28 DIAGNOSIS — N21.1 URETHRAL STONE: ICD-10-CM

## 2021-07-28 DIAGNOSIS — Z09 HOSPITAL DISCHARGE FOLLOW-UP: Primary | ICD-10-CM

## 2021-07-28 DIAGNOSIS — N30.01 ACUTE CYSTITIS WITH HEMATURIA: ICD-10-CM

## 2021-07-28 LAB
BILIRUBIN, POC: ABNORMAL
BLOOD URINE, POC: ABNORMAL
CLARITY, POC: ABNORMAL
COLOR, POC: YELLOW
GLUCOSE URINE, POC: ABNORMAL
KETONES, POC: ABNORMAL
LEUKOCYTE EST, POC: ABNORMAL
NITRITE, POC: ABNORMAL
PH, POC: 7
PROTEIN, POC: ABNORMAL
SPECIFIC GRAVITY, POC: 1.02
UROBILINOGEN, POC: 0.2

## 2021-07-28 PROCEDURE — G8427 DOCREV CUR MEDS BY ELIG CLIN: HCPCS | Performed by: NURSE PRACTITIONER

## 2021-07-28 PROCEDURE — 1090F PRES/ABSN URINE INCON ASSESS: CPT | Performed by: NURSE PRACTITIONER

## 2021-07-28 PROCEDURE — 1123F ACP DISCUSS/DSCN MKR DOCD: CPT | Performed by: NURSE PRACTITIONER

## 2021-07-28 PROCEDURE — G8399 PT W/DXA RESULTS DOCUMENT: HCPCS | Performed by: NURSE PRACTITIONER

## 2021-07-28 PROCEDURE — 1111F DSCHRG MED/CURRENT MED MERGE: CPT | Performed by: NURSE PRACTITIONER

## 2021-07-28 PROCEDURE — G8417 CALC BMI ABV UP PARAM F/U: HCPCS | Performed by: NURSE PRACTITIONER

## 2021-07-28 PROCEDURE — 99214 OFFICE O/P EST MOD 30 MIN: CPT | Performed by: NURSE PRACTITIONER

## 2021-07-28 PROCEDURE — 1036F TOBACCO NON-USER: CPT | Performed by: NURSE PRACTITIONER

## 2021-07-28 PROCEDURE — 81002 URINALYSIS NONAUTO W/O SCOPE: CPT | Performed by: NURSE PRACTITIONER

## 2021-07-28 PROCEDURE — 4040F PNEUMOC VAC/ADMIN/RCVD: CPT | Performed by: NURSE PRACTITIONER

## 2021-07-28 RX ORDER — METOPROLOL SUCCINATE 50 MG/1
TABLET, EXTENDED RELEASE ORAL
Qty: 90 TABLET | Refills: 3 | Status: SHIPPED | OUTPATIENT
Start: 2021-07-28 | End: 2022-06-30

## 2021-07-28 SDOH — ECONOMIC STABILITY: TRANSPORTATION INSECURITY
IN THE PAST 12 MONTHS, HAS THE LACK OF TRANSPORTATION KEPT YOU FROM MEDICAL APPOINTMENTS OR FROM GETTING MEDICATIONS?: NO

## 2021-07-28 SDOH — ECONOMIC STABILITY: FOOD INSECURITY: WITHIN THE PAST 12 MONTHS, YOU WORRIED THAT YOUR FOOD WOULD RUN OUT BEFORE YOU GOT MONEY TO BUY MORE.: NEVER TRUE

## 2021-07-28 SDOH — ECONOMIC STABILITY: TRANSPORTATION INSECURITY
IN THE PAST 12 MONTHS, HAS LACK OF TRANSPORTATION KEPT YOU FROM MEETINGS, WORK, OR FROM GETTING THINGS NEEDED FOR DAILY LIVING?: NO

## 2021-07-28 SDOH — ECONOMIC STABILITY: FOOD INSECURITY: WITHIN THE PAST 12 MONTHS, THE FOOD YOU BOUGHT JUST DIDN'T LAST AND YOU DIDN'T HAVE MONEY TO GET MORE.: NEVER TRUE

## 2021-07-28 ASSESSMENT — ENCOUNTER SYMPTOMS
CHEST TIGHTNESS: 1
SHORTNESS OF BREATH: 0
NAUSEA: 0
ABDOMINAL PAIN: 0
DIARRHEA: 0
SINUS PAIN: 0
ABDOMINAL DISTENTION: 0
COLOR CHANGE: 0
CONSTIPATION: 0
COUGH: 0
EYE DISCHARGE: 0
SINUS PRESSURE: 0
BACK PAIN: 0

## 2021-07-28 ASSESSMENT — SOCIAL DETERMINANTS OF HEALTH (SDOH): HOW HARD IS IT FOR YOU TO PAY FOR THE VERY BASICS LIKE FOOD, HOUSING, MEDICAL CARE, AND HEATING?: NOT HARD AT ALL

## 2021-07-28 NOTE — TELEPHONE ENCOUNTER
PT SAW UROLOGY 10 DAYS AGO AND URINE SAMPLE WAS TAKEN, SHE CAME HOME TODAY TO A MESSAGE FROM THE UROLOGY GROUP STATING THEY SENT OVER AN ANTIBIOTIC AND SHE DID NOT WANT LUZMA TO DUPLICATE MEDS THIS IS JUST AN FYI. Prashant Mcclendon

## 2021-07-28 NOTE — TELEPHONE ENCOUNTER
No other med was prescribed for the urine specimen. We are awaiting urine culture and pt was encouraged to check in with urology. Julia Galla could potentially increase the risk of kidney stones but would not recommend protonix twice daily as that is not appropriate to stay on long term without esophagitis or ongoing stomach ulcers/bleeding concerns. Could you try Maalox or Rolaids when the indigestion is not tolerable and replace TUMS with those.

## 2021-07-28 NOTE — PATIENT INSTRUCTIONS
Call [address] to schedule kidney ultrasound    Call urology to find out when appointment follow up is. We will call you with urine culture results    Avoid/limit 4 Cs- carbonation, caffeine, citrus, and chocolate as these are bladder irritants    Urinate before and after sexual intercourse    Push fluids, water is best    Wipe front to back    Use mild unscented soap for ankur area    Avoid bubble baths, keep baths short    Minimize or avoid hot tub use    Take antibiotic in its entirety even if feeling better    Patient Education        Urinary Tract Infection (UTI) in Women: Care Instructions  Overview     A urinary tract infection, or UTI, is a general term for an infection anywhere between the kidneys and the urethra (where urine comes out). Most UTIs are bladder infections. They often cause pain or burning when you urinate. UTIs are caused by bacteria and can be cured with antibiotics. Be sure to complete your treatment so that the infection does not get worse. Follow-up care is a key part of your treatment and safety. Be sure to make and go to all appointments, and call your doctor if you are having problems. It's also a good idea to know your test results and keep a list of the medicines you take. How can you care for yourself at home? · Take your antibiotics as directed. Do not stop taking them just because you feel better. You need to take the full course of antibiotics. · Drink extra water and other fluids for the next day or two. This will help make the urine less concentrated and help wash out the bacteria that are causing the infection. (If you have kidney, heart, or liver disease and have to limit fluids, talk with your doctor before you increase the amount of fluids you drink.)  · Avoid drinks that are carbonated or have caffeine. They can irritate the bladder. · Urinate often. Try to empty your bladder each time.   · To relieve pain, take a hot bath or lay a heating pad set on low over your lower belly or genital area. Never go to sleep with a heating pad in place. To prevent UTIs  · Drink plenty of water each day. This helps you urinate often, which clears bacteria from your system. (If you have kidney, heart, or liver disease and have to limit fluids, talk with your doctor before you increase the amount of fluids you drink.)  · Urinate when you need to. · If you are sexually active, urinate right after you have sex. · Change sanitary pads often. · Avoid douches, bubble baths, feminine hygiene sprays, and other feminine hygiene products that have deodorants. · After going to the bathroom, wipe from front to back. When should you call for help? Call your doctor now or seek immediate medical care if:    · Symptoms such as fever, chills, nausea, or vomiting get worse or appear for the first time.     · You have new pain in your back just below your rib cage. This is called flank pain.     · There is new blood or pus in your urine.     · You have any problems with your antibiotic medicine. Watch closely for changes in your health, and be sure to contact your doctor if:    · You are not getting better after taking an antibiotic for 2 days.     · Your symptoms go away but then come back. Where can you learn more? Go to [email or web address].[email or web address] org and sign in to your [email or web address] account. Enter B767 in the [facility] box to learn more about \"Urinary Tract Infection (UTI) in Women: Care Instructions. \"     If you do not have an account, please click on the \"Sign Up Now\" link. Current as of: February 10, 2021               Content Version: 12.9  © 1741-4539 [employer]. Care instructions adapted under license by [address] If you have questions about a medical condition or this instruction, always ask your healthcare professional. [place] [address] any warranty or liability for your use of this information.          Patient Education Learning About Hydronephrosis  What is hydronephrosis? Hydronephrosis is swelling of the kidneys. It is caused by a buildup of urine. This condition can happen if a tube that drains urine from your kidneys is blocked. The blockage can come from within the urinary tract or from pressure outside of the tract. Pregnancy is an example of an outside (external) cause. This condition is often caused by a blockage such as a kidney stone, tumor, or blood clot. It also can be caused by a problem in your urinary system that you were born with (congenital problem). What are the symptoms? Some of the common symptoms are:  · Pain in one or both sides. · Stomach pain. · Blood in your urine. Some people have no symptoms. How is it diagnosed? Your doctor will do an ultrasound to look for a blockage in your urinary system. An ultrasound allows your doctor to see a picture of the organs and other structures in your belly (abdomen). You also may need blood and urine tests. How is it treated? Your treatment depends on the cause of the swelling. If it is caused by a blockage, your treatment will depend on the type of blockage you have. If the blockage is caused by a kidney stone, you may wait for the stone to pass. If hydronephrosis happens during pregnancy, it usually clears up on its own. You may need to have urine drained from your bladder or kidneys. A urinary catheter is a small, flexible tube that can be inserted through the urethra and into the bladder, allowing urine to drain. A nephrostomy catheter is a thin tube placed into your kidney to drain urine. Sometimes surgery is needed to clear the blockage. If you have a blockage, you should begin to feel better after the blockage is gone. Many people recover and have no long-term problems. But some may have kidney damage. If hydronephrosis was left untreated for a long time, the damage can be severe. Severe damage will require further treatment.   Follow-up care is a key part of your treatment and safety. Be sure to make and go to all appointments, and call your doctor if you are having problems. It's also a good idea to know your test results and keep a list of the medicines you take. Where can you learn more? Go to https://chpepiceweb.[email or web address] org and sign in to your [employer] account. Enter S386 in the [facility] box to learn more about \"Learning About Hydronephrosis. \"     If you do not have an account, please click on the \"Sign Up Now\" link. Current as of: December 17, 2020               Content Version: 12.9  © 3683-5570 [employer]. Care instructions adapted under license by [facility] ([facility]). If you have questions about a medical condition or this instruction, always ask your healthcare professional. [address] any warranty or liability for your use of this information.

## 2021-07-28 NOTE — TELEPHONE ENCOUNTER
----- Message from Chantell Cadena sent at 7/28/2021  1:09 PM EDT -----  Subject: Medication Problem    QUESTIONS  Name of Medication? pantoprazole (PROTONIX) 40 MG tablet     Patient-reported dosage and instructions? 1 per day    What question or problem do you have with the medication? Owen Nguyen would like to know if she is allowed to continue to take the tums or if the tums are causing the kidney stones due to this medication. She is wanting to know if she can take 2 of the pantoprazole to replace the tums. Preferred Pharmacy? [facility] [address] [place] [address] Saul Tolentino 7 [phone] - [phone]  Pharmacy phone number (if available)? [phone]  Additional Information for Provider?   ---------------------------------------------------------------------------  --------------  [address]  What is the best way for the office to contact you? OK to leave message on voicemail  Preferred Call Back Phone Number? [phone]  ---------------------------------------------------------------------------  --------------  SCRIPT ANSWERS  Relationship to Patient?  Self

## 2021-07-28 NOTE — PROGRESS NOTES
Date of Service:  2021    Federico Vera (:  1935) is a 82 y.o. female, here for evaluation of the following medical concerns:    Chief Complaint   Patient presents with    Follow-Up from Hospital     hospital follow up- chest pain 2021[facility], balance still feels to be off and the fatigue         HPI     Hospital Discharge Follow Up for Chest Pain, discharged 20 days ago  Pt went to hospital on  with SOB, N/V, weakness, anorexia, and chest discomfort. Pt had L heart cath during visit which was normal. Pt has hx CABG 3 years ago. Pt also dx with UTI and stone and hydronephrosis. Following with urology- urology group in [facility]. Pt no longer on flomax that was prescribed during hospitalization and about 4 days after but caused too many side effects, urinary frequency. Pt feels she is still having symptoms of UTI- severe pain with urination but not with every void. Urology followed while hospitalized and thought the 3mm stone could potentially pass without intervention. Follow up with urology, but pt unsure of date. Repeat UA/cx today to rule out infection. 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 Not on long-term prophylactic abx. Pt was hospitalized 3 nights, discharged . Follow up with cardiology in September. Plan for ultrasound of kidney as well per pt in near future, awaiting scheduling. Reports continued chest heaviness. Cardiac workup was normal. Continued left flank pain at times and lower back pain at times, but has chronic back pain issues. Review of Systems   Constitutional: Negative for activity change, appetite change, fatigue, fever and unexpected weight change. HENT: Negative for congestion, ear pain, sinus pressure and sinus pain. Eyes: Negative for discharge and visual disturbance. Respiratory: Positive for chest tightness. Negative for cough and shortness of breath. Cardiovascular: Negative for chest pain, palpitations and leg swelling. DULCOLAX) 100 MG CAPS Take 100 mg by mouth 2 times daily Yes Markie Larsen APRN - CNP   Cholecalciferol (VITAMIN D3) 2000 units CAPS Take 2,000 Units by mouth daily  Yes Phuong Marti MD   aspirin 81 MG chewable tablet Take 1 tablet by mouth daily Yes Medina Sullivan MD   metoprolol succinate (TOPROL XL) 50 MG extended release tablet TAKE 1 TABLET BY MOUTH  DAILY  RIGOBERTO Marte - CNP        Social History     Tobacco Use    Smoking status: Former Smoker     Years: 7.00     Types: Cigarettes     Quit date: 5/3/1960     Years since quittin.2    Smokeless tobacco: Never Used    Tobacco comment: AS A TEENAGER FOR ABOUT 6 YEARS    Substance Use Topics    Alcohol use: No        Vitals:    21 0958   BP: 120/80   Site: Right Upper Arm   Position: Sitting   Cuff Size: Large Adult   Pulse: 78   SpO2: 98%   Weight: 176 lb (79.8 kg)   Height: 5' 2\" (1.575 m)     Estimated body mass index is 32.19 kg/m² as calculated from the following:    Height as of this encounter: 5' 2\" (1.575 m). Weight as of this encounter: 176 lb (79.8 kg). Physical Exam  Vitals reviewed. Constitutional:       General: She is awake. Appearance: Normal appearance. She is well-developed and well-groomed. She is obese. She is not ill-appearing. HENT:      Head: Normocephalic and atraumatic. Right Ear: Hearing, tympanic membrane, ear canal and external ear normal.      Left Ear: Hearing, tympanic membrane, ear canal and external ear normal.      Nose: Nose normal.      Mouth/Throat:      Lips: Pink. Mouth: Mucous membranes are moist.      Pharynx: Oropharynx is clear. Eyes:      General: Lids are normal.      Extraocular Movements: Extraocular movements intact. Conjunctiva/sclera: Conjunctivae normal.      Pupils: Pupils are equal, round, and reactive to light. Neck:      Thyroid: No thyromegaly. Vascular: No carotid bruit. Cardiovascular:      Rate and Rhythm: Normal rate. Pulses:           Carotid pulses are 2+ on the right side and 2+ on the left side. Radial pulses are 2+ on the right side and 2+ on the left side. Posterior tibial pulses are 2+ on the right side and 2+ on the left side. Heart sounds: Normal heart sounds, S1 normal and S2 normal. No murmur heard. Pulmonary:      Effort: Pulmonary effort is normal.      Breath sounds: Normal breath sounds. Comments: SOB with exertion  Abdominal:      General: Bowel sounds are normal. There is no abdominal bruit. Palpations: Abdomen is soft. Tenderness: There is no abdominal tenderness. Comments: Mild flank pain/pressure   Genitourinary:     Comments: Deferred  Musculoskeletal:         General: Normal range of motion. Cervical back: Full passive range of motion without pain, normal range of motion and neck supple. Right lower leg: No edema. Left lower leg: No edema. Lymphadenopathy:      Head:      Right side of head: No submental, submandibular, tonsillar, preauricular, posterior auricular or occipital adenopathy. Left side of head: No submental, submandibular, tonsillar, preauricular, posterior auricular or occipital adenopathy. Cervical: No cervical adenopathy. Right cervical: No superficial, deep or posterior cervical adenopathy. Left cervical: No superficial, deep or posterior cervical adenopathy. Upper Body:      Right upper body: No supraclavicular adenopathy. Left upper body: No supraclavicular adenopathy. Skin:     General: Skin is warm and dry. Capillary Refill: Capillary refill takes less than 2 seconds. Neurological:      General: No focal deficit present. Mental Status: She is alert and oriented to person, place, and time. Mental status is at baseline. Sensory: Sensation is intact. Motor: Motor function is intact. Coordination: Coordination is intact. Gait: Gait is intact.    Psychiatric: Attention and Perception: Attention and perception normal.         Mood and Affect: Mood and affect normal.         Speech: Speech normal.         Behavior: Behavior normal. Behavior is cooperative. Thought Content: Thought content normal.         Cognition and Memory: Cognition and memory normal.         Judgment: Judgment normal.         ASSESSMENT/PLAN:  1. Hospital discharge follow-up   Was diagnosed with UTI urethral stone and hydronephrosis   Following with cardiology and urology  2. Acute cystitis with hematuria  -     POCT Urinalysis no Micro  -     Culture, Urine; Future   + mod blood and small leukocytes   Will send for cx   Encouraged pt to follow up with urology with results  3. Urethral stone  -     POCT Urinalysis no Micro  -     Culture, Urine; Future   Encouraged flomax to be used when pain severe with urination, could be trying to pass stone  4. Hydronephrosis, unspecified hydronephrosis type  -     POCT Urinalysis no Micro  -     Culture, Urine; Future    Renal US follow up ordered, needs to be scheduled, scheduling info given      Care Gaps Addressed  Call insurance company to discuss coverage for shingles vaccine (Shingrix) 2 dose series   AWV due end of October    I have reviewed patient's pertinent medical history, relevant laboratory and imaging studies, and past/future health maintenance. Discussed with the patient the importance of adhering to their current medication regimen as directed. Advised the patient that they should continue to work on eating a healthy balanced diet and staying active by exercising within their personal limits. Orders as listed above. Patient was advised to keep future appointments with their respective specialty care team(s). Patient had the opportunity to ask questions, all of which were answered to the best of my ability and with patient satisfaction. Patient understands and is agreeable with the care plan following today's visit.  Patient is to schedule an appointment for any new or worsening symptoms. Go to ER for significant shortness of breath, chest pain, or uncontrolled pain or fever. I discussed with patient the risk and benefits of any medications that were prescribed today. I verified that the patient understands their medications, labs, and/or procedures. The patient is doing well with current medication regimen and does not have any barriers to adherence. The patient's self-management abilities are good. Return in about 3 months (around 10/29/2021) for Annual Wellness Visit, HTN Follow Up. An electronic signature was used to authenticate this note.     --RIGOBERTO Parson - CNP on 7/28/2021 at 10:37 AM

## 2021-07-28 NOTE — TELEPHONE ENCOUNTER
[address]  [facility] [address]  Practice Staff  Subject: Message to Provider     QUESTIONS   Information for Provider? Dominick Madison had her urine   tested this morning and is no longer needing the prescription you prescribed for her for the previous urine screening (outside from today). ---------------------------------------------------------------------------   --------------   [facility]   What is the best way for the office to contact you? OK to leave message on voicemail   Preferred Call Back Phone Number? [phone]   ---------------------------------------------------------------------------   --------------   SCRIPT ANSWERS   Relationship to Patient?  Self

## 2021-07-30 LAB
ORGANISM: ABNORMAL
URINE CULTURE, ROUTINE: ABNORMAL

## 2021-08-09 ENCOUNTER — HOSPITAL ENCOUNTER (OUTPATIENT)
Dept: ULTRASOUND IMAGING | Age: 86
Discharge: HOME | End: 2021-08-09
Payer: MEDICARE

## 2021-08-09 DIAGNOSIS — R31.9 HEMATURIA, UNSPECIFIED TYPE: ICD-10-CM

## 2021-08-09 DIAGNOSIS — I10 HYPERTENSION WITH ALBUMINURIA: ICD-10-CM

## 2021-08-09 DIAGNOSIS — N30.00 ACUTE CYSTITIS WITHOUT HEMATURIA: ICD-10-CM

## 2021-08-09 DIAGNOSIS — N30.10 INTERSTITIAL CYSTITIS (CHRONIC) WITHOUT HEMATURIA: ICD-10-CM

## 2021-08-09 DIAGNOSIS — N94.9 DISEASE OF FEMALE GENITAL ORGANS: ICD-10-CM

## 2021-08-09 DIAGNOSIS — N81.6: ICD-10-CM

## 2021-08-09 DIAGNOSIS — N20.0 CALCULUS, KIDNEY: ICD-10-CM

## 2021-08-09 DIAGNOSIS — R80.9 HYPERTENSION WITH ALBUMINURIA: ICD-10-CM

## 2021-08-09 PROCEDURE — 76770 US EXAM ABDO BACK WALL COMP: CPT

## 2021-08-26 RX ORDER — CALCIUM POLYCARBOPHIL 625 MG 625 MG/1
625 TABLET ORAL DAILY
COMMUNITY
End: 2021-12-27 | Stop reason: ALTCHOICE

## 2021-08-26 NOTE — PROGRESS NOTES
Name_______________________________________Printed:____________________  Date and time of surgery__09/01/21___1115___________________Arrival Time:___0945___MAIN__________   1. The instructions given regarding when and if a patient needs to stop oral intake prior to surgery varies. Follow the specific instructions you were given                  _X__Nothing to eat or to drink after Midnight the night before.                   ____Carbo loading or ERAS instructions will be given to select patients-if you have been given those instructions -please do the following                           The evening before your surgery after dinner before midnight drink 40 ounces of gatorade. If you are diabetic use sugar free. The morning of surgery drink 40 ounces of water. This needs to be finished 3 hours prior to your surgery start time. 2. Take the following pills with a small sip of water on the morning of surgery__Metoprolol___Protonix______________________________________________                  Do not take blood pressure medications ending in pril or sartan the sheri prior to surgery or the morning of surgery_   3. Aspirin, Ibuprofen, Advil, Naproxen, Vitamin E and other Anti-inflammatory products and supplements should be stopped for 5 -7days before surgery or as directed by your physician. 4. Check with your Doctor regarding stopping Plavix, Coumadin,Eliquis, Lovenox,Effient,Pradaxa,Xarelto, Fragmin or other blood thinners and follow their instructions. 5. Do not smoke, and do not drink any alcoholic beverages 24 hours prior to surgery. This includes NA Beer. Refrain from the usage of any recreational drugs. 6. You may brush your teeth and gargle the morning of surgery. DO NOT SWALLOW WATER   7. You MUST make arrangements for a responsible adult to stay on site while you are here and take you home after your surgery. You will not be allowed to leave alone or drive yourself home.   It is strongly suggested someone stay with you the first 24 hrs. Your surgery will be cancelled if you do not have a ride home. 8. A parent/legal guardian must accompany a child scheduled for surgery and plan to stay at the hospital until the child is discharged. Please do not bring other children with you. 9. Please wear simple, loose fitting clothing to the hospital.  Joesph Solid not bring valuables (money, credit cards, checkbooks, etc.) Do not wear any makeup (including no eye makeup) or nail polish on your fingers or toes. 10. DO NOT wear any jewelry or piercings on day of surgery. All body piercing jewelry must be removed. 11. If you have _X__dentures, they will be removed before going to the OR; we will provide you a container. If you wear ___contact lenses or _X__glasses, they will be removed; please bring a case for them. 12. Please see your family doctor/pediatrician for a history & physical and/or concerning medications. Bring any test results/reports from your physician's office. PCP__________________Phone___________H&P Appt. Date________             13 If you  have a Living Will and Durable Power of  for Healthcare, please bring in a copy. 15. Notify your Surgeon if you develop any illness between now and surgery  time, cough, cold, fever, sore throat, nausea, vomiting, etc.  Please notify your surgeon if you experience dizziness, shortness of breath or blurred vision between now & the time of your surgery             15. DO NOT shave your operative site 96 hours prior to surgery. For face & neck surgery, men may use an electric razor 48 hours prior to surgery. 16. Shower the night before or morning of surgery using an antibacterial soap or as you have been instructed. 17. To provide excellent care visitors will be limited to one in the room at any given time. 18.  Please bring picture ID and insurance card.              19.  Visit our web site for additional information:  [email or web address]              20.During flu season no children under the age of 15 are permitted in the hospital for the safety of all patients. 21. If you take a long acting insulin in the evening only  take half of your usual  dose the night  before your procedure              22. If you use a c-pap please bring DOS if staying overnight,             23.For your convenience [facility] has a pharmacy on site to fill your prescriptions. 24. If you use oxygen and have a portable tank please bring it  with you the DOS             25. Bring a complete list of all your medications with name and dose include any supplements. 26. Other__________________________________________   *Please call pre admission testing if you any further questions   Dulce Rhodes   [address]    [place] [phone] [place]  [phone]   [facility] [address] NIKKI DOBSON   [phone]           COVID TESTING    _X__ Vaccinated-patient instructed to bring card    ___ Covid test to be done 3-5 days prior to scheduled surgery if not vaccinated-patient aware they are REQUIRED to bring a copy of the negative result DOS-if they receive a positive result to notify their surgeon         If known - indicate where patient is getting covid test done ___________________________________________________________    ___ [facility] - DOS    ___ Other___________________________________      [place] POLICY(subject to change)    There is a one visitor policy at [facility] for all surgeries and endoscopies. Whether the visitor can stay or will be asked to wait in the car will depend on the current policy and if social distancing can be maintained. The policy is subject to change at any time. Please make sure the visitor has a cell phone that is on,charged and able to accept calls, as this may be the way that the staff communicates with them. Pain management is NO VISITOR policyThe patients ride is expected to remain in the car with a cell phone for communication. If the ride is leaving the hospital grounds please make sure they are back in time for pickup. Have the patient inform the staff on arrival what their rides plans are while the patient is in the facility. At the MAIN there is one visitor allowed. Please note that the visitor policy is subject to change. All above information reviewed with patient in person or by phone. Patient verbalizes understanding. All questions and concerns addressed.                                                                                                  Patient/Rep__Patient__________________                                                                                                                                    PRE OP INSTRUCTIONS

## 2021-09-01 ENCOUNTER — HOSPITAL ENCOUNTER (OUTPATIENT)
Age: 86
Setting detail: OUTPATIENT SURGERY
Discharge: HOME | End: 2021-09-01
Attending: UROLOGY | Admitting: UROLOGY
Payer: MEDICARE

## 2021-09-01 ENCOUNTER — ANESTHESIA (OUTPATIENT)
Dept: OPERATING ROOM | Age: 86
End: 2021-09-01
Payer: MEDICARE

## 2021-09-01 ENCOUNTER — ANESTHESIA EVENT (OUTPATIENT)
Dept: OPERATING ROOM | Age: 86
End: 2021-09-01
Payer: MEDICARE

## 2021-09-01 ENCOUNTER — APPOINTMENT (OUTPATIENT)
Dept: GENERAL RADIOLOGY | Age: 86
End: 2021-09-01
Attending: UROLOGY
Payer: MEDICARE

## 2021-09-01 VITALS
OXYGEN SATURATION: 98 % | RESPIRATION RATE: 2 BRPM | DIASTOLIC BLOOD PRESSURE: 64 MMHG | SYSTOLIC BLOOD PRESSURE: 139 MMHG

## 2021-09-01 VITALS
HEIGHT: 62 IN | BODY MASS INDEX: 31.65 KG/M2 | RESPIRATION RATE: 15 BRPM | SYSTOLIC BLOOD PRESSURE: 165 MMHG | TEMPERATURE: 97 F | OXYGEN SATURATION: 96 % | WEIGHT: 172.01 LBS | HEART RATE: 70 BPM | DIASTOLIC BLOOD PRESSURE: 77 MMHG

## 2021-09-01 DIAGNOSIS — N20.1 CALCULUS OF URETER: Primary | ICD-10-CM

## 2021-09-01 LAB
A/G RATIO: 1.7 (ref 1.1–2.2)
ALBUMIN SERPL-MCNC: 4.2 G/DL (ref 3.4–5)
ALP BLD-CCNC: 80 U/L (ref 40–129)
ALT SERPL-CCNC: 22 U/L (ref 10–40)
ANION GAP SERPL CALCULATED.3IONS-SCNC: 10 MMOL/L (ref 3–16)
AST SERPL-CCNC: 23 U/L (ref 15–37)
BILIRUB SERPL-MCNC: 1.7 MG/DL (ref 0–1)
BUN BLDV-MCNC: 13 MG/DL (ref 7–20)
CALCIUM SERPL-MCNC: 9.8 MG/DL (ref 8.3–10.6)
CHLORIDE BLD-SCNC: 104 MMOL/L (ref 99–110)
CO2: 27 MMOL/L (ref 21–32)
CREAT SERPL-MCNC: 0.6 MG/DL (ref 0.6–1.2)
GFR AFRICAN AMERICAN: >60
GFR NON-AFRICAN AMERICAN: >60
GLOBULIN: 2.5 G/DL
GLUCOSE BLD-MCNC: 100 MG/DL (ref 70–99)
HCT VFR BLD CALC: 40.4 % (ref 36–48)
HEMOGLOBIN: 13.7 G/DL (ref 12–16)
MCH RBC QN AUTO: 31.2 PG (ref 26–34)
MCHC RBC AUTO-ENTMCNC: 33.8 G/DL (ref 31–36)
MCV RBC AUTO: 92.3 FL (ref 80–100)
PDW BLD-RTO: 14.2 % (ref 12.4–15.4)
PLATELET # BLD: 191 K/UL (ref 135–450)
PMV BLD AUTO: 8.8 FL (ref 5–10.5)
POTASSIUM SERPL-SCNC: 3.9 MMOL/L (ref 3.5–5.1)
RBC # BLD: 4.37 M/UL (ref 4–5.2)
SODIUM BLD-SCNC: 141 MMOL/L (ref 136–145)
TOTAL PROTEIN: 6.7 G/DL (ref 6.4–8.2)
WBC # BLD: 5.4 K/UL (ref 4–11)

## 2021-09-01 PROCEDURE — C1769 GUIDE WIRE: HCPCS | Performed by: UROLOGY

## 2021-09-01 PROCEDURE — 2580000003 HC RX 258: Performed by: UROLOGY

## 2021-09-01 PROCEDURE — 2709999900 HC NON-CHARGEABLE SUPPLY: Performed by: UROLOGY

## 2021-09-01 PROCEDURE — 3700000000 HC ANESTHESIA ATTENDED CARE: Performed by: UROLOGY

## 2021-09-01 PROCEDURE — 6360000004 HC RX CONTRAST MEDICATION: Performed by: UROLOGY

## 2021-09-01 PROCEDURE — 6360000002 HC RX W HCPCS: Performed by: ANESTHESIOLOGY

## 2021-09-01 PROCEDURE — 7100000010 HC PHASE II RECOVERY - FIRST 15 MIN: Performed by: UROLOGY

## 2021-09-01 PROCEDURE — 74420 UROGRAPHY RTRGR +-KUB: CPT

## 2021-09-01 PROCEDURE — 82365 CALCULUS SPECTROSCOPY: CPT

## 2021-09-01 PROCEDURE — 7100000000 HC PACU RECOVERY - FIRST 15 MIN: Performed by: UROLOGY

## 2021-09-01 PROCEDURE — 2500000003 HC RX 250 WO HCPCS: Performed by: NURSE ANESTHETIST, CERTIFIED REGISTERED

## 2021-09-01 PROCEDURE — 3700000001 HC ADD 15 MINUTES (ANESTHESIA): Performed by: UROLOGY

## 2021-09-01 PROCEDURE — 2580000003 HC RX 258

## 2021-09-01 PROCEDURE — 3600000004 HC SURGERY LEVEL 4 BASE: Performed by: UROLOGY

## 2021-09-01 PROCEDURE — 2720000010 HC SURG SUPPLY STERILE: Performed by: UROLOGY

## 2021-09-01 PROCEDURE — 6360000002 HC RX W HCPCS: Performed by: UROLOGY

## 2021-09-01 PROCEDURE — 7100000001 HC PACU RECOVERY - ADDTL 15 MIN: Performed by: UROLOGY

## 2021-09-01 PROCEDURE — 7100000011 HC PHASE II RECOVERY - ADDTL 15 MIN: Performed by: UROLOGY

## 2021-09-01 PROCEDURE — 3600000014 HC SURGERY LEVEL 4 ADDTL 15MIN: Performed by: UROLOGY

## 2021-09-01 PROCEDURE — 6360000002 HC RX W HCPCS: Performed by: NURSE ANESTHETIST, CERTIFIED REGISTERED

## 2021-09-01 PROCEDURE — 36415 COLL VENOUS BLD VENIPUNCTURE: CPT

## 2021-09-01 PROCEDURE — 80053 COMPREHEN METABOLIC PANEL: CPT

## 2021-09-01 PROCEDURE — 85027 COMPLETE CBC AUTOMATED: CPT

## 2021-09-01 PROCEDURE — 6360000002 HC RX W HCPCS

## 2021-09-01 RX ORDER — PHENAZOPYRIDINE HYDROCHLORIDE 100 MG/1
100 TABLET, FILM COATED ORAL 3 TIMES DAILY PRN
Qty: 9 TABLET | Refills: 0 | Status: SHIPPED | OUTPATIENT
Start: 2021-09-01 | End: 2021-09-04

## 2021-09-01 RX ORDER — ONDANSETRON 2 MG/ML
INJECTION INTRAMUSCULAR; INTRAVENOUS PRN
Status: DISCONTINUED | OUTPATIENT
Start: 2021-09-01 | End: 2021-09-01 | Stop reason: SDUPTHER

## 2021-09-01 RX ORDER — FENTANYL CITRATE 50 UG/ML
INJECTION, SOLUTION INTRAMUSCULAR; INTRAVENOUS PRN
Status: DISCONTINUED | OUTPATIENT
Start: 2021-09-01 | End: 2021-09-01 | Stop reason: SDUPTHER

## 2021-09-01 RX ORDER — SODIUM CHLORIDE, SODIUM LACTATE, POTASSIUM CHLORIDE, CALCIUM CHLORIDE 600; 310; 30; 20 MG/100ML; MG/100ML; MG/100ML; MG/100ML
INJECTION, SOLUTION INTRAVENOUS CONTINUOUS
Status: DISCONTINUED | OUTPATIENT
Start: 2021-09-01 | End: 2021-09-01 | Stop reason: HOSPADM

## 2021-09-01 RX ORDER — AMOXICILLIN 250 MG
1 CAPSULE ORAL 2 TIMES DAILY
Qty: 30 TABLET | Refills: 1 | Status: SHIPPED | OUTPATIENT
Start: 2021-09-01 | End: 2021-09-14

## 2021-09-01 RX ORDER — MAGNESIUM HYDROXIDE 1200 MG/15ML
LIQUID ORAL
Status: COMPLETED | OUTPATIENT
Start: 2021-09-01 | End: 2021-09-01

## 2021-09-01 RX ORDER — CIPROFLOXACIN 2 MG/ML
400 INJECTION, SOLUTION INTRAVENOUS
Status: COMPLETED | OUTPATIENT
Start: 2021-09-01 | End: 2021-09-01

## 2021-09-01 RX ORDER — SODIUM CHLORIDE 0.9 % (FLUSH) 0.9 %
10 SYRINGE (ML) INJECTION EVERY 12 HOURS SCHEDULED
Status: DISCONTINUED | OUTPATIENT
Start: 2021-09-01 | End: 2021-09-01 | Stop reason: HOSPADM

## 2021-09-01 RX ORDER — PROPOFOL 10 MG/ML
INJECTION, EMULSION INTRAVENOUS PRN
Status: DISCONTINUED | OUTPATIENT
Start: 2021-09-01 | End: 2021-09-01 | Stop reason: SDUPTHER

## 2021-09-01 RX ORDER — LIDOCAINE HYDROCHLORIDE 20 MG/ML
INJECTION, SOLUTION EPIDURAL; INFILTRATION; INTRACAUDAL; PERINEURAL PRN
Status: DISCONTINUED | OUTPATIENT
Start: 2021-09-01 | End: 2021-09-01 | Stop reason: SDUPTHER

## 2021-09-01 RX ORDER — LIDOCAINE HYDROCHLORIDE 10 MG/ML
0.5 INJECTION, SOLUTION EPIDURAL; INFILTRATION; INTRACAUDAL; PERINEURAL ONCE
Status: DISCONTINUED | OUTPATIENT
Start: 2021-09-01 | End: 2021-09-01 | Stop reason: HOSPADM

## 2021-09-01 RX ORDER — PROMETHAZINE HYDROCHLORIDE 25 MG/ML
6.25 INJECTION, SOLUTION INTRAMUSCULAR; INTRAVENOUS ONCE
Status: COMPLETED | OUTPATIENT
Start: 2021-09-01 | End: 2021-09-01

## 2021-09-01 RX ORDER — HYDROCODONE BITARTRATE AND ACETAMINOPHEN 5; 325 MG/1; MG/1
1 TABLET ORAL EVERY 6 HOURS PRN
Qty: 20 TABLET | Refills: 0 | Status: SHIPPED | OUTPATIENT
Start: 2021-09-01 | End: 2021-09-06

## 2021-09-01 RX ORDER — OXYCODONE HYDROCHLORIDE AND ACETAMINOPHEN 5; 325 MG/1; MG/1
1 TABLET ORAL
Status: DISCONTINUED | OUTPATIENT
Start: 2021-09-01 | End: 2021-09-01 | Stop reason: HOSPADM

## 2021-09-01 RX ORDER — HYDRALAZINE HYDROCHLORIDE 20 MG/ML
5 INJECTION INTRAMUSCULAR; INTRAVENOUS EVERY 10 MIN PRN
Status: DISCONTINUED | OUTPATIENT
Start: 2021-09-01 | End: 2021-09-01 | Stop reason: HOSPADM

## 2021-09-01 RX ORDER — PROMETHAZINE HYDROCHLORIDE 25 MG/ML
INJECTION, SOLUTION INTRAMUSCULAR; INTRAVENOUS
Status: COMPLETED
Start: 2021-09-01 | End: 2021-09-01

## 2021-09-01 RX ORDER — LIDOCAINE HYDROCHLORIDE 10 MG/ML
1 INJECTION, SOLUTION EPIDURAL; INFILTRATION; INTRACAUDAL; PERINEURAL
Status: DISCONTINUED | OUTPATIENT
Start: 2021-09-01 | End: 2021-09-01 | Stop reason: HOSPADM

## 2021-09-01 RX ORDER — TAMSULOSIN HYDROCHLORIDE 0.4 MG/1
0.4 CAPSULE ORAL DAILY
Qty: 30 CAPSULE | Refills: 0 | Status: SHIPPED | OUTPATIENT
Start: 2021-09-01 | End: 2021-09-14

## 2021-09-01 RX ORDER — LABETALOL HYDROCHLORIDE 5 MG/ML
5 INJECTION, SOLUTION INTRAVENOUS EVERY 10 MIN PRN
Status: DISCONTINUED | OUTPATIENT
Start: 2021-09-01 | End: 2021-09-01 | Stop reason: HOSPADM

## 2021-09-01 RX ORDER — SODIUM CHLORIDE 9 MG/ML
25 INJECTION, SOLUTION INTRAVENOUS PRN
Status: DISCONTINUED | OUTPATIENT
Start: 2021-09-01 | End: 2021-09-01 | Stop reason: HOSPADM

## 2021-09-01 RX ORDER — ONDANSETRON 2 MG/ML
4 INJECTION INTRAMUSCULAR; INTRAVENOUS
Status: COMPLETED | OUTPATIENT
Start: 2021-09-01 | End: 2021-09-01

## 2021-09-01 RX ORDER — DEXAMETHASONE SODIUM PHOSPHATE 4 MG/ML
INJECTION, SOLUTION INTRA-ARTICULAR; INTRALESIONAL; INTRAMUSCULAR; INTRAVENOUS; SOFT TISSUE PRN
Status: DISCONTINUED | OUTPATIENT
Start: 2021-09-01 | End: 2021-09-01 | Stop reason: SDUPTHER

## 2021-09-01 RX ORDER — SODIUM CHLORIDE 0.9 % (FLUSH) 0.9 %
10 SYRINGE (ML) INJECTION PRN
Status: DISCONTINUED | OUTPATIENT
Start: 2021-09-01 | End: 2021-09-01 | Stop reason: HOSPADM

## 2021-09-01 RX ORDER — HYDROMORPHONE HCL 110MG/55ML
0.25 PATIENT CONTROLLED ANALGESIA SYRINGE INTRAVENOUS EVERY 5 MIN PRN
Status: DISCONTINUED | OUTPATIENT
Start: 2021-09-01 | End: 2021-09-01 | Stop reason: HOSPADM

## 2021-09-01 RX ADMIN — PROMETHAZINE HYDROCHLORIDE 6.25 MG: 25 INJECTION, SOLUTION INTRAMUSCULAR; INTRAVENOUS at 12:48

## 2021-09-01 RX ADMIN — HYDRALAZINE HYDROCHLORIDE 5 MG: 20 INJECTION INTRAMUSCULAR; INTRAVENOUS at 12:59

## 2021-09-01 RX ADMIN — PROMETHAZINE HYDROCHLORIDE 6.25 MG: 25 INJECTION INTRAMUSCULAR; INTRAVENOUS at 12:48

## 2021-09-01 RX ADMIN — CIPROFLOXACIN 400 MG: 2 INJECTION, SOLUTION INTRAVENOUS at 11:30

## 2021-09-01 RX ADMIN — HYDROMORPHONE HYDROCHLORIDE 0.25 MG: 2 INJECTION, SOLUTION INTRAMUSCULAR; INTRAVENOUS; SUBCUTANEOUS at 12:53

## 2021-09-01 RX ADMIN — HYDRALAZINE HYDROCHLORIDE 5 MG: 20 INJECTION INTRAMUSCULAR; INTRAVENOUS at 13:25

## 2021-09-01 RX ADMIN — PROPOFOL 120 MG: 10 INJECTION, EMULSION INTRAVENOUS at 11:43

## 2021-09-01 RX ADMIN — ONDANSETRON 4 MG: 2 INJECTION INTRAMUSCULAR; INTRAVENOUS at 12:43

## 2021-09-01 RX ADMIN — ONDANSETRON 4 MG: 2 INJECTION INTRAMUSCULAR; INTRAVENOUS at 11:58

## 2021-09-01 RX ADMIN — DEXAMETHASONE SODIUM PHOSPHATE 8 MG: 4 INJECTION, SOLUTION INTRAMUSCULAR; INTRAVENOUS at 11:58

## 2021-09-01 RX ADMIN — HYDROMORPHONE HYDROCHLORIDE 0.25 MG: 2 INJECTION, SOLUTION INTRAMUSCULAR; INTRAVENOUS; SUBCUTANEOUS at 13:03

## 2021-09-01 RX ADMIN — LIDOCAINE HYDROCHLORIDE 60 MG: 20 INJECTION, SOLUTION EPIDURAL; INFILTRATION; INTRACAUDAL; PERINEURAL at 11:43

## 2021-09-01 RX ADMIN — FENTANYL CITRATE 25 MCG: 50 INJECTION, SOLUTION INTRAMUSCULAR; INTRAVENOUS at 12:04

## 2021-09-01 RX ADMIN — SODIUM CHLORIDE, POTASSIUM CHLORIDE, SODIUM LACTATE AND CALCIUM CHLORIDE: 600; 310; 30; 20 INJECTION, SOLUTION INTRAVENOUS at 10:48

## 2021-09-01 ASSESSMENT — PULMONARY FUNCTION TESTS
PIF_VALUE: 13
PIF_VALUE: 1
PIF_VALUE: 12
PIF_VALUE: 10
PIF_VALUE: 1
PIF_VALUE: 12
PIF_VALUE: 2
PIF_VALUE: 0
PIF_VALUE: 14
PIF_VALUE: 12
PIF_VALUE: 12
PIF_VALUE: 0
PIF_VALUE: 28
PIF_VALUE: 12
PIF_VALUE: 12
PIF_VALUE: 14
PIF_VALUE: 13
PIF_VALUE: 8
PIF_VALUE: 12
PIF_VALUE: 1
PIF_VALUE: 14
PIF_VALUE: 0
PIF_VALUE: 0
PIF_VALUE: 14
PIF_VALUE: 12
PIF_VALUE: 0
PIF_VALUE: 13
PIF_VALUE: 21
PIF_VALUE: 1
PIF_VALUE: 10
PIF_VALUE: 1
PIF_VALUE: 10
PIF_VALUE: 13
PIF_VALUE: 8
PIF_VALUE: 12
PIF_VALUE: 1
PIF_VALUE: 2
PIF_VALUE: 12

## 2021-09-01 ASSESSMENT — PAIN SCALES - GENERAL
PAINLEVEL_OUTOF10: 6
PAINLEVEL_OUTOF10: 2
PAINLEVEL_OUTOF10: 9
PAINLEVEL_OUTOF10: 2

## 2021-09-01 ASSESSMENT — PAIN DESCRIPTION - PAIN TYPE
TYPE: SURGICAL PAIN

## 2021-09-01 ASSESSMENT — PAIN DESCRIPTION - LOCATION
LOCATION: ABDOMEN

## 2021-09-01 ASSESSMENT — LIFESTYLE VARIABLES: SMOKING_STATUS: 0

## 2021-09-01 NOTE — PROGRESS NOTES
BP now 166/64 after 2 doses of hydralazine, Dr. Katia Sagastume informed and okay to transfer to phase II and discharge home. VSS, c/o 2/10 surgical pain, denies nausea, phase I discharge criteria met, will transfer to [facility] [place].

## 2021-09-01 NOTE — PROGRESS NOTES
Pt arrived from pacu in stable condition. VSS. Pt states she is having very little pain and that nausea has improved. Will continue to monitor.

## 2021-09-01 NOTE — PROGRESS NOTES
Pt discharged home with daughter in stable condition. Via wheelchair by this RN. PIV removed, pressure and gauze applied.

## 2021-09-01 NOTE — ANESTHESIA POSTPROCEDURE EVALUATION
Department of Anesthesiology  Postprocedure Note    Patient: Brady Boggs  MRN: [id number]  YOB: 1935  Date of evaluation: 9/1/2021  Time:  12:28 PM     Procedure Summary     Date: 09/01/21 Room / Location: [address] [place] [address]    Anesthesia Start: 2742 Anesthesia Stop: 8311    Procedure: CYSTOSCOPY WITH LEFT URETEROSCOPY, STONE MANIPULATION ,STONE BASKET REMOVAL (Left ) Diagnosis: (N20.1 CALCULUS URETER)    Surgeons: Michelle Zhao MD Responsible Provider: Heidi Gowers, MD    Anesthesia Type: general ASA Status: 3          Anesthesia Type: general    Kaorlyn Phase I: Karolyn Score: 8    Karolyn Phase II:      Last vitals: Reviewed and per EMR flowsheets.        Anesthesia Post Evaluation    Patient location during evaluation: PACU  Patient participation: complete - patient participated  Level of consciousness: awake and alert  Airway patency: patent  Nausea & Vomiting: no vomiting and no nausea  Complications: no  Cardiovascular status: hemodynamically stable  Respiratory status: acceptable  Hydration status: stable

## 2021-09-01 NOTE — H&P
Urology Preoperative History & Physical  [facility]     Patient: Lauri Pimentel MRN: [id number]  Room/Bed: OR/NONE   YOB: 1935  Age/Sex: 82 y. o.female  Admission Date: 9/1/2021     Date of Service:  9/1/2021    ASSESSMENT/PLAN     Left ureteral stone here for ureteroscopy    Plan: To OR for above procedure    All patient questions were answered. She understands the plan as listed above. HISTORY     Chief Complaint: As above    History of Present Illness: Lauri Pimentel is a 82 y.o. female with above listed problems. No changes in history/physical since last evaluation. No change in symptoms. 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 Hospital Problem List:  Active Problems:    * No active hospital problems. *  Resolved Problems:    * No resolved hospital problems. *      Past Surgical History:  She has a past surgical history that includes partial hysterectomy (cervix not removed); Knee arthroscopy (Right, 02/2015); Rotator cuff repair (Right, 2014); Coronary artery bypass graft (05/29/2018); transesophageal echocardiogram (05/29/2018); Cardiac catheterization (05/29/2018); Colonoscopy (07/31/2015); Mohs surgery (Left); and Cardiac catheterization (2012). Social History:  She reports that she quit smoking about 61 years ago. Her smoking use included cigarettes. She quit after 7.00 years of use. She has never used smokeless tobacco. She reports that she does not drink alcohol and does not use drugs. Family History:  family history includes Breast Cancer in her brother, father, and mother; COPD in her father; Tuberculosis in her brother and father. Allergies:   Allergies   Allergen Reactions    Latex Itching    Mucinex [Guaifenesin Er] Anaphylaxis     Coughs more, dry cough, feels like she has bronchitis    Tetanus Toxoids      HAS REACTION- TOLD TO TAKE ALTERNATIVE    Redness and swelling to site    Morphine Nausea And Vomiting    Pneumococcal Vaccines Rash       Medications:  Scheduled Meds:   ciprofloxacin  400 mg IntraVENous On Call to OR    lidocaine PF  0.5 mL IntraDERmal Once    sodium chloride flush  10 mL IntraVENous 2 times per day     Continuous Infusions:   lactated ringers 50 mL/hr at 09/01/21 1048    lactated ringers      sodium chloride       PRN Meds:HYDROmorphone, oxyCODONE-acetaminophen, ondansetron, labetalol, hydrALAZINE, sodium chloride flush, sodium chloride, lidocaine PF    Review of Systems:  Pertinent positives/negatives reviewed in HPI. All other systems reviewed and negative, unless noted below. Constitutional: Negative  Genitourinary: see HPI  HEENT: Negative   Cardiovascular: Negative   Respiratory: Negative   Gastrointestinal: Negative   Musculoskeletal: Negative   Neurological: Negative   Psychiatric: Negative   Integumentary: Negative     PHYSICAL EXAM     Vitals:    09/01/21 1023   BP: (!) 191/79   Pulse: 60   Resp: 16   Temp: 98.8 °F (37.1 °C)   SpO2: 94%     CONSTITUTIONAL: The patient is well nourished/developed, with no distress noted. CARDIOVASCULAR: normal rate. RESPIRATOR: non-labored breathing.   GENITOURINARY: Defer to OR; see clinic note for  exam.    Ins/Outs:  No intake or output data in the 24 hours ending 09/01/21 1130    LABS     CBC   Lab Results   Component Value Date    WBC 5.4 09/01/2021    RBC 4.37 09/01/2021    HGB 13.7 09/01/2021    HCT 40.4 09/01/2021    MCV 92.3 09/01/2021    MCH 31.2 09/01/2021    MCHC 33.8 09/01/2021    RDW 14.2 09/01/2021     09/01/2021    MPV 8.8 09/01/2021     BMP   Lab Results   Component Value Date     09/01/2021    K 3.9 09/01/2021    K 3.6 07/06/2021     09/01/2021    CO2 27 09/01/2021 BUN 13 09/01/2021    CREATININE 0.6 09/01/2021    GLUCOSE 100 09/01/2021    CALCIUM 9.8 09/01/2021     Urinalysis:   Lab Results   Component Value Date    COLORU yellow 07/28/2021    COLORU YELLOW 07/05/2021    GLUCOSEU neg 07/28/2021    GLUCOSEU Negative 07/05/2021    BLOODU moderate 07/28/2021    BLOODU MODERATE 07/05/2021    NITRU Negative 07/05/2021    LEUKOCYTESUR small 07/28/2021    LEUKOCYTESUR LARGE 07/05/2021     Urine culture: No results for input(s): LABURIN in the last 72 hours. PSA: No results found for: PSA      IMAGING     US RENAL COMPLETE    Result Date: 8/9/2021  EXAMINATION: RETROPERITONEAL ULTRASOUND OF THE KIDNEYS AND URINARY BLADDER 8/9/2021 COMPARISON: None HISTORY: ORDERING SYSTEM PROVIDED HISTORY: Disease of female genital organs FINDINGS: Kidneys: The right kidney measures 8.2 x 5.1 x 4.4 cm in length and the left kidney measures 10.3 x 5.3 x 5.2 cm in length. Kidneys demonstrate normal cortical echogenicity. No evidence of hydronephrosis. Simple appearing cyst is noted in the right kidney measuring approximately 1.9 x 1.2 x 0.9 cm. Calculus is noted in the area of the left ureterovesicular junction. Bladder: Unremarkable appearance of the bladder. Bilateral ureteral jets are noted. Unremarkable ultrasound of the kidneys. Nonobstructing calculus is noted in the area of the left ureterovesicular junction. Bilateral ureteral jets are noted.             Electronically signed by: Mic Winters MD MD, ANY 9/1/2021   [facility]  Office Contact: [phone]

## 2021-09-01 NOTE — PROGRESS NOTES
CLINICAL PHARMACY NOTE: MEDS TO BEDS    Total # of Prescriptions Filled: 4   The following medications were delivered to the patient:  · Flomax 0.4 mg  · Stimulant laxative 8.6-50 mg  · Norco 5-325 mg  · Phenazopyridine 100 mg    Additional Documentation:    Delivered to Patient daughter=signed  Bessie Gonzalez CPhT

## 2021-09-01 NOTE — PROGRESS NOTES
Patient transferred from OR to PACU, VSS and O2 sat maintained on 6L via simple mask. Denies pain. Continue to monitor.

## 2021-09-01 NOTE — OP NOTE
Urology Operative Report  [facility]    Provider: Arnaldo Rasmussen MD MD Patient ID:  Admission Date: 2021 Name: Lauri Pimentel  OR Date: 2021  MRN: [id number]   Patient Location: OR/NONE : 1935  Attending: Arnaldo Rasmussen MD Date of Service: 2021  PCP: Magalys Becerril MD     Date of Operation: 2021    Preoperative Diagnosis: LEFT side distal ureteral stone (3 mm) with hydroureteronephrosis and failed trial of passage    Postoperative Diagnosis: same    Procedure:    1. Left side ureteroscopy with basket stone extraction and left retrograde pyelogram  2. Fluoroscopic imaging < 1 hr physician time    Surgeon:   Arnaldo Rasmussen MD, ANY    Anesthesia: General LMA anesthesia    Indications: Lauri Pimentel is a 82 y.o. female who presents for the above named surgery. Informed consent was obtained and the risks, benefits, and details of the procedure were explained to the patient who elected to proceed. Details of Procedure: The patient was brought to the operating room and placed in the supine position on the operating room table. SCDs were placed on the lower extremities. Following induction of anesthesia the patient was positioned in a lithotomy position, all pressure points were padded, and the genitals were prepped and draped in the usual sterile fashion. A routine timeout was performed, confirming the patient, procedure, site, risk of fire, patient allergies and confirming that preoperative antibiotics had been administered prior to beginning. A 21 fr rigid cystoscope was advance via a normal appearing urethra into the bladder. The bladder was inspected and there were no suspicious lesions, stones or diverticula seen. Patient did have a grade 2 cystocele which made the ureteroscopy slightly more challenging given the angulation of the scope.   Attention was turned to the left ureteral orifice and a 0.035 sensor wire was advance up to the renal pelvis under control of fluoroscopy. A semi-rigid ureteroscope was passed alongside the safety wire to encounter a stone in the distal ureter. It was cylindrical in shape and with the basket I was able to easily manipulate and remove this without trauma. Remaining portion of the ureter were carefully examined there were no residual stones. Retrograde pyelogram showed minimal dilation of collecting system and prompt drainage. Given the atraumatic nature of the procedure, I elected to not leave ureteral stent. The ureteroscope was removed and a cystoscope was used to empty the bladder again noting the patient's cystocele. At the end of the procedure all counts were correct. The patient tolerated the procedure well and was transported to the PACU in stable condition. Findings: Left distal ureteral stone, cylindrical in shape and easily removed with basket stone manipulation in an atraumatic fashion    Estimated Blood Loss: minimal                  Drains: None          Specimens: Left ureteral stone stone for analysis    Complications: none apparent           Disposition:  PACU - hemodynamically stable.      Plan: Follow-up for postoperative imaging and general stone prevention with possible metabolic evaluation, management of cystocele and lower urinary tract symptoms           Mic Winters MD, [facility]  9/1/2021

## 2021-09-01 NOTE — ANESTHESIA PRE PROCEDURE
Department of Anesthesiology  Preprocedure Note       Name:  Elzbieta Zeng   Age:  82 y.o.  :  1935                                          MRN:  [id number]         Date:  2021      Surgeon: Kyra Lau):  Phil Johnson MD    Procedure: Procedure(s):  CYSTOSCOPY WITH LEFT URETEROSCOPY, HOLMIUM LASER LITHOTRIPSY STONE MANIPULATION ,STONE BASKET, POSSIBLE LEFT STENT INSERTION    Medications prior to admission:   Prior to Admission medications    Medication Sig Start Date End Date Taking?  Authorizing Provider   polycarbophil (FIBERCON) 625 MG tablet Take 625 mg by mouth daily   Yes Historical Provider, MD   Turmeric 450 MG CAPS Take by mouth   Yes Historical Provider, MD   metoprolol succinate (TOPROL XL) 50 MG extended release tablet TAKE 1 TABLET BY MOUTH  DAILY 21   RIGOBERTO Jennings CNP   triamterene-hydroCHLOROthiazide (MAXZIDE-25) 37.5-25 MG per tablet TAKE 1 TABLET BY MOUTH  DAILY 21   RIGOBERTO Jennings CNP   pantoprazole (PROTONIX) 40 MG tablet TAKE 1 TABLET BY MOUTH IN  THE MORNING BEFORE  BREAKFAST 21   Haroon Jane MD   atorvastatin (LIPITOR) 80 MG tablet TAKE 1 TABLET BY MOUTH  DAILY 21   RIGOBERTO Zuniga CNP   acetaminophen (TYLENOL) 500 MG tablet Take 500 mg by mouth every 6 hours as needed for Pain    Historical Provider, MD   vitamin B-12 100 MCG tablet Take 1 tablet by mouth daily  Patient taking differently: Take 1,000 mcg by mouth daily  18  RIGOBERTO Zheng CNP   docusate sodium (COLACE, DULCOLAX) 100 MG CAPS Take 100 mg by mouth 2 times daily 18   RIGOBERTO Zheng CNP   Cholecalciferol (VITAMIN D3) 2000 units CAPS Take 2,000 Units by mouth daily     Historical Provider, MD   aspirin 81 MG chewable tablet Take 1 tablet by mouth daily 18   Jocelyn John MD       Current medications:    Current Facility-Administered Medications   Medication Dose Route Frequency Provider Last Rate Last Admin  HYDROmorphone (DILAUDID) injection 0.25 mg  0.25 mg IntraVENous Q5 Min PRN Angel Luis Figueroa MD        oxyCODONE-acetaminophen (PERCOCET) 5-325 MG per tablet 1 tablet  1 tablet Oral Once PRN Angel Luis Figueroa MD        ondansetron [facility]) injection 4 mg  4 mg IntraVENous Once PRN Angel Luis Figueroa MD        labetalol (NORMODYNE;TRANDATE) injection 5 mg  5 mg IntraVENous Q10 Min PRN Angel Luis Figueroa MD        hydrALAZINE (APRESOLINE) injection 5 mg  5 mg IntraVENous Q10 Min PRN Angel Luis Figueroa MD           Allergies:     Allergies   Allergen Reactions    Latex Itching    Mucinex [Guaifenesin Er] Anaphylaxis     Coughs more, dry cough, feels like she has bronchitis    Tetanus Toxoids      HAS REACTION- TOLD TO TAKE ALTERNATIVE    Redness and swelling to site    Morphine Nausea And Vomiting    Pneumococcal Vaccines Rash       Problem List:    Patient Active Problem List   Diagnosis Code    Gastroesophageal reflux disease K21.9    Primary osteoarthritis involving multiple joints M89.49    Psoriasis L40.9    Interstitial cystitis N30.10    Thoracic ascending aortic aneurysm (HCC) I71.2    Chest pain R07.9    Obesity E66.9    NSTEMI (non-ST elevated myocardial infarction) ([facility]) I21.4    CAD in native artery I25.10    Anemia D64.9    Mixed hyperlipidemia E78.2    Atrial fibrillation ([facility]) I48.91    Ganglion cyst of dorsum of left wrist M67.432    Vitamin B 12 deficiency E53.8    Rotator cuff tear, right M75.101    Varicose veins of legs I83.93    History of colonic polyps Z86.010    Hypertension I10    Leukocytosis D72.829    Lactic acidosis E87.2    Failure of outpatient treatment Z78.9    Patient is Ep[employer]opalian Z78.9    Hypokalemia E87.6    Acute bronchitis J20.9       Past Medical History:        Diagnosis Date    Arthritis     Cystitis, interstitial     GERD (gastroesophageal reflux disease)     GI bleeding     was a frequent aspirin user at that time    Heart burn     Hypertension     Interstitial cystitis     Patient is [employer]     no blood products    Psoriasis     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)     cardiologist watching       Past Surgical History:        Procedure Laterality Date    CARDIAC CATHETERIZATION  2018    Dr. Dalila James - w/placement of IABP    CARDIAC CATHETERIZATION  2012    COLONOSCOPY  2015    Dr. Merryl Baumgarten - w/polypectomy    CORONARY ARTERY BYPASS GRAFT  2018    Dr. Vicente New - off pump x2 (LIMA-LAD, L SVG-D2)    KNEE ARTHROSCOPY Right 2015    MOHS SURGERY Left     facial for skin CA    PARTIAL HYSTERECTOMY      ROTATOR CUFF REPAIR Right     TRANSESOPHAGEAL ECHOCARDIOGRAM  2018    during CABG       Social History:    Social History     Tobacco Use    Smoking status: Former Smoker     Years: 7.00     Types: Cigarettes     Quit date: 5/3/1960     Years since quittin.3    Smokeless tobacco: Never Used    Tobacco comment: AS A TEENAGER FOR ABOUT 6 YEARS    Substance Use Topics    Alcohol use: No                                Counseling given: Not Answered  Comment: AS A TEENAGER FOR ABOUT 6 YEARS       Vital Signs (Current):   Vitals:    21 1529   Weight: 178 lb (80.7 kg)   Height: 5' 2\" (1.575 m)                                              BP Readings from Last 3 Encounters:   21 120/80   21 134/82   21 (!) 158/84       NPO Status:                                                                                 BMI:   Wt Readings from Last 3 Encounters:   21 178 lb (80.7 kg)   21 176 lb (79.8 kg)   21 177 lb 9.6 oz (80.6 kg)     Body mass index is 32.56 kg/m².     CBC:   Lab Results   Component Value Date    WBC 5.5 2021    RBC 4.29 2021    HGB 13.6 2021    HCT 39.4 2021    MCV 91.7 2021    RDW 14.1 2021     2021       CMP:   Lab Results Component Value Date     07/08/2021    K 3.5 07/08/2021    K 3.6 07/06/2021     07/08/2021    CO2 23 07/08/2021    BUN 16 07/08/2021    CREATININE 0.5 07/08/2021    GFRAA >60 07/08/2021    GFRAA >60 12/10/2012    AGRATIO 1.7 07/06/2021    LABGLOM >60 07/08/2021    GLUCOSE 96 07/08/2021    PROT 6.2 07/06/2021    PROT 6.9 08/25/2011    CALCIUM 9.2 07/08/2021    BILITOT 1.4 07/06/2021    ALKPHOS 65 07/06/2021    AST 27 07/06/2021    ALT 23 07/06/2021       POC Tests: No results for input(s): POCGLU, POCNA, POCK, POCCL, POCBUN, POCHEMO, POCHCT in the last 72 hours. Coags:   Lab Results   Component Value Date    PROTIME 10.8 07/05/2021    INR 0.96 07/05/2021    APTT 32.6 07/05/2021       HCG (If Applicable): No results found for: PREGTESTUR, PREGSERUM, HCG, HCGQUANT     ABGs:   Lab Results   Component Value Date    PHART 7.495 05/30/2018    PO2ART 129.6 05/30/2018    OAG0FYQ 33.0 05/30/2018    VMO7HHI 25.4 05/30/2018    BEART 2 05/30/2018    Q5WEDLNZ 99 05/30/2018        Type & Screen (If Applicable):  No results found for: LABABO, LABRH    Drug/Infectious Status (If Applicable):  No results found for: HIV, HEPCAB    COVID-19 Screening (If Applicable):   Lab Results   Component Value Date    COVID19 Not Detected 07/05/2021           Anesthesia Evaluation  Patient summary reviewed and Nursing notes reviewed no history of anesthetic complications:   Airway: Mallampati: III  TM distance: >3 FB   Neck ROM: full  Mouth opening: > = 3 FB Dental:    (+) partials      Pulmonary:normal exam  breath sounds clear to auscultation      (-) COPD, asthma, sleep apnea and not a current smoker (former)                           Cardiovascular:    (+) hypertension:, past MI:, CAD ([facility] 7/7/21 with severe MVD. Patent LIMA>LAD and SVG>D1 grafts.  LVEF 65%, no RWMA; no current symptoms/ntg use):, CABG/stent (hx of CABG '18):, dysrhythmias (brief post-cabg, none since, on bb): atrial fibrillation, hyperlipidemia    (-)  angina and  CHF (echo 2020 EF 60-65 mild MR mod TR PAP 28)    ECG reviewed  Rhythm: regular  Rate: normal  Echocardiogram reviewed  Stress test reviewed       Beta Blocker:  Dose within 24 Hrs         Neuro/Psych:   Negative Neuro/Psych ROS     (-) seizures, TIA and CVA           GI/Hepatic/Renal:   (+) GERD: well controlled, renal disease: kidney stones,      (-) liver disease       Endo/Other:    (+) : arthritis: OA., .    (-) diabetes mellitus, hypothyroidism, hyperthyroidism               Abdominal:   (+) obese,           Vascular:   + PVD, aortic or cerebral (Ascending thoracic aorta measures 4.4 cm 7/21), . Other Findings:           Anesthesia Plan      general     ASA 3       Induction: intravenous. MIPS: Postoperative opioids intended and Prophylactic antiemetics administered. Anesthetic plan and risks discussed with patient. Use of blood products discussed with whom did not consent to blood products. Special considerations: Worship. Plan discussed with CRNA.                   Deidra Solrozano MD   9/1/2021

## 2021-09-01 NOTE — PROGRESS NOTES
Discharge instructions reviewed and understanding verbalized per pt/family with copy given. All home medications/new prescriptions have been reviewed, questions answered and patient/family state understanding. Medication information sheet provided for new prescriptions received when applicable      Waiting for outpatient pharmacy to deliver prescriptions. Will continue to monitor.

## 2021-09-05 LAB
CALCULI COMPOSITION: NORMAL
MASS: 39 MG
STONE DESCRIPTION: NORMAL
STONE NUMBER: 1
STONE SIZE: NORMAL MM

## 2021-09-07 ENCOUNTER — TELEPHONE (OUTPATIENT)
Dept: FAMILY MEDICINE CLINIC | Age: 86
End: 2021-09-07

## 2021-09-07 DIAGNOSIS — M25.552 LEFT HIP PAIN: Primary | ICD-10-CM

## 2021-09-07 NOTE — TELEPHONE ENCOUNTER
Is she having a lot of pain to press over hip bone on outer side of hip? Or is this still pain radiating from back down left hip/ leg? I don't see xray of her hip on chart.

## 2021-09-07 NOTE — TELEPHONE ENCOUNTER
CALLED PT SHE IS CURRENTLY WITH [facility] AND ALREADY HAD AN EPIDURAL STEROID, SHE IS GOING TO CALL AND SCHEDULE THIS TO BE DONE WITH THEM. Marcelle Rodríguez

## 2021-09-07 NOTE — TELEPHONE ENCOUNTER
PT @  [phone]    PAIN IN LEFT HIP - DR. SILVA SAID SHE NEEDED A CORTISONE SHOT -      PLEASE CALL PATIENT

## 2021-09-07 NOTE — TELEPHONE ENCOUNTER
CALLED AND SPOKE TO PATIENT. SHE SAID THAT IT IS NOT FROM HER BACK. SHE SAID IT IS MORE OVER WHER HER PELVIC BONE IS AND THE PAIN IS RIGHT AROUND THAT AREA. SHE STATES SITTING, AND WALKING CAN MAKE PAIN WORSE, STANDING NOT SO MUCH. SHE STATES SHE HAD AN MRI IF HER BACK A FEW MONTHS AGO AND THIS PAIN FEELS DIFFERENT.  SC

## 2021-09-14 ENCOUNTER — OFFICE VISIT (OUTPATIENT)
Dept: CARDIOLOGY CLINIC | Age: 86
End: 2021-09-14
Payer: MEDICARE

## 2021-09-14 VITALS
DIASTOLIC BLOOD PRESSURE: 84 MMHG | HEART RATE: 71 BPM | BODY MASS INDEX: 33.18 KG/M2 | WEIGHT: 180.3 LBS | HEIGHT: 62 IN | OXYGEN SATURATION: 96 % | SYSTOLIC BLOOD PRESSURE: 148 MMHG

## 2021-09-14 DIAGNOSIS — E78.2 MIXED HYPERLIPIDEMIA: ICD-10-CM

## 2021-09-14 DIAGNOSIS — I10 ESSENTIAL HYPERTENSION: ICD-10-CM

## 2021-09-14 DIAGNOSIS — I25.10 CORONARY ARTERY DISEASE INVOLVING NATIVE CORONARY ARTERY OF NATIVE HEART WITHOUT ANGINA PECTORIS: Primary | ICD-10-CM

## 2021-09-14 PROCEDURE — 99214 OFFICE O/P EST MOD 30 MIN: CPT | Performed by: NURSE PRACTITIONER

## 2021-09-14 PROCEDURE — 1090F PRES/ABSN URINE INCON ASSESS: CPT | Performed by: NURSE PRACTITIONER

## 2021-09-14 PROCEDURE — 1036F TOBACCO NON-USER: CPT | Performed by: NURSE PRACTITIONER

## 2021-09-14 PROCEDURE — G8427 DOCREV CUR MEDS BY ELIG CLIN: HCPCS | Performed by: NURSE PRACTITIONER

## 2021-09-14 PROCEDURE — G8417 CALC BMI ABV UP PARAM F/U: HCPCS | Performed by: NURSE PRACTITIONER

## 2021-09-14 PROCEDURE — 4040F PNEUMOC VAC/ADMIN/RCVD: CPT | Performed by: NURSE PRACTITIONER

## 2021-09-14 PROCEDURE — 1123F ACP DISCUSS/DSCN MKR DOCD: CPT | Performed by: NURSE PRACTITIONER

## 2021-09-14 PROCEDURE — G8399 PT W/DXA RESULTS DOCUMENT: HCPCS | Performed by: NURSE PRACTITIONER

## 2021-09-14 NOTE — PROGRESS NOTES
[address]     Outpatient Follow Up Note    CHIEF COMPLAINT / HPI: Hospital Follow Up secondary to status post coronary angiogram    Hospital record has been reviewed  Hospital Course progressed as follows per discharge summary: 7/5 - 7/8/21   Chest pain  -With elevated troponin. Patient underwent left heart cath which was normal.  -Patient medical management discharge stable     UTI with left ureteral stone with hydronephrosis  -Conservative management per urology IV antibiotics and transition to p.o. amoxicillin based on culture     Elva Buck is 82 y.o. female who presents today for a routine follow up after a recent hospitalization related to the above mentioned issues. Subjective:   Since the time of discharge, the patient admits their symptoms have improved. She denies significant chest pain. However, she has gas pain / indigestion. She woke this morning with epigastric discomfort. She took her acid inhibitor. There is SOB attributed to a wt gain over the past 2 years. She gets breathless when going to fast.   Her back also hurts (seeing ortho). The patient is not experiencing palpitations. She has swelling in her legs that's been around for over a year. She had a Rt knee procedure and had swelling since. She has arthritic knees/feet. These symptoms are improving over the last many weeks. With regard to medication therapy the patient has been compliant with prescribed regimen. They have tolerated therapy to date.      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    Thoracic aortic aneurysm [facility])     cardiologist watching     Social History:    Social History Tobacco Use   Smoking Status Former Smoker    Years: 7.00    Types: Cigarettes    Quit date: 5/3/1960    Years since quittin.4   Smokeless Tobacco Never Used   Tobacco Comment    AS A TEENAGER FOR ABOUT 6 YEARS      Current Medications:  Current Outpatient Medications   Medication Sig Dispense Refill    polycarbophil (FIBERCON) 625 MG tablet Take 625 mg by mouth daily      metoprolol succinate (TOPROL XL) 50 MG extended release tablet TAKE 1 TABLET BY MOUTH  DAILY 90 tablet 3    triamterene-hydroCHLOROthiazide (MAXZIDE-25) 37.5-25 MG per tablet TAKE 1 TABLET BY MOUTH  DAILY 90 tablet 3    pantoprazole (PROTONIX) 40 MG tablet TAKE 1 TABLET BY MOUTH IN  THE MORNING BEFORE  BREAKFAST 90 tablet 2    atorvastatin (LIPITOR) 80 MG tablet TAKE 1 TABLET BY MOUTH  DAILY 90 tablet 3    acetaminophen (TYLENOL) 500 MG tablet Take 500 mg by mouth every 6 hours as needed for Pain      vitamin B-12 100 MCG tablet Take 1 tablet by mouth daily (Patient taking differently: Take 1,000 mcg by mouth daily ) 60 tablet 0    docusate sodium (COLACE, DULCOLAX) 100 MG CAPS Take 100 mg by mouth 2 times daily 60 capsule 0    Cholecalciferol (VITAMIN D3) 2000 units CAPS Take 2,000 Units by mouth daily       aspirin 81 MG chewable tablet Take 1 tablet by mouth daily 30 tablet 3     No current facility-administered medications for this visit. REVIEW OF SYSTEMS:   CONSTITUTIONAL: No major weight gain or loss, fatigue, weakness, night sweats or fever. There's been no change in energy level, sleep pattern, or activity level. HEENT: No new vision difficulties or ringing in the ears. RESPIRATORY: No new SOB, PND, orthopnea or cough. CARDIOVASCULAR: See HPI  GI: No nausea, vomiting, diarrhea, constipation, abdominal pain or changes in bowel habits. : No urinary frequency, urgency, incontinence hematuria or dysuria. SKIN: No cyanosis or skin lesions. MUSCULOSKELETAL: No new muscle or joint pain.   NEUROLOGICAL: No syncope or TIA-like symptoms. PSYCHIATRIC: No anxiety, pain, insomnia or depression    Objective:   PHYSICAL EXAM:       Vitals:    09/14/21 1132 09/14/21 1155   BP: 130/80 (!) 148/84   Site: Right Upper Arm Right Upper Arm   Position: Sitting    Cuff Size: Large Adult Large Adult   Pulse: 71    SpO2: 96%    Weight: 180 lb 4.8 oz (81.8 kg)    Height: 5' 2\" (1.575 m)         VITALS:  /80 (Site: Right Upper Arm, Position: Sitting, Cuff Size: Large Adult)   Pulse 71   Ht 5' 2\" (1.575 m)   Wt 180 lb 4.8 oz (81.8 kg)   SpO2 96%   BMI 32.98 kg/m²     CONSTITUTIONAL: Cooperative, no apparent distress, and appears well nourished / developed  NEUROLOGIC:  Awake and orientated to person, place and time. PSYCH: Calm affect. SKIN: Warm and dry. HEENT: Sclera non-icteric, normocephalic, neck supple, no elevation of JVP, normal carotid pulses with no bruits and thyroid normal size. LUNGS:  No increased work of breathing and clear to auscultation, no crackles or wheezing. CARDIOVASCULAR:  Regular rate 68 and rhythm with no murmurs, gallops, rubs, or abnormal heart sounds, normal PMI. The apical impulses not displaced. Heart tones are crisp and normal                                                                                            Cervical veins are not engorged                 JVP less than 8 cm H2O                                                                              The carotid upstroke is normal in amplitude and contour without delay or bruit    ABDOMEN:  Normal bowel sounds, non-distended and non-tender to palpation   EXT: yasmin ankle/pedal edema, no calf tenderness. Pulses are present bilaterally.     DATA:    Lab Results   Component Value Date    ALT 22 09/01/2021    AST 23 09/01/2021    ALKPHOS 80 09/01/2021    BILITOT 1.7 (H) 09/01/2021     Lab Results   Component Value Date    CREATININE 0.6 09/01/2021    BUN 13 09/01/2021     09/01/2021    K 3.9 angina pectoris   ~stable : denies angina  ~patent bypass conduits by cath July '21  ~hx of CABG '18 with post-op AF  ~ASA / BB / statin     2. Essential hypertension   ~controlled on arrival ; reasonable-suboptimal with recheck    3. Mixed hyperlipidemia   ~trig not at goal otherwise controlled  ~atorvastatin 80 mg daily         Patient  is stable since hospital discharge. Plan:  Continue present management   ~monitor BP    F/U in six months     I have addressed the patient's cardiac risk factors and adjusted pharmacologic treatment as needed. In addition, I have reinforced the need for patient directed risk factor modification. Further evaluation will be based upon the patient's clinical course and testing results. All questions and concerns were addressed to the patient. Alternatives to  treatment were discussed. The patient  currently  is not smoking. The risks related to smoking were reviewed with the patient. Recommend maintaining a smoke-free lifestyle. Antiplatelet therapy has been recommended / prescribed for this patient. Education conducted on adverse reactions including bleeding was discussed. The patient verbalizes understanding. Pt is on a BB  Pt is not on an ace-i/ARB  Pt is on a statin      Saturated fat diet discussed  Exercise program discussed    Thank you for allowing to us to participate in the care of Maribeth White.       [facility]  Documentation of today's visit sent to PCP

## 2021-11-09 ENCOUNTER — OFFICE VISIT (OUTPATIENT)
Dept: FAMILY MEDICINE CLINIC | Age: 86
End: 2021-11-09
Payer: MEDICARE

## 2021-11-09 VITALS — HEIGHT: 62 IN | BODY MASS INDEX: 33.13 KG/M2 | WEIGHT: 180 LBS

## 2021-11-09 DIAGNOSIS — K21.9 GASTROESOPHAGEAL REFLUX DISEASE, UNSPECIFIED WHETHER ESOPHAGITIS PRESENT: ICD-10-CM

## 2021-11-09 DIAGNOSIS — R05.9 COUGH: ICD-10-CM

## 2021-11-09 DIAGNOSIS — E78.2 MIXED HYPERLIPIDEMIA: ICD-10-CM

## 2021-11-09 DIAGNOSIS — I10 ESSENTIAL HYPERTENSION: ICD-10-CM

## 2021-11-09 DIAGNOSIS — G89.29 CHRONIC LEFT-SIDED LOW BACK PAIN, UNSPECIFIED WHETHER SCIATICA PRESENT: ICD-10-CM

## 2021-11-09 DIAGNOSIS — M54.50 CHRONIC LEFT-SIDED LOW BACK PAIN, UNSPECIFIED WHETHER SCIATICA PRESENT: ICD-10-CM

## 2021-11-09 DIAGNOSIS — N20.1 CALCULUS OF URETER: ICD-10-CM

## 2021-11-09 DIAGNOSIS — I48.0 PAROXYSMAL ATRIAL FIBRILLATION (HCC): ICD-10-CM

## 2021-11-09 DIAGNOSIS — Z00.00 ROUTINE GENERAL MEDICAL EXAMINATION AT A HEALTH CARE FACILITY: Primary | ICD-10-CM

## 2021-11-09 DIAGNOSIS — M15.9 PRIMARY OSTEOARTHRITIS INVOLVING MULTIPLE JOINTS: ICD-10-CM

## 2021-11-09 PROCEDURE — G8417 CALC BMI ABV UP PARAM F/U: HCPCS | Performed by: FAMILY MEDICINE

## 2021-11-09 PROCEDURE — G8399 PT W/DXA RESULTS DOCUMENT: HCPCS | Performed by: FAMILY MEDICINE

## 2021-11-09 PROCEDURE — 1036F TOBACCO NON-USER: CPT | Performed by: FAMILY MEDICINE

## 2021-11-09 PROCEDURE — 1090F PRES/ABSN URINE INCON ASSESS: CPT | Performed by: FAMILY MEDICINE

## 2021-11-09 PROCEDURE — 99213 OFFICE O/P EST LOW 20 MIN: CPT | Performed by: FAMILY MEDICINE

## 2021-11-09 PROCEDURE — 4040F PNEUMOC VAC/ADMIN/RCVD: CPT | Performed by: FAMILY MEDICINE

## 2021-11-09 PROCEDURE — 1123F ACP DISCUSS/DSCN MKR DOCD: CPT | Performed by: FAMILY MEDICINE

## 2021-11-09 PROCEDURE — G0439 PPPS, SUBSEQ VISIT: HCPCS | Performed by: FAMILY MEDICINE

## 2021-11-09 PROCEDURE — G8427 DOCREV CUR MEDS BY ELIG CLIN: HCPCS | Performed by: FAMILY MEDICINE

## 2021-11-09 PROCEDURE — G8484 FLU IMMUNIZE NO ADMIN: HCPCS | Performed by: FAMILY MEDICINE

## 2021-11-09 RX ORDER — NITROGLYCERIN 0.4 MG/1
0.4 TABLET SUBLINGUAL EVERY 5 MIN PRN
Qty: 25 TABLET | Refills: 1 | Status: SHIPPED | OUTPATIENT
Start: 2021-11-09

## 2021-11-09 ASSESSMENT — PATIENT HEALTH QUESTIONNAIRE - PHQ9
SUM OF ALL RESPONSES TO PHQ QUESTIONS 1-9: 0
SUM OF ALL RESPONSES TO PHQ QUESTIONS 1-9: 0
2. FEELING DOWN, DEPRESSED OR HOPELESS: 0
SUM OF ALL RESPONSES TO PHQ9 QUESTIONS 1 & 2: 0
SUM OF ALL RESPONSES TO PHQ QUESTIONS 1-9: 0
1. LITTLE INTEREST OR PLEASURE IN DOING THINGS: 0

## 2021-11-09 ASSESSMENT — LIFESTYLE VARIABLES: HOW OFTEN DO YOU HAVE A DRINK CONTAINING ALCOHOL: 0

## 2021-11-09 NOTE — PATIENT INSTRUCTIONS
Personalized Preventive Plan for Fartun Perrin - 11/9/2021  Medicare offers a range of preventive health benefits. Some of the tests and screenings are paid in full while other may be subject to a deductible, co-insurance, and/or copay. Some of these benefits include a comprehensive review of your medical history including lifestyle, illnesses that may run in your family, and various assessments and screenings as appropriate. After reviewing your medical record and screening and assessments performed today your provider may have ordered immunizations, labs, imaging, and/or referrals for you. A list of these orders (if applicable) as well as your Preventive Care list are included within your After Visit Summary for your review. Other Preventive Recommendations:    · A preventive eye exam performed by an eye specialist is recommended every 1-2 years to screen for glaucoma; cataracts, macular degeneration, and other eye disorders. · A preventive dental visit is recommended every 6 months. · Try to get at least 150 minutes of exercise per week or 10,000 steps per day on a pedometer . · Order or download the FREE \"Exercise & Physical Activity: Your Everyday Guide\" from [employer]. Call 5-746.320.5259 or search [employer] online. · You need 0476-8270 mg of calcium and 0576-2321 IU of vitamin D per day. It is possible to meet your calcium requirement with diet alone, but a vitamin D supplement is usually necessary to meet this goal.  · When exposed to the sun, use a sunscreen that protects against both UVA and UVB radiation with an SPF of 30 or greater. Reapply every 2 to 3 hours or after sweating, drying off with a towel, or swimming. · Always wear a seat belt when traveling in a car. Always wear a helmet when riding a bicycle or motorcycle.

## 2021-11-09 NOTE — PROGRESS NOTES
Medicare Annual Wellness Visit  Name: Atif Villela Date: 2021   MRN: [id number] Sex: Female   Age: 82 y.o. Ethnicity: Non- / Non    : 1935 Race: White (non-)      Gino Corbin is here for Medicare AWV (MEDICARE AWV)  HAD URETERAL STONE REMOVED  - dr. Evens Dc  Had kidney stone many years ago but nothing recently  Good to drink water generally through day  Appetite not good  Had cabg - cravings for sweet / sour since   Sees cardio -dr. Tor Higuera routinely - recent angiogram July  occ cp still - ?  Angina  No ntg on hand  Dry cough since cabg but bad since flu shot - wakes up at night - nocturia x2  Has post nasal drainage  More forgetful noted per pt  Seen by ent - dr. Heriberto Strickland - eczema in canals  Ear pain from clenching teeth  Bruising easily w/ asa  A lot of pain posterior left hip area  Diet limited by cystitis/ acid reflux  Has some curvature of spine on xray  BP Readings from Last 3 Encounters:   21 (!) 148/84   21 (!) 165/77   21 139/64     Pulse Readings from Last 3 Encounters:   21 71   21 70   21 78     Wt Readings from Last 3 Encounters:   21 180 lb (81.6 kg)   21 180 lb 4.8 oz (81.8 kg)   21 172 lb 0.2 oz (78 kg)     Past Surgical History:   Procedure Laterality Date    CARDIAC CATHETERIZATION  2018    Dr. Sandra Castillo - w/placement of IABP    CARDIAC CATHETERIZATION  2012    COLONOSCOPY  2015    Dr. Jessy Landeros - w/polypectomy    CORONARY ARTERY BYPASS GRAFT  2018    Dr. Massey Late - off pump x2 (LIMA-LAD, L SVG-D2)    CYSTOSCOPY Left 2021    CYSTOSCOPY WITH LEFT URETEROSCOPY, STONE MANIPULATION ,STONE BASKET REMOVAL performed by Kristina Johns MD at [address] ARTHROSCOPY Right 2015    MOHS SURGERY Left     facial for skin CA    PARTIAL HYSTERECTOMY      ROTATOR CUFF REPAIR Right     TRANSESOPHAGEAL ECHOCARDIOGRAM  2018    during CABG       Screenings for behavioral, psychosocial and functional/safety risks, and cognitive dysfunction are all negative except as indicated below. These results, as well as other patient data from the [address] form, are documented in Flowsheets linked to this Encounter. Allergies   Allergen Reactions    Latex Itching    Mucinex [Guaifenesin Er] Anaphylaxis     Coughs more, dry cough, feels like she has bronchitis    Tetanus Toxoids      HAS REACTION- TOLD TO TAKE ALTERNATIVE    Redness and swelling to site    Morphine Nausea And Vomiting    Pneumococcal Vaccines Rash       Prior to Visit Medications    Medication Sig Taking?  Authorizing Provider   polycarbophil (FIBERCON) 625 MG tablet Take 625 mg by mouth daily Yes Historical Provider, MD   metoprolol succinate (TOPROL XL) 50 MG extended release tablet TAKE 1 TABLET BY MOUTH  DAILY Yes RIGOBERTO Darby CNP   triamterene-hydroCHLOROthiazide (MAXZIDE-25) 37.5-25 MG per tablet TAKE 1 TABLET BY MOUTH  DAILY Yes RIGOBERTO Darby CNP   pantoprazole (PROTONIX) 40 MG tablet TAKE 1 TABLET BY MOUTH IN  THE MORNING BEFORE  BREAKFAST Yes Roosevelt Livingston MD   atorvastatin (LIPITOR) 80 MG tablet TAKE 1 TABLET BY MOUTH  DAILY Yes RIGOBERTO Marion CNP   acetaminophen (TYLENOL) 500 MG tablet Take 500 mg by mouth every 6 hours as needed for Pain Yes Historical Provider, MD   vitamin B-12 100 MCG tablet Take 1 tablet by mouth daily  Patient taking differently: Take 1,000 mcg by mouth daily  Yes RIGOBERTO Patricia CNP   docusate sodium (COLACE, DULCOLAX) 100 MG CAPS Take 100 mg by mouth 2 times daily Yes RIGOBERTO Patricia CNP   Cholecalciferol (VITAMIN D3) 2000 units CAPS Take 2,000 Units by mouth daily  Yes Historical Provider, MD   aspirin 81 MG chewable tablet Take 1 tablet by mouth daily Yes Corey Haas MD       Past Medical History:   Diagnosis Date    Arthritis     Cystitis, interstitial     GERD (gastroesophageal reflux disease)     GI bleeding     was a frequent aspirin user at that time    Heart burn     Hypertension     Interstitial cystitis     Patient is [employer]     no blood products    Psoriasis     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 Td Hill - w/placement of IABP    CARDIAC CATHETERIZATION  2012    COLONOSCOPY  07/31/2015    Dr. Quin Celaya - w/polypectomy   Gaurang Inks GRAFT  05/29/2018    Dr. Vickey Nassar - off pump x2 (LIMA-LAD, L SVG-D2)    CYSTOSCOPY Left 9/1/2021    CYSTOSCOPY WITH LEFT URETEROSCOPY, STONE MANIPULATION ,STONE BASKET REMOVAL performed by Key Juárez MD at [address] ARTHROSCOPY Right 02/2015    MOHS SURGERY Left     facial for skin CA    PARTIAL HYSTERECTOMY      ROTATOR CUFF REPAIR Right 2014    TRANSESOPHAGEAL ECHOCARDIOGRAM  05/29/2018    during CABG       Family History   Problem Relation Age of Onset    Breast Cancer Mother         LUNG    Breast Cancer Father         LUNG    COPD Father     Tuberculosis Father     Breast Cancer Brother         LUNG    Tuberculosis Brother        CareTeam (Including outside providers/suppliers regularly involved in providing care):   Patient Care Team:  Krystin Ivy MD as PCP - General (Family Medicine)  Krystin Ivy MD as PCP - REHABILITATION [facility] Empaneled Provider    Wt Readings from Last 3 Encounters:   11/09/21 180 lb (81.6 kg)   09/14/21 180 lb 4.8 oz (81.8 kg)   09/01/21 172 lb 0.2 oz (78 kg)     Vitals:    11/09/21 1302   Weight: 180 lb (81.6 kg)   Height: 5' 2\" (1.575 m)     Body mass index is 32.92 kg/m². Based upon direct observation of the patient, evaluation of cognition reveals recent and remote memory intact.   SLUMS - 33/28 - age related short -term memory changes  No falls but some balance issues      Patient's complete Health Risk Assessment and screening values have been reviewed and are found in Flowsheets. The following problems were reviewed today and where indicated follow up appointments were made and/or referrals ordered. Positive Risk Factor Screenings with Interventions:          General Health and ACP:  General  In general, how would you say your health is?: Good  In the past 7 days, have you experienced any of the following?  New or Increased Pain, New or Increased Fatigue, Loneliness, Social Isolation, Stress or Anger?: (!) New or Increased Pain, New or Increased Fatigue  Do you get the social and emotional support that you need?: Yes  Do you have a Living Will?: Yes  Advance Directives     Power of  Living Will ACP-Advance Directive ACP-Power of Hannah Nelson on 07/09/21 Filed on 06/06/18 [id number] [facility] Risk Interventions:  · lw/ dpoa addressed - pain addressed below    Health Habits/Nutrition:  Health Habits/Nutrition  Do you exercise for at least 20 minutes 2-3 times per week?: (!) No  Have you lost any weight without trying in the past 3 months?: No  Do you eat only one meal per day?: No  Have you seen the dentist within the past year?: Yes  Body mass index: (!) 32.92  Health Habits/Nutrition Interventions:  · stressed exercise need    Hearing/Vision:  No exam data present  Hearing/Vision  Do you or your family notice any trouble with your hearing that hasn't been managed with hearing aids?: (!) Yes  Do you have difficulty driving, watching TV, or doing any of your daily activities because of your eyesight?: (!) Yes  Have you had an eye exam within the past year?: (!) No  Hearing/Vision Interventions:  · vision/ hearing monitoring dw/ pt    Safety:  Safety  Do you have working smoke detectors?: Yes  Have all throw rugs been removed or fastened?: (!) No  Do you have non-slip mats or surfaces in all bathtubs/showers?: Yes  Do all of your stairways have a railing or banister?: Yes  Are your doorways, halls and stairs free of clutter?: Yes  Do you always fasten your seatbelt when you are in a car?: Yes  Safety Interventions:  · Home safety tips provided     Personalized Preventive Plan   Current Health Maintenance Status  Immunization History   Administered Date(s) Administered    COVID-19, J&J, PF, 0.5 mL 03/27/2021    Influenza, High Dose (Fluzone 65 yrs and older) 09/01/2017, 09/28/2018, 09/04/2019    Influenza, High-dose, Quadv, 65 yrs +, IM (Fluzone) 09/11/2020, 10/11/2021    Pneumococcal Conjugate 13-valent (Noreene Hoof) 05/09/2018        Health Maintenance   Topic Date Due    Shingles Vaccine (1 of 2) Never done    COVID-19 Vaccine (2 - Booster for Mab[employer] series) 05/22/2021    Annual Wellness Visit (AWV)  10/28/2021    Lipid screen  07/08/2022    Potassium monitoring  09/01/2022    Creatinine monitoring  09/01/2022    DEXA (modify frequency per FRAX score)  Completed    Flu vaccine  Completed    Hepatitis A vaccine  Aged Out    Hepatitis B vaccine  Aged Out    Hib vaccine  Aged Out    Meningococcal (ACWY) vaccine  Aged Out     Recommendations for ipvive Due: see orders and patient instructions/AVS.  . Recommended screening schedule for the next 5-10 years is provided to the patient in written form: see Patient Instructions/AVS.     Diagnosis Orders   1. Routine general medical examination at a health care facility     2. Essential hypertension  Comprehensive Metabolic Panel   3. Mixed hyperlipidemia  Lipid Panel   4. Paroxysmal atrial fibrillation (HCC)     5. Calculus of ureter     6. Gastroesophageal reflux disease, unspecified whether esophagitis present     7. Cough     8. Chronic left-sided low back pain, unspecified whether sciatica present     9.  Primary osteoarthritis involving multiple joints     lidoderm/ stretching low back pain - pt wants to hold on pt for now  Hydrate well through day - not before bed for stone  Cough likely 2/2 pnd - flonase otc encouraged  bp today 115 systolic - con present bb/ maxzide - good control  Cont statin - check lipid, cmp  Nitro 0.4 sl q5m prn angina - rx given - on asa daily    utd on flu shot this year - covid booster encouraged  Shingrix/ tdap reviewed - believe she had appropriate pneumonia vaccines  Diet/ activity     Home safety wdw pt  Eye exam next week  Memory concerns addressed w/ pt  Reviewed recent cardio note - s/p cabg w/ post op afib - cath 7/21 -patent grafts  bp control good -on statin  Hospital note 9/1 - dr. Yee Records urology sen for ureteral stone - reviewed  Consider reduce ppi to qod but seems to need daily now -

## 2021-11-10 ENCOUNTER — NURSE ONLY (OUTPATIENT)
Dept: FAMILY MEDICINE CLINIC | Age: 86
End: 2021-11-10
Payer: MEDICARE

## 2021-11-10 DIAGNOSIS — I10 ESSENTIAL HYPERTENSION: ICD-10-CM

## 2021-11-10 DIAGNOSIS — E78.2 MIXED HYPERLIPIDEMIA: ICD-10-CM

## 2021-11-10 LAB
A/G RATIO: 2.3 (ref 1.1–2.2)
ALBUMIN SERPL-MCNC: 4.1 G/DL (ref 3.4–5)
ALP BLD-CCNC: 89 U/L (ref 40–129)
ALT SERPL-CCNC: 24 U/L (ref 10–40)
ANION GAP SERPL CALCULATED.3IONS-SCNC: 10 MMOL/L (ref 3–16)
AST SERPL-CCNC: 24 U/L (ref 15–37)
BILIRUB SERPL-MCNC: 1.2 MG/DL (ref 0–1)
BUN BLDV-MCNC: 10 MG/DL (ref 7–20)
CALCIUM SERPL-MCNC: 9.7 MG/DL (ref 8.3–10.6)
CHLORIDE BLD-SCNC: 106 MMOL/L (ref 99–110)
CHOLESTEROL, TOTAL: 147 MG/DL (ref 0–199)
CO2: 27 MMOL/L (ref 21–32)
CREAT SERPL-MCNC: 0.6 MG/DL (ref 0.6–1.2)
GFR AFRICAN AMERICAN: >60
GFR NON-AFRICAN AMERICAN: >60
GLUCOSE BLD-MCNC: 93 MG/DL (ref 70–99)
HDLC SERPL-MCNC: 75 MG/DL (ref 40–60)
LDL CHOLESTEROL CALCULATED: 53 MG/DL
POTASSIUM SERPL-SCNC: 4.2 MMOL/L (ref 3.5–5.1)
SODIUM BLD-SCNC: 143 MMOL/L (ref 136–145)
TOTAL PROTEIN: 5.9 G/DL (ref 6.4–8.2)
TRIGL SERPL-MCNC: 94 MG/DL (ref 0–150)
VLDLC SERPL CALC-MCNC: 19 MG/DL

## 2021-11-10 PROCEDURE — 36415 COLL VENOUS BLD VENIPUNCTURE: CPT | Performed by: FAMILY MEDICINE

## 2021-12-27 ENCOUNTER — VIRTUAL VISIT (OUTPATIENT)
Dept: FAMILY MEDICINE CLINIC | Age: 86
End: 2021-12-27
Payer: MEDICARE

## 2021-12-27 DIAGNOSIS — I50.23 CHF (CONGESTIVE HEART FAILURE), NYHA CLASS I, ACUTE ON CHRONIC, SYSTOLIC (HCC): ICD-10-CM

## 2021-12-27 DIAGNOSIS — I71.21 THORACIC ASCENDING AORTIC ANEURYSM: ICD-10-CM

## 2021-12-27 DIAGNOSIS — J40 BRONCHITIS: Primary | ICD-10-CM

## 2021-12-27 PROCEDURE — G8484 FLU IMMUNIZE NO ADMIN: HCPCS | Performed by: FAMILY MEDICINE

## 2021-12-27 PROCEDURE — G8417 CALC BMI ABV UP PARAM F/U: HCPCS | Performed by: FAMILY MEDICINE

## 2021-12-27 PROCEDURE — G8399 PT W/DXA RESULTS DOCUMENT: HCPCS | Performed by: FAMILY MEDICINE

## 2021-12-27 PROCEDURE — 1123F ACP DISCUSS/DSCN MKR DOCD: CPT | Performed by: FAMILY MEDICINE

## 2021-12-27 PROCEDURE — 4040F PNEUMOC VAC/ADMIN/RCVD: CPT | Performed by: FAMILY MEDICINE

## 2021-12-27 PROCEDURE — 1036F TOBACCO NON-USER: CPT | Performed by: FAMILY MEDICINE

## 2021-12-27 PROCEDURE — 1090F PRES/ABSN URINE INCON ASSESS: CPT | Performed by: FAMILY MEDICINE

## 2021-12-27 PROCEDURE — G8427 DOCREV CUR MEDS BY ELIG CLIN: HCPCS | Performed by: FAMILY MEDICINE

## 2021-12-27 PROCEDURE — 99215 OFFICE O/P EST HI 40 MIN: CPT | Performed by: FAMILY MEDICINE

## 2021-12-27 RX ORDER — SULFAMETHOXAZOLE AND TRIMETHOPRIM 800; 160 MG/1; MG/1
1 TABLET ORAL 2 TIMES DAILY
Qty: 14 TABLET | Refills: 0 | Status: SHIPPED | OUTPATIENT
Start: 2021-12-27 | End: 2022-01-03

## 2021-12-27 ASSESSMENT — ENCOUNTER SYMPTOMS
TROUBLE SWALLOWING: 0
SINUS PRESSURE: 1
RHINORRHEA: 1
VOICE CHANGE: 1
WHEEZING: 1
SORE THROAT: 1
SINUS PAIN: 0
CHEST TIGHTNESS: 1
DIARRHEA: 1
SHORTNESS OF BREATH: 0
COUGH: 1

## 2021-12-27 NOTE — PROGRESS NOTES
[facility] (:  1935) is a 82 y.o. female,Established patient, here for evaluation of the following chief complaint(s): Other (pt c/o coughing, headaches, sore throat, back pain, general weakness, pt got her booster shot 1 week ago)       ASSESSMENT/PLAN:  1014 + 10 min    Patient Instructions   Jenna Lorenzo was seen today for other. Diagnoses and all orders for this visit:    Bronchitis  -     sulfamethoxazole-trimethoprim (BACTRIM DS;SEPTRA DS) 800-160 MG per tablet; Take 1 tablet by mouth 2 times daily for 7 days  -     COVID-19; Future. Testing was ordered for you. Call the numbers in the texts we sent to schedule it. When trying to get testing always get PCR testing which is not a rapid test.  You get the results the next day. Rapid testing has an error rate of up to 30%. That means you are only 70% sure you do not have COVID-19 with a rapid test.    IT IS CRITICAL THAT YOU START THE RESPIRATORY INFECTION INSTRUCTIONS ON THE  FIRST DAY YOU EXPERIENCE ANY SYMPTOMS SUGGESTIVE OF COVID-19. Doing so may mean the difference between cold like symptoms and a hospitalization. This regimen will help whether it is a cold or other virus or COVID-19. If it is influenza there is a special treatment and testing needs to be done. Start this regimen before diagnosis of COVID-19 is confirmed when you first get symptoms. Instructions for Respiratory Infections (SAVE THIS SHEET)    For the first 7-14 days of symptoms follow instructions below, even before being seen in the office or even during treatment with antibiotics, until symptom free. 1. Water: Drink 1 ounce of water for every 2 pounds of body weight for adults, you need about 7580 ounces of water/fluids per day. This will loosen mucus in the head and chest & improve the weak feeling of dehydration, allow the body to get germ fighting resources to the infection.  Half of fluids can be juice or sugar free Crystal Light. Don't count drinks with caffeine, alcohol or carbonation. Infants can have Pedialyte liquid or freezer pops. Avoid salt and long-term use of sports drinks if you have high Blood Pressure, swelling in the feet or ankles or have heart problems. If you feel lightheaded and weak using Gatorade or other sports drinks can help you feel less lightheaded. It will not help with dizziness which is where you feel the room is spinning. 2. Humidity: Humidify the air to 35-50% ( or until the windows fog over slightly). Can use a humidifier, vaporizer, boil water on the stove or put a coffee can full of water on the heater vents. This will loosen mucus from infections and allergies. 3. Sleep: Get 8-10 hours a night and rest during the evening after work or school. If you have trouble sleeping, adults can take Melatonin 5mg up to 2 tabs at bedtime ( not for children or pregnant women). If Mono is suspected then sleep during 9PM to 9AM time span (if possible.)  4. Cough: Take cough medicines with Guaifenesin ( to loosen chest or head congestion) and Dextromethorphan ( to decrease excess cough). Robitussin D.M. Syrup every 4-6 hrs OR Mucinex D. M. pills OR Delsym DM syrup twice a day. Use the pediatric formulations for children over 6 months making sure they are alcohol & sugar free for children, pregnant women, and diabetics. 5. Pain And Fevers: Take Acetaminophen ( Tylenol) for fevers, aches, and headaches. 2-500 mg every 8 hours for adults AS NEEDED (Tylenol does not make you get better but it does help you get better if you are not sleeping eating or drinking because of fevers or pain/aches). Appropriate doses at bedtime for children may help them sleep better. If pregnant take 1 -500 mg (Tylenol) every 8 hours as needed.  Ibuprofen/Aleve/aspirin for pain and fevers SHOULD NOT BE USED IN THE SETTING OF POSSIBLE COVID-19 viral infection NOR if pregnant, if you have acid reflux, high blood pressure, CHF, or kidney problems. 6.Gargle: (DAY ONE OF SYMPTOMS) Gargle in the back of the throat with the head tilted back and to the sides with a strong mouthwash  ( Listerine or Scope) after meals and at bedtime at least 4 -5 times a day. This helps kill bacteria and viruses in the back of the throat and will shorten the duration and decrease the severity of your symptoms: sore throat, cough, ear popping,/ear pain, and possibly dizziness. 7. Smoking: Avoid smoking or exposure to second hand smoke. 8. Zinc: (DAY ONE OF SYMPTOMS)  Zinc lozenges such as Cold [place] (available most stores), or Basic ([employer] brand) will help shorten the duration and lessen symptoms such as sore throat, cough, nasal congestion, runny nose, and post nasal drip. Use 1 lozenge every 2-4 hours ( after meals if stomach is sensitive). Children can use 10-15 mg or less 3-4 times a day or Zinc lollypops. In pregnancy limit to 50-60 mg a day for 7 days as prenatals have Zinc also. With diarrhea or multiple loose stools use zinc pills 50 mg 1/2 to 1 pill 2x/day as this will help clear the diarrhea. As stools firm up you may need to taper down on the amount of zinc pills or change back to zinc lozenges to avoid constipation. Diarrhea can easily dehydrate you and make you feel weak. 9. Vitamins: Vitamin C 500 mg with breakfast and dinner (with suspected or diagnosed COVID-19 infection take vitamin C 1000 mg 2-3 times a day). Children and pregnant women should drink citrus juices. This speeds healing and strengthens immune system. 10. Chest Symptoms: Vicks Vapor rub to the chest at bedtime. 11. Decongestants: Avoid all decongestants and antihistamine cold preparations in children. Decongestants should always be avoided in people with high blood pressure, palpitations, heart disease and those on stimulant medications.    Antihistamines may impede the body's ability to fight COVID-19.  12. Frequent hand washing and/or hand gel especially after coughing or sneezing into the hands or blowing the nose to help prevent spreading to others. Use kleenex and NOT handkerchiefs. Try all of the above starting with day 1 of symptoms. If Strep throat symptoms appear call to be seen in the office as soon as possible and don't gargle on that day. Newborns, infants, or anyone with earaches or influenza may need to be seen quickly. Adults with fevers over 103 degrees or shortness of breath should call the office immediately. Nutrients that support the immune system:  Beta carotene/vitamin A  Vitamin C  Vitamin D  Zinc  Proteins  Probiotics/prebiotic's(prebiotics are fiber sources that support the growth of probiotics)  Water from all sources including foods such as fruit: Women 2.7 L / 91 ounces per day AND men 3.7 L/125 ounces per day  Source Kevin AGUIRRE (award winning nutritionist/registered dietitian, author, ) 3/25/2020     Vitamin D by mouth and vitamin C intravenously are being used as part of the treatment regimen for COVID-19 in some hospitalized patients. Continue vitamin D 2000 IU daily. Also start vitamin C 500 mg daily with a meal once your cold or flu like symptoms are resolved. Both of these should be continued for the duration of the moderate risk portion of the COVID-19 pandemic expected to be 2 years according to the [employer]. Continue wearing a mask when around people except those living in the same house who are not infected with or heavily exposed to COVID-19, handwashing/hand gel use, social distancing, and social isolation for nonessential activities.     Preventing the Spread of Coronavirus Disease 2019 in Homes and Residential Communities   For the most recent information go to [employer]    Prevention steps for People with confirmed or suspected COVID-19 (including persons under investigation) who do not need to be hospitalized  and   People with confirmed COVID-19 who were hospitalized and determined to be medically stable to go home    Your healthcare provider and public health staff will evaluate whether you can be cared for at home. If it is determined that you do not need to be hospitalized and can be isolated at home, you will be monitored by staff from your local or state health department. You should follow the prevention steps below until a healthcare provider or local or state health department says you can return to your normal activities. Stay home except to get medical care  People who are mildly ill with COVID-19 are able to isolate at home during their illness. You should restrict activities outside your home, except for getting medical care. Do not go to work, school, or public areas. Avoid using public transportation, ride-sharing, or taxis. Separate yourself from other people and animals in your home  People: As much as possible, you should stay in a specific room and away from other people in your home. Also, you should use a separate bathroom, if available. Animals: You should restrict contact with pets and other animals while you are sick with COVID-19, just like you would around other people. Although there have not been reports of pets or other animals becoming sick with COVID-19, it is still recommended that people sick with COVID-19 limit contact with animals until more information is known about the virus. When possible, have another member of your household care for your animals while you are sick. If you are sick with COVID-19, avoid contact with your pet, including petting, snuggling, being kissed or licked, and sharing food. If you must care for your pet or be around animals while you are sick, wash your hands before and after you interact with pets and wear a facemask. Call ahead before visiting your doctor  If you have a medical appointment, call the healthcare provider and tell them that you have or may have COVID-19.  This will help the healthcare providers office take steps to keep other people from getting infected or exposed. Wear a facemask  You should wear a facemask when you are around other people (e.g., sharing a room or vehicle) or pets and before you enter a healthcare providers office. If you are not able to wear a facemask (for example, because it causes trouble breathing), then people who live with you should not stay in the same room with you, or they should wear a facemask if they enter your room. Cover your coughs and sneezes  Cover your mouth and nose with a tissue when you cough or sneeze. Throw used tissues in a lined trash can. Immediately wash your hands with soap and water for at least 20 seconds or, if soap and water are not available, clean your hands with an alcohol-based hand  that contains at least 60% alcohol. Clean your hands often  Wash your hands often with soap and water for at least 20 seconds, especially after blowing your nose, coughing, or sneezing; going to the bathroom; and before eating or preparing food. If soap and water are not readily available, use an alcohol-based hand  with at least 60% alcohol, covering all surfaces of your hands and rubbing them together until they feel dry. Soap and water are the best option if hands are visibly dirty. Avoid touching your eyes, nose, and mouth with unwashed hands. Avoid sharing personal household items  You should not share dishes, drinking glasses, cups, eating utensils, towels, or bedding with other people or pets in your home. After using these items, they should be washed thoroughly with soap and water. Clean all high-touch surfaces everyday  High touch surfaces include counters, tabletops, doorknobs, bathroom fixtures, toilets, phones, keyboards, tablets, and bedside tables. Also, clean any surfaces that may have blood, stool, or body fluids on them. Use a household cleaning spray or wipe, according to the label instructions. Labels contain instructions for safe and effective use of the cleaning product including precautions you should take when applying the product, such as wearing gloves and making sure you have good ventilation during use of the product. Monitor your symptoms  Seek prompt medical attention if your illness is worsening (e.g., difficulty breathing). Before seeking care, call your healthcare provider and tell them that you have, or are being evaluated for, COVID-19. Put on a facemask before you enter the facility. These steps will help the healthcare providers office to keep other people in the office or waiting room from getting infected or exposed. Ask your healthcare provider to call the local or state health department. Persons who are placed under active monitoring or facilitated self-monitoring should follow instructions provided by their local health department or occupational health professionals, as appropriate. When working with your local health department check their available hours. If you have a medical emergency and need to call 911, notify the dispatch personnel that you have, or are being evaluated for COVID-19. If possible, put on a facemask before emergency medical services arrive. Discontinuing home isolation  Patients with confirmed COVID-19 should remain under home isolation precautions until the risk of secondary transmission to others is thought to be low. The decision to discontinue home isolation precautions should be made on a case-by-case basis, in consultation with healthcare providers and state and local health departments. Use Tylenol NOT Ibuprofen/Aleve/aspirin for pain or fevers. There is a theoretical decrease in the body's ability to fight the virus when you are infected if you are on NSAIDs. The FDA has said that this information is not yet proven.      Advance Care Planning  People with COVID-19 may have no symptoms, mild symptoms, such as fever, cough, and shortness of breath or they may have more severe illness, developing severe and fatal pneumonia. As a result, Advance Care Planning with attention to naming a health care decision maker (someone you trust to make healthcare decisions for you if you could not speak for yourself) and sharing other health care preferences is important BEFORE a possible health crisis. Please contact your Primary Care Provider to discuss Advance Care Planning. COVID-19 VACCINE REACTION TREATMENT OF FLULIKE SYMPTOMS  Often after the second shot with the [address] vaccines or after the first [employer] vaccine shot there is a strong antibody reaction causing flulike symptoms. Not everybody experiences this. It occurs very often in people who have already had COVID-19 who get the first shot. It sometimes also occurs in people who have never had symptoms of COVID-19 following the first shot with the Lira Peter and Moderna vaccines if they had a prior asymptomatic COVID-19 infection. Symptoms can include: Fatigue, lightheadedness due to low blood pressure, headache, muscle aches, enlargement of lymph nodes in the armpit on the same side as the vaccine was given, or for some people nausea vomiting and diarrhea. It is wise to have some sports drink like Gatorade at the house to keep the blood pressures up if your blood pressure drops and you feel lightheaded. This can also be used if you are getting dehydrated from nausea, vomiting or diarrhea. If you have nausea and vomiting call the office and we will call in medication to prevent dehydration which will worsen and prolong your flulike symptoms. If you have diarrhea but no nausea or vomiting you can take a zinc pill 50 mg over-the-counter during or immediately after a meal for the first dose of zinc and then decrease to 1/2 pill (25 mg) with a meal as needed for diarrhea or loose stools. The usual maximum dose for zinc is 50 mg twice a day.   Watch for constipation when taking zinc.  If you are prone to diarrhea taking a probiotic several days before getting the vaccine and until symptoms resolve after the vaccine may help lessen risk of diarrhea and loose stools. Taking vitamin D before and after your shot for a few weeks will help the immune system and decrease flulike symptoms. The dosage depends on your previous vitamin D level. Continue vitamin D 2000 IU (50 mcg) daily. It will also help your immune system to take vitamin C 500 mg once or twice a day. CHF (congestive heart failure), NYHA class I, acute on chronic, systolic (HCC)  This diagnosis was on the chart and may have been temporary during the time that you had the heart attack. If you have a history of congestive heart failure it is a good idea to do daily weights when you are sick to make sure you are not getting fluid retention. Thoracic ascending aortic aneurysm [facility])  Reviewed the last CT scan and this looks like mild enlargement rather than a true aneurysm. Good blood pressure control can help prevent further enlargement. Opening Monday, 9/27/2021:  1. [facility]  (Outreach Car Covid Collection)  ADDRESS:  [address] [place] [address] phone [phone]; Fax [phone] --  M-V [phone]. Around Back by the [facility]-designated parking spots with a phone number to call for service. ? No appointment needed. Servicing:  ? [employer] patients with Epic orders. o Epic Test code = [id number]. (Future Lab Collect). ? [employer] employees directed from "[facility]   ? Pre-Procedure Patients in need of Covid Collection   o Greater than 3 days of their scheduled procedure [facility] will collect.   o Within 3 days of a scheduled procedure, do not direct patient to the Laboratory Outreach Drawsite.     - Patient will need to present to the facility the procedure will be performed or to their Primary Care Doctor, [address] [place] [address], Urgent Care, [facility], [facility], etc.  Above all texted the patient. Return if symptoms worsen or fail to improve, for scheduled/requested routine visit. Subjective   SUBJECTIVE/OBJECTIVE:   Chief Complaint   Patient presents with    Other     pt c/o coughing, headaches, sore throat, back pain, general weakness, pt got her booster shot 1 week ago   1036  HPI    Flu like illness  Got sick 3 days after the COVID-19 booster Monday 12/20/21. Not exposed to anyone who was sick. Was at her friends Wednesday last week. Was not wearing a mask. Temperature 102.5 was highest. Had her flu shot 2 months ago. Her headache is not bad today. Took ASA at 8 am today and helped. She is lightheaded. She is drink coffee today. She is drinking water right now. Sleep apnea has never been diagnosed. Can only sleep on her right side now. She does snore when she sleeps on her back. Her chest tightness is from a cough. She is coughing up some mucus. It is now yellow. She has a history of recurrent bronchitis. No diagnosis of asthma/COPD. She does not use an inhaler. She just uses a vaporizer. She has chronic ear pain. Throat is sore from the coughing. Has some old cough med     Review of Systems   Constitutional: Positive for activity change, appetite change, chills, diaphoresis, fatigue and fever. HENT: Positive for congestion, ear pain ( Bilateral but right worse than left), postnasal drip, rhinorrhea, sinus pressure, sneezing, sore throat and voice change. Negative for ear discharge, sinus pain and trouble swallowing. Respiratory: Positive for cough, chest tightness and wheezing. Negative for shortness of breath. Cardiovascular: Positive for chest pain and leg swelling ( Chronically). Negative for palpitations. Gastrointestinal: Positive for diarrhea. Vomiting: loose stools. Neurological: Positive for dizziness, weakness, light-headedness and headaches.      Past Medical History:   Diagnosis Date    Arthritis     Atrial fibrillation ([facility] 6/19/2018  CAD in native artery 6/1/2018    Calculus of ureter 9/1/2021    Cystitis, interstitial     Gastroesophageal reflux disease 3/15/2018    GERD (gastroesophageal reflux disease)     GI bleeding     was a frequent aspirin user at that time    Heart burn     History of colonic polyps 7/2/2015    Hypertension     Interstitial cystitis     Mixed hyperlipidemia 6/19/2018    NSTEMI (non-ST elevated myocardial infarction) ([address] 5/29/2018    Obesity 12/12/2012    Overview:  Replaced inactive diagnosis via diagnosis import    Patient is Jewish     no blood products    Psoriasis     Rotator cuff tear, right 3/25/2013    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)     cardiologist watching    Vitamin B 12 deficiency 1/7/2013       Past Surgical History:   Procedure Laterality Date    CARDIAC CATHETERIZATION  05/29/2018    Dr. Nnacy Gavin - w/placement of IABP    CARDIAC CATHETERIZATION  2012    COLONOSCOPY  07/31/2015    Dr. Severa Rex - w/polypectomy    CORONARY ARTERY BYPASS GRAFT  05/29/2018    Dr. Derek Ames - off pump x2 (LIMA-LAD, L SVG-D2)    CYSTOSCOPY Left 9/1/2021    CYSTOSCOPY WITH LEFT URETEROSCOPY, STONE MANIPULATION ,STONE BASKET REMOVAL performed by Yoandy Garcia MD at [address] ARTHROSCOPY Right 02/2015    MOHS SURGERY Left     facial for skin CA    PARTIAL HYSTERECTOMY      ROTATOR CUFF REPAIR Right 2014    TRANSESOPHAGEAL ECHOCARDIOGRAM  05/29/2018    during CABG       Social History     Socioeconomic History    Marital status:       Spouse name: Not on file    Number of children: Not on file    Years of education: Not on file    Highest education level: Not on file   Occupational History    Occupation: Retired department    Tobacco Use    Smoking status: Former Smoker     Packs/day: 0.25     Years: 6.00     Pack years: 1.50     Types: Cigarettes     Start date: 3/5/1955     Quit date: 5/3/1960     Years since quittin.6    Smokeless tobacco: Never Used    Tobacco comment: AS A TEENAGER FOR ABOUT 6 YEARS    Vaping Use    Vaping Use: Never used   Substance and Sexual Activity    Alcohol use: No    Drug use: No    Sexual activity: Not on file   Other Topics Concern    Not on file   Social History Narrative    Uses walker. No exercise. Social Determinants of Health     Financial Resource Strain: Low Risk     Difficulty of Paying Living Expenses: Not hard at all   Food Insecurity: No Food Insecurity    Worried About Running Out of Food in the Last Year: Never true    Michael of Food in the Last Year: Never true   Transportation Needs: No Transportation Needs    Lack of Transportation (Medical): No    Lack of Transportation (Non-Medical):  No   Physical Activity:     Days of Exercise per Week: Not on file    Minutes of Exercise per Session: Not on file   Stress:     Feeling of Stress : Not on file   Social Connections:     Frequency of Communication with Friends and Family: Not on file    Frequency of Social Gatherings with Friends and Family: Not on file    Attends Jew Services: Not on file    Active Member of [address] [place] [address] or Organizations: Not on file    Attends Club or Organization Meetings: Not on file    Marital Status: Not on file   Intimate Partner Violence:     Fear of Current or Ex-Partner: Not on file    Emotionally Abused: Not on file    Physically Abused: Not on file    Sexually Abused: Not on file   Housing Stability:     Unable to Pay for Housing in the Last Year: Not on file    Number of [place] in the Last Year: Not on file    Unstable Housing in the Last Year: Not on file       Family History   Problem Relation Age of Onset    Breast Cancer Mother         LUNG    Lung Cancer Father         LUNG    COPD Father     Tuberculosis Father     Lung Cancer Sister          of chemo    Lung Cancer Brother     Tuberculosis Brother        Allergies   Allergen Reactions    Latex Itching    Mucinex [Guaifenesin Er] Anaphylaxis     Coughs more, dry cough, feels like she has bronchitis    Tetanus Toxoids      HAS REACTION- TOLD TO TAKE ALTERNATIVE    Redness and swelling to site    Morphine Nausea And Vomiting    Pneumococcal Vaccines Rash       Current Outpatient Medications   Medication Sig Dispense Refill    Cyanocobalamin 1000 MCG SUBL Place under the tongue      nitroGLYCERIN (NITROSTAT) 0.4 MG SL tablet Place 1 tablet under the tongue every 5 minutes as needed for Chest pain up to max of 3 total doses. If no relief after 1 dose, call 911. 25 tablet 1    metoprolol succinate (TOPROL XL) 50 MG extended release tablet TAKE 1 TABLET BY MOUTH  DAILY 90 tablet 3    triamterene-hydroCHLOROthiazide (MAXZIDE-25) 37.5-25 MG per tablet TAKE 1 TABLET BY MOUTH  DAILY 90 tablet 3    pantoprazole (PROTONIX) 40 MG tablet TAKE 1 TABLET BY MOUTH IN  THE MORNING BEFORE  BREAKFAST 90 tablet 2    atorvastatin (LIPITOR) 80 MG tablet TAKE 1 TABLET BY MOUTH  DAILY 90 tablet 3    Cholecalciferol (VITAMIN D3) 2000 units CAPS Take 2,000 Units by mouth daily       aspirin 81 MG chewable tablet Take 1 tablet by mouth daily 30 tablet 3    acetaminophen (TYLENOL) 500 MG tablet Take 500 mg by mouth every 6 hours as needed for Pain (Patient not taking: Reported on 12/27/2021)       No current facility-administered medications for this visit. Objective    Physical Exam   COUGHING DURING EXAM:  Was having occasional moderate coughing spasms.      Patient-Reported Vitals 12/27/2021   Patient-Reported Weight 175lbs   Patient-Reported Height 5'2\"   Patient-Reported Temperature 97.6      11/10/2021 08:40   Sodium 143   Potassium 4.2   Chloride 106   CO2 27   BUN 10   Creatinine 0.6   Anion Gap 10   GFR Non-African American >60   Glucose 93   CALCIUM, SERUM, 671053 9.7   Total Protein 5.9 (L)   Cholesterol, Total 147   HDL Cholesterol 75 (H)   LDL Calculated 53   Triglycerides 94   VLDL Cholesterol Calculated 19   Albumin 4.1   Albumin/Globulin Ratio 2.3 (H)   Alk Phos 89   ALT 24   AST 24   Bilirubin 1.2 (H)      TWO XRAY VIEWS OF THE CHEST  7/5/2021 2:25 pm     COMPARISON:  Prior study(s) most recent chest CT 04/13/2021. HISTORY:  ORDERING SYSTEM PROVIDED HISTORY: chest pain  TECHNOLOGIST PROVIDED HISTORY:  Reason for exam:->chest pain  Reason for Exam: Shortness of Breath (nausea and vomiting on saturday, sunday  woke up weak, no appetite  feels sob, and chest discomfort)  Acuity: Unknown  Type of Exam: Unknown     FINDINGS:  The heart and pulmonary vessels are normal.  Lungs are clear with moderate  hyperinflation. Sternotomy wires are seen from prior surgery. Bones are  unremarkable for age.     IMPRESSION:  No acute cardiac or pulmonary disease. Sasha Kendall is a 82 y.o. female evaluated via telephone on 12/27/2021. Consent:  She and/or health care decision maker is aware that that she may receive a bill for this telephone service, depending on her insurance coverage, and has provided verbal consent to proceed: Yes    Documentation:  I communicated with the patient and/or health care decision maker about issues above. Details of this discussion including any medical advice provided: Information above      I affirm this is a Patient Initiated Episode with a Patient who has not had a related appointment within my department in the past 7 days or scheduled within the next 24 hours. Patient identification was verified at the start of the visit: Yes    Total Time: 48 minutes    The visit was conducted pursuant to the emergency declaration under the [address] [place] [address], [address] [place] [address] authority and the [place] and [employer] Act. Patient identification was verified, and a caregiver was present when appropriate. The patient was located in a state where the provider was credentialed to provide care.     Note: not billable if this call serves to triage the patient into an appointment for the relevant concern    Saadia Barrios DO       An electronic signature was used to authenticate this note.     --Saadia Barrios, DO

## 2021-12-27 NOTE — PATIENT INSTRUCTIONS
Jenna Lorenzo was seen today for other. Diagnoses and all orders for this visit:    Bronchitis  -     sulfamethoxazole-trimethoprim (BACTRIM DS;SEPTRA DS) 800-160 MG per tablet; Take 1 tablet by mouth 2 times daily for 7 days  -     COVID-19; Future. Testing was ordered for you. Call the numbers in the texts we sent to schedule it. When trying to get testing always get PCR testing which is not a rapid test.  You get the results the next day. Rapid testing as an error rate of up to 30%. That means you are only 70% sure you do not have COVID-19 with a rapid test.    IT IS CRITICAL THAT YOU START THE RESPIRATORY INFECTION INSTRUCTIONS ON THE  FIRST DAY YOU EXPERIENCE ANY SYMPTOMS SUGGESTIVE OF COVID-19. Doing so may mean the difference between cold like symptoms and a hospitalization. This regimen will help whether it is a cold or other virus or COVID-19. If it is influenza there is a special treatment and testing needs to be done. Start this regimen before diagnosis of COVID-19 is confirmed when you first get symptoms. Instructions for Respiratory Infections (SAVE THIS SHEET)    For the first 7-14 days of symptoms follow instructions below, even before being seen in the office or even during treatment with antibiotics, until symptom free. 1. Water: Drink 1 ounce of water for every 2 pounds of body weight for adults, you need about 7580 ounces of water/fluids per day. This will loosen mucus in the head and chest & improve the weak feeling of dehydration, allow the body to get germ fighting resources to the infection. Half of fluids can be juice or sugar free Crystal Light. Don't count drinks with caffeine, alcohol or carbonation. Infants can have Pedialyte liquid or freezer pops. Avoid salt and long-term use of sports drinks if you have high Blood Pressure, swelling in the feet or ankles or have heart problems.   If you feel lightheaded and weak using Gatorade or other sports drinks can help you feel less lightheaded. It will not help with dizziness which is where you feel the room is spinning. 2. Humidity: Humidify the air to 35-50% ( or until the windows fog over slightly). Can use a humidifier, vaporizer, boil water on the stove or put a coffee can full of water on the heater vents. This will loosen mucus from infections and allergies. 3. Sleep: Get 8-10 hours a night and rest during the evening after work or school. If you have trouble sleeping, adults can take Melatonin 5mg up to 2 tabs at bedtime ( not for children or pregnant women). If Mono is suspected then sleep during 9PM to 9AM time span (if possible.)  4. Cough: Take cough medicines with Guaifenesin ( to loosen chest or head congestion) and Dextromethorphan ( to decrease excess cough). Robitussin D.M. Syrup every 4-6 hrs OR Mucinex D. M. pills OR Delsym DM syrup twice a day. Use the pediatric formulations for children over 6 months making sure they are alcohol & sugar free for children, pregnant women, and diabetics. 5. Pain And Fevers: Take Acetaminophen ( Tylenol) for fevers, aches, and headaches. 2-500 mg every 8 hours for adults AS NEEDED (Tylenol does not make you get better but it does help you get better if you are not sleeping eating or drinking because of fevers or pain/aches). Appropriate doses at bedtime for children may help them sleep better. If pregnant take 1 -500 mg (Tylenol) every 8 hours as needed. Ibuprofen/Aleve/aspirin for pain and fevers SHOULD NOT BE USED IN THE SETTING OF POSSIBLE COVID-19 viral infection NOR if pregnant, if you have acid reflux, high blood pressure, CHF, or kidney problems. 6.Gargle: (DAY ONE OF SYMPTOMS) Gargle in the back of the throat with the head tilted back and to the sides with a strong mouthwash  ( Listerine or Scope) after meals and at bedtime at least 4 -5 times a day.  This helps kill bacteria and viruses in the back of the throat and will shorten the duration and decrease the severity of your symptoms: sore throat, cough, ear popping,/ear pain, and possibly dizziness. 7. Smoking: Avoid smoking or exposure to second hand smoke. 8. Zinc: (DAY ONE OF SYMPTOMS)  Zinc lozenges such as Cold [place] (available most stores), or Basic ([employer] brand) will help shorten the duration and lessen symptoms such as sore throat, cough, nasal congestion, runny nose, and post nasal drip. Use 1 lozenge every 2-4 hours ( after meals if stomach is sensitive). Children can use 10-15 mg or less 3-4 times a day or Zinc lollypops. In pregnancy limit to 50-60 mg a day for 7 days as prenatals have Zinc also. With diarrhea or multiple loose stools use zinc pills 50 mg 1/2 to 1 pill 2x/day as this will help clear the diarrhea. As stools firm up you may need to taper down on the amount of zinc pills or change back to zinc lozenges to avoid constipation. Diarrhea can easily dehydrate you and make you feel weak. 9. Vitamins: Vitamin C 500 mg with breakfast and dinner (with suspected or diagnosed COVID-19 infection take vitamin C 1000 mg 2-3 times a day). Children and pregnant women should drink citrus juices. This speeds healing and strengthens immune system. 10. Chest Symptoms: Vicks Vapor rub to the chest at bedtime. 11. Decongestants: Avoid all decongestants and antihistamine cold preparations in children. Decongestants should always be avoided in people with high blood pressure, palpitations, heart disease and those on stimulant medications. Antihistamines may impede the body's ability to fight COVID-19.  12. Frequent hand washing and/or hand gel especially after coughing or sneezing into the hands or blowing the nose to help prevent spreading to others. Use kleenex and NOT handkerchiefs. Try all of the above starting with day 1 of symptoms. If Strep throat symptoms appear call to be seen in the office as soon as possible and don't gargle on that day.  Newborns, infants, or anyone with earaches or influenza may need to be seen quickly. Adults with fevers over 103 degrees or shortness of breath should call the office immediately. Nutrients that support the immune system:  Beta carotene/vitamin A  Vitamin C  Vitamin D  Zinc  Proteins  Probiotics/prebiotic's(prebiotics are fiber sources that support the growth of probiotics)  Water from all sources including foods such as fruit: Women 2.7 L / 91 ounces per day AND men 3.7 L/125 ounces per day  Source Radha AGUIRRE (award winning nutritionist/registered dietitian, author, ) 3/25/2020     Vitamin D by mouth and vitamin C intravenously are being used as part of the treatment regimen for COVID-19 in some hospitalized patients. Continue vitamin D 2000 IU daily. Also start vitamin C 500 mg daily with a meal once your cold or flu like symptoms are resolved. Both of these should be continued for the duration of the moderate risk portion of the COVID-19 pandemic expected to be 2 years according to the [employer]. Continue wearing a mask when around people except those living in the same house who are not infected with or heavily exposed to COVID-19, handwashing/hand gel use, social distancing, and social isolation for nonessential activities. Preventing the Spread of Coronavirus Disease 2019 in Homes and Residential Communities   For the most recent information go to [employer]    Prevention steps for People with confirmed or suspected COVID-19 (including persons under investigation) who do not need to be hospitalized  and   People with confirmed COVID-19 who were hospitalized and determined to be medically stable to go home    Your healthcare provider and public health staff will evaluate whether you can be cared for at home.  If it is determined that you do not need to be hospitalized and can be isolated at home, you will be monitored by staff from your local or state because it causes trouble breathing), then people who live with you should not stay in the same room with you, or they should wear a facemask if they enter your room. Cover your coughs and sneezes  Cover your mouth and nose with a tissue when you cough or sneeze. Throw used tissues in a lined trash can. Immediately wash your hands with soap and water for at least 20 seconds or, if soap and water are not available, clean your hands with an alcohol-based hand  that contains at least 60% alcohol. Clean your hands often  Wash your hands often with soap and water for at least 20 seconds, especially after blowing your nose, coughing, or sneezing; going to the bathroom; and before eating or preparing food. If soap and water are not readily available, use an alcohol-based hand  with at least 60% alcohol, covering all surfaces of your hands and rubbing them together until they feel dry. Soap and water are the best option if hands are visibly dirty. Avoid touching your eyes, nose, and mouth with unwashed hands. Avoid sharing personal household items  You should not share dishes, drinking glasses, cups, eating utensils, towels, or bedding with other people or pets in your home. After using these items, they should be washed thoroughly with soap and water. Clean all high-touch surfaces everyday  High touch surfaces include counters, tabletops, doorknobs, bathroom fixtures, toilets, phones, keyboards, tablets, and bedside tables. Also, clean any surfaces that may have blood, stool, or body fluids on them. Use a household cleaning spray or wipe, according to the label instructions. Labels contain instructions for safe and effective use of the cleaning product including precautions you should take when applying the product, such as wearing gloves and making sure you have good ventilation during use of the product.   Monitor your symptoms  Seek prompt medical attention if your illness is worsening (e.g., difficulty breathing). Before seeking care, call your healthcare provider and tell them that you have, or are being evaluated for, COVID-19. Put on a facemask before you enter the facility. These steps will help the healthcare providers office to keep other people in the office or waiting room from getting infected or exposed. Ask your healthcare provider to call the local or state health department. Persons who are placed under active monitoring or facilitated self-monitoring should follow instructions provided by their local health department or occupational health professionals, as appropriate. When working with your local health department check their available hours. If you have a medical emergency and need to call 911, notify the dispatch personnel that you have, or are being evaluated for COVID-19. If possible, put on a facemask before emergency medical services arrive. Discontinuing home isolation  Patients with confirmed COVID-19 should remain under home isolation precautions until the risk of secondary transmission to others is thought to be low. The decision to discontinue home isolation precautions should be made on a case-by-case basis, in consultation with healthcare providers and [facility] health departments. Use Tylenol NOT Ibuprofen/Aleve/aspirin for pain or fevers. There is a theoretical decrease in the body's ability to fight the virus when you are infected if you are on NSAIDs. The FDA has said that this information is not yet proven. Advance Care Planning  People with COVID-19 may have no symptoms, mild symptoms, such as fever, cough, and shortness of breath or they may have more severe illness, developing severe and fatal pneumonia.   As a result, Advance Care Planning with attention to naming a health care decision maker (someone you trust to make healthcare decisions for you if you could not speak for yourself) and sharing other health care preferences is important BEFORE a possible health crisis. Please contact your Primary Care Provider to discuss Advance Care Planning. COVID-19 VACCINE REACTION TREATMENT OF FLULIKE SYMPTOMS  Often after the second shot with the [address] vaccines or after the first [employer] vaccine shot there is a strong antibody reaction causing flulike symptoms. Not everybody experiences this. It occurs very often in people who have already had COVID-19 who get the first shot. It sometimes also occurs in people who have never had symptoms of COVID-19 following the first shot with the Lira Peter and Moderna vaccines if they had a prior asymptomatic COVID-19 infection. Symptoms can include: Fatigue, lightheadedness due to low blood pressure, headache, muscle aches, enlargement of lymph nodes in the armpit on the same side as the vaccine was given, or for some people nausea vomiting and diarrhea. It is wise to have some sports drink like Gatorade at the house to keep the blood pressures up if your blood pressure drops and you feel lightheaded. This can also be used if you are getting dehydrated from nausea, vomiting or diarrhea. If you have nausea and vomiting call the office and we will call in medication to prevent dehydration which will worsen and prolong your flulike symptoms. If you have diarrhea but no nausea or vomiting you can take a zinc pill 50 mg over-the-counter during or immediately after a meal for the first dose of zinc and then decrease to 1/2 pill (25 mg) with a meal as needed for diarrhea or loose stools. The usual maximum dose for zinc is 50 mg twice a day. Watch for constipation when taking zinc.  If you are prone to diarrhea taking a probiotic several days before getting the vaccine and until symptoms resolve after the vaccine may help lessen risk of diarrhea and loose stools. Taking vitamin D before and after your shot for a few weeks will help the immune system and decrease flulike symptoms.   The dosage depends on your previous vitamin D level. Continue vitamin D 2000 IU (50 mcg) daily. It will also help your immune system to take vitamin C 500 mg once or twice a day. CHF (congestive heart failure), NYHA class I, acute on chronic, systolic (HCC)  This diagnosis was on the chart and may have been temporary during the time that you had the heart attack. If you have a history of congestive heart failure it is a good idea to do daily weights when you are sick to make sure you are not getting fluid retention. Thoracic ascending aortic aneurysm [facility])  Reviewed the last CT scan and this looks like mild enlargement rather than a true aneurysm. Good blood pressure control can help prevent further enlargement. Opening Monday, 9/27/2021:  1. [facility]  (Outreach Car Covid Collection)  ADDRESS:  [address] [place], [place] [address]atherine [place]inney [address] phone [phone]; Fax [phone] --  M-F [phone]. Around Back by the [facility]-designated parking spots with a phone number to call for service. ? No appointment needed. Servicing:  ? [facility] patients with [employer] orders. o Epic Test code = [id number]. (Future Lab Collect). ? [employer] employees directed from [employer]. ? Pre-Procedure Patients in need of Covid Collection   o Greater than 3 days of their scheduled procedure [facility] will collect.   o Within 3 days of a scheduled procedure, do not direct patient to the Laboratory Outreach Drawsite.     - Patient will need to present to the facility the procedure will be performed or to their Primary Care Doctor, [address] [place] [address] , Urgent Care, [place], [facility], etc.  Above all texted the patient.

## 2022-01-03 ENCOUNTER — TELEPHONE (OUTPATIENT)
Dept: FAMILY MEDICINE CLINIC | Age: 87
End: 2022-01-03

## 2022-01-03 DIAGNOSIS — J40 BRONCHITIS: Primary | ICD-10-CM

## 2022-01-03 RX ORDER — LEVOFLOXACIN 500 MG/1
500 TABLET, FILM COATED ORAL DAILY
Qty: 5 TABLET | Refills: 0 | Status: SHIPPED | OUTPATIENT
Start: 2022-01-03 | End: 2022-01-08

## 2022-01-10 ENCOUNTER — TELEPHONE (OUTPATIENT)
Dept: FAMILY MEDICINE CLINIC | Age: 87
End: 2022-01-10

## 2022-01-10 DIAGNOSIS — J40 BRONCHITIS: Primary | ICD-10-CM

## 2022-01-10 RX ORDER — PREDNISONE 20 MG/1
40 TABLET ORAL DAILY
Qty: 10 TABLET | Refills: 0 | Status: SHIPPED | OUTPATIENT
Start: 2022-01-10 | End: 2022-01-15

## 2022-01-10 NOTE — TELEPHONE ENCOUNTER
Pt had the Rormix booster 12-20-21. She was sick two days later. Pt was diagnosed with Bronchitis  A week ago today. She finished her antibiotic Saturday-the second one. The first one she only took for 5 days it made her sick. Pt is still  Very little cough no mucous-no fever 98.9, he chest still feels tight. Feels breathless. She wants to know if she should get tested for Covid. she wants to know if she is contaigous. Should she also get a chest xray to see if the bronchitis cleared up. Melida Fernandez is closer to her. She has started taking   Zinc 50 mg a day  She is eating tangerines.

## 2022-01-10 NOTE — TELEPHONE ENCOUNTER
VV office visit with [address] [place] [address] on 12/27/2021 for Bronchitis.   Please advise./mv

## 2022-01-11 NOTE — TELEPHONE ENCOUNTER
Call patient. She likely has postinfectious inflammation. We will treat with a steroid. Take 40 mg once a day in the morning for 5 days. If shortness of breath does not improve will need to have an x-ray and blood work followed by a video or telephone visit. Testing for COVID-19   about 2 weeks after she started with her illness would not likely be useful unless she had a COVID-19 exposure during her treatment with antibiotics. Bronchitis  -     predniSONE (DELTASONE) 20 MG tablet;  Take 2 tablets by mouth daily for 5 days

## 2022-01-11 NOTE — TELEPHONE ENCOUNTER
She did not get COVID tested. She wants to do that now. Explained that now that the test will likely be negative. Ordered a chest x-ray let the patient know that she could go to [facility] to get that. Do not go through the emergency just get registered and go to radiology. Make sure to mask or double mask and social distance. Use hand . She did not take the vitamin C. She has not been gargling. I told her to schedule another visit if she is not getting better and needs an inhaler because of persistent shortness of breath after she takes the steroids.

## 2022-01-12 ENCOUNTER — HOSPITAL ENCOUNTER (OUTPATIENT)
Age: 87
Discharge: HOME | End: 2022-01-12
Payer: MEDICARE

## 2022-01-12 ENCOUNTER — HOSPITAL ENCOUNTER (OUTPATIENT)
Dept: GENERAL RADIOLOGY | Age: 87
Discharge: HOME | End: 2022-01-12
Payer: MEDICARE

## 2022-01-12 DIAGNOSIS — J40 BRONCHITIS: ICD-10-CM

## 2022-01-12 PROCEDURE — 71046 X-RAY EXAM CHEST 2 VIEWS: CPT

## 2022-01-15 DIAGNOSIS — R06.02 SHORTNESS OF BREATH: ICD-10-CM

## 2022-01-15 DIAGNOSIS — Z20.822 SUSPECTED COVID-19 VIRUS INFECTION: ICD-10-CM

## 2022-01-15 DIAGNOSIS — J20.9 ACUTE BRONCHITIS, UNSPECIFIED ORGANISM: Primary | ICD-10-CM

## 2022-01-15 RX ORDER — BUDESONIDE AND FORMOTEROL FUMARATE DIHYDRATE 160; 4.5 UG/1; UG/1
2 AEROSOL RESPIRATORY (INHALATION) 2 TIMES DAILY
Qty: 10.2 G | Refills: 0 | Status: SHIPPED | OUTPATIENT
Start: 2022-01-15 | End: 2022-03-18

## 2022-02-10 RX ORDER — PANTOPRAZOLE SODIUM 40 MG/1
TABLET, DELAYED RELEASE ORAL
Qty: 90 TABLET | Refills: 1 | Status: SHIPPED | OUTPATIENT
Start: 2022-02-10 | End: 2022-04-20 | Stop reason: ALTCHOICE

## 2022-03-14 NOTE — PROGRESS NOTES
[address]   Cardiac Evaluation      Patient: Steve Joseph  YOB: 1935         Chief Complaint   Patient presents with    Coronary Artery Disease    Hyperlipidemia    Hypertension    6 Month Follow-Up        Referring provider: Denise Kaye MD    History of Present Illness:   Steve Joseph is a 82 y.o. female here to follow up on CAD, CABG '18, patent grafts by cath 7/2021, Karlie Trinidad. Had Covid in Dec/Jatin'22. Today she states that her BP has been high a lot, her home bp cuff broke recently. She has issues with balance and is using a walker at home to get in and out of her tub. She normally uses a can. She has no chest pain, but does get out of breath with long distances. She has arthritis pain as well. With regard to medication therapy he/she has been compliant with prescribed regimen and has tolerated therapy to date. Past Medical History:   has a past medical history of Arthritis, Atrial fibrillation ([address] CAD in native artery, Calculus of ureter, Cystitis, interstitial, Gastroesophageal reflux disease, GERD (gastroesophageal reflux disease), GI bleeding, Heart burn, History of colonic polyps, Hypertension, Interstitial cystitis, Mixed hyperlipidemia, NSTEMI (non-ST elevated myocardial infarction) ([address] Obesity, Patient is Restoration, Psoriasis, Rotator cuff tear, right, Sebaceous carcinoma, Thoracic aortic aneurysm ([address] and Vitamin B 12 deficiency. Surgical History:   has a past surgical history that includes partial hysterectomy (cervix not removed); Knee arthroscopy (Right, 02/2015); Rotator cuff repair (Right, 2014); Coronary artery bypass graft (05/29/2018); transesophageal echocardiogram (05/29/2018); Cardiac catheterization (05/29/2018); Colonoscopy (07/31/2015); Mohs surgery (Left); Cardiac catheterization (2012);  Cystoscopy (Left, 9/1/2021); and Kidney stone removal.     Current Outpatient Medications   Medication Sig Dispense Refill  pantoprazole (PROTONIX) 40 MG tablet TAKE 1 TABLET BY MOUTH IN  THE MORNING BEFORE  BREAKFAST 90 tablet 1    Cyanocobalamin 1000 MCG SUBL Place under the tongue      nitroGLYCERIN (NITROSTAT) 0.4 MG SL tablet Place 1 tablet under the tongue every 5 minutes as needed for Chest pain up to max of 3 total doses. If no relief after 1 dose, call 911. 25 tablet 1    metoprolol succinate (TOPROL XL) 50 MG extended release tablet TAKE 1 TABLET BY MOUTH  DAILY 90 tablet 3    triamterene-hydroCHLOROthiazide (MAXZIDE-25) 37.5-25 MG per tablet TAKE 1 TABLET BY MOUTH  DAILY 90 tablet 3    atorvastatin (LIPITOR) 80 MG tablet TAKE 1 TABLET BY MOUTH  DAILY 90 tablet 3    acetaminophen (TYLENOL) 500 MG tablet Take 500 mg by mouth every 6 hours as needed for Pain       Cholecalciferol (VITAMIN D3) 2000 units CAPS Take 2,000 Units by mouth daily       aspirin 81 MG chewable tablet Take 1 tablet by mouth daily 30 tablet 3     No current facility-administered medications for this visit. Allergies:  Latex, Mucinex [guaifenesin er], Bactrim [sulfamethoxazole-trimethoprim], Tetanus toxoids, Morphine, and Pneumococcal vaccines     Social History:  Social History     Socioeconomic History    Marital status:       Spouse name: Not on file    Number of children: Not on file    Years of education: Not on file    Highest education level: Not on file   Occupational History    Occupation: Retired department    Tobacco Use    Smoking status: Former Smoker     Packs/day: 0.25     Years: 6.00     Pack years: 1.50     Types: Cigarettes     Start date: 3/5/1955     Quit date: 5/3/1960     Years since quittin.9    Smokeless tobacco: Never Used    Tobacco comment: AS A TEENAGER FOR ABOUT 6 YEARS    Vaping Use    Vaping Use: Never used   Substance and Sexual Activity    Alcohol use: No    Drug use: No    Sexual activity: Not on file   Other Topics Concern    Not on file   Social History Narrative Uses walker. No exercise. 21. Social Determinants of Health     Financial Resource Strain: Low Risk     Difficulty of Paying Living Expenses: Not hard at all   Food Insecurity: No Food Insecurity    Worried About Running Out of Food in the Last Year: Never true    Michael of Food in the Last Year: Never true   Transportation Needs: No Transportation Needs    Lack of Transportation (Medical): No    Lack of Transportation (Non-Medical): No   Physical Activity:     Days of Exercise per Week: Not on file    Minutes of Exercise per Session: Not on file   Stress:     Feeling of Stress : Not on file   Social Connections:     Frequency of Communication with Friends and Family: Not on file    Frequency of Social Gatherings with Friends and Family: Not on file    Attends Bahai Services: Not on file    Active Member of Clubs or Organizations: Not on file    Attends Club or Organization Meetings: Not on file    Marital Status: Not on file   Intimate Partner Violence:     Fear of Current or Ex-Partner: Not on file    Emotionally Abused: Not on file    Physically Abused: Not on file    Sexually Abused: Not on file   Housing Stability:     Unable to Pay for Housing in the Last Year: Not on file    Number of [place] in the Last Year: Not on file    Unstable Housing in the Last Year: Not on file       Family History:   Family History   Problem Relation Age of Onset    Breast Cancer Mother         LUNG    Lung Cancer Father         LUNG    COPD Father     Tuberculosis Father     Lung Cancer Sister          of chemo    Lung Cancer Brother     Tuberculosis Brother      Family history has been reviewed and not pertinent except as noted above. Review of Systems:   · Constitutional: there has been no unanticipated weight loss. No change in energy or activity level   · Eyes: No visual changes   · ENT: No Headaches, hearing loss or vertigo.  No mouth sores or sore throat. · Cardiovascular: Reviewed in HPI  · Respiratory: No cough or wheezing, no sputum production. · Gastrointestinal: No abdominal pain, appetite loss, blood in stools. No change in bowel or bladder habits. · Genitourinary: No nocturia, dysuria, trouble voiding  · Musculoskeletal:  No gait disturbance, weakness or joint complaints. · Integumentary: No rash or pruritis. · Neurological: No headache, change in muscle strength, numbness or tingling. No change in gait, balance, coordination, mood, affect, memory, mentation, behavior. · Psychiatric: No anxiety or depression  · Endocrine: No malaise or fever  · Hematologic/Lymphatic: No abnormal bruising or bleeding, blood clots or swollen lymph nodes. · Allergic/Immunologic: No nasal congestion or hives. Physical Examination:    Vitals:    03/18/22 1552   BP: 136/82   Pulse: 66   SpO2: 98%   Weight: 183 lb 6.4 oz (83.2 kg)   Height: 5' 2\" (1.575 m)     Body mass index is 33.54 kg/m². Wt Readings from Last 3 Encounters:   03/18/22 183 lb 6.4 oz (83.2 kg)   11/09/21 180 lb (81.6 kg)   09/14/21 180 lb 4.8 oz (81.8 kg)      BP Readings from Last 3 Encounters:   03/18/22 136/82   09/14/21 (!) 148/84   09/01/21 (!) 165/77        Physical Examination:    · CONSTITUTIONAL: Well developed, well nourished  · EYES: PERRLA. No xanthelasma, sclera non icteric  · EARS,NOSE,MOUTH,THROAT:  Mucous membranes moist, normal hearing  · NECK: Supple, JVP normal, thyroid not enlarged. Carotids 2+ without bruits  · RESPIRATORY: Normal effort, no rales or rhonchi  · CARDIOVASCULAR: Normal PMI, regular rate and rhythm, no murmurs, rub or gallop. No edema. Radial pulses present and equal  · CHEST: No scar or masses  · ABDOMEN: Normal bowel sounds. No masses or tenderness. No bruit  · MUSCULOSKELETAL: No clubbing or cyanosis. Moves all extremities well. Normal gait  · SKIN:  Warm and dry. No rashes  · NEUROLOGIC: Cranial nerves intact.  Alert and oriented  · PSYCHIATRIC: Calm affect. Appears to have normal judgement and insight    All testing and labs listed below were personally reviewed by myself. Cardiac Cath LV21 Piper Ayala)  Anatomy:   LM-nml   LAD-mid 100% . Patent LIMA to distal LAD. Distal LAD diffuse disease up to 90%. SVG to D1 patent  Cx-nml  OM- nml  RCA-prox 40%, distal 40%  RPDA- nml  LVEF- 65  LVG- nml  LVEDP- 2     Contrast: 73  Flouro Time: 4.9  Access: R CFA. Ultrasound guidance used to determine aforementioned artery patency, size (>2mm), anatomic variations and ideal puncture location. Real-time ultrasound utilized concurrent with vascular needle entry into the artery. Image(s) permanently recorded and reported in the patient chart. Perc stick safe zone     Impression  ~Coronary Angiography w/ severe multi Vessel CAD  ~LVG with LVEF of 65 and no regional wall motion abnormalities    Recommendation  ~Aggressive medical treatment and risk factor modification  ~1. Medications reviewed, no changes at this time. 2. Post cath IVF. Bedrest.  4. Patient has been advised on the importance of regular exercise of at least 20-30 minutes daily alternating with aerobic and isometric activities. 5. Patient counseled about and offered assistance for smoking cessation   6. No indication for cardiac rehab  7. Monitor overnight, OK to discharge home tomorrow . Follow up in 2-3 months with cardiology    CABG 18 ([facility])    LHC: 18 Ravi Sethi)  Findings:                      LM       Normal              LAD     Mid 99% trifurcation involving D1 and D2 with GILBERTO 2 flow,               Cx        Normal              RCA     20% mid              LVG     50%, apical hk              EDP     17 mmHg  Intervention:  None  Recs:              Urgent CABG                          No procedural complications meriting CABG.                           Due to ongoing cp and severe LAD disease, IABP placed with improvement in cp    Echo 2020  Summary  Normal left ventricle size and systolic function with an estimated ejection  fraction of 60-65%. There is mild concentric left ventricular hypertrophy. E'e is 8.9  Mild mitral regurgitation. Trace aortic regurgitation. Mild-to-moderate tricuspid regurgitation with estimated PASP of 28mmHg . Right ventricular function appears mildly reduced. TAPSE is 1.46cm. The left atrium is mildly dilated. Assessment/Plan  1. Coronary artery disease involving coronary bypass graft of native heart without angina pectoris    2. Hypertension, unspecified type    3. Hyperlipidemia, unspecified hyperlipidemia type          Coronary artery disease involving coronary bypass graft of native heart without angina pectoris  Angina~ none  CCS class~ 1  Intervention  CABG~ 6/2018 L[facility]   LHC~   7/2021 with patent grafts    5/2018 with severe LAD dx -> CABG  Echo~   2/2020 EF 60-65%  Current meds~ asa   Plan~ stable     Hypertension  /82   Pulse 66   Ht 5' 2\" (1.575 m)   Wt 183 lb 6.4 oz (83.2 kg)   SpO2 98%   BMI 33.54 kg/m²   Meds~ Toprol / maxzide  Plan~ stable today     Hyperlipidemia   11/2021     LDL  53   HDL 75  Meds~ lipitor  Plan~ stable       No orders of the defined types were placed in this encounter. Emmanuelle Sanchez MD    Follow up in 1 year with NP    Thank you for allowing to me to participate in the care of Stefan Kaur. Scribe's Attestation: This note was scribed in the presence of Dr. Tahmina Purdy MD by Kevin Larsen RN.       I, Dr. Tahmina Purdy, personally performed the services described in this documentation, as scribed by the above signed scribe in my presence. It is both accurate and complete to my knowledge. I agree with the details independently gathered by the clinical support staff, while the remaining scribed note accurately describes my personal service to the patient.

## 2022-03-16 PROBLEM — E78.5 HYPERLIPIDEMIA: Status: ACTIVE | Noted: 2018-06-19

## 2022-03-16 PROBLEM — I25.810 CORONARY ARTERY DISEASE INVOLVING CORONARY BYPASS GRAFT OF NATIVE HEART WITHOUT ANGINA PECTORIS: Status: ACTIVE | Noted: 2018-06-01

## 2022-03-16 NOTE — ASSESSMENT & PLAN NOTE
Angina~ none  CCS class~ 1  Intervention  CABG~ 6/2018 Kristyn Galan   7/2021 with patent grafts    5/2018 with severe LAD dx -> CABG  Echo~   2/2020 EF 60-65%  Current meds~ asa   Plan~ stable

## 2022-03-16 NOTE — ASSESSMENT & PLAN NOTE
/82   Pulse 66   Ht 5' 2\" (1.575 m)   Wt 183 lb 6.4 oz (83.2 kg)   SpO2 98%   BMI 33.54 kg/m²   Meds~ Toprol / maxzide  Plan~ stable today

## 2022-03-18 ENCOUNTER — OFFICE VISIT (OUTPATIENT)
Dept: CARDIOLOGY CLINIC | Age: 87
End: 2022-03-18
Payer: MEDICARE

## 2022-03-18 VITALS
OXYGEN SATURATION: 98 % | BODY MASS INDEX: 33.75 KG/M2 | DIASTOLIC BLOOD PRESSURE: 82 MMHG | HEIGHT: 62 IN | WEIGHT: 183.4 LBS | SYSTOLIC BLOOD PRESSURE: 136 MMHG | HEART RATE: 66 BPM

## 2022-03-18 DIAGNOSIS — E78.5 HYPERLIPIDEMIA, UNSPECIFIED HYPERLIPIDEMIA TYPE: ICD-10-CM

## 2022-03-18 DIAGNOSIS — I25.810 CORONARY ARTERY DISEASE INVOLVING CORONARY BYPASS GRAFT OF NATIVE HEART WITHOUT ANGINA PECTORIS: Primary | ICD-10-CM

## 2022-03-18 DIAGNOSIS — I10 HYPERTENSION, UNSPECIFIED TYPE: ICD-10-CM

## 2022-03-18 PROCEDURE — 1036F TOBACCO NON-USER: CPT | Performed by: INTERNAL MEDICINE

## 2022-03-18 PROCEDURE — 1090F PRES/ABSN URINE INCON ASSESS: CPT | Performed by: INTERNAL MEDICINE

## 2022-03-18 PROCEDURE — G8427 DOCREV CUR MEDS BY ELIG CLIN: HCPCS | Performed by: INTERNAL MEDICINE

## 2022-03-18 PROCEDURE — 99214 OFFICE O/P EST MOD 30 MIN: CPT | Performed by: INTERNAL MEDICINE

## 2022-03-18 PROCEDURE — 1123F ACP DISCUSS/DSCN MKR DOCD: CPT | Performed by: INTERNAL MEDICINE

## 2022-03-18 PROCEDURE — G8399 PT W/DXA RESULTS DOCUMENT: HCPCS | Performed by: INTERNAL MEDICINE

## 2022-03-18 PROCEDURE — 4040F PNEUMOC VAC/ADMIN/RCVD: CPT | Performed by: INTERNAL MEDICINE

## 2022-03-18 PROCEDURE — G8484 FLU IMMUNIZE NO ADMIN: HCPCS | Performed by: INTERNAL MEDICINE

## 2022-03-18 PROCEDURE — G8417 CALC BMI ABV UP PARAM F/U: HCPCS | Performed by: INTERNAL MEDICINE

## 2022-03-18 NOTE — PATIENT INSTRUCTIONS
Your heart looks good today  You can take tylenol for your arthritis  Follow up with Dr Gail Carpenter for arthritis  You can see a nurse practitioner in our office next year

## 2022-03-21 ENCOUNTER — TELEPHONE (OUTPATIENT)
Dept: FAMILY MEDICINE CLINIC | Age: 87
End: 2022-03-21

## 2022-03-21 NOTE — TELEPHONE ENCOUNTER
----- Message from [facility] sent at 3/21/2022  9:28 AM EDT -----  Subject: Message to Provider    QUESTIONS  Information for Provider? Pt would like to schedule an appt for a   Cortisone shot for her muscle pain, Call pt back to schedule her.   ---------------------------------------------------------------------------  --------------  CALL BACK INFO  What is the best way for the office to contact you? OK to leave message on   voicemail  Preferred Call Back Phone Number? [phone]  ---------------------------------------------------------------------------  --------------  SCRIPT ANSWERS  Relationship to Patient? Self  (Is the patient requesting to see the provider for a procedure?)?  Yes

## 2022-03-21 NOTE — TELEPHONE ENCOUNTER
CALLED PT AND SCHED HER TOMORROW AT 0800 ADVISED PT [facility] MAY NOT BE ABLE TO DO HIP INJECTION. Johanna Cornejo

## 2022-03-22 ENCOUNTER — OFFICE VISIT (OUTPATIENT)
Dept: FAMILY MEDICINE CLINIC | Age: 87
End: 2022-03-22
Payer: MEDICARE

## 2022-03-22 VITALS
OXYGEN SATURATION: 97 % | HEART RATE: 68 BPM | WEIGHT: 185 LBS | DIASTOLIC BLOOD PRESSURE: 74 MMHG | HEIGHT: 62 IN | BODY MASS INDEX: 34.04 KG/M2 | SYSTOLIC BLOOD PRESSURE: 120 MMHG

## 2022-03-22 DIAGNOSIS — I48.0 PAROXYSMAL ATRIAL FIBRILLATION (HCC): ICD-10-CM

## 2022-03-22 DIAGNOSIS — M25.552 LEFT HIP PAIN: ICD-10-CM

## 2022-03-22 DIAGNOSIS — M15.9 PRIMARY OSTEOARTHRITIS INVOLVING MULTIPLE JOINTS: ICD-10-CM

## 2022-03-22 DIAGNOSIS — K21.9 GASTROESOPHAGEAL REFLUX DISEASE, UNSPECIFIED WHETHER ESOPHAGITIS PRESENT: ICD-10-CM

## 2022-03-22 DIAGNOSIS — Z86.010 HISTORY OF COLONIC POLYPS: ICD-10-CM

## 2022-03-22 DIAGNOSIS — I50.23 CHF (CONGESTIVE HEART FAILURE), NYHA CLASS I, ACUTE ON CHRONIC, SYSTOLIC (HCC): ICD-10-CM

## 2022-03-22 DIAGNOSIS — I25.119 CORONARY ARTERY DISEASE INVOLVING NATIVE HEART WITH ANGINA PECTORIS, UNSPECIFIED VESSEL OR LESION TYPE (HCC): ICD-10-CM

## 2022-03-22 DIAGNOSIS — M75.82 ROTATOR CUFF TENDONITIS, LEFT: ICD-10-CM

## 2022-03-22 DIAGNOSIS — I71.21 THORACIC ASCENDING AORTIC ANEURYSM: ICD-10-CM

## 2022-03-22 DIAGNOSIS — D51.9 ANEMIA DUE TO VITAMIN B12 DEFICIENCY, UNSPECIFIED B12 DEFICIENCY TYPE: ICD-10-CM

## 2022-03-22 DIAGNOSIS — M75.101 TEAR OF RIGHT ROTATOR CUFF, UNSPECIFIED TEAR EXTENT, UNSPECIFIED WHETHER TRAUMATIC: ICD-10-CM

## 2022-03-22 DIAGNOSIS — M70.62 TROCHANTERIC BURSITIS OF LEFT HIP: ICD-10-CM

## 2022-03-22 DIAGNOSIS — I10 PRIMARY HYPERTENSION: Primary | ICD-10-CM

## 2022-03-22 DIAGNOSIS — N20.1 CALCULUS OF URETER: ICD-10-CM

## 2022-03-22 PROBLEM — J20.9 ACUTE BRONCHITIS: Status: RESOLVED | Noted: 2020-02-19 | Resolved: 2022-03-22

## 2022-03-22 PROBLEM — Z78.9 FAILURE OF OUTPATIENT TREATMENT: Status: RESOLVED | Noted: 2020-02-19 | Resolved: 2022-03-22

## 2022-03-22 PROCEDURE — G8417 CALC BMI ABV UP PARAM F/U: HCPCS | Performed by: FAMILY MEDICINE

## 2022-03-22 PROCEDURE — G8399 PT W/DXA RESULTS DOCUMENT: HCPCS | Performed by: FAMILY MEDICINE

## 2022-03-22 PROCEDURE — 4040F PNEUMOC VAC/ADMIN/RCVD: CPT | Performed by: FAMILY MEDICINE

## 2022-03-22 PROCEDURE — 1123F ACP DISCUSS/DSCN MKR DOCD: CPT | Performed by: FAMILY MEDICINE

## 2022-03-22 PROCEDURE — G8484 FLU IMMUNIZE NO ADMIN: HCPCS | Performed by: FAMILY MEDICINE

## 2022-03-22 PROCEDURE — 20610 DRAIN/INJ JOINT/BURSA W/O US: CPT | Performed by: FAMILY MEDICINE

## 2022-03-22 PROCEDURE — 1036F TOBACCO NON-USER: CPT | Performed by: FAMILY MEDICINE

## 2022-03-22 PROCEDURE — 20605 DRAIN/INJ JOINT/BURSA W/O US: CPT | Performed by: FAMILY MEDICINE

## 2022-03-22 PROCEDURE — 99214 OFFICE O/P EST MOD 30 MIN: CPT | Performed by: FAMILY MEDICINE

## 2022-03-22 PROCEDURE — G8427 DOCREV CUR MEDS BY ELIG CLIN: HCPCS | Performed by: FAMILY MEDICINE

## 2022-03-22 PROCEDURE — 1090F PRES/ABSN URINE INCON ASSESS: CPT | Performed by: FAMILY MEDICINE

## 2022-03-22 RX ORDER — METHYLPREDNISOLONE ACETATE 80 MG/ML
40 INJECTION, SUSPENSION INTRA-ARTICULAR; INTRALESIONAL; INTRAMUSCULAR; SOFT TISSUE ONCE
Status: COMPLETED | OUTPATIENT
Start: 2022-03-22 | End: 2022-03-22

## 2022-03-22 RX ORDER — METHYLPREDNISOLONE ACETATE 80 MG/ML
80 INJECTION, SUSPENSION INTRA-ARTICULAR; INTRALESIONAL; INTRAMUSCULAR; SOFT TISSUE ONCE
Status: COMPLETED | OUTPATIENT
Start: 2022-03-22 | End: 2022-03-22

## 2022-03-22 RX ADMIN — METHYLPREDNISOLONE ACETATE 40 MG: 80 INJECTION, SUSPENSION INTRA-ARTICULAR; INTRALESIONAL; INTRAMUSCULAR; SOFT TISSUE at 16:15

## 2022-03-22 RX ADMIN — METHYLPREDNISOLONE ACETATE 80 MG: 80 INJECTION, SUSPENSION INTRA-ARTICULAR; INTRALESIONAL; INTRAMUSCULAR; SOFT TISSUE at 16:16

## 2022-03-22 ASSESSMENT — PATIENT HEALTH QUESTIONNAIRE - PHQ9
1. LITTLE INTEREST OR PLEASURE IN DOING THINGS: 0
SUM OF ALL RESPONSES TO PHQ QUESTIONS 1-9: 0
SUM OF ALL RESPONSES TO PHQ9 QUESTIONS 1 & 2: 0
2. FEELING DOWN, DEPRESSED OR HOPELESS: 0
SUM OF ALL RESPONSES TO PHQ QUESTIONS 1-9: 0

## 2022-03-22 NOTE — PROGRESS NOTES
[facility] Medicine  Clinic Note    Date: 3/22/2022                                               Subjective:     Chief Complaint   Patient presents with    Pain     DISCUSS HIP PAIN ON RIGHT SIDE X2 YEARS ALREADY SEEING ORTHO    can't sleep on left side  Sore in am  Hurts to sit for long - uses cushion or leans on right butt cheek  Left arm also problematic - ? Torn RC  Can lift arm up to 90 degrees but hard to IR left shoulder/ get clothes on. Seen by Lukasz Munoz ortho in past - had RC operated on in past - held off on right reverse shoulder replacement? Right side painful also but rom a little better on right presently. Using tylenol  Was getting injections for oa in lumbar spine  Had kidney stone removed -in hospital 3 days  Sees derm tomorrow for precancerous lesions on face  Taking tylenol 500mg qid - ?  Dose  No etoh - liver okay - drinking plenty of water  Needs to see GI for acid reflux - persistent stomach pain  Had EGD 6 years ago - had gastritis - colonoscopy at same time \"okay\"  On protonix daily  Not checking bp at home as problem w/ monitor  Overall feeling pretty good -not sick  Had bad day last week - just didn't feel right  HPI  BP Readings from Last 3 Encounters:   03/22/22 120/74   03/18/22 136/82   09/14/21 (!) 148/84     Pulse Readings from Last 3 Encounters:   03/22/22 68   03/18/22 66   09/14/21 71     Wt Readings from Last 3 Encounters:   03/22/22 185 lb (83.9 kg)   03/18/22 183 lb 6.4 oz (83.2 kg)   11/09/21 180 lb (81.6 kg)            Patient Active Problem List    Diagnosis Date Noted    Anemia     CHF (congestive heart failure), NYHA class I, acute on chronic, systolic (HCC) 84/64/6592    Calculus of ureter 09/01/2021    Leukocytosis 02/19/2020    Lactic acidosis 02/19/2020    Failure of outpatient treatment 02/19/2020    Patient is Islam 02/19/2020    Hypokalemia 02/19/2020    Acute bronchitis 02/19/2020    Hypertension     Ganglion cyst of dorsum of left wrist 10/04/2018    Hyperlipidemia 06/19/2018    Atrial fibrillation ([facility] 06/19/2018    Coronary artery disease involving coronary bypass graft of native heart without angina pectoris 06/01/2018    NSTEMI (non-ST elevated myocardial infarction) ([address] 05/29/2018    Chest pain 05/03/2018    Gastroesophageal reflux disease 03/15/2018    Primary osteoarthritis involving multiple joints 03/15/2018    Psoriasis 03/15/2018    Interstitial cystitis 03/15/2018    Thoracic ascending aortic aneurysm ([address] 03/15/2018    History of colonic polyps 07/02/2015    Varicose veins of legs 01/23/2015    Rotator cuff tear, right 03/25/2013    Vitamin B 12 deficiency 01/07/2013    Obesity 12/12/2012     Past Medical History:   Diagnosis Date    Arthritis     Atrial fibrillation ([address] 6/19/2018    CAD in native artery 6/1/2018    Calculus of ureter 9/1/2021    Cystitis, interstitial     Gastroesophageal reflux disease 3/15/2018    GERD (gastroesophageal reflux disease)     GI bleeding     was a frequent aspirin user at that time    Heart burn     History of colonic polyps 7/2/2015    Hypertension     Interstitial cystitis     Mixed hyperlipidemia 6/19/2018    NSTEMI (non-ST elevated myocardial infarction) ([address] 5/29/2018    Obesity 12/12/2012    Overview:  Replaced inactive diagnosis via diagnosis import    Patient is Advent     no blood products    Psoriasis     Rotator cuff tear, right 3/25/2013    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    Thoracic aortic aneurysm [facility])     cardiologist watching    Vitamin B 12 deficiency 1/7/2013     Past Surgical History:   Procedure Laterality Date    CARDIAC CATHETERIZATION  05/29/2018    Dr. Amari Asif - w/placement of IABP   [address]  2012    COLONOSCOPY  07/31/2015    Dr. Jeremiah Patel - w/polypectomy    CORONARY ARTERY BYPASS GRAFT  05/29/2018    Dr. Janet Bosch - off pump x2 (LIMA-LAD, L SVG-D2)    CYSTOSCOPY Left 9/1/2021 CYSTOSCOPY WITH LEFT URETEROSCOPY, STONE MANIPULATION ,STONE BASKET REMOVAL performed by Hernandez Gifford MD at [facility] ARTHROSCOPY Right 02/2015    MOHS SURGERY Left     facial for skin CA    PARTIAL HYSTERECTOMY      ROTATOR CUFF REPAIR Right 2014    TRANSESOPHAGEAL ECHOCARDIOGRAM  05/29/2018    during CABG     Nurse Only on 11/10/2021   Component Date Value Ref Range Status    Cholesterol, Total 11/10/2021 147  0 - 199 mg/dL Final    Triglycerides 11/10/2021 94  0 - 150 mg/dL Final    HDL 11/10/2021 75* 40 - 60 mg/dL Final    LDL Calculated 11/10/2021 53  <100 mg/dL Final    VLDL Cholesterol Calculated 11/10/2021 19  Not Established mg/dL Final    Sodium 11/10/2021 143  136 - 145 mmol/L Final    Potassium 11/10/2021 4.2  3.5 - 5.1 mmol/L Final    Chloride 11/10/2021 106  99 - 110 mmol/L Final    CO2 11/10/2021 27  21 - 32 mmol/L Final    Anion Gap 11/10/2021 10  3 - 16 Final    Glucose 11/10/2021 93  70 - 99 mg/dL Final    BUN 11/10/2021 10  7 - 20 mg/dL Final    CREATININE 11/10/2021 0.6  0.6 - 1.2 mg/dL Final    GFR Non- 11/10/2021 >60  >60 Final    Comment: >60 mL/min/1.73m2 EGFR, calc. for ages 25 and older using the  MDRD formula (not corrected for weight), is valid for stable  renal function.  GFR  11/10/2021 >60  >60 Final    Comment: Chronic Kidney Disease: less than 60 ml/min/1.73 sq.m. Kidney Failure: less than 15 ml/min/1.73 sq.m. Results valid for patients 18 years and older.       Calcium 11/10/2021 9.7  8.3 - 10.6 mg/dL Final    Total Protein 11/10/2021 5.9* 6.4 - 8.2 g/dL Final    Albumin 11/10/2021 4.1  3.4 - 5.0 g/dL Final    Albumin/Globulin Ratio 11/10/2021 2.3* 1.1 - 2.2 Final    Total Bilirubin 11/10/2021 1.2* 0.0 - 1.0 mg/dL Final    Alkaline Phosphatase 11/10/2021 89  40 - 129 U/L Final    ALT 11/10/2021 24  10 - 40 U/L Final    AST 11/10/2021 24  15 - 37 U/L Final     Family History Problem Relation Age of Onset    Breast Cancer Mother         LUNG    Lung Cancer Father         LUNG    COPD Father     Tuberculosis Father     Lung Cancer Sister          of chemo    Lung Cancer Brother     Tuberculosis Brother      Current Outpatient Medications   Medication Sig Dispense Refill    pantoprazole (PROTONIX) 40 MG tablet TAKE 1 TABLET BY MOUTH IN  THE MORNING BEFORE  BREAKFAST 90 tablet 1    Cyanocobalamin 1000 MCG SUBL Place under the tongue      nitroGLYCERIN (NITROSTAT) 0.4 MG SL tablet Place 1 tablet under the tongue every 5 minutes as needed for Chest pain up to max of 3 total doses. If no relief after 1 dose, call 911. 25 tablet 1    metoprolol succinate (TOPROL XL) 50 MG extended release tablet TAKE 1 TABLET BY MOUTH  DAILY 90 tablet 3    triamterene-hydroCHLOROthiazide (MAXZIDE-25) 37.5-25 MG per tablet TAKE 1 TABLET BY MOUTH  DAILY 90 tablet 3    atorvastatin (LIPITOR) 80 MG tablet TAKE 1 TABLET BY MOUTH  DAILY 90 tablet 3    acetaminophen (TYLENOL) 500 MG tablet Take 500 mg by mouth every 6 hours as needed for Pain       Cholecalciferol (VITAMIN D3) 2000 units CAPS Take 2,000 Units by mouth daily       aspirin 81 MG chewable tablet Take 1 tablet by mouth daily 30 tablet 3     No current facility-administered medications for this visit. Allergies   Allergen Reactions    Latex Itching    Mucinex [Guaifenesin Er] Anaphylaxis     Coughs more, dry cough, feels like she has bronchitis    Bactrim [Sulfamethoxazole-Trimethoprim] Other (See Comments)     stomach pain, weakness, cant urinate, affected her taste, dizziness.  Tetanus Toxoids      HAS REACTION- TOLD TO TAKE ALTERNATIVE    Redness and swelling to site    Morphine Nausea And Vomiting    Pneumococcal Vaccines Rash       Review of Systems    Objective: There were no vitals taken for this visit.     BP Readings from Last 3 Encounters:   22 136/82   21 (!) 148/84   21 (!) 165/77       Pulse Readings from Last 3 Encounters:   03/18/22 66   09/14/21 71   09/01/21 70       Wt Readings from Last 3 Encounters:   03/18/22 183 lb 6.4 oz (83.2 kg)   11/09/21 180 lb (81.6 kg)   09/14/21 180 lb 4.8 oz (81.8 kg)       Physical Exam  Vitals and nursing note reviewed. Constitutional:       General: She is not in acute distress. Appearance: She is well-developed. HENT:      Head: Normocephalic and atraumatic. Mouth/Throat:      Pharynx: No oropharyngeal exudate. Eyes:      General: No scleral icterus. Conjunctiva/sclera: Conjunctivae normal.   Neck:      Thyroid: No thyromegaly. Cardiovascular:      Rate and Rhythm: Normal rate and regular rhythm. Heart sounds: Normal heart sounds. No murmur heard. Pulmonary:      Effort: Pulmonary effort is normal. No respiratory distress. Breath sounds: Normal breath sounds. No wheezing or rales. Abdominal:      General: Bowel sounds are normal. There is no distension. Palpations: Abdomen is soft. Tenderness: There is abdominal tenderness (mild epigastric). Musculoskeletal:      Comments: Impingement left shoulder  Tender left posterior hip/ iliac area laterally and ttp lateral hip   Lymphadenopathy:      Cervical: No cervical adenopathy. Skin:     General: Skin is warm and dry. Neurological:      Mental Status: She is alert and oriented to person, place, and time. Assessment/Plan:      Diagnosis Orders   1. Primary hypertension     2. CHF (congestive heart failure), NYHA class I, acute on chronic, systolic (HCC)     3. Thoracic ascending aortic aneurysm (HCC)     4. Paroxysmal atrial fibrillation ([address]     5. Coronary artery disease involving native heart with angina pectoris, unspecified vessel or lesion type ([address]     6. Primary osteoarthritis involving multiple joints     7.  Gastroesophageal reflux disease, unspecified whether esophagitis present  [facility] - Edwin Bo MD, Gastroenterology (ERCP & EUS), [facility]   8. Calculus of ureter     9. History of colonic polyps     10. Anemia due to vitamin B12 deficiency, unspecified B12 deficiency type     11. Rotator cuff tendonitis, left  [facility] - Rik Piper MD, Orthopedic Surgery, [facility]   12. Left hip pain  Ashtyn Alegria MD, Orthopedic Surgery, [facility]   13.  Trochanteric bursitis of left hip     bp stable on toprol 50/ maxzide 37.5/25  Cont lipitor 80 for HLD  CV seems stable -no angina - f/u cardiology routinely  Cont B12/ D  Labs reviewed from 7/21/9/21  Uses cane to get around  Increase tylenol to 1g tid prn pain  voltaren gel topical trial - hold on oral nsaids 2/2 gi issues  Persistent epigastric pain w/ ppi - refer gi  Refer ortho - wants to hold on PT for now pending response to injections has had done a lot in past  Hydrate well for stones  40mg depomedrol left trochanter w/ 1/2 cc sensorcaine  80mg depomedrol left shoulder w/ 1 cc sensorcaine  Pt tolerated well - risks/ expectations d/w pt     F/u w/ labs 4-6 months    Jeane Meza MD, MD  3/22/2022  7:59 AM

## 2022-04-20 ENCOUNTER — OFFICE VISIT (OUTPATIENT)
Dept: FAMILY MEDICINE CLINIC | Age: 87
End: 2022-04-20
Payer: MEDICARE

## 2022-04-20 VITALS — WEIGHT: 176 LBS | TEMPERATURE: 97.2 F | HEIGHT: 62 IN | BODY MASS INDEX: 32.39 KG/M2

## 2022-04-20 DIAGNOSIS — S40.021A ARM BRUISE, RIGHT, INITIAL ENCOUNTER: Primary | ICD-10-CM

## 2022-04-20 LAB
BASOPHILS ABSOLUTE: 0 K/UL (ref 0–0.2)
BASOPHILS RELATIVE PERCENT: 0.5 %
EOSINOPHILS ABSOLUTE: 0.1 K/UL (ref 0–0.6)
EOSINOPHILS RELATIVE PERCENT: 2 %
HCT VFR BLD CALC: 44.4 % (ref 36–48)
HEMOGLOBIN: 14.9 G/DL (ref 12–16)
LYMPHOCYTES ABSOLUTE: 2 K/UL (ref 1–5.1)
LYMPHOCYTES RELATIVE PERCENT: 29.9 %
MCH RBC QN AUTO: 31.2 PG (ref 26–34)
MCHC RBC AUTO-ENTMCNC: 33.5 G/DL (ref 31–36)
MCV RBC AUTO: 93 FL (ref 80–100)
MONOCYTES ABSOLUTE: 0.5 K/UL (ref 0–1.3)
MONOCYTES RELATIVE PERCENT: 7.4 %
NEUTROPHILS ABSOLUTE: 3.9 K/UL (ref 1.7–7.7)
NEUTROPHILS RELATIVE PERCENT: 60.2 %
PDW BLD-RTO: 15 % (ref 12.4–15.4)
PLATELET # BLD: 218 K/UL (ref 135–450)
PMV BLD AUTO: 8.1 FL (ref 5–10.5)
RBC # BLD: 4.78 M/UL (ref 4–5.2)
WBC # BLD: 6.5 K/UL (ref 4–11)

## 2022-04-20 PROCEDURE — 4040F PNEUMOC VAC/ADMIN/RCVD: CPT | Performed by: NURSE PRACTITIONER

## 2022-04-20 PROCEDURE — 1036F TOBACCO NON-USER: CPT | Performed by: NURSE PRACTITIONER

## 2022-04-20 PROCEDURE — G8417 CALC BMI ABV UP PARAM F/U: HCPCS | Performed by: NURSE PRACTITIONER

## 2022-04-20 PROCEDURE — G8428 CUR MEDS NOT DOCUMENT: HCPCS | Performed by: NURSE PRACTITIONER

## 2022-04-20 PROCEDURE — 99212 OFFICE O/P EST SF 10 MIN: CPT | Performed by: NURSE PRACTITIONER

## 2022-04-20 PROCEDURE — 1090F PRES/ABSN URINE INCON ASSESS: CPT | Performed by: NURSE PRACTITIONER

## 2022-04-20 PROCEDURE — 1123F ACP DISCUSS/DSCN MKR DOCD: CPT | Performed by: NURSE PRACTITIONER

## 2022-04-20 PROCEDURE — G8399 PT W/DXA RESULTS DOCUMENT: HCPCS | Performed by: NURSE PRACTITIONER

## 2022-04-20 PROCEDURE — 36415 COLL VENOUS BLD VENIPUNCTURE: CPT | Performed by: NURSE PRACTITIONER

## 2022-04-20 RX ORDER — OMEPRAZOLE 40 MG/1
40 CAPSULE, DELAYED RELEASE ORAL DAILY
COMMUNITY
End: 2022-09-22

## 2022-04-20 NOTE — PROGRESS NOTES
Real Llanes (:  1935) is a 82 y.o. female,Established patient, here for evaluation of the following chief complaint(s):    Bleeding/Bruising (pt c/o bruising to left upper arm x 1 week)      SUBJECTIVE/OBJECTIVE:  HPI  Pt c/o bruising to her left upper arm not sure of any injury but she did bring canned vegetables from, her basement holding three cans in her left arm -  She noticed the bruising the day after -she did not feel anything- no injury noted- she is not bleeding from any where else  she is doing  Review of Systems    Physical Exam  Cardiovascular:      Rate and Rhythm: Normal rate and regular rhythm. Heart sounds: Normal heart sounds, S1 normal and S2 normal. Heart sounds not distant. No murmur heard. No systolic murmur is present. No diastolic murmur is present. No friction rub. No gallop. No S3 or S4 sounds. Pulmonary:      Effort: Pulmonary effort is normal.      Breath sounds: Normal breath sounds and air entry. Musculoskeletal:      Right lower leg: No edema. Left lower leg: No edema. Skin:     Comments: Left upper arm deep purple bruise noted to biceps and lateral side of forearm         Bovey Eastern was seen today for bleeding/bruising. Diagnoses and all orders for this visit:    Arm bruise, right, initial encounter  Advised  Mrs Krystina Bernabe that the bruising was likely from the cans  She carried from the basement - she may bruise easier because she takes baby ASA daily  -     CBC with Auto Differential; Future  -            An electronic signature was used to authenticate this note.     --Rene Patino, RIGOBERTO - CNP

## 2022-04-20 NOTE — PATIENT INSTRUCTIONS
Patient Education        Bruises: Care Instructions  Overview     Bruises occur when small blood vessels under the skin tear or rupture, most often from a twist, bump, or fall. Blood leaks into tissues under the skin and causes a black-and-blue spot that often turns colors, including purplish black,reddish blue, or yellowish green, as the bruise heals. Bruises hurt, but most are not serious and will go away on their own within 2 to 4 weeks. Sometimes, gravity causes them to spread down the body. A legbruise usually will take longer to heal than a bruise on the face or arms. Follow-up care is a key part of your treatment and safety. Be sure to make and go to all appointments, and call your doctor if you are having problems. It's also a good idea to know your test results and keep alist of the medicines you take. How can you care for yourself at home?  Take pain medicines exactly as directed. ? If the doctor gave you a prescription medicine for pain, take it as prescribed. ? If you are not taking a prescription pain medicine, ask your doctor if you can take an over-the-counter medicine.  Put ice or a cold pack on the area for 10 to 20 minutes at a time. Put a thin cloth between the ice and your skin.  If you can, prop up the bruised area on pillows as much as possible for the next few days. Try to keep the bruise above the level of your heart. When should you call for help? Call your doctor now or seek immediate medical care if:     You have signs of infection, such as:  ? Increased pain, swelling, warmth, or redness. ? Red streaks leading from the bruise. ? Pus draining from the bruise. ? A fever.      You have a bruise on your leg and signs of a blood clot, such as:  ? Increasing redness and swelling along with warmth, tenderness, and pain in the bruised area. ? Pain in your calf, back of the knee, thigh, or groin. ? Redness and swelling in your leg or groin.      Your pain gets worse. Watch closely for changes in your health, and be sure to contact your doctor if:     You do not get better as expected. Where can you learn more? Go to [email or web address] org and sign in to your rVue account. Enter [phone] in the [facility] box to learn more about \"Bruises: Care Instructions. \"     If you do not have an account, please click on the \"Sign Up Now\" link. Current as of: July 1, 2021               Content Version: 13.2  © 9608-6010 Healthwise, Incorporated. Care instructions adapted under license by [facility] ([facility]). If you have questions about a medical condition or this instruction, always ask your healthcare professional. [address] any warranty or liability for your use of this information.

## 2022-05-09 ENCOUNTER — OFFICE VISIT (OUTPATIENT)
Dept: ORTHOPEDIC SURGERY | Age: 87
End: 2022-05-09

## 2022-05-09 VITALS — BODY MASS INDEX: 32.76 KG/M2 | WEIGHT: 178 LBS | HEIGHT: 62 IN

## 2022-05-09 DIAGNOSIS — M16.12 ARTHRITIS OF LEFT HIP: Primary | ICD-10-CM

## 2022-05-09 DIAGNOSIS — M47.816 LUMBAR SPONDYLOSIS: ICD-10-CM

## 2022-05-09 PROCEDURE — 1123F ACP DISCUSS/DSCN MKR DOCD: CPT | Performed by: ORTHOPAEDIC SURGERY

## 2022-05-09 PROCEDURE — 4040F PNEUMOC VAC/ADMIN/RCVD: CPT | Performed by: ORTHOPAEDIC SURGERY

## 2022-05-09 PROCEDURE — G8427 DOCREV CUR MEDS BY ELIG CLIN: HCPCS | Performed by: ORTHOPAEDIC SURGERY

## 2022-05-09 PROCEDURE — G8417 CALC BMI ABV UP PARAM F/U: HCPCS | Performed by: ORTHOPAEDIC SURGERY

## 2022-05-09 PROCEDURE — 1036F TOBACCO NON-USER: CPT | Performed by: ORTHOPAEDIC SURGERY

## 2022-05-09 PROCEDURE — 99204 OFFICE O/P NEW MOD 45 MIN: CPT | Performed by: ORTHOPAEDIC SURGERY

## 2022-05-09 PROCEDURE — G8399 PT W/DXA RESULTS DOCUMENT: HCPCS | Performed by: ORTHOPAEDIC SURGERY

## 2022-05-09 PROCEDURE — 1090F PRES/ABSN URINE INCON ASSESS: CPT | Performed by: ORTHOPAEDIC SURGERY

## 2022-05-09 NOTE — PROGRESS NOTES
ORTHOPAEDIC CONSULTATION NOTE    Chief Complaint   Patient presents with    New Patient     Lt Hip pain x2 yrs. No specific injury       HPI  5/9/22  82 y.o. female seen in consultation at the request of Gilles Blackwell MD for evaluation of left hip pain:  Onset 2 years ago  Injury/trauma none  History of symptoms as above  Pain is located left lateral hip  Also has LBP, hx of sciatica  Worse with pain/sleeping on left side, hip range of motion, deep flexion, activity  Better with rest  She reports Dr. Pieter Menard previously gave her left lateral hip steroid injection which helped somewhat  Back pain = yes  Radicular symptoms = intermittent  Numbness and/or tingling = intermittent    I have reviewed and discussed the below pain assessment findings with the patient. Pain Assessment  Location of Pain: Pelvis  Location Modifiers: Left  Quality of Pain: Sharp  Duration of Pain: Persistent  Frequency of Pain: Constant  Aggravating Factors: Bending,Exercise,Squatting,Stairs  Relieving Factors: Nsaids  Result of Injury: No  Work-Related Injury: No  Are there other pain locations you wish to document?: No    Review of Systems  I have read over the ROS from the Patient History Form dated on 5/9/22  Pertinent positives include weight change, skin condition, headaches, hearing impairment, chest pain and shortness of breath, hypertension, peripheral edema, reflux, urinary frequency, kidney stone, back pain, chronic pain  Rest of 13 point ROS otherwise negative except per HPI, and scanned into the patient's chart under the Media tab. Allergies   Allergen Reactions    Latex Itching    Mucinex [Guaifenesin Er] Anaphylaxis     Coughs more, dry cough, feels like she has bronchitis    Bactrim [Sulfamethoxazole-Trimethoprim] Other (See Comments)     stomach pain, weakness, cant urinate, affected her taste, dizziness.      Tetanus Toxoids      HAS REACTION- TOLD TO TAKE ALTERNATIVE    Redness and swelling to site    Morphine Nausea And Vomiting    Pneumococcal Vaccines Rash        Current Outpatient Medications   Medication Sig Dispense Refill    omeprazole (PRILOSEC) 40 MG delayed release capsule Take 40 mg by mouth daily      Cyanocobalamin 1000 MCG SUBL Place under the tongue      nitroGLYCERIN (NITROSTAT) 0.4 MG SL tablet Place 1 tablet under the tongue every 5 minutes as needed for Chest pain up to max of 3 total doses. If no relief after 1 dose, call 911. 25 tablet 1    metoprolol succinate (TOPROL XL) 50 MG extended release tablet TAKE 1 TABLET BY MOUTH  DAILY 90 tablet 3    triamterene-hydroCHLOROthiazide (MAXZIDE-25) 37.5-25 MG per tablet TAKE 1 TABLET BY MOUTH  DAILY 90 tablet 3    atorvastatin (LIPITOR) 80 MG tablet TAKE 1 TABLET BY MOUTH  DAILY 90 tablet 3    acetaminophen (TYLENOL) 500 MG tablet Take 500 mg by mouth every 6 hours as needed for Pain       Cholecalciferol (VITAMIN D3) 2000 units CAPS Take 2,000 Units by mouth daily       aspirin 81 MG chewable tablet Take 1 tablet by mouth daily 30 tablet 3     No current facility-administered medications for this visit.        Past Medical History:   Diagnosis Date    Arthritis     Atrial fibrillation ([facility] [address] 6/19/2018    CAD in native artery 6/1/2018    Calculus of ureter 9/1/2021    Cystitis, interstitial     Gastroesophageal reflux disease 3/15/2018    GERD (gastroesophageal reflux disease)     GI bleeding     was a frequent aspirin user at that time    Heart burn     History of colonic polyps 7/2/2015    Hypertension     Interstitial cystitis     Mixed hyperlipidemia 6/19/2018    NSTEMI (non-ST elevated myocardial infarction) ([facility] [address] 5/29/2018    Obesity 12/12/2012    Overview:  Replaced inactive diagnosis via diagnosis import    Patient is Islam     no blood products    Psoriasis     Rotator cuff tear, right 3/25/2013    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 Ti Thomas - w/placement of IABP    CARDIAC CATHETERIZATION  2012    COLONOSCOPY  2015    Dr. Patricia Sprague - ryan/polypectomy    CORONARY ARTERY BYPASS GRAFT  2018    Dr. Menezes Belt - off pump x2 (LIMA-LAD, L SVG-D2)    CYSTOSCOPY Left 2021    CYSTOSCOPY WITH LEFT URETEROSCOPY, STONE MANIPULATION ,STONE BASKET REMOVAL performed by Terri Muro MD at [address] [place] [address] ARTHROSCOPY Right 2015    MOHS SURGERY Left     facial for skin CA    PARTIAL HYSTERECTOMY      ROTATOR CUFF REPAIR Right     TRANSESOPHAGEAL ECHOCARDIOGRAM  2018    during CABG       Family History   Problem Relation Age of Onset    Breast Cancer Mother         LUNG    Lung Cancer Father         LUNG    COPD Father     Tuberculosis Father     Lung Cancer Sister          of chemo    Lung Cancer Brother     Tuberculosis Brother        Social History     Socioeconomic History    Marital status:      Spouse name: Not on file    Number of children: Not on file    Years of education: Not on file    Highest education level: Not on file   Occupational History    Occupation: Retired department    Tobacco Use    Smoking status: Former Smoker     Packs/day: 0.25     Years: 6.00     Pack years: 1.50     Types: Cigarettes     Start date: 3/5/1955     Quit date: 5/3/1960     Years since quittin.0    Smokeless tobacco: Never Used    Tobacco comment: AS A TEENAGER FOR ABOUT 6 YEARS    Vaping Use    Vaping Use: Never used   Substance and Sexual Activity    Alcohol use: No    Drug use: No    Sexual activity: Not on file   Other Topics Concern    Not on file   Social History Narrative    Uses walker. No exercise. 23.      Social Determinants of Health     Financial Resource Strain: Low Risk     Difficulty of Paying Living Expenses: Not hard at all   Food Insecurity: No Food Insecurity    Worried About [address] [place] [address] in the Last Year: Never true    Michael of Food in the Last Year: Never true   Transportation Needs: No Transportation Needs    Lack of Transportation (Medical): No    Lack of Transportation (Non-Medical): No   Physical Activity:     Days of Exercise per Week: Not on file    Minutes of Exercise per Session: Not on file   Stress:     Feeling of Stress : Not on file   Social Connections:     Frequency of Communication with Friends and Family: Not on file    Frequency of Social Gatherings with Friends and Family: Not on file    Attends Anglican Services: Not on file    Active Member of [address] [place] [address] or Organizations: Not on file    Attends Club or Organization Meetings: Not on file    Marital Status: Not on file   Intimate Partner Violence:     Fear of Current or Ex-Partner: Not on file    Emotionally Abused: Not on file    Physically Abused: Not on file    Sexually Abused: Not on file   Housing Stability:     Unable to Pay for Housing in the Last Year: Not on file    Number of [place] in the Last Year: Not on file    Unstable Housing in the Last Year: Not on file        Vitals:    05/09/22 1328   Weight: 178 lb (80.7 kg)   Height: 5' 2\" (1.575 m)       Physical Exam  Constitutional - well-groomed, well-nourished, Body mass index is 32.56 kg/m². Psychiatric - pleasant, normal mood & affect  Cardiovascular - RRR, positive peripheral edema, posterior tibialis pulse 2+  Respiratory - respirations unlabored, on room air; mask on  Gastrointestinal - abdomen soft NDNT  Skin - no rashes, wounds seen on exposed skin  Spine - no radicular pain with SLR.      Neurological - LLE SILT SP/DP/T/sural/saphenous nerve distributions; EHL/FHL/TA/GS intact  Left hip -    Inspection:  No obvious deformity/swelling/ecchymosis   Range of Motion:    Flexion contracture:  none    Flexion:  100     IR:  20     ER:  30    Abduction: 30   Special tests:    positive Stinchfield test    negative pain with log roll    Leg lengths grossly symmetric      Imaging:  Images were personally reviewed by myself and discussed with the patient  AP pelvis and Left hip 2 views performed 5/9/22 - moderate bilateral hip degeneration/arthritis seen. There is spurring laterally, left greater than right. Bones appear demineralized. No obvious fractures or dislocation. Pelvic ring is intact. Lumbar spondylosis is partially visualized. There is also evidence of sacroiliac joint arthritis as well. Assessment & Plan:  82 y.o. female who presents with    Diagnosis Orders   1. Arthritis of left hip  XR HIP 2-3 VW W PELVIS LEFT    FL ARTHR/ASP/INJ MAJOR JT/BURSA LT WO US   2. Lumbar spondylosis         No orders of the defined types were placed in this encounter. Thank you Dr Merari Hensley for referring Vasquez Cancer to me for evaluation of her left hip:    Discussed at length conservative treatment of hip symptoms/arthritis, according to AAOS Clinical Practice Guidelines:  1)  Recommend ice and anti-inflammatories to reduce pain and swelling. Tylenol is an option as well. 2)  Recommend maintaining weight in a healthy range to reduce stresses on the hip joint. 3)  Home exercise program, focusing on strengthening, low impact aerobic exercises, and neuromuscular education. 4)  Intra-articular corticosteroid injections are an option. Will refer to interventional radiology for fluoroscopic-guided injection for both diagnostic and therapeutic purposes.     Other possible sources of her pain include lumbar stenosis/radiculopathy, left SI joint arthropathy    She is advised to return if her symptoms return/persist    Amie Larose MD

## 2022-05-09 NOTE — LETTER
[facility]  [address] [place] [address] [address]  [address]  Phone: [phone]  Fax: [phone]    Carlos Alberto Anderson MD    May 9, 2022     Sissy Gaytan, [address] [place] [address]    Patient: Gloria Rosenberg   MR Number: [id number]   YOB: 1935   Date of Visit: 5/9/2022       Dear Sissy Gaytan: Thank you for referring Adam Abreu to me for evaluation/treatment. Below are the relevant portions of my assessment and plan of care. If you have questions, please do not hesitate to call me. I look forward to following Raymond Brunson along with you.     Sincerely,      Carlos Alberto Anderson MD

## 2022-05-12 ENCOUNTER — HOSPITAL ENCOUNTER (OUTPATIENT)
Dept: GENERAL RADIOLOGY | Age: 87
Discharge: HOME | End: 2022-05-12
Payer: MEDICARE

## 2022-05-12 DIAGNOSIS — M16.12 ARTHRITIS OF LEFT HIP: ICD-10-CM

## 2022-05-12 PROCEDURE — 20610 DRAIN/INJ JOINT/BURSA W/O US: CPT

## 2022-05-12 PROCEDURE — 2500000003 HC RX 250 WO HCPCS

## 2022-05-12 PROCEDURE — 6360000002 HC RX W HCPCS

## 2022-05-12 PROCEDURE — 6360000004 HC RX CONTRAST MEDICATION

## 2022-05-12 RX ORDER — BUPIVACAINE HYDROCHLORIDE 2.5 MG/ML
INJECTION, SOLUTION EPIDURAL; INFILTRATION; INTRACAUDAL
Status: COMPLETED
Start: 2022-05-12 | End: 2022-05-12

## 2022-05-12 RX ORDER — METHYLPREDNISOLONE ACETATE 40 MG/ML
INJECTION, SUSPENSION INTRA-ARTICULAR; INTRALESIONAL; INTRAMUSCULAR; SOFT TISSUE
Status: COMPLETED
Start: 2022-05-12 | End: 2022-05-12

## 2022-05-12 RX ADMIN — METHYLPREDNISOLONE ACETATE 40 MG: 40 INJECTION, SUSPENSION INTRA-ARTICULAR; INTRALESIONAL; INTRAMUSCULAR; SOFT TISSUE at 12:05

## 2022-05-12 RX ADMIN — BUPIVACAINE HYDROCHLORIDE 2.5 ML: 2.5 INJECTION, SOLUTION EPIDURAL; INFILTRATION; INTRACAUDAL; PERINEURAL at 12:03

## 2022-05-12 RX ADMIN — IOPAMIDOL 5 ML: 408 INJECTION, SOLUTION INTRATHECAL at 12:05

## 2022-05-17 ENCOUNTER — TELEPHONE (OUTPATIENT)
Dept: FAMILY MEDICINE CLINIC | Age: 87
End: 2022-05-17

## 2022-05-17 NOTE — TELEPHONE ENCOUNTER
----- Message from Regi Ayala sent at 5/17/2022  8:55 AM EDT -----  Subject: Message to Provider    QUESTIONS  Information for Provider? pt calling to know if she should take Rx with   blood work fasting?  ---------------------------------------------------------------------------  --------------  Katerina FONG  What is the best way for the office to contact you? OK to leave message on   voicemail  Preferred Call Back Phone Number? [phone]  ---------------------------------------------------------------------------  --------------  SCRIPT ANSWERS  Relationship to Patient?  Self

## 2022-06-07 ENCOUNTER — TELEPHONE (OUTPATIENT)
Dept: FAMILY MEDICINE CLINIC | Age: 87
End: 2022-06-07

## 2022-06-07 NOTE — TELEPHONE ENCOUNTER
She still got a script for the pantoprazole - can someone make sure they don't send this to her anymore - this came from [facility] - can someone call and cancel this for her - she can not get through

## 2022-06-09 RX ORDER — ATORVASTATIN CALCIUM 80 MG/1
TABLET, FILM COATED ORAL
Qty: 90 TABLET | Refills: 1 | Status: SHIPPED | OUTPATIENT
Start: 2022-06-09 | End: 2022-10-10

## 2022-06-24 DIAGNOSIS — I10 ESSENTIAL HYPERTENSION: ICD-10-CM

## 2022-06-27 RX ORDER — TRIAMTERENE AND HYDROCHLOROTHIAZIDE 37.5; 25 MG/1; MG/1
1 TABLET ORAL DAILY
Qty: 90 TABLET | Refills: 3 | Status: SHIPPED | OUTPATIENT
Start: 2022-06-27

## 2022-06-29 DIAGNOSIS — I10 ESSENTIAL HYPERTENSION: ICD-10-CM

## 2022-06-30 RX ORDER — METOPROLOL SUCCINATE 50 MG/1
TABLET, EXTENDED RELEASE ORAL
Qty: 90 TABLET | Refills: 0 | Status: SHIPPED | OUTPATIENT
Start: 2022-06-30 | End: 2022-09-20

## 2022-07-27 ENCOUNTER — HOSPITAL ENCOUNTER (EMERGENCY)
Age: 87
Discharge: HOME | End: 2022-07-27
Attending: EMERGENCY MEDICINE
Payer: COMMERCIAL

## 2022-07-27 ENCOUNTER — APPOINTMENT (OUTPATIENT)
Dept: CT IMAGING | Age: 87
End: 2022-07-27
Payer: COMMERCIAL

## 2022-07-27 ENCOUNTER — APPOINTMENT (OUTPATIENT)
Dept: GENERAL RADIOLOGY | Age: 87
End: 2022-07-27
Payer: COMMERCIAL

## 2022-07-27 VITALS
DIASTOLIC BLOOD PRESSURE: 110 MMHG | RESPIRATION RATE: 20 BRPM | OXYGEN SATURATION: 95 % | HEART RATE: 76 BPM | TEMPERATURE: 98.2 F | SYSTOLIC BLOOD PRESSURE: 175 MMHG

## 2022-07-27 DIAGNOSIS — M25.551 RIGHT HIP PAIN: Primary | ICD-10-CM

## 2022-07-27 PROCEDURE — 72192 CT PELVIS W/O DYE: CPT

## 2022-07-27 PROCEDURE — 99284 EMERGENCY DEPT VISIT MOD MDM: CPT

## 2022-07-27 PROCEDURE — 73502 X-RAY EXAM HIP UNI 2-3 VIEWS: CPT

## 2022-07-27 PROCEDURE — 6370000000 HC RX 637 (ALT 250 FOR IP)

## 2022-07-27 RX ORDER — HYDROCODONE BITARTRATE AND ACETAMINOPHEN 5; 325 MG/1; MG/1
1 TABLET ORAL EVERY 4 HOURS PRN
Qty: 10 TABLET | Refills: 0 | Status: SHIPPED | OUTPATIENT
Start: 2022-07-27 | End: 2022-07-30

## 2022-07-27 RX ORDER — MORPHINE SULFATE 4 MG/ML
4 INJECTION, SOLUTION INTRAMUSCULAR; INTRAVENOUS ONCE
Status: DISCONTINUED | OUTPATIENT
Start: 2022-07-27 | End: 2022-07-27

## 2022-07-27 RX ORDER — OXYCODONE AND ACETAMINOPHEN 7.5; 325 MG/1; MG/1
TABLET ORAL
Status: COMPLETED
Start: 2022-07-27 | End: 2022-07-27

## 2022-07-27 RX ORDER — METHYLPREDNISOLONE 4 MG/1
TABLET ORAL
Qty: 1 KIT | Refills: 0 | Status: SHIPPED | OUTPATIENT
Start: 2022-07-27 | End: 2022-08-04 | Stop reason: ALTCHOICE

## 2022-07-27 RX ORDER — OXYCODONE AND ACETAMINOPHEN 7.5; 325 MG/1; MG/1
1 TABLET ORAL EVERY 4 HOURS PRN
Status: DISCONTINUED | OUTPATIENT
Start: 2022-07-27 | End: 2022-07-28 | Stop reason: HOSPADM

## 2022-07-27 RX ADMIN — OXYCODONE AND ACETAMINOPHEN 1 TABLET: 7.5; 325 TABLET ORAL at 19:20

## 2022-07-27 ASSESSMENT — PAIN SCALES - GENERAL: PAINLEVEL_OUTOF10: 6

## 2022-07-27 ASSESSMENT — PAIN - FUNCTIONAL ASSESSMENT: PAIN_FUNCTIONAL_ASSESSMENT: 0-10

## 2022-07-28 ENCOUNTER — CARE COORDINATION (OUTPATIENT)
Dept: CARE COORDINATION | Age: 87
End: 2022-07-28

## 2022-07-28 NOTE — ED NOTES
Pt Discharged in stable condition, VSS, no signs of distress, discharge instructions and meds reviewed. Pt verbalizes understanding and states no further questions or concerns unaddressed.        Mildred Pruitt RN  07/27/22 8345

## 2022-07-28 NOTE — ED PROVIDER NOTES
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 Jennifer Hernandez - w/placement of IABP    CARDIAC CATHETERIZATION  2012    COLONOSCOPY  07/31/2015    Dr. Félix Barahona - w/polypectomy    CORONARY ARTERY BYPASS GRAFT  05/29/2018    Dr. Ronen Bo - off pump x2 (LIMA-LAD, L SVG-D2)    CYSTOSCOPY Left 9/1/2021    CYSTOSCOPY WITH LEFT URETEROSCOPY, STONE MANIPULATION ,STONE BASKET REMOVAL performed by Yenni Smith MD at [facility] ARTHROSCOPY Right 02/2015    MOHS SURGERY Left     facial for skin CA    PARTIAL HYSTERECTOMY (CERVIX NOT REMOVED)      ROTATOR CUFF REPAIR Right 2014    TRANSESOPHAGEAL ECHOCARDIOGRAM  05/29/2018    during CABG         CURRENT MEDICATIONS       Previous Medications    ACETAMINOPHEN (TYLENOL) 500 MG TABLET    Take 500 mg by mouth every 6 hours as needed for Pain     ASPIRIN 81 MG CHEWABLE TABLET    Take 1 tablet by mouth daily    ATORVASTATIN (LIPITOR) 80 MG TABLET    TAKE 1 TABLET BY MOUTH  DAILY    CHOLECALCIFEROL (VITAMIN D3) 2000 UNITS CAPS    Take 2,000 Units by mouth daily     CYANOCOBALAMIN 1000 MCG SUBL    Place under the tongue    METOPROLOL SUCCINATE (TOPROL XL) 50 MG EXTENDED RELEASE TABLET    TAKE 1 TABLET BY MOUTH  DAILY    NITROGLYCERIN (NITROSTAT) 0.4 MG SL TABLET    Place 1 tablet under the tongue every 5 minutes as needed for Chest pain up to max of 3 total doses. If no relief after 1 dose, call 911. OMEPRAZOLE (PRILOSEC) 40 MG DELAYED RELEASE CAPSULE    Take 40 mg by mouth daily    TRIAMTERENE-HYDROCHLOROTHIAZIDE (MAXZIDE-25) 37.5-25 MG PER TABLET    TAKE 1 TABLET BY MOUTH  DAILY       ALLERGIES     Latex, Mucinex [guaifenesin er], Tetanus toxoids, Bactrim [sulfamethoxazole-trimethoprim], Morphine, and Pneumococcal vaccines    FAMILY HISTORY       Family History   Problem Relation Age of Onset    Breast Cancer Mother         LUNG    Lung Cancer Father         LUNG    COPD Father     Tuberculosis Father     Lung Cancer Sister          of chemo    Lung Cancer Brother     Tuberculosis Brother           SOCIAL HISTORY       Social History     Socioeconomic History    Marital status:      Spouse name: None    Number of children: None    Years of education: None    Highest education level: None   Occupational History    Occupation: Retired department    Tobacco Use    Smoking status: Former     Packs/day: 0.25     Years: 6.00     Pack years: 1.50     Types: Cigarettes     Start date: 3/5/1955     Quit date: 5/3/1960     Years since quittin.2    Smokeless tobacco: Never    Tobacco comments:     AS A TEENAGER FOR ABOUT 6 YEARS    Vaping Use    Vaping Use: Never used   Substance and Sexual Activity    Alcohol use: No    Drug use: No   Social History Narrative    Uses walker. No exercise. 23. Social Determinants of Health     Financial Resource Strain: Low Risk     Difficulty of Paying Living Expenses: Not hard at all   Food Insecurity: No Food Insecurity    Worried About [address] in the Last Year: Never true    [address] [employer] in the Last Year: Never true   Transportation Needs: No Transportation Needs    Lack of Transportation (Medical): No    Lack of Transportation (Non-Medical):  No       SCREENINGS Riana Coma Scale  Eye Opening: Spontaneous  Best Verbal Response: Oriented  Best Motor Response: Obeys commands  Riana Coma Scale Score: 15        PHYSICAL EXAM    (up to 7 for level 4, 8 or more for level 5)     ED Triage Vitals [07/27/22 1843]   BP Temp Temp Source Heart Rate Resp SpO2 Height Weight   (!) 175/110 98.2 °F (36.8 °C) Oral 76 20 95 % -- --       Physical Exam:      General Appearance:  Alert, cooperative, appears stated age. Head:  Normocephalic, without obvious abnormality, atraumatic. Eyes:  conjunctiva/corneas clear, EOM's intact. Sclera anicteric. ENT: Mucous remains moist and pink   Neck: Supple, symmetrical, trachea midline, no adenopathy. No jugular venous distention. Lungs:   Clear to auscultation bilaterally   Chest Wall:  No pain to palpation   Heart:   Genitourinary: Regular rate rhythm with no murmurs rubs gallops  Deferred   Abdomen:   Soft and benign. No guarding or rebound. Positive bowel sounds. Extremities: No clubbing cyanosis or edema   Pulses: Good throughout   Skin:  No rashes or lesions to exposed skin. Neurologic: Alert and oriented X 3. She ambulated without difficulty. The pain did appear to be reproducible around the right sciatica area. DIAGNOSTIC RESULTS         RADIOLOGY:   Non-plain filmimages such as CT, Ultrasound and MRI are read by the radiologist. Plain radiographic images are visualized and preliminarily interpreted by the emergency physician with the below findings:    See below    Interpretation per the Radiologist below, if available at the time ofthis note: All incidental findings were discussed with the patient. CT PELVIS WO CONTRAST Additional Contrast? None   Final Result   No acute osseous abnormality. XR HIP 2-3 VW W PELVIS RIGHT   Final Result   Mild osteoarthritic changes in both hips and diffuse osteopenia with no   obvious acute hip fracture.       Mild bony deformity along the superior pubic ramus on the right which could   represent a subacute or old healed fracture in the area. Suggest orthopedic   follow-up or CT correlation, if indicated clinically. ED BEDSIDE ULTRASOUND:   Performed by ED Physician - none    LABS:  Labs Reviewed - No data to display    All other labs were within normal range or not returned as of this dictation. EMERGENCY DEPARTMENT COURSE and DIFFERENTIAL DIAGNOSIS/MDM:   Vitals:    Vitals:    07/27/22 1843   BP: (!) 175/110   Pulse: 76   Resp: 20   Temp: 98.2 °F (36.8 °C)   TempSrc: Oral   SpO2: 95%           MDM      The patient has remained stable throughout her hospital course. She was given oral pain medication with good relief. Her work-up included initial x-ray of the right hip that showed a possible occult pubis ramus fracture but there was augmented by a CT scan that was unremarkable normal.  On reexamination, the patient felt much better. This appears to be musculoskeletal.  The patient be placed on a Medrol Dosepak and I gave her 10 Norco for pain control. She was referred back to her primary physician with instructions to return if worse. REASSESSMENT              CONSULTS:  None    PROCEDURES:  Unless otherwise noted below, none     Procedures    FINAL IMPRESSION      1. Right hip pain          DISPOSITION/PLAN   DISPOSITION Decision To Discharge 07/27/2022 10:10:03 PM      PATIENT REFERREDTO:  Colt [address]  [phone]    Call in 2 days  As needed      DISCHARGEMEDICATIONS:  New Prescriptions    HYDROCODONE-ACETAMINOPHEN (NORCO) 5-325 MG PER TABLET    Take 1 tablet by mouth every 4 hours as needed for Pain for up to 3 days. Intended supply: 3 days. Take lowest dose possible to manage pain    METHYLPREDNISOLONE (MEDROL, ORLIN,) 4 MG TABLET    Take by mouth. Controlled Substances Monitoring:     No flowsheet data found.     (Please note that portions of this note were completed with a voice recognition program.  Efforts were made to edit the dictations but occasionally words are mis-transcribed.)    Kenny Higuera MD (electronically signed)  Attending Emergency Physician          Kenny Higuera MD  07/27/22 4878

## 2022-07-28 NOTE — CARE COORDINATION
Educated patient about risk for severe COVID-19 due to risk factors according to CDC guidelines. ACM reviewed discharge instructions, medical action plan and red flag symptoms with the patient who verbalized understanding. Discussed COVID vaccination status: Yes. Education provided on COVID-19 vaccination as appropriate. Discussed exposure protocols and quarantine with CDC Guidelines. Patient was given an opportunity to verbalize any questions and concerns and agrees to contact ACM or health care provider for questions related to their healthcare. Outreach call to patient to follow up after ED visit. The patient is eligible and agreeable to Ambulatory Care Management. The patient has an appointment on 8/4/2022 and is agreeable to a follow up on 8/5 and to enroll in ACM. The patient accepted this ACM's contact information and was encouraged to call with any needs.

## 2022-08-04 ENCOUNTER — OFFICE VISIT (OUTPATIENT)
Dept: FAMILY MEDICINE CLINIC | Age: 87
End: 2022-08-04
Payer: MEDICARE

## 2022-08-04 VITALS
OXYGEN SATURATION: 95 % | HEART RATE: 88 BPM | HEIGHT: 62 IN | SYSTOLIC BLOOD PRESSURE: 134 MMHG | WEIGHT: 175 LBS | BODY MASS INDEX: 32.2 KG/M2 | DIASTOLIC BLOOD PRESSURE: 86 MMHG

## 2022-08-04 DIAGNOSIS — M25.551 RIGHT HIP PAIN: Primary | ICD-10-CM

## 2022-08-04 DIAGNOSIS — Z09 HOSPITAL DISCHARGE FOLLOW-UP: ICD-10-CM

## 2022-08-04 PROCEDURE — 1123F ACP DISCUSS/DSCN MKR DOCD: CPT | Performed by: NURSE PRACTITIONER

## 2022-08-04 PROCEDURE — 99214 OFFICE O/P EST MOD 30 MIN: CPT | Performed by: NURSE PRACTITIONER

## 2022-08-04 RX ORDER — PANTOPRAZOLE SODIUM 40 MG/1
TABLET, DELAYED RELEASE ORAL
COMMUNITY
Start: 2022-06-02

## 2022-08-04 RX ORDER — TIZANIDINE 2 MG/1
2-4 TABLET ORAL EVERY 8 HOURS PRN
Qty: 30 TABLET | Refills: 0 | Status: SHIPPED | OUTPATIENT
Start: 2022-08-04

## 2022-08-04 SDOH — ECONOMIC STABILITY: FOOD INSECURITY: WITHIN THE PAST 12 MONTHS, THE FOOD YOU BOUGHT JUST DIDN'T LAST AND YOU DIDN'T HAVE MONEY TO GET MORE.: NEVER TRUE

## 2022-08-04 SDOH — ECONOMIC STABILITY: FOOD INSECURITY: WITHIN THE PAST 12 MONTHS, YOU WORRIED THAT YOUR FOOD WOULD RUN OUT BEFORE YOU GOT MONEY TO BUY MORE.: NEVER TRUE

## 2022-08-04 ASSESSMENT — ENCOUNTER SYMPTOMS
SHORTNESS OF BREATH: 0
NAUSEA: 0
ABDOMINAL DISTENTION: 0
EYE REDNESS: 0
COUGH: 0
WHEEZING: 0
DIARRHEA: 0
ABDOMINAL PAIN: 0
VOMITING: 0
SINUS PRESSURE: 0
SINUS PAIN: 0
SORE THROAT: 0
EYE PAIN: 0
BACK PAIN: 1
EYE DISCHARGE: 0

## 2022-08-04 ASSESSMENT — SOCIAL DETERMINANTS OF HEALTH (SDOH): HOW HARD IS IT FOR YOU TO PAY FOR THE VERY BASICS LIKE FOOD, HOUSING, MEDICAL CARE, AND HEATING?: NOT HARD AT ALL

## 2022-08-04 NOTE — PROGRESS NOTES
Nils Barreto (:  1935) is a 82 y.o. female,Established patient, here for evaluation of the following chief complaint(s):  Follow-Up from Hospital (ER FOLLOW UP, RIGHT HIP PAIN )      ASSESSMENT/PLAN:  1. Right hip pain  -Seems to be improving with time. Likely arthritic versus muscular. The patient does have some trochanteric tenderness. Discussed options regarding a muscle relaxer. Flexeril worked but made the patient extremely drowsy. She would like something that she can take during the day. We will try tizanidine instead. Can take 1 to 2 tablets 3 times daily. Follow-up as needed if symptoms fail to continue to improve. Will refer to PT at that time. -     tiZANidine (ZANAFLEX) 2 MG tablet; Take 1-2 tablets by mouth every 8 hours as needed (Muscle spasm), Disp-30 tablet, R-0Normal  2. Hospital discharge follow-up  -Emergency room notes and results reviewed. Medications reconciled. Return in about 7 weeks (around 2022) for Already scheduled with Dr. Isabel Guzmán. SUBJECTIVE/OBJECTIVE:  DERRICK Arrieta presents today for emergency room follow-up. She was seen on  at [facility] for right hip pain. She states that she felt like she moved the wrong way and may be pulled a muscle. She had x-rays and CT performed. Believed to be muscular in origin. She was sent home with a Medrol Dosepak and Norco.  She states that she was experiencing night terrors with the Norco, so she decreased it to 1/2 tablet and took it once or twice a day. She has not taken it yet today. Trying to take Tylenol as well. She did complete her steroid. She feels that it did help but she still has a little bit of right-sided hip pain. She also feels that she is having worsening bilateral sciatica. She states that when the flareup occurs, she feels like she is going to fall. She is inquiring about Flexeril today.   States that her friend had some and gave her 1 and she felt it helped significantly with her sleep. She would like to be able to take it during the daytime to, so she was inquiring whether a dose adjustment would be beneficial.  She is ambulating through her house with a walker which she does as a precaution for falls. She overall feels like she is doing better. Ashlie Cagle has been following with Dr. eRkha Stern for her left hip. She had received an injection via IR in May and she feels like that did not help. She is planning on contacting Dr. Rekha Stern to follow-up. Review of Systems   Constitutional:  Negative for chills and fever. HENT:  Negative for ear discharge, ear pain, hearing loss, sinus pressure, sinus pain and sore throat. Eyes:  Negative for pain, discharge and redness. Respiratory:  Negative for cough, shortness of breath and wheezing. Cardiovascular:  Negative for chest pain and palpitations. Gastrointestinal:  Negative for abdominal distention, abdominal pain, diarrhea, nausea and vomiting. Genitourinary:  Negative for dysuria and hematuria. Musculoskeletal:  Positive for arthralgias, back pain, gait problem and myalgias. Skin:  Negative for rash. Neurological:  Negative for dizziness, weakness, light-headedness, numbness and headaches. Psychiatric/Behavioral:  Negative for decreased concentration, dysphoric mood, self-injury, sleep disturbance and suicidal ideas. The patient is not nervous/anxious. Physical Exam  Constitutional:       General: She is not in acute distress. Appearance: Normal appearance. She is obese. HENT:      Head: Normocephalic and atraumatic. Right Ear: Tympanic membrane, ear canal and external ear normal.      Left Ear: Tympanic membrane, ear canal and external ear normal.      Mouth/Throat:      Mouth: Mucous membranes are moist.   Eyes:      Extraocular Movements: Extraocular movements intact. Conjunctiva/sclera: Conjunctivae normal.      Pupils: Pupils are equal, round, and reactive to light.    Neck:      Thyroid: No thyromegaly. Cardiovascular:      Rate and Rhythm: Normal rate and regular rhythm. Pulses: Normal pulses. Heart sounds: Normal heart sounds. No murmur heard. No gallop. Pulmonary:      Effort: Pulmonary effort is normal.      Breath sounds: Normal breath sounds. No wheezing. Abdominal:      General: Bowel sounds are normal.      Palpations: Abdomen is soft. Tenderness: There is no abdominal tenderness. There is no guarding or rebound. Musculoskeletal:         General: Tenderness present. No swelling. Normal range of motion. Cervical back: Normal range of motion and neck supple. Comments: Bilateral trochanteric tenderness as well as low back tenderness. Full range of motion. Ambulating with cane. Lymphadenopathy:      Cervical: No cervical adenopathy. Skin:     General: Skin is warm and dry. Capillary Refill: Capillary refill takes less than 2 seconds. Neurological:      Mental Status: She is alert and oriented to person, place, and time. Psychiatric:         Mood and Affect: Mood normal.         Behavior: Behavior normal.         Thought Content: Thought content normal.         Judgment: Judgment normal.             This dictation was generated by voice recognition computer software. Although all attempts are made to edit the dictation for accuracy, there may be errors in the transcription that are not intended. An electronic signature was used to authenticate this note.     --Martin Bernabe, RIGOBERTO - CNP

## 2022-08-29 ENCOUNTER — TELEMEDICINE (OUTPATIENT)
Dept: FAMILY MEDICINE CLINIC | Age: 87
End: 2022-08-29
Payer: MEDICARE

## 2022-08-29 DIAGNOSIS — R05.9 COUGH: Primary | ICD-10-CM

## 2022-08-29 PROCEDURE — 99213 OFFICE O/P EST LOW 20 MIN: CPT

## 2022-08-29 PROCEDURE — 1123F ACP DISCUSS/DSCN MKR DOCD: CPT

## 2022-08-29 PROCEDURE — 87428 SARSCOV & INF VIR A&B AG IA: CPT

## 2022-08-29 ASSESSMENT — ENCOUNTER SYMPTOMS
SORE THROAT: 1
WHEEZING: 0
DIARRHEA: 0
SHORTNESS OF BREATH: 0
ABDOMINAL PAIN: 1
CONSTIPATION: 1
SINUS PAIN: 0
EYE REDNESS: 0
CHEST TIGHTNESS: 0
SINUS PRESSURE: 0
RHINORRHEA: 1
VOMITING: 0
EYE PAIN: 0
NAUSEA: 1
EYE DISCHARGE: 0
ABDOMINAL DISTENTION: 0
COUGH: 1

## 2022-08-29 NOTE — PROGRESS NOTES
Tatiana Rooney (:  1935) is a Established patient, here for evaluation of the following:    Assessment & Plan   Below is the assessment and plan developed based on review of pertinent history, physical exam, labs, studies, and medications. 1. Cough  -Negative for COVID 3x via home tests. Viral URI versus flu versus COVID versus bacterial URI. Possible gastroenteritis as well, may be due to same respiratory infection. Test COVID flu combo swab in parking lot tomorrow.  -Continue conservative treatment for now. We will consider additional treatment based on results of testing tomorrow. If negative for both test, and symptoms persist after 1 week, we will consider antibiotic therapy. Return if symptoms worsen or fail to improve. Subjective   Fever   Associated symptoms include abdominal pain, coughing, headaches (band-like), nausea and a sore throat. Pertinent negatives include no chest pain, diarrhea, ear pain, rash, urinary pain, vomiting or wheezing. Mohit Monique presents today for fever and chills, headache, fevers, body aches, and upset stomach for 3 days. He is only been able to tolerate nonfat cottage cheese and try crackers. Even water makes his stomach upset. She tried taking vitamin C and zinc, that made her stomach upset as well. She has been taking home COVID tests, and 3 have been negative. She is having daily bowel movements with her small diet intake. She was very gassy today, so she used a suppository. She has another COVID test for tomorrow as she is saving. She is not taking any medications because she does not believe she will tolerate swallowing them. Denies any shortness of breath, chest tenderness or pain, bladder changes, diarrhea, lightheadedness or dizziness, or tachycardia. Review of Systems   Constitutional:  Positive for appetite change, chills and fever. HENT:  Positive for postnasal drip, rhinorrhea, sneezing and sore throat.  Negative for ear discharge, ear pain, hearing loss, sinus pressure and sinus pain. Eyes:  Negative for pain, discharge and redness. Respiratory:  Positive for cough. Negative for chest tightness, shortness of breath and wheezing. Cardiovascular:  Negative for chest pain and palpitations. Gastrointestinal:  Positive for abdominal pain, constipation and nausea. Negative for abdominal distention, diarrhea and vomiting. Genitourinary:  Negative for dysuria and hematuria. Musculoskeletal:  Positive for myalgias (generalized body aches). Skin:  Negative for rash. Neurological:  Positive for headaches (band-like). Negative for dizziness, weakness, light-headedness and numbness. Psychiatric/Behavioral:  Negative for dysphoric mood. The patient is not nervous/anxious.          Objective   Patient-Reported Vitals  No data recorded     Physical Exam limited due to telephone call  Chela Bolus:  \"[x]\" Indicates a positive item  \"[]\" Indicates a negative item  -- DELETE ALL ITEMS NOT EXAMINED]    Constitutional: [] Appears well-developed and well-nourished [x] No apparent distress      [] Abnormal -     Mental status: [x] Alert and awake  [x] Oriented to person/place/time [x] Able to follow commands    [] Abnormal -     Eyes:   EOM    []  Normal    [] Abnormal -   Sclera  []  Normal    [] Abnormal -          Discharge []  None visible   [] Abnormal -     HENT: [] Normocephalic, atraumatic  [] Abnormal -   [] Mouth/Throat: Mucous membranes are moist    External Ears [] Normal  [] Abnormal -    Neck: [] No visualized mass [] Abnormal -     Pulmonary/Chest: [x] Respiratory effort normal   [] No visualized signs of difficulty breathing or respiratory distress        [x] Abnormal - cough     Musculoskeletal:   [] Normal gait with no signs of ataxia         [] Normal range of motion of neck        [] Abnormal -     Neurological:        [] No Facial Asymmetry (Cranial nerve 7 motor function) (limited exam due to video visit) [] No gaze palsy        [] Abnormal -          Skin:        [] No significant exanthematous lesions or discoloration noted on facial skin         [] Abnormal -            Psychiatric:       [x] Normal Affect [] Abnormal -        [x] No Hallucinations    Other pertinent observable physical exam findings:-             Ricky Bo is a 82 y.o. female evaluated via telephone on 8/29/2022 for Fever ([facility] Friday - COVID TEST NEGATIVE )  . Documentation:  I communicated with the patient and/or health care decision maker about respiratory and/or GI infection. Details of this discussion including any medical advice provided: see plan    Total Time: minutes: 5-10 minutes    Ricky Bo was evaluated through a synchronous (real-time) audio encounter. Patient identification was verified at the start of the visit. She (or guardian if applicable) is aware that this is a billable service, which includes applicable co-pays. This visit was conducted with the patient's (and/or legal guardian's) verbal consent. She has not had a related appointment within my department in the past 7 days or scheduled within the next 24 hours. The patient was located at Home: [facility] [address]. The provider was located at [facility] (Appt Dept): [address] [place] [address],  [address]     Note: not billable if this call serves to triage the patient into an appointment for the relevant concern    RIGOBERTO Rooney - CNP

## 2022-08-30 ENCOUNTER — NURSE ONLY (OUTPATIENT)
Dept: FAMILY MEDICINE CLINIC | Age: 87
End: 2022-08-30

## 2022-08-30 DIAGNOSIS — R06.02 SOB (SHORTNESS OF BREATH): Primary | ICD-10-CM

## 2022-09-15 ENCOUNTER — CARE COORDINATION (OUTPATIENT)
Dept: CARE COORDINATION | Age: 87
End: 2022-09-15

## 2022-09-15 SDOH — ECONOMIC STABILITY: HOUSING INSECURITY
IN THE LAST 12 MONTHS, WAS THERE A TIME WHEN YOU DID NOT HAVE A STEADY PLACE TO SLEEP OR SLEPT IN A SHELTER (INCLUDING NOW)?: NO

## 2022-09-15 SDOH — ECONOMIC STABILITY: HOUSING INSECURITY: IN THE LAST 12 MONTHS, HOW MANY PLACES HAVE YOU LIVED?: 1

## 2022-09-15 SDOH — ECONOMIC STABILITY: INCOME INSECURITY: IN THE LAST 12 MONTHS, WAS THERE A TIME WHEN YOU WERE NOT ABLE TO PAY THE MORTGAGE OR RENT ON TIME?: NO

## 2022-09-15 SDOH — HEALTH STABILITY: PHYSICAL HEALTH: ON AVERAGE, HOW MANY DAYS PER WEEK DO YOU ENGAGE IN MODERATE TO STRENUOUS EXERCISE (LIKE A BRISK WALK)?: 7 DAYS

## 2022-09-15 SDOH — HEALTH STABILITY: PHYSICAL HEALTH: ON AVERAGE, HOW MANY MINUTES DO YOU ENGAGE IN EXERCISE AT THIS LEVEL?: 20 MIN

## 2022-09-15 ASSESSMENT — SOCIAL DETERMINANTS OF HEALTH (SDOH)
WITHIN THE LAST YEAR, HAVE YOU BEEN KICKED, HIT, SLAPPED, OR OTHERWISE PHYSICALLY HURT BY YOUR PARTNER OR EX-PARTNER?: NO
WITHIN THE LAST YEAR, HAVE TO BEEN RAPED OR FORCED TO HAVE ANY KIND OF SEXUAL ACTIVITY BY YOUR PARTNER OR EX-PARTNER?: NO
HOW OFTEN DO YOU ATTEND CHURCH OR RELIGIOUS SERVICES?: 1 TO 4 TIMES PER YEAR
IN A TYPICAL WEEK, HOW MANY TIMES DO YOU TALK ON THE PHONE WITH FAMILY, FRIENDS, OR NEIGHBORS?: MORE THAN THREE TIMES A WEEK
HOW OFTEN DO YOU GET TOGETHER WITH FRIENDS OR RELATIVES?: TWICE A WEEK
HOW OFTEN DO YOU ATTENT MEETINGS OF THE CLUB OR ORGANIZATION YOU BELONG TO?: 1 TO 4 TIMES PER YEAR
DO YOU BELONG TO ANY CLUBS OR ORGANIZATIONS SUCH AS CHURCH GROUPS UNIONS, FRATERNAL OR ATHLETIC GROUPS, OR SCHOOL GROUPS?: YES
WITHIN THE LAST YEAR, HAVE YOU BEEN AFRAID OF YOUR PARTNER OR EX-PARTNER?: NO
WITHIN THE LAST YEAR, HAVE YOU BEEN HUMILIATED OR EMOTIONALLY ABUSED IN OTHER WAYS BY YOUR PARTNER OR EX-PARTNER?: NO

## 2022-09-15 ASSESSMENT — LIFESTYLE VARIABLES
HOW OFTEN DO YOU HAVE A DRINK CONTAINING ALCOHOL: NEVER
HOW MANY STANDARD DRINKS CONTAINING ALCOHOL DO YOU HAVE ON A TYPICAL DAY: PATIENT DOES NOT DRINK

## 2022-09-15 NOTE — CARE COORDINATION
Ambulatory Care Coordination Note  9/15/2022    ACC: Johan Taylor RN    Summary Note: Outreach call to patient to assess needs and finish enrollment in Ambulatory Care Management. The patient states that over all she is doing ok. She was sick a few weeks ago, but tested negative for COVID and influenza. The patient stated that she has pain which she contributes to arthritis and scoliosis. She stated she also has Sciatica. The patient advised that Dr. Breann Julio referred her to an orthopedic doctor and the treatment he advised did not give her any relief. She stated that she needed to make another appointment with him and let him know and see if he had any other advise. This ACM asked if the patient had seen a neurologist.The patient advised that she had not. This ACM advised that the patient discuss this with Dr. Breann Julio at her wellness check on 9/22/2022. The patient added that she could use some PT to help with her strength and balance. This ACM advised that I would send a message to Dr. Breann Julio advising of request for PT and a referral to a neurologist. The patient stated that would be wonderful. The patient advised that she doesn't really have stress for her personal concerns, but she worries about her 6 children. The patient states she sees 2 of her sons every week and she talks to her children every day. The patient advised that her [employer] recently opened back up for in person services and she has one zoom service also once a week. The patient states she still drives and can do shopping, go to appointments and [employer] by herself, but if she needs help, she can call on her children. The patient states that she cooks most of her meals and does her grocery shopping. She advised that she could use some help around the house though. This ACM advised that I could reach out to [employer] and have them to give her a call to see what she may be eligible for. The patient stated that she would be open to a call from [employer]. The patient states that she would like to make a goal to lose 25 lbs. She stated that ever since COVID started, the she has put on more weight. She advised that her appetite has been poor and she has been eating more sweet and sour foods. This ACM offered referral to our RD Sudha Hairston and explained Jo's roll as RD. The patient advised that she would gladly talk with Eugene. This ACM advised that I would put in a referral and she should expect a call from Eugene within the week. The patient VU. Enrollment was completed. Most medications were reviewed . The patient accepted this ACM's contact information and was encouraged to reach out with needs. The patient VU. This ACM reached out to [address] and spoke with Marcela. This ACM advised of patient's stated needs. [facility] advised that she would reach out to the patient today. This ACM VU and thanked stay for her time. Ambulatory Care Coordination Assessment    Care Coordination Protocol  Week 1 - Initial Assessment     Do you have all of your prescriptions and are they filled?: Yes  Are you able to afford your medications?: Yes  How often do you have trouble taking your medications the way you have been told to take them?: I always take them as prescribed. Ability to seek help/take action for Emergent Urgent situations i.e. fire, crime, inclement weather or health crisis. : Independent  Ability to ambulate to restroom: Independent  Ability handle personal hygeine needs (bathing/dressing/grooming): Independent  Ability to manage Medications: Independent  Ability to prepare Food Preparation: Independent  Ability to maintain home (clean home, laundry): Independent  Ability to drive and/or has transportation: Independent  Ability to do shopping: Independent  Ability to manage finances:  Independent  Is patient able to live independently?: Yes     Current Housing: Private Residence                 Suggested Interventions and [employer] Prior to Admission medications    Medication Sig Start Date End Date Taking? Authorizing Provider   pantoprazole (PROTONIX) 40 MG tablet  6/2/22   Historical Provider, MD   tiZANidine (ZANAFLEX) 2 MG tablet Take 1-2 tablets by mouth every 8 hours as needed (Muscle spasm)  Patient not taking: Reported on 8/29/2022 8/4/22   RIGOBERTO Gutierrez CNP   metoprolol succinate (TOPROL XL) 50 MG extended release tablet TAKE 1 TABLET BY MOUTH  DAILY 6/30/22   RIGOBERTO Garcia CNP   triamterene-hydroCHLOROthiazide (CYSEFUX-92) 37.5-25 MG per tablet TAKE 1 TABLET BY MOUTH  DAILY 6/27/22   RIGOBERTO Garcia CNP   atorvastatin (LIPITOR) 80 MG tablet TAKE 1 TABLET BY MOUTH  DAILY  Patient not taking: Reported on 8/29/2022 6/9/22   Kathlean Landau, APRN - CNP   omeprazole (PRILOSEC) 40 MG delayed release capsule Take 40 mg by mouth daily  Patient not taking: Reported on 8/29/2022    Historical Provider, MD   Cyanocobalamin 1000 MCG SUBL Place under the tongue    Historical Provider, MD   nitroGLYCERIN (NITROSTAT) 0.4 MG SL tablet Place 1 tablet under the tongue every 5 minutes as needed for Chest pain up to max of 3 total doses. If no relief after 1 dose, call 911. Patient not taking: Reported on 8/29/2022 11/9/21   Carola Ferreira MD   acetaminophen (TYLENOL) 500 MG tablet Take 500 mg by mouth every 6 hours as needed for Pain     Historical Provider, MD   Cholecalciferol (VITAMIN D3) 2000 units CAPS Take 2,000 Units by mouth daily     Historical Provider, MD   aspirin 81 MG chewable tablet Take 1 tablet by mouth daily 5/5/18   Zuleika Casas MD       Future Appointments   Date Time Provider Sybil Maldonado   9/22/2022 10:00 AM MD Sanedr Christiansen:  Advise PCP of patient request for PT. Advise PCP of patient's sciatic pain and request for referral to a neurologist.   Review medications. Follow up on [facility].   Follow up on RD referral.

## 2022-09-19 ENCOUNTER — CARE COORDINATION (OUTPATIENT)
Dept: CARE COORDINATION | Age: 87
End: 2022-09-19

## 2022-09-19 DIAGNOSIS — I10 ESSENTIAL HYPERTENSION: ICD-10-CM

## 2022-09-19 NOTE — CARE COORDINATION
Attempted to contact Cassandra Alvarado regarding Dietitian referral. Attempted to call patient's home phone number and cell phone number, was not able to leave a message with either number. Will follow up as appropriate.        Cirilo Silva, [address] [place] [address],   [phone]

## 2022-09-20 RX ORDER — METOPROLOL SUCCINATE 50 MG/1
TABLET, EXTENDED RELEASE ORAL
Qty: 90 TABLET | Refills: 0 | Status: SHIPPED | OUTPATIENT
Start: 2022-09-20

## 2022-09-22 ENCOUNTER — OFFICE VISIT (OUTPATIENT)
Dept: FAMILY MEDICINE CLINIC | Age: 87
End: 2022-09-22
Payer: MEDICARE

## 2022-09-22 VITALS
WEIGHT: 177 LBS | SYSTOLIC BLOOD PRESSURE: 120 MMHG | DIASTOLIC BLOOD PRESSURE: 74 MMHG | HEIGHT: 62 IN | OXYGEN SATURATION: 99 % | HEART RATE: 68 BPM | BODY MASS INDEX: 32.57 KG/M2

## 2022-09-22 DIAGNOSIS — M54.41 CHRONIC BILATERAL LOW BACK PAIN WITH BILATERAL SCIATICA: ICD-10-CM

## 2022-09-22 DIAGNOSIS — I10 PRIMARY HYPERTENSION: Primary | ICD-10-CM

## 2022-09-22 DIAGNOSIS — G89.29 CHRONIC BILATERAL LOW BACK PAIN WITH BILATERAL SCIATICA: ICD-10-CM

## 2022-09-22 DIAGNOSIS — K21.9 GASTROESOPHAGEAL REFLUX DISEASE, UNSPECIFIED WHETHER ESOPHAGITIS PRESENT: ICD-10-CM

## 2022-09-22 DIAGNOSIS — R06.02 SOBOE (SHORTNESS OF BREATH ON EXERTION): ICD-10-CM

## 2022-09-22 DIAGNOSIS — M15.9 PRIMARY OSTEOARTHRITIS INVOLVING MULTIPLE JOINTS: ICD-10-CM

## 2022-09-22 DIAGNOSIS — I25.10 CORONARY ARTERY DISEASE INVOLVING NATIVE HEART WITHOUT ANGINA PECTORIS, UNSPECIFIED VESSEL OR LESION TYPE: ICD-10-CM

## 2022-09-22 DIAGNOSIS — G47.00 INSOMNIA, UNSPECIFIED TYPE: ICD-10-CM

## 2022-09-22 DIAGNOSIS — R60.0 PEDAL EDEMA: ICD-10-CM

## 2022-09-22 DIAGNOSIS — M54.42 CHRONIC BILATERAL LOW BACK PAIN WITH BILATERAL SCIATICA: ICD-10-CM

## 2022-09-22 DIAGNOSIS — E80.4 GILBERT SYNDROME: ICD-10-CM

## 2022-09-22 PROBLEM — I50.23 CHF (CONGESTIVE HEART FAILURE), NYHA CLASS I, ACUTE ON CHRONIC, SYSTOLIC (HCC): Status: RESOLVED | Noted: 2021-12-27 | Resolved: 2022-09-22

## 2022-09-22 LAB
A/G RATIO: 2.2 (ref 1.1–2.2)
ALBUMIN SERPL-MCNC: 4.3 G/DL (ref 3.4–5)
ALP BLD-CCNC: 85 U/L (ref 40–129)
ALT SERPL-CCNC: 17 U/L (ref 10–40)
ANION GAP SERPL CALCULATED.3IONS-SCNC: 10 MMOL/L (ref 3–16)
AST SERPL-CCNC: 19 U/L (ref 15–37)
BILIRUB SERPL-MCNC: 1.2 MG/DL (ref 0–1)
BUN BLDV-MCNC: 12 MG/DL (ref 7–20)
CALCIUM SERPL-MCNC: 10.2 MG/DL (ref 8.3–10.6)
CHLORIDE BLD-SCNC: 100 MMOL/L (ref 99–110)
CHOLESTEROL, TOTAL: 174 MG/DL (ref 0–199)
CO2: 27 MMOL/L (ref 21–32)
CREAT SERPL-MCNC: 0.6 MG/DL (ref 0.6–1.2)
D DIMER: 0.58 UG/ML FEU (ref 0–0.6)
GFR AFRICAN AMERICAN: >60
GFR NON-AFRICAN AMERICAN: >60
GLUCOSE BLD-MCNC: 95 MG/DL (ref 70–99)
HDLC SERPL-MCNC: 71 MG/DL (ref 40–60)
LDL CHOLESTEROL CALCULATED: 73 MG/DL
POTASSIUM SERPL-SCNC: 3.7 MMOL/L (ref 3.5–5.1)
PRO-BNP: 378 PG/ML (ref 0–449)
SODIUM BLD-SCNC: 137 MMOL/L (ref 136–145)
TOTAL PROTEIN: 6.3 G/DL (ref 6.4–8.2)
TRIGL SERPL-MCNC: 148 MG/DL (ref 0–150)
VLDLC SERPL CALC-MCNC: 30 MG/DL

## 2022-09-22 PROCEDURE — 36415 COLL VENOUS BLD VENIPUNCTURE: CPT | Performed by: FAMILY MEDICINE

## 2022-09-22 PROCEDURE — 99214 OFFICE O/P EST MOD 30 MIN: CPT | Performed by: FAMILY MEDICINE

## 2022-09-22 PROCEDURE — 1123F ACP DISCUSS/DSCN MKR DOCD: CPT | Performed by: FAMILY MEDICINE

## 2022-09-22 RX ORDER — FUROSEMIDE 20 MG/1
20 TABLET ORAL DAILY PRN
Qty: 30 TABLET | Refills: 0 | Status: SHIPPED | OUTPATIENT
Start: 2022-09-22 | End: 2022-09-22

## 2022-09-22 RX ORDER — POTASSIUM CHLORIDE 750 MG/1
TABLET, FILM COATED, EXTENDED RELEASE ORAL
Qty: 90 TABLET | Refills: 0 | Status: SHIPPED | OUTPATIENT
Start: 2022-09-22

## 2022-09-22 RX ORDER — TRAZODONE HYDROCHLORIDE 50 MG/1
25-50 TABLET ORAL NIGHTLY
Qty: 30 TABLET | Refills: 2 | Status: SHIPPED | OUTPATIENT
Start: 2022-09-22

## 2022-09-22 RX ORDER — POTASSIUM CHLORIDE 750 MG/1
10 TABLET, FILM COATED, EXTENDED RELEASE ORAL DAILY
Qty: 30 TABLET | Refills: 0 | Status: SHIPPED | OUTPATIENT
Start: 2022-09-22 | End: 2022-09-22

## 2022-09-22 RX ORDER — FUROSEMIDE 20 MG/1
TABLET ORAL
Qty: 90 TABLET | Refills: 0 | Status: SHIPPED | OUTPATIENT
Start: 2022-09-22

## 2022-09-22 NOTE — PROGRESS NOTES
[facility]  Clinic Note    Date: 9/22/2022                                               Subjective:     Chief Complaint   Patient presents with    Hypertension     6 MO HTN ROUTINE FOLLOW UP     Insomnia     HAVING ISSUES SLEEPING ONLY SLEEPS FOR 2-3 HOURS THEN AWAKE AND CANNOT GET BACK TO SLEEP TRIED ORGANIC SLEEPING PILLS AND THEY DID NOT HELP    NOT DOING REAL WELL - ONGOING SLEEP PROBLEMS - UP 2-3 HOURS DURING NIGHT FOR SOME TIME BUT SEEMS WORSE  DRAGGING - tried otc sleep aids w/ melatonin and not helpful. BOTH ANKLES SWOLLEN for last week  Legs throbbing -not pain knees down bilat. Pain left iliac crest area/ sciatica -down both legs to toes at times - more frequent  No numbness but legs are weak  Using cane to get around - has walker in house as well - tries not to use often -uses at night and wagner next to her w/ showering  Wearing sandals to get around  Up around 3x/ night - nocturia. Only sleeps on right side 2/2 left posterior pelvic pain  Had imaging/ injections into spine w/o much relief - ? Si joint injection  Trouble lifting things into car  Some breathlessness - moves slowly  Couple episodes of sob waking her at night. Occ tight in chest w/ cough - some sinus drainage lately  Some soreness to press over left low chest wall - ?  Related to lifting soil into car  Has chronic rc issues arben right shoulder - hard to reach overhead  Last EF 65% 1 year ago  Sees cardiologist in 3 months    Hypertension    Insomnia        BP Readings from Last 3 Encounters:   09/22/22 120/74   08/04/22 134/86   07/27/22 (!) 175/110     Pulse Readings from Last 3 Encounters:   09/22/22 68   08/04/22 88   07/27/22 76     Wt Readings from Last 3 Encounters:   09/22/22 177 lb (80.3 kg)   08/04/22 175 lb (79.4 kg)   05/09/22 178 lb (80.7 kg)            Patient Active Problem List    Diagnosis Date Noted    Anemia     CHF (congestive heart failure), NYHA class I, acute on chronic, systolic (HCC) 66/70/2035 Calculus of ureter 09/01/2021    Leukocytosis 02/19/2020    Lactic acidosis 02/19/2020    Patient is [employer] 02/19/2020    Hypokalemia 02/19/2020    Hypertension     Ganglion cyst of dorsum of left wrist 10/04/2018    Hyperlipidemia 06/19/2018    Atrial fibrillation ([address] 06/19/2018    Coronary artery disease involving native heart with angina pectoris, unspecified vessel or lesion type ([address] 06/01/2018    NSTEMI (non-ST elevated myocardial infarction) ([address] 05/29/2018    Chest pain 05/03/2018    Gastroesophageal reflux disease 03/15/2018    Primary osteoarthritis involving multiple joints 03/15/2018    Psoriasis 03/15/2018    Interstitial cystitis 03/15/2018    Thoracic ascending aortic aneurysm ([address] 03/15/2018    History of colonic polyps 07/02/2015    Varicose veins of legs 01/23/2015    Rotator cuff tear, right 03/25/2013    Vitamin B 12 deficiency 01/07/2013    Obesity 12/12/2012     Past Medical History:   Diagnosis Date    Arthritis     Atrial fibrillation ([address] 6/19/2018    CAD in native artery 6/1/2018    Calculus of ureter 9/1/2021    Cystitis, interstitial     Gastroesophageal reflux disease 3/15/2018    GERD (gastroesophageal reflux disease)     GI bleeding     was a frequent aspirin user at that time    Heart burn     History of colonic polyps 7/2/2015    Hypertension     Interstitial cystitis     Mixed hyperlipidemia 6/19/2018    NSTEMI (non-ST elevated myocardial infarction) ([address] 5/29/2018    Obesity 12/12/2012    Overview:  Replaced inactive diagnosis via diagnosis import    Patient is [employer]     no blood products    Psoriasis     Rotator cuff tear, right 3/25/2013    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 Mary Brown - w/placement of IABP    CARDIAC CATHETERIZATION  2012 COLONOSCOPY  2015    Dr. Piter Acevedo - w/polypectomy    Karle Hurt GRAFT  2018    Dr. Breanne Sibley - off pump x2 (LIMA-LAD, L SVG-D2)    CYSTOSCOPY Left 2021    CYSTOSCOPY WITH LEFT URETEROSCOPY, STONE MANIPULATION ,STONE BASKET REMOVAL performed by Joey Green MD at [facility] ARTHROSCOPY Right 2015    MOHS SURGERY Left     facial for skin CA    PARTIAL HYSTERECTOMY (CERVIX NOT REMOVED)      ROTATOR CUFF REPAIR Right     TRANSESOPHAGEAL ECHOCARDIOGRAM  2018    during CABG     Office Visit on 2022   Component Date Value Ref Range Status    WBC 2022 6.5  4.0 - 11.0 K/uL Final    RBC 2022 4.78  4.00 - 5.20 M/uL Final    Hemoglobin 2022 14.9  12.0 - 16.0 g/dL Final    Hematocrit 2022 44.4  36.0 - 48.0 % Final    MCV 2022 93.0  80.0 - 100.0 fL Final    MCH 2022 31.2  26.0 - 34.0 pg Final    MCHC 2022 33.5  31.0 - 36.0 g/dL Final    RDW 2022 15.0  12.4 - 15.4 % Final    Platelets  218  135 - 450 K/uL Final    MPV 2022 8.1  5.0 - 10.5 fL Final    Neutrophils % 2022 60.2  % Final    Lymphocytes % 2022 29.9  % Final    Monocytes % 2022 7.4  % Final    Eosinophils % 2022 2.0  % Final    Basophils % 2022 0.5  % Final    Neutrophils Absolute 2022 3.9  1.7 - 7.7 K/uL Final    Lymphocytes Absolute 2022 2.0  1.0 - 5.1 K/uL Final    Monocytes Absolute 2022 0.5  0.0 - 1.3 K/uL Final    Eosinophils Absolute 2022 0.1  0.0 - 0.6 K/uL Final    Basophils Absolute 2022 0.0  0.0 - 0.2 K/uL Final     Family History   Problem Relation Age of Onset    Breast Cancer Mother         LUNG    Lung Cancer Father         LUNG    COPD Father     Tuberculosis Father     Lung Cancer Sister          of chemo    Lung Cancer Brother     Tuberculosis Brother      Current Outpatient Medications   Medication Sig Dispense Refill    metoprolol succinate (TOPROL XL) 50 MG extended release tablet TAKE 1 TABLET BY MOUTH  DAILY 90 tablet 0    pantoprazole (PROTONIX) 40 MG tablet       tiZANidine (ZANAFLEX) 2 MG tablet Take 1-2 tablets by mouth every 8 hours as needed (Muscle spasm) 30 tablet 0    triamterene-hydroCHLOROthiazide (MAXZIDE-25) 37.5-25 MG per tablet TAKE 1 TABLET BY MOUTH  DAILY 90 tablet 3    atorvastatin (LIPITOR) 80 MG tablet TAKE 1 TABLET BY MOUTH  DAILY 90 tablet 1    omeprazole (PRILOSEC) 40 MG delayed release capsule Take 40 mg by mouth daily      Cyanocobalamin 1000 MCG SUBL Place under the tongue      nitroGLYCERIN (NITROSTAT) 0.4 MG SL tablet Place 1 tablet under the tongue every 5 minutes as needed for Chest pain up to max of 3 total doses. If no relief after 1 dose, call 911. 25 tablet 1    acetaminophen (TYLENOL) 500 MG tablet Take 500 mg by mouth every 6 hours as needed for Pain       Cholecalciferol (VITAMIN D3) 2000 units CAPS Take 2,000 Units by mouth daily       aspirin 81 MG chewable tablet Take 1 tablet by mouth daily 30 tablet 3     No current facility-administered medications for this visit. Allergies   Allergen Reactions    Latex Itching    Mucinex [Guaifenesin Er] Other (See Comments)     Coughs more, dry cough, feels like she has bronchitis    Tetanus Toxoids      HAS REACTION- TOLD TO TAKE ALTERNATIVE    Redness and swelling to site    Bactrim [Sulfamethoxazole-Trimethoprim] Nausea And Vomiting and Other (See Comments)     stomach pain, weakness, cant urinate, affected her taste, dizziness. Morphine Nausea And Vomiting    Pneumococcal Vaccines Rash       Review of Systems   Psychiatric/Behavioral:  The patient has insomnia.       Objective:  /74 (Site: Left Upper Arm, Position: Sitting, Cuff Size: Medium Adult)   Pulse 68   Ht 5' 2\" (1.575 m)   Wt 177 lb (80.3 kg)   SpO2 99%   BMI 32.37 kg/m²     BP Readings from Last 3 Encounters:   09/22/22 120/74   08/04/22 134/86   07/27/22 (!) 175/110       Pulse Readings from Last 3 Encounters:   09/22/22 68   08/04/22 88   07/27/22 76       Wt Readings from Last 3 Encounters:   09/22/22 177 lb (80.3 kg)   08/04/22 175 lb (79.4 kg)   05/09/22 178 lb (80.7 kg)       Physical Exam  Constitutional:       General: She is not in acute distress. Appearance: She is well-developed. HENT:      Head: Normocephalic and atraumatic. Mouth/Throat:      Pharynx: No oropharyngeal exudate. Eyes:      General: No scleral icterus. Conjunctiva/sclera: Conjunctivae normal.   Neck:      Thyroid: No thyromegaly. Cardiovascular:      Rate and Rhythm: Normal rate and regular rhythm. Heart sounds: Normal heart sounds. No murmur heard. Pulmonary:      Effort: Pulmonary effort is normal. No respiratory distress. Breath sounds: Normal breath sounds. No wheezing or rales. Abdominal:      General: Bowel sounds are normal. There is no distension. Palpations: Abdomen is soft. Tenderness: There is no abdominal tenderness. Musculoskeletal:         General: Swelling (1-2+ swelling feet) present. Lymphadenopathy:      Cervical: No cervical adenopathy. Skin:     General: Skin is warm and dry. Neurological:      Mental Status: She is alert and oriented to person, place, and time. Assessment/Plan:      Diagnosis Orders   1. Primary hypertension        2. Insomnia, unspecified type        3. Gastroesophageal reflux disease, unspecified whether esophagitis present        4. Primary osteoarthritis involving multiple joints        5. Coronary artery disease involving native heart without angina pectoris, unspecified vessel or lesion type        6. Gilbert syndrome        7. Pedal edema        8.  SOBOE (shortness of breath on exertion)           MRI lumbar 5/22 showed spinal stenosis -mild w/ multilevel ddd/ foramen neural compression - sasha #1 didn't help much  PT d/w pt o/w will refer to spine specialist  F/u urology soon  F/u cardio in next 3 months -sooner if worsening soboe  Lasix/ kcl daily prn w/ low salt/ elevation/ compression - short term - prn  Se's dw/ pt  Reviewed recent labs - repeat labs in next 3 months - lipid,cmp, vit d - recent cbc okay  Cont lipitor 80 - tolerating well  On toprol, maxzide, asa w/ good bp control  Cad stable - no angina  Xray reviewed hips/ pelvis - ? Old pubic rami fx w/ mild oa hips  oarrs reviewed and results c/w rx'd meds  Norco 5mg #10 given 7/28/22  Flu shot today  Return in about 3 months (around 12/22/2022) for FOLLOW UP WITH DR. SILVA NEXT VISIT, [facility].     Ginny De La Vega MD, MD  9/22/2022  10:09 AM

## 2022-09-26 ENCOUNTER — CARE COORDINATION (OUTPATIENT)
Dept: CARE COORDINATION | Age: 87
End: 2022-09-26

## 2022-09-26 NOTE — CARE COORDINATION
Attempted to contact Saida Weeks regarding Dietitian referral. Wilber Dawley to leave voicemail at home phone number due to, \"number is not accepting your call. \" Attempted, then, to call patient's cell phone, but was unable to leave voicemail as voicemail box was not yet set up. Will follow up as appropriate.        [place], [address] [place] [address],   [phone]

## 2022-09-28 ENCOUNTER — TELEPHONE (OUTPATIENT)
Dept: FAMILY MEDICINE CLINIC | Age: 87
End: 2022-09-28

## 2022-09-28 NOTE — TELEPHONE ENCOUNTER
CALLED PATIENT AND ADVISED OF LAB RESULTS. PATIENT STATED AT LAST VISIT DRCESAR DISCONTINUED HER OMEPRAZOLE 40MG. BUT SHE NEEDS TO TAKE SOMETHING BECAUSE HER ACID REFLUX IS BAD AND EVEN THREW UP ONE DAY DUE TO HOW BAD THE REFLUX WAS. STILL HAS A HALF OF BOTTLE LEFT OF THIS. CAN SHE RESTART THIS OR CAN YOU SEND IN SOMETHING ELSE? I LOOKED THROUGH  THE OV NOTE FROM HER LAST VISIT AND COULD NOT FIND WHERE [facility]REE TOOK OFF MED.  CHRISTI

## 2022-09-28 NOTE — TELEPHONE ENCOUNTER
Patient would like her blood test results from a week ago. Please give her a call back. Discuss medication acid reflux.     Silviomai Elsie phone no. [phone]

## 2022-09-29 ENCOUNTER — HOSPITAL ENCOUNTER (OUTPATIENT)
Dept: PHYSICAL THERAPY | Age: 87
Setting detail: THERAPIES SERIES
Discharge: HOME | End: 2022-09-29
Payer: MEDICARE

## 2022-09-29 PROCEDURE — 97530 THERAPEUTIC ACTIVITIES: CPT

## 2022-09-29 PROCEDURE — 97161 PT EVAL LOW COMPLEX 20 MIN: CPT

## 2022-09-29 PROCEDURE — 97116 GAIT TRAINING THERAPY: CPT
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
## 2022-09-29 NOTE — FLOWSHEET NOTE
[address] [facility] Physical Therapy  Phone: [phone]   Fax: [phone]    Physical Therapy Daily Treatment Note    Date:  2022     Patient Name:  Jaylen Nathan    :  1935  MRN: [id number]  Medical Diagnosis:  Chronic bilateral low back pain with bilateral sciatica [M54.42, M54.41, G89.29]  Treatment Diagnosis:  hypomobility lumbar spine and R>L knee, balance deficits, myofascial pain. Strength deficits and OA pain in R>L LE cause antalgia which  contributes significantly to back pain                    Insurance/Certification information:  PT Insurance Information: medicare advantage 40$ copay, no Maciel Quinones, Med YSE  Physician Information:  Hannah Wong MD ,   PT referral dated 22  Plan of care signed (Y/N): []  Yes [x]  No     Date of Patient follow up with Physician:      Progress Report: []  Yes  [x]  No     Date Range for reporting period:  Beginnin2022  Ending:     Progress report due (10 Rx/or 30 days whichever is less): visit #10 or /      Recertification due (POC duration/ or 90 days whichever is less): visit #12 or  (date)     Visit # Insurance Allowable Auth required?  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, HTN, vertigo, latex allergy    Exercises/Interventions:     Therapeutic Exercises (60732) Resistance / level Sets/sec Reps Notes   Seated stepper       IB/HR       Step stretches:  HS stretches  Knee flex stretches                                                 Therapeutic Activities (01009)       Sit to stand 5x CGA      Step ups       Upright sitting posture with lumbar roll 4'      Posture and body [facility] ed                     Gait (45687)       Gait trg with RW- cues for upright posture and use RW to minimize her limp/antalgia 1x120'                           Neuromuscular Re-ed (86769)       Balance ex       NBOS floor  1x15\"                                Manual Intervention (29582)       Manual stretching: HS, rectus femoris, glut max       IASTM to : TPs R >L QL, glut max, glut med, glut min Prn for pain                                      Modalities: heat prn for pain    Pt. Education:  09/29/2022  -patient educated on diagnosis, prognosis and expectations for rehab  -all patient questions were answered  Encouraged pt to f/u with Dr. Valdo Mcgrath re her OA pain - she is agreeable    Home Exercise Program:  Access Code: [employer] [facility]  URL: [email or web address]  Date: 09/29/2022  Prepared by: Radha Osorio    Exercises  Romberg Stance - 2 x daily - 7 x weekly - 1 sets - 2-5 reps - 10-30 hold  Sit to Stand - 2-3 x daily - 7 x weekly - 1-2 sets - 5-10 reps  Walking - 2-4 x daily - 7 x weekly - 3-10 min hold        Therapeutic Exercise and NMR EXR  [x] (28665) Provided verbal/tactile cueing for activities related to strengthening, flexibility, endurance, ROM for improvements in  [] LE / Lumbar: LE, proximal hip, and core control with self care, mobility, lifting, ambulation. [] UE / Cervical: cervical, postural, scapular, scapulothoracic and UE control with self care, reaching, carrying, lifting, house/yardwork, driving, computer work. [x] (42938) Provided verbal/tactile cueing for activities related to improving balance, coordination, kinesthetic sense, posture, motor skill, proprioception to assist with   [] LE / lumbar: LE, proximal hip, and core control in self care, mobility, lifting, ambulation and eccentric single leg control.    [] UE / cervical: cervical, scapular, scapulothoracic and UE control with self care, reaching, carrying, lifting, house/yardwork, driving, computer work.   [] (75367) Therapist is in constant attendance of 2 or more patients providing skilled therapy interventions, but not providing any significant amount of measurable one-on-one time to either patient, for improvements in  [] LE / lumbar: LE, proximal hip, and core control in self care, mobility, lifting, ambulation and eccentric single leg control. [] UE / cervical: cervical, scapular, scapulothoracic and UE control with self care, reaching, carrying, lifting, house/yardwork, driving, computer work. NMR and Therapeutic Activities:    [x] (59320 or 37900) Provided verbal/tactile cueing for activities related to improving balance, coordination, kinesthetic sense, posture, motor skill, proprioception and motor activation to allow for proper function of   [] LE: / Lumbar core, proximal hip and LE with self care and ADLs  [] UE / Cervical: cervical, postural, scapular, scapulothoracic and UE control with self care, carrying, lifting, driving, computer work. [x] (67992) Gait Re-education- Provided training and instruction to the patient for proper LE, core and proximal hip recruitment and positioning and eccentric body weight control with ambulation re-education including up and down stairs     Home Management Training / Self Care:  [x] (98795) Provided self-care/home management training related to activities of daily living and compensatory training, and/or use of adaptive equipment for improvement with: ADLs and compensatory training, meal preparation, safety procedures and instruction in use of adaptive equipment, including bathing, grooming, dressing, personal hygiene, basic household cleaning and chores.      Home Exercise Program:    [x] (78736) Reviewed/Progressed HEP activities related to strengthening, flexibility, endurance, ROM of   [] LE / Lumbar: core, proximal hip and LE for functional self-care, mobility, lifting and ambulation/stair navigation   [] UE / Cervical: cervical, postural, scapular, scapulothoracic and UE control with self care, reaching, carrying, lifting, house/yardwork, driving, computer work  [x] (67889)Reviewed/Progressed HEP activities related to improving balance, coordination, kinesthetic sense, posture, motor skill, proprioception of   [] LE: core, proximal hip and LE for self care, mobility, lifting, and ambulation/stair navigation    [] UE / Cervical: cervical, postural,  scapular, scapulothoracic and UE control with self care, reaching, carrying, lifting, house/yardwork, driving, computer work    Manual Treatments:  PROM / STM / Oscillations-Mobs:  G-I, II, III, IV (PA's, Inf., Post.)  [x] (12083) Provided manual therapy to mobilize LE, proximal hip and/or LS spine soft tissue/joints for the purpose of modulating pain, promoting relaxation,  increasing ROM, reducing/eliminating soft tissue swelling/inflammation/restriction, improving soft tissue extensibility and allowing for proper ROM for normal function with   [] LE / lumbar: self care, mobility, lifting and ambulation. [] UE / Cervical: self care, reaching, carrying, lifting, house/yardwork, driving, computer work. Modalities:  [] (76554) Vasopneumatic compression: Utilized vasopneumatic compression to decrease edema / swelling for the purpose of improving mobility and quad tone / recruitment which will allow for increased overall function including but not limited to self-care, transfers, ambulation, and ascending / descending stairs.        Charges:  Timed Code Treatment Minutes: 40   Total Treatment Minutes: 60     [x] EVAL - LOW (63093)   [] EVAL - MOD (47195)  [] EVAL - HIGH (99850)  [] RE-EVAL (49111)  [] CZ(12244) x       [] Ionto  [] NMR (77849) x       [] Vaso  [] Manual (53917) x       [] Ultrasound  [x] TA x 2       [] Mech Traction (33295)  [] Aquatic Therapy x     [] ES (un) (08135):   [] Home Management Training x  [] ES(attended) (39188)   [] Dry Needling 1-2 muscles (60523):  [] Dry Needling 3+ muscles (039138)  [] Group:      [x] Other: gait x1    GOALS:   Patient stated goal:  less pain with ADLs  [] Progressing: [] Met: [] Not Met: [] Adjusted     Therapist goals for Patient:   Short Term Goals: To be achieved in: 2 weeks  1. Independent in HEP and progression per patient tolerance, in order to prevent re-injury. [] Progressing: [] Met: [] Not Met: [] Adjusted  2. Patient will have a decrease in pain to facilitate improvement in movement, function, and ADLs as indicated by improvement with respect to Functional Deficits. [] Progressing: [] Met: [] Not Met: [] Adjusted     Long Term Goals: To be achieved in: 6 weeks  1. FOTO functional survey score of >/= 53  to assist with reaching prior level of function. [] Progressing: [] Met: [] Not Met: [] Adjusted  2. Patient will demonstrate increased AROM  to [facility], to allow for proper joint functioning to allow pt to resume using good psoture for ADLs/home choress without increase in symptoms. [] Progressing: [] Met: [] Not Met: [] Adjusted  3. Patient will demonstrate increased Strength and core activation to allow for proper functional mobility as indicated by patients Functional Deficits to allow pt to resume standing for 30-60 min for cooking and going out in community without increase in symptoms. [] Progressing: [] Met: [] Not Met: [] Adjusted  4. Patient will return to functional activities including walking with LRAD prn for shopping, IADLs without increased symptoms or restriction. [] Progressing: [] Met: [] Not Met: [] Adjusted     Overall Progression Towards Functional goals/ Treatment Progress Update:  [] Patient is progressing as expected towards functional goals listed. [] Progression is slowed due to complexities/Impairments listed. [] Progression has been slowed due to co-morbidities.   [x] Plan just implemented, too soon to assess goals progression <30days   [] Goals require adjustment due to lack of progress  [] Patient is not progressing as expected and requires additional follow up with physician  [] Other    Persisting Functional Limitations/Impairments:  []Sleeping [x]Sitting               [x]Standing [x]Transfers        [x]Walking []Kneeling               [x]Stairs [x]Squatting / bending   [x]ADLs [x]Reaching  [x]Lifting  [x]Housework  []Driving []Job related tasks  []Sports/Recreation []Other:        ASSESSMENT:  See eval  Treatment/Activity Tolerance:  [x] Patient able to complete tx [] Patient limited by fatigue  [] Patient limited by pain  [] Patient limited by other medical complications  [] Other:     Prognosis: [x] Good [] Fair  [] Poor    Patient Requires Follow-up: [x] Yes  [] No    Plan for next treatment session: balance, increase safe functional mobility, gait trg, LE and core strength, manual for myofascial pain, HEP, heat prn    PLAN:  strength, ROM/flexibility, posture and body mechs, manual, MOC, HEP, pt education     Frequency/Duration:  2 days per week for 6 Weeks:  Interventions:  [x]  Therapeutic exercise including:strength, ROM, flexibility  [x]  NMR activation and proprioception including postural re-education  [x]  Manual therapy as indicated to include: IASTM, STM, PROM, Gr I-IV mobilizations, manipulation. [x]  Modalities as needed that may include: thermal agents, E-stim, Biofeedback, US, iontophoresis as indicated  [x]  Patient education on joint protection, postural re-education, activity modification, progression of HEP. Aquatic therapy    PLAN: See eval. PT 2x / week for 6 weeks. [] Continue per plan of care [] Alter current plan (see comments)  [x] Plan of care initiated [] Hold pending MD visit [] Discharge    Electronically signed by: Laurel Figueroa, PT , DPT, OMT-C    Note: If patient does not return for scheduled/ recommended follow up visits, this note will serve as a discharge from care along with most recent update on progress.

## 2022-10-03 ENCOUNTER — TELEPHONE (OUTPATIENT)
Dept: FAMILY MEDICINE CLINIC | Age: 87
End: 2022-10-03

## 2022-10-03 ENCOUNTER — CARE COORDINATION (OUTPATIENT)
Dept: CARE COORDINATION | Age: 87
End: 2022-10-03

## 2022-10-03 NOTE — CARE COORDINATION
Attempted to contact Lelo Hartman regarding Dietitian referral. Clif Og to leave voicemail at home phone number due to, \"number is not accepting your call. \" Attempted, then, to call patient's cell phone, but was unable to leave voicemail as voicemail box was not yet set up. Will follow up as appropriate.          Stefani Marte, [address] [place] [address],   [phone]

## 2022-10-03 NOTE — TELEPHONE ENCOUNTER
Patient wants to know if she can stop taking the medication LASIX 20 MG TABLET - IT MAKES HER FEEL LIKE SHE HAS THE FLU. She also wants to know if she can stop taking also the medication POTASSIUM. Please give her a call back.      Devorah Benavidez [address] phone no. [phone]

## 2022-10-10 ENCOUNTER — CARE COORDINATION (OUTPATIENT)
Dept: CARE COORDINATION | Age: 87
End: 2022-10-10

## 2022-10-10 RX ORDER — ATORVASTATIN CALCIUM 80 MG/1
TABLET, FILM COATED ORAL
Qty: 90 TABLET | Refills: 0 | Status: SHIPPED | OUTPATIENT
Start: 2022-10-10

## 2022-10-10 NOTE — CARE COORDINATION
Attempted to contact Chucky Maddox regarding Dietitian referral. Ellis Ivy to leave voicemail at home phone number due to, \"number is not accepting your call. \" Attempted, then, to call patient's cell phone, but was unable to leave voicemail as voicemail box was not yet set up. Will follow up  if/when patient returns call.     Electronically signed by Abdiel Gillette RD on 10/10/2022 at 10:32 AM

## 2022-10-12 ENCOUNTER — TELEPHONE (OUTPATIENT)
Dept: FAMILY MEDICINE CLINIC | Age: 87
End: 2022-10-12

## 2022-10-12 RX ORDER — ACETAMINOPHEN 160 MG
2000 TABLET,DISINTEGRATING ORAL DAILY
Qty: 90 CAPSULE | Refills: 3 | Status: SHIPPED | OUTPATIENT
Start: 2022-10-12 | End: 2022-11-03 | Stop reason: SDUPTHER

## 2022-10-13 ENCOUNTER — TELEPHONE (OUTPATIENT)
Dept: FAMILY MEDICINE CLINIC | Age: 87
End: 2022-10-13

## 2022-10-14 ENCOUNTER — TELEPHONE (OUTPATIENT)
Dept: FAMILY MEDICINE CLINIC | Age: 87
End: 2022-10-14

## 2022-10-14 NOTE — TELEPHONE ENCOUNTER
Cholecalciferol (VITAMIN D3) 50 MCG (2000 UT) CAPS 90 capsule 3 10/12/2022     Sig - Route:  Take 1 capsule by mouth daily - Oral    Sent to pharmacy as: Vitamin D3 50 MCG (2000 UT) Oral Capsule    E-Prescribing Status: Receipt confirmed by pharmacy (10/12/2022  6:23 PM EDT)

## 2022-10-14 NOTE — TELEPHONE ENCOUNTER
[place] is calling back about pt Vitamin B12 tablet.     There is not any frequency on the rx it does have #90 on the RX

## 2022-10-21 ENCOUNTER — OFFICE VISIT (OUTPATIENT)
Dept: ORTHOPEDIC SURGERY | Age: 87
End: 2022-10-21
Payer: MEDICARE

## 2022-10-21 VITALS — WEIGHT: 177 LBS | HEIGHT: 62 IN | BODY MASS INDEX: 32.57 KG/M2

## 2022-10-21 DIAGNOSIS — M25.561 CHRONIC PAIN OF RIGHT KNEE: Primary | ICD-10-CM

## 2022-10-21 DIAGNOSIS — G89.29 CHRONIC PAIN OF RIGHT KNEE: Primary | ICD-10-CM

## 2022-10-21 DIAGNOSIS — M47.816 LUMBAR SPONDYLOSIS: ICD-10-CM

## 2022-10-21 DIAGNOSIS — M17.11 ARTHRITIS OF RIGHT KNEE: ICD-10-CM

## 2022-10-21 PROCEDURE — 99214 OFFICE O/P EST MOD 30 MIN: CPT | Performed by: ORTHOPAEDIC SURGERY

## 2022-10-21 PROCEDURE — 1123F ACP DISCUSS/DSCN MKR DOCD: CPT | Performed by: ORTHOPAEDIC SURGERY

## 2022-10-21 PROCEDURE — 20610 DRAIN/INJ JOINT/BURSA W/O US: CPT | Performed by: ORTHOPAEDIC SURGERY

## 2022-10-21 RX ORDER — BUPIVACAINE HYDROCHLORIDE 2.5 MG/ML
1 INJECTION, SOLUTION INFILTRATION; PERINEURAL ONCE
Status: COMPLETED | OUTPATIENT
Start: 2022-10-21 | End: 2022-10-21

## 2022-10-21 RX ORDER — TRIAMCINOLONE ACETONIDE 40 MG/ML
40 INJECTION, SUSPENSION INTRA-ARTICULAR; INTRAMUSCULAR ONCE
Status: COMPLETED | OUTPATIENT
Start: 2022-10-21 | End: 2022-10-21

## 2022-10-21 RX ORDER — LIDOCAINE HYDROCHLORIDE 10 MG/ML
1 INJECTION, SOLUTION EPIDURAL; INFILTRATION; INTRACAUDAL; PERINEURAL ONCE
Status: COMPLETED | OUTPATIENT
Start: 2022-10-21 | End: 2022-10-21

## 2022-10-21 RX ADMIN — BUPIVACAINE HYDROCHLORIDE 2.5 MG: 2.5 INJECTION, SOLUTION INFILTRATION; PERINEURAL at 15:05

## 2022-10-21 RX ADMIN — LIDOCAINE HYDROCHLORIDE 1 ML: 10 INJECTION, SOLUTION EPIDURAL; INFILTRATION; INTRACAUDAL; PERINEURAL at 15:06

## 2022-10-21 RX ADMIN — TRIAMCINOLONE ACETONIDE 40 MG: 40 INJECTION, SUSPENSION INTRA-ARTICULAR; INTRAMUSCULAR at 15:06

## 2022-10-21 NOTE — PROGRESS NOTES
ORTHOPAEDIC CONSULTATION NOTE    Chief Complaint   Patient presents with    Knee Pain     Right Knee       Knee Pain     10/21/22  Atilio Ramirez presents to clinic today for new problem: Right knee pain   She reports the right knee has been painful for many years  She has history of meniscectomy approximately 12 years ago  The pain is described as mainly anteriorly but diffuse  Throbbing in nature  She also reports swelling/effusion  She has baseline bilateral peripheral edema  She has been using Tylenol as well as Voltaren  I saw her in May of this year and she was referred for IR left hip intra-articular steroid injection  She reports no significant relief with the left hip injection  She reports persistent low back pain and bilateral hip pain      5/9/22  82 y.o. female seen in consultation at the request of Coleen Taylor MD for evaluation of left hip pain:  Onset 2 years ago  Injury/trauma none  History of symptoms as above  Pain is located left lateral hip  Also has LBP, hx of sciatica  Worse with pain/sleeping on left side, hip range of motion, deep flexion, activity  Better with rest  She reports Dr. Edith Rosas previously gave her left lateral hip steroid injection which helped somewhat  Back pain = yes  Radicular symptoms = intermittent  Numbness and/or tingling = intermittent      Review of Systems  ROS from the Patient History Form dated on 5/9/22  Pertinent positives include weight change, skin condition, headaches, hearing impairment, chest pain and shortness of breath, hypertension, peripheral edema, reflux, urinary frequency, kidney stone, back pain, chronic pain  Rest of 13 point ROS otherwise negative except per HPI, and scanned into the patient's chart under the Media tab.       Allergies   Allergen Reactions    Latex Itching    Mucinex [Guaifenesin Er] Other (See Comments)     Coughs more, dry cough, feels like she has bronchitis    Tetanus Toxoids      HAS REACTION- TOLD TO TAKE ALTERNATIVE    Redness and swelling to site    Bactrim [Sulfamethoxazole-Trimethoprim] Nausea And Vomiting and Other (See Comments)     stomach pain, weakness, cant urinate, affected her taste, dizziness. Morphine Nausea And Vomiting    Pneumococcal Vaccines Rash        Current Outpatient Medications   Medication Sig Dispense Refill    diclofenac sodium (VOLTAREN) 1 % GEL Apply 2 g topically 2 times daily      Cyanocobalamin 1000 MCG SUBL Place 1,000 mcg under the tongue daily Place under the tongue 90 tablet 3    Cholecalciferol (VITAMIN D3) 50 MCG (2000 UT) CAPS Take 1 capsule by mouth daily 90 capsule 3    atorvastatin (LIPITOR) 80 MG tablet TAKE 1 TABLET BY MOUTH  DAILY 90 tablet 0    traZODone (DESYREL) 50 MG tablet Take 0.5-1 tablets by mouth nightly 30 tablet 2    furosemide (LASIX) 20 MG tablet TAKE 1 TABLET BY MOUTH DAILY AS NEEDED FOR SEVERE SWELLING 90 tablet 0    potassium chloride (KLOR-CON) 10 MEQ extended release tablet TAKE 1 TABLET BY MOUTH DAILY WITH LASIX/FUROSEMIDE 90 tablet 0    metoprolol succinate (TOPROL XL) 50 MG extended release tablet TAKE 1 TABLET BY MOUTH  DAILY 90 tablet 0    pantoprazole (PROTONIX) 40 MG tablet       tiZANidine (ZANAFLEX) 2 MG tablet Take 1-2 tablets by mouth every 8 hours as needed (Muscle spasm) 30 tablet 0    triamterene-hydroCHLOROthiazide (MAXZIDE-25) 37.5-25 MG per tablet TAKE 1 TABLET BY MOUTH  DAILY 90 tablet 3    nitroGLYCERIN (NITROSTAT) 0.4 MG SL tablet Place 1 tablet under the tongue every 5 minutes as needed for Chest pain up to max of 3 total doses.  If no relief after 1 dose, call 911. 25 tablet 1    acetaminophen (TYLENOL) 500 MG tablet Take 500 mg by mouth every 6 hours as needed for Pain       aspirin 81 MG chewable tablet Take 1 tablet by mouth daily 30 tablet 3     Current Facility-Administered Medications   Medication Dose Route Frequency Provider Last Rate Last Admin    bupivacaine (MARCAINE) 0.25 % injection 2.5 mg  1 mL Intra-artICUlar Once Caleb Acosta MD lidocaine PF 1 % injection 1 mL  1 mL Intra-artICUlar Once Kim Ferreira MD        triamcinolone acetonide (KENALOG-40) injection 40 mg  40 mg IntraMUSCular Once Kim Ferreira MD           Past Medical History:   Diagnosis Date    Arthritis     Atrial fibrillation ([facility] [address] 6/19/2018    CAD in native artery 6/1/2018    Calculus of ureter 9/1/2021    Cystitis, interstitial     Gastroesophageal reflux disease 3/15/2018    GERD (gastroesophageal reflux disease)     GI bleeding     was a frequent aspirin user at that time    Heart burn     History of colonic polyps 7/2/2015    Hypertension     Interstitial cystitis     Mixed hyperlipidemia 6/19/2018    NSTEMI (non-ST elevated myocardial infarction) ([facility] [address] 5/29/2018    Obesity 12/12/2012    Overview:  Replaced inactive diagnosis via diagnosis import    Patient is Restorationist     no blood products    Psoriasis     Rotator cuff tear, right 3/25/2013    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 Lance Garrido - w/placement of IABP    CARDIAC CATHETERIZATION  2012    COLONOSCOPY  07/31/2015    Dr. Cecil Duval - ryan/polypectomy    CORONARY ARTERY BYPASS GRAFT  05/29/2018    Dr. Vazquez Cielo - off pump x2 (LIMA-LAD, L SVG-D2)    CYSTOSCOPY Left 9/1/2021    CYSTOSCOPY WITH LEFT URETEROSCOPY, STONE MANIPULATION ,STONE BASKET REMOVAL performed by Bertrand Gonzalez MD at [facility] [place] ARTHROSCOPY Right 02/2015    MOHS SURGERY Left     facial for skin CA    PARTIAL HYSTERECTOMY (CERVIX NOT REMOVED)      ROTATOR CUFF REPAIR Right 2014    TRANSESOPHAGEAL ECHOCARDIOGRAM  05/29/2018    during CABG       Family History   Problem Relation Age of Onset    Breast Cancer Mother         LUNG    Lung Cancer Father         LUNG    COPD Father     Tuberculosis Father     Lung Cancer Sister          of chemo    Lung Cancer Brother     Tuberculosis Brother        Social History     Socioeconomic History    Marital status:      Spouse name: Not on file    Number of children: Not on file    Years of education: Not on file    Highest education level: Not on file   Occupational History    Occupation: Retired department    Tobacco Use    Smoking status: Former     Packs/day: 0.25     Years: 6.00     Pack years: 1.50     Types: Cigarettes     Start date: 3/5/1955     Quit date: 5/3/1960     Years since quittin.5    Smokeless tobacco: Never    Tobacco comments:     AS A TEENAGER FOR ABOUT 6 YEARS    Vaping Use    Vaping Use: Never used   Substance and Sexual Activity    Alcohol use: No    Drug use: No    Sexual activity: Not on file   Other Topics Concern    Not on file   Social History Narrative    Uses walker. No exercise. 23. Social Determinants of Health     Financial Resource Strain: Low Risk     Difficulty of Paying Living Expenses: Not hard at all   Food Insecurity: No Food Insecurity    Worried About [address] in the Last Year: Never true    [address] in the Last Year: Never true   Transportation Needs: No Transportation Needs    Lack of Transportation (Medical): No    Lack of Transportation (Non-Medical): No   Physical Activity: Insufficiently Active    Days of Exercise per Week: 7 days    Minutes of Exercise per Session: 20 min   Stress: Stress Concern Present    Feeling of Stress : To some extent   Social Connections: Moderately Integrated    Frequency of Communication with Friends and Family: More than three times a week    Frequency of Social Gatherings with Friends and Family: Twice a week    Attends Evangelical Services: 1 to 4 times per year    Active Member of [employer] or Organizations: Yes    Attends Club or Organization Meetings: 1 to 4 times per year    Marital Status:     Intimate Partner Violence: Not At Risk    Fear of Current or Ex-Partner: No    Emotionally Abused: No    Physically Abused: No    Sexually Abused: No   Housing Stability: Low Risk     Unable to Pay for Housing in the Last Year: No    Number of Places Lived in the Last Year: 1    Unstable Housing in the Last Year: No        Vitals:    10/21/22 1002   Weight: 177 lb (80.3 kg)   Height: 5' 2\" (1.575 m)       Physical Exam  Constitutional - well-groomed, well-nourished, Body mass index is 32.37 kg/m². Psychiatric - pleasant, normal mood & affect  Cardiovascular - RRR, positive BLE peripheral edema, posterior tibialis pulse 2+  Respiratory - respirations unlabored, on room air  Skin - no rashes, wounds seen on exposed skin  Neurological - BLE SILT SP/DP/T/sural/saphenous nerve distributions; EHL/FHL/TA/GS intact  Right knee:   neutral alignment    effusion noted   Moderate tenderness to palpation medial joint line   Moderate tenderness to palpation lateral joint line   Range of Motion:    Extension:  10    Flexion:  90   Special tests:    crepitus with ROM    Pain with patellar grind test   Ligamentous testing:    Varus stress stable    Valgus stress stable    Anterior and Posterior Drawer stable        Imaging:  Images were personally reviewed by myself and discussed with the patient  Right knee 4 views performed 10/21/22 -   Overall alignment is neutral.    The medial compartment articular height is severely narrowed with small osteophytes seen. The lateral compartment articular height is severely narrowed with small osteophytes seen. The anterior compartment articular height is mildly to moderately narrowed with small osteophytes seen. There are no loose bodies appreciated. Bones appear demineralized. AP pelvis and Left hip 2 views performed 5/9/22 - moderate bilateral hip degeneration/arthritis seen. There is spurring laterally, left greater than right. Bones appear demineralized. No obvious fractures or dislocation. Pelvic ring is intact.   Lumbar spondylosis is partially visualized. There is also evidence of sacroiliac joint arthritis as well. Assessment & Plan:  82 y.o. female who presents with    Diagnosis Orders   1. Chronic pain of right knee  XR KNEE RIGHT (MIN 4 VIEWS)    20610 - [place] DRAIN/INJECT LARGE JOINT/BURSA      2. Arthritis of right knee        3. Lumbar spondylosis  AFL - Cari Marc MD, Neurosurgery (Spine), [facility] - [facility]              Procedures    43879 - [place] DRAIN/INJECT LARGE JOINT/BURSA       Conservative treatment of knee symptoms/arthritis, according to AAOS Clinical Practice Guidelines:  1)  Recommend oral or topical NSAIDs to reduce pain and swelling. 2)  Recommend acetaminophen to reduce pain. 3)  Oral narcotics, including tramadol, result in a significant increase of adverse events and are not effective at improving pain or function for treatment of osteoarthritis of the knee. 4)  Recommend maintaining weight in a healthy range to reduce stresses on the knee, particularly the patellofemoral compartment which sees up to 6x body weight. 5)  Home exercise program, focusing on strengthening, low impact aerobic exercises, and neuromuscular education. 6)  Intra-articular corticosteroid injections are an option. Corticosteroid injections can be performed every 4 months as needed. 7)  Evidence for intra-articular hyaluronic acid injections (viscosupplementation) is not as strong, but may benefit a subset of patients who have failed other options. Viscosupplementation can be performed every 6 months as needed. 8)  Bracing and cane use can improve pain and function. Although not specifically listed in the CPGs, ice therapy and topical patches such as Salonpas are good options as well. Intra-articular knee injection:  Risks and benefits of a steroid injection were discussed with the patient, including the possibility of adverse local site reactions such as dermal atrophy and skin discoloration.   Although rare, an infection is possible and may necessitate surgical treatment if it occurs in a joint or develops into an abscess. Finally, a rise in blood sugar levels is anticipated, particularly in diabetics. Diabetic patients were instructed to monitor their blood glucose levels after the injection and to adjust their insulin regimen as appropriate. The patient elected to proceed, and after verbal consent was obtained and drug allergies were reviewed, the injection site was prepped with alcohol and ChloraPrep. 40mg of Kenalog mixed with lidocaine and marcaine (no epinephrine) was injected into the right knee. There were no immediate complications after the procedure. The patient was advised to ice the area intermittently over the next 24-48 hours until the corticosteroid becomes effective.     Definitive treatment would be right total knee arthroplasty, and is a reasonable option whenever she is ready for it      No significant relief with previous left hip intra-articular steroid injection  Persistent low back pain and bilateral hip pain  Prior radiographs do show advanced lumbar spondylosis/arthritis  Therefore, spine referral given today    Andre Wagoner MD

## 2022-11-03 ENCOUNTER — TELEPHONE (OUTPATIENT)
Dept: FAMILY MEDICINE CLINIC | Age: 87
End: 2022-11-03

## 2022-11-03 RX ORDER — ACETAMINOPHEN 160 MG
2000 TABLET,DISINTEGRATING ORAL DAILY
Qty: 90 CAPSULE | Refills: 1 | Status: SHIPPED | OUTPATIENT
Start: 2022-11-03

## 2022-11-07 ENCOUNTER — APPOINTMENT (OUTPATIENT)
Dept: CT IMAGING | Age: 87
End: 2022-11-07
Payer: MEDICARE

## 2022-11-07 ENCOUNTER — HOSPITAL ENCOUNTER (EMERGENCY)
Age: 87
Discharge: HOME | End: 2022-11-07
Payer: MEDICARE

## 2022-11-07 ENCOUNTER — TELEMEDICINE (OUTPATIENT)
Dept: FAMILY MEDICINE CLINIC | Age: 87
End: 2022-11-07
Payer: MEDICARE

## 2022-11-07 VITALS
HEIGHT: 62 IN | RESPIRATION RATE: 16 BRPM | HEART RATE: 66 BPM | SYSTOLIC BLOOD PRESSURE: 168 MMHG | OXYGEN SATURATION: 96 % | DIASTOLIC BLOOD PRESSURE: 77 MMHG | BODY MASS INDEX: 31.27 KG/M2 | WEIGHT: 169.9 LBS | TEMPERATURE: 97.6 F

## 2022-11-07 DIAGNOSIS — J06.9 BACTERIAL URI: ICD-10-CM

## 2022-11-07 DIAGNOSIS — S09.90XA INJURY OF HEAD, INITIAL ENCOUNTER: Primary | ICD-10-CM

## 2022-11-07 DIAGNOSIS — S09.90XA CLOSED HEAD INJURY, INITIAL ENCOUNTER: Primary | ICD-10-CM
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
## 2022-11-07 DIAGNOSIS — B96.89 BACTERIAL URI: ICD-10-CM

## 2022-11-07 DIAGNOSIS — S00.83XA CONTUSION OF FACE, INITIAL ENCOUNTER: ICD-10-CM

## 2022-11-07 PROCEDURE — 99284 EMERGENCY DEPT VISIT MOD MDM: CPT | Performed by: PHYSICIAN ASSISTANT

## 2022-11-07 PROCEDURE — 1123F ACP DISCUSS/DSCN MKR DOCD: CPT

## 2022-11-07 PROCEDURE — 70486 CT MAXILLOFACIAL W/O DYE: CPT

## 2022-11-07 PROCEDURE — 72125 CT NECK SPINE W/O DYE: CPT

## 2022-11-07 PROCEDURE — 70450 CT HEAD/BRAIN W/O DYE: CPT

## 2022-11-07 PROCEDURE — 99213 OFFICE O/P EST LOW 20 MIN: CPT

## 2022-11-07 RX ORDER — ALBUTEROL SULFATE 90 UG/1
1-2 AEROSOL, METERED RESPIRATORY (INHALATION)
COMMUNITY
Start: 2022-11-06

## 2022-11-07 RX ORDER — OMEPRAZOLE 40 MG/1
1 CAPSULE, DELAYED RELEASE ORAL DAILY
COMMUNITY
Start: 2022-10-27

## 2022-11-07 RX ORDER — AMOXICILLIN AND CLAVULANATE POTASSIUM 875; 125 MG/1; MG/1
1 TABLET, FILM COATED ORAL
COMMUNITY
Start: 2022-11-06

## 2022-11-07 ASSESSMENT — ENCOUNTER SYMPTOMS
SINUS PRESSURE: 0
ABDOMINAL PAIN: 0
SORE THROAT: 0
BACK PAIN: 0
EYE REDNESS: 0
RHINORRHEA: 1
PHOTOPHOBIA: 0
EYE PAIN: 0
EYE DISCHARGE: 0
DIARRHEA: 0
EYE ITCHING: 0
SINUS PAIN: 0
TROUBLE SWALLOWING: 0
VOMITING: 0
ABDOMINAL PAIN: 0
EYE PAIN: 1
ABDOMINAL DISTENTION: 0
CHEST TIGHTNESS: 0
SHORTNESS OF BREATH: 1
NAUSEA: 0
TROUBLE SWALLOWING: 0
EYE REDNESS: 0
SORE THROAT: 0
COUGH: 1
FACIAL SWELLING: 1
VOMITING: 0
COLOR CHANGE: 0
NAUSEA: 0
EYE DISCHARGE: 0
VOICE CHANGE: 0
COLOR CHANGE: 1
WHEEZING: 0
SHORTNESS OF BREATH: 0

## 2022-11-07 ASSESSMENT — PAIN - FUNCTIONAL ASSESSMENT: PAIN_FUNCTIONAL_ASSESSMENT: 0-10

## 2022-11-07 ASSESSMENT — VISUAL ACUITY: OU: 1

## 2022-11-07 NOTE — PROGRESS NOTES
Ruben Zhang (:  1935) is a Established patient, here for evaluation of the following:    Assessment & Plan   Below is the assessment and plan developed based on review of pertinent history, physical exam, labs, studies, and medications. 1. Injury of head, initial encounter  -Given acute head injury with current anticoagulant therapy and unknown source of clear nasal drainage, I am referring the patient to the ER immediately. Concern for possible head bleed or skull fracture, as she is 87 and on blood thinners with current dizziness. Patient verbalized understanding/agreement, states her daughter can take her to the ER. Informed the patient that if this changes, she needs to call 911. She verbalized understanding. 2. Bacterial URI  -Given improvement of symptoms followed by worsening of symptoms, I am suspicious of bacterial URI. -She is currently being treated with Augmentin and an albuterol inhaler. Continue with this treatment plan. -She did sound dyspneic on the phone call, with a cough. I believe the emerged part may be able to assist with this as well, to determine if respiratory failure or distress is present as my assessment was limited due to phone call only visit. -Referring patient to ER. She revises understanding, states her daughter can drive her. Informed patient if this changes, she needs to call 911. She verbalized understanding. Return for Go to Emergency Department. Subjective   Cough  Associated symptoms include postnasal drip, rhinorrhea and shortness of breath. Pertinent negatives include no chest pain, chills, ear pain, eye redness, fever, headaches, myalgias, sore throat or wheezing. Fall  Pertinent negatives include no abdominal pain, fever, headaches, nausea, numbness or vomiting. Holly Harvey presents via phone call for a respiratory infection and head injury. She was unable to connect to the virtual visit.   The respiratory infection began 1 week ago with cold symptoms. Friday felt improved, but then got worse after. Cough with yellow mucus, congestion, nasal drainage, no fevers since Thursday. Friday had a shower and felt better, so she took out the trash. On her way back in, she tripped over the lip inside garage and fell onto right side/face. Shoulder and hand somewhat sore as well. Did not call 911 but rather scooted self inside and rested in her chair. Does not know if LOC, believes it was short if so. Right face is bruised, cut above eye, swollen around the eye, eye is okay. Stopped bleeding from laceration over her eye with washcloth. She felt bad Saturday from both the fall and the respiratory infection. Sunday called Nigel with [address] and was sent antibiotic and inhaler. Nigel also recommended that she go to the ER, but she declined yesterday. Today she denies any headache. She is still coughing up mucous, yellow-green. Nasal drainage is clear. States that has had flu shot. She has trouble spitting out mucus production. She is taking baby aspirin. She states that she feels very unbalanced, uses her walker in house but uses her cane outside for mailbox or trash. She states her daughter is not working today and was available to help her/take her anywhere if needed. Review of Systems   Constitutional:  Negative for chills and fever. HENT:  Positive for congestion, postnasal drip and rhinorrhea. Negative for ear discharge, ear pain, hearing loss, sinus pressure, sinus pain, sneezing, sore throat and trouble swallowing. Eyes:  Positive for pain (bruising and swelling around eye). Negative for discharge and redness. Respiratory:  Positive for cough and shortness of breath. Negative for chest tightness and wheezing. Cardiovascular:  Negative for chest pain and palpitations. Gastrointestinal:  Negative for abdominal distention, abdominal pain, diarrhea, nausea and vomiting.    Musculoskeletal:  Positive for arthralgias (right: shoulder, elbow, hand, face). Negative for myalgias. Skin:  Positive for color change (Bruising over right eye) and wound (Laceration above right eye). Neurological:  Positive for dizziness. Negative for syncope, facial asymmetry, speech difficulty, weakness, light-headedness, numbness and headaches. Psychiatric/Behavioral:  Negative for confusion, dysphoric mood and sleep disturbance. The patient is not nervous/anxious.          Objective   Patient-Reported Vitals  No data recorded     Physical Exam: Limited due to phone call  Yang Faustinacynthia:  \"[x]\" Indicates a positive item  \"[]\" Indicates a negative item  -- DELETE ALL ITEMS NOT EXAMINED]    Constitutional: [] Appears well-developed and well-nourished [x] No apparent distress      [] Abnormal -     Mental status: [x] Alert and awake  [x] Oriented to person/place/time [x] Able to follow commands    [] Abnormal -     Eyes:   EOM    []  Normal    [] Abnormal -   Sclera  []  Normal    [] Abnormal -          Discharge []  None visible   [] Abnormal -     HENT: [] Normocephalic, atraumatic  [] Abnormal -   [] Mouth/Throat: Mucous membranes are moist    External Ears [] Normal  [] Abnormal -    Neck: [] No visualized mass [] Abnormal -     Pulmonary/Chest: [] Respiratory effort normal   [] No visualized signs of difficulty breathing or respiratory distress        [x] Abnormal -cough, dyspnea     Musculoskeletal:   [] Normal gait with no signs of ataxia         [] Normal range of motion of neck        [] Abnormal -     Neurological:        [] No Facial Asymmetry (Cranial nerve 7 motor function) (limited exam due to video visit)          [] No gaze palsy        [] Abnormal -          Skin:        [] No significant exanthematous lesions or discoloration noted on facial skin         [] Abnormal -            Psychiatric:       [x] Normal Affect [] Abnormal -        [x] No Hallucinations    Other pertinent observable physical exam findings:-     Cherylin Spurling Terra Kumar is a 82 y.o. female evaluated via telephone on 11/7/2022 for Cough (PT C/O COUGH W/YELLOW/GREEN PHLEGM, PND, NASAL DR CLEAR, RANI R>L EAR PAIN/PRESSURE, SOB/WHEEZING AND NO TEMP X11 DAYS.  7 DAYS AGO HAD A TEMP 101.7 BUT THAT HAS IMPROVED. COVID TEST NEGATIVE LAST WEEK. PT HAS TRIED AMOXICILLIN AND INHALER. SHE TALKED TO [facility] YESTERDAY ABOUT HER SYMPTOMS AND THEY PRESCRIBED ABX AND INHALER.) and Fall ([address] ON Friday COMING IN THE HOUSE AND HIT HER FACE. NO ONE WAS HOME AND SHE DIDN'T GET CHECKED OUT.)  . Documentation:  I communicated with the patient and/or health care decision maker about see above. Details of this discussion including any medical advice provided: See above    Total Time: minutes: 11-20 minutes    Ivanna Skaggs was evaluated through a synchronous (real-time) audio encounter. Patient identification was verified at the start of the visit. She (or guardian if applicable) is aware that this is a billable service, which includes applicable co-pays. This visit was conducted with the patient's (and/or legal guardian's) verbal consent. She has not had a related appointment within my department in the past 7 days or scheduled within the next 24 hours. The patient was located at Home: [place] [id number]. The provider was located at [facility] (Appt Dept): [facility],  [address] Note: not billable if this call serves to triage the patient into an appointment for the relevant concern    Clinton Maria, APRN - CNP       Please note that this chart was generated using dragon dictation software. Although every effort was made to ensure the accuracy of this automated transcription, some errors in transcription may have occurred.

## 2022-11-07 NOTE — ED PROVIDER NOTES
[address] [facility]        Pt Name: Hasmukh Larson  MRN: [id number]  [place] 1935  Date of evaluation: 11/7/2022  Provider: Patricia Brown PA-C  PCP: Kailey Mercado MD  Note Started: 1:45 PM EST       MARIBEL. I have evaluated this patient. My supervising physician was available for consultation. CHIEF COMPLAINT       Chief Complaint   Patient presents with    Fall     Pt w c/o fall. Pt stated \"I tripped on something and fell on my face\" on Friday. Pt with bruise on R side of face. Pt denied LOC       HISTORY OF PRESENT ILLNESS   (Location, Timing/Onset, Context/Setting, Quality, Duration, Modifying Factors, Severity, Associated Signs and Symptoms)  Note limiting factors. Chief Complaint: Facial bruise status post blunt trauma from mechanical fall    Hasmukh Larson is a 82 y.o. female who presents to the emergency department because she is experiencing bruising on the right side of her face after tripping and falling on Friday. She fell forward hitting her face on the ground. She was wearing her glasses at the time and states that her glasses broke. Denies any acute vision changes, neck pain or stiffness, back pain, chest pain, dizziness lightheadedness. Her PCP advised that she come get CT scans to make sure she does not have a skull fracture or bleed. She takes baby aspirin daily. Nursing Notes were all reviewed and agreed with or any disagreements were addressed in the HPI. REVIEW OF SYSTEMS    (2-9 systems for level 4, 10 or more for level 5)     Review of Systems   Constitutional:  Negative for chills and fever. HENT:  Positive for facial swelling. Negative for congestion, dental problem, drooling, ear discharge, ear pain, sore throat, tinnitus, trouble swallowing and voice change. Eyes:  Negative for photophobia, pain, discharge, redness, itching and visual disturbance.    Respiratory:  Negative for shortness of breath. Cardiovascular:  Negative for chest pain. Gastrointestinal:  Negative for abdominal pain, nausea and vomiting. Genitourinary: Negative. Musculoskeletal:  Negative for back pain, neck pain and neck stiffness. Skin:  Negative for color change, pallor, rash and wound. Neurological:  Negative for dizziness, tremors, seizures, syncope, facial asymmetry, speech difficulty, weakness, light-headedness, numbness and headaches. Psychiatric/Behavioral:  Negative for confusion. All other systems reviewed and are negative. Positives and Pertinent negatives as per HPI. Except as noted above in the ROS, all other systems were reviewed and negative.        PAST MEDICAL HISTORY     Past Medical History:   Diagnosis Date    Arthritis     Atrial fibrillation ([facility] [address] 6/19/2018    CAD in native artery 6/1/2018    Calculus of ureter 9/1/2021    Cystitis, interstitial     Gastroesophageal reflux disease 3/15/2018    GERD (gastroesophageal reflux disease)     GI bleeding     was a frequent aspirin user at that time    Heart burn     History of colonic polyps 7/2/2015    Hypertension     Interstitial cystitis     Mixed hyperlipidemia 6/19/2018    NSTEMI (non-ST elevated myocardial infarction) ([facility] [address] 5/29/2018    Obesity 12/12/2012    Overview:  Replaced inactive diagnosis via diagnosis import    Patient is Jehovah's witness     no blood products    Psoriasis     Rotator cuff tear, right 3/25/2013    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 Cherri Sunshine - w/placement of IABP    CARDIAC CATHETERIZATION  2012    COLONOSCOPY  07/31/2015    Dr. Edwin Tang - w/polypectomy    CORONARY ARTERY BYPASS GRAFT  05/29/2018    Dr. John Ellis - off pump x2 (LIMA-LAD, L SVG-D2)    CYSTOSCOPY Left 9/1/2021 CYSTOSCOPY WITH LEFT URETEROSCOPY, STONE MANIPULATION ,STONE BASKET REMOVAL performed by Ant Garcia MD at [facility] ARTHROSCOPY Right 02/2015    MOHS SURGERY Left     facial for skin CA    PARTIAL HYSTERECTOMY (CERVIX NOT REMOVED)      ROTATOR CUFF REPAIR Right 2014    TRANSESOPHAGEAL ECHOCARDIOGRAM  05/29/2018    during CABG         CURRENTMEDICATIONS       Previous Medications    ACETAMINOPHEN (TYLENOL) 500 MG TABLET    Take 500 mg by mouth every 6 hours as needed for Pain     ALBUTEROL SULFATE HFA (PROVENTIL;VENTOLIN;PROAIR) 108 (90 BASE) MCG/ACT INHALER    Inhale 1-2 puffs into the lungs every 4-6 hours as needed    AMOXICILLIN-CLAVULANATE (AUGMENTIN) 875-125 MG PER TABLET    Take 1 tablet by mouth Twice a Week    ASPIRIN 81 MG CHEWABLE TABLET    Take 1 tablet by mouth daily    ATORVASTATIN (LIPITOR) 80 MG TABLET    TAKE 1 TABLET BY MOUTH  DAILY    CHOLECALCIFEROL (VITAMIN D3) 50 MCG (2000 UT) CAPS    Take 1 capsule by mouth daily    CYANOCOBALAMIN 1000 MCG SUBL    Place 1,000 mcg under the tongue daily Place under the tongue    DICLOFENAC SODIUM (VOLTAREN) 1 % GEL    Apply 2 g topically 2 times daily    FUROSEMIDE (LASIX) 20 MG TABLET    TAKE 1 TABLET BY MOUTH DAILY AS NEEDED FOR SEVERE SWELLING    METOPROLOL SUCCINATE (TOPROL XL) 50 MG EXTENDED RELEASE TABLET    TAKE 1 TABLET BY MOUTH  DAILY    NITROGLYCERIN (NITROSTAT) 0.4 MG SL TABLET    Place 1 tablet under the tongue every 5 minutes as needed for Chest pain up to max of 3 total doses. If no relief after 1 dose, call 911.     OMEPRAZOLE (PRILOSEC) 40 MG DELAYED RELEASE CAPSULE    Take 1 capsule by mouth daily    PANTOPRAZOLE (PROTONIX) 40 MG TABLET    Take 40 mg by mouth daily    POTASSIUM CHLORIDE (KLOR-CON) 10 MEQ EXTENDED RELEASE TABLET    TAKE 1 TABLET BY MOUTH DAILY WITH LASIX/FUROSEMIDE    TIZANIDINE (ZANAFLEX) 2 MG TABLET    Take 1-2 tablets by mouth every 8 hours as needed (Muscle spasm)    TRAZODONE (DESYREL) 50 or septal hematoma. Left Nostril: No epistaxis or septal hematoma. Right Sinus: No maxillary sinus tenderness or frontal sinus tenderness. Left Sinus: No maxillary sinus tenderness or frontal sinus tenderness. Mouth/Throat:      Lips: Pink. No lesions. Mouth: Mucous membranes are moist. No oral lesions or angioedema. Dentition: No dental tenderness or dental abscesses. Tongue: No lesions. Tongue does not deviate from midline. Palate: No mass and lesions. Pharynx: Oropharynx is clear. Uvula midline. No pharyngeal swelling, oropharyngeal exudate, posterior oropharyngeal erythema or uvula swelling. Tonsils: No tonsillar exudate or tonsillar abscesses. 0 on the right. 0 on the left. Eyes:      General: Vision grossly intact. Gaze aligned appropriately. No scleral icterus. Right eye: No foreign body, discharge or hordeolum. Left eye: No foreign body, discharge or hordeolum. Extraocular Movements: Extraocular movements intact. Conjunctiva/sclera: Conjunctivae normal.      Pupils: Pupils are equal, round, and reactive to light. Funduscopic exam:     Right eye: No hemorrhage. Red reflex present. Left eye: No hemorrhage. Red reflex present. Cardiovascular:      Rate and Rhythm: Normal rate. Pulmonary:      Effort: Pulmonary effort is normal.      Breath sounds: Normal breath sounds. Abdominal:      General: Bowel sounds are normal.      Palpations: Abdomen is soft. Tenderness: There is no abdominal tenderness. Musculoskeletal:         General: Normal range of motion. Cervical back: Normal and normal range of motion. Thoracic back: Normal.      Lumbar back: Normal.   Skin:     General: Skin is warm and dry. Coloration: Skin is not jaundiced or pale. Findings: No erythema, lesion or rash. Neurological:      General: No focal deficit present.       Mental Status: She is alert and oriented to person, place, and time. Cranial Nerves: No cranial nerve deficit (II-XII intact). Psychiatric:         Behavior: Behavior normal.       DIAGNOSTIC RESULTS   LABS:    Labs Reviewed - No data to display    When ordered only abnormal lab results are displayed. All other labs were within normal range or not returned as of this dictation. EKG: When ordered, EKG's are interpreted by the Emergency Department Physician in the absence of a cardiologist.  Please see their note for interpretation of EKG. RADIOLOGY:   Non-plain film images such as CT, Ultrasound and MRI are read by the radiologist. Plain radiographic images are visualized and preliminarily interpreted by the ED Provider with the below findings:        Interpretation per the Radiologist below, if available at the time of this note:    CT CERVICAL SPINE WO CONTRAST   Final Result   No acute cervical spine fracture. Spinal degenerative changes as described. CT FACIAL BONES WO CONTRAST   Final Result   Subtle right cheek swelling. No acute bony abnormality detected. Mild mucosal thickening within the maxillary sinuses, along with moderate   ethmoid sinus opacification, which is new when compared to the previous exam.   Please correlate with clinical evidence of acute and/or chronic sinusitis. CT HEAD WO CONTRAST   Final Result   No acute intracranial abnormality. PROCEDURES   Unless otherwise noted below, none     Procedures    CRITICAL CARE TIME   N/a    CONSULTS:  None      EMERGENCY DEPARTMENT COURSE and DIFFERENTIAL DIAGNOSIS/MDM:   Vitals:    Vitals:    11/07/22 1342   BP: (!) 168/77   Pulse: 66   Resp: 16   Temp: 97.6 °F (36.4 °C)   TempSrc: Oral   SpO2: 96%   Weight: 169 lb 14.4 oz (77.1 kg)   Height: 5' 2\" (1.575 m)       Patient was given the following medications:  Medications - No data to display      Is this patient to be included in the SEP-1 Core Measure due to severe sepsis or septic shock?    No   Exclusion criteria - the patient is NOT to be included for SEP-1 Core Measure due to:  2+ SIRS criteria are not met    This patient presents to the emergency department with facial bruising status post blunt trauma from mechanical fall several days ago. CT scans of the face, head and cervical spine appear stable. Patient does have some incidental sinus congestion on CT. However, is already on Augmentin to treat a respiratory infection. Patient is advised to follow-up with PCP for recheck and may return to ED per discharge instructions. Continue cryotherapy to this area. Denies any acute vision changes at this time. My suspicion is low for acute conjunctivitis, blepharitis, dacryocystitis, acute angle-closure glaucoma, corneal abrasion, ulcer, infiltrate, dendritic, rust ring, foreign body, MS, optic neuritis, periorbital or orbital cellulitis, stye, herpes ophthalmicus, Bella Sagastume syndrome, retinal detachment, acute ophthalmic arterial or venous compromise, syphilis, covid19, central venous occlusion, kawasaki disease , acute spine fracture or dislocation, epidural abscess or hematoma, discitis, meningitis, encephalitis, transverse myelitis, cauda quina,  pyelonephritis, perinephric abscess, urosepsis, kidney stone, AAA, dissection, shingles,  carotid dissection, sinus abscess, acute fracture, acute CVA, ICH, SAH, TIA, meningitis, encephalitis, pseudotumor cerebri, temporal arteritis, sentinel bleed from ruptured aneurysm, hypertensive urgency or emergency, subdural hematoma, epidural hematoma, cerebellar compromise, posterior stroke, Chiari malformation, hydrocephalus, skull or facial fracture, postconcussive syndrome, or other concerning pathology      FINAL IMPRESSION      1. Closed head injury, initial encounter    2. Contusion of face, initial encounter    3.  Fall from slip, trip, or stumble, initial encounter          DISPOSITION/PLAN   DISPOSITION Decision To Discharge 11/07/2022 02:41:02 PM      [facility]:  [facility] [address]  [phone]    In 3 days      [facility] Emergency Department  [address] [facility]  [phone]    If symptoms worsen    DISCHARGE MEDICATIONS:  New Prescriptions    No medications on file       DISCONTINUED MEDICATIONS:  Discontinued Medications    No medications on file              (Please note that portions of this note were completed with a voice recognition program.  Efforts were made to edit the dictations but occasionally words are mis-transcribed.)    Norma Larsen PA-C (electronically signed)            Norma Larsen PA-C  11/07/22 0246

## 2022-11-08 ENCOUNTER — CARE COORDINATION (OUTPATIENT)
Dept: CARE COORDINATION | Age: 87
End: 2022-11-08

## 2022-11-08 NOTE — CARE COORDINATION
Ambulatory Care Coordination  ED Follow up Call    Reason for ED visit:  Closed head injury  Status:     improved    Did you call your PCP prior to going to the ED? Yes      Did you receive a discharge instructions from the Emergency Room? Yes  Review of Instructions:     Understands what to report/when to return?:  Yes   Understands discharge instructions?:  Yes   Following discharge instructions?:  Yes   If not why? N/A    Are there any new complaints of pain? No  New Pain Meds? No    Constipation prophylaxis needed? No    If you have a wound is the dressing clean, dry, and intact? N/A  Understands wound care regimen? N/A    Are there any other complaints/concerns that you wish to tell your provider? No    FU appts/Provider:    Future Appointments   Date Time Provider Sybil Maldonado   12/22/2022 11:00 AM Cristin Coulter MD [facility]           New Medications?:   Yes      Medication Reconciliation by phone - Yes  Understands Medications? Yes  Taking Medications? Yes  Can you swallow your pills? Yes    Any further needs in the home i.e. Equipment? No    Link to services in community?:  Yes   Which services:  [facility]    Offered patient enrollment in the Remote Patient Monitoring (RPM) program for in-home monitoring: Patient declined. Lab Results       None            Care Coordination Interventions    Referral from Primary Care Provider: No  Suggested Interventions and Community Resources  Physical Therapy: Not Started  Registered Dietician: Not Started          Goals Addressed                      This Visit's Progress       Care Coordination      WEIGHT LOSS WOULD LIKE TO LOSE 15LBS 3/21/2019 (pt-stated)   Improving      WEIGHT LOSS WOULD LIKE TO LOSE 25 lbs    Barriers: poor appetite, likes sweet and sour foods.    Plan for overcoming my barriers: talk with registered dietician  Confidence: 9/10  Anticipated Goal Completion Date: March 15, 2023              Prior to Admission medications Medication Sig Start Date End Date Taking? Authorizing Provider   albuterol sulfate HFA (PROVENTIL;VENTOLIN;PROAIR) 108 (90 Base) MCG/ACT inhaler Inhale 1-2 puffs into the lungs every 4-6 hours as needed 11/6/22  Yes Historical Provider, MD   amoxicillin-clavulanate (AUGMENTIN) 875-125 MG per tablet Take 1 tablet by mouth Twice a Week 11/6/22  Yes Historical Provider, MD   omeprazole (PRILOSEC) 40 MG delayed release capsule Take 1 capsule by mouth daily 10/27/22  Yes Historical Provider, MD   Cholecalciferol (VITAMIN D3) 50 MCG (2000 UT) CAPS Take 1 capsule by mouth daily 11/3/22  Yes Kailey Mercado MD   Cyanocobalamin 1000 MCG SUBL Place 1,000 mcg under the tongue daily Place under the tongue 11/3/22  Yes Kailey Mercado MD   diclofenac sodium (VOLTAREN) 1 % GEL Apply 2 g topically 2 times daily   Yes Historical Provider, MD   atorvastatin (LIPITOR) 80 MG tablet TAKE 1 TABLET BY MOUTH  DAILY 10/10/22  Yes RIGOBERTO Oliva - CNP   furosemide (LASIX) 20 MG tablet TAKE 1 TABLET BY MOUTH DAILY AS NEEDED FOR SEVERE SWELLING 9/22/22  Yes Kailey Mercado MD   metoprolol succinate (TOPROL XL) 50 MG extended release tablet TAKE 1 TABLET BY MOUTH  DAILY 9/20/22  Yes Kailey Mercado MD   triamterene-hydroCHLOROthiazide [facility]) 37.5-25 MG per tablet TAKE 1 TABLET BY MOUTH  DAILY 6/27/22  Yes Jerel Reeder APRN - CNP   nitroGLYCERIN (NITROSTAT) 0.4 MG SL tablet Place 1 tablet under the tongue every 5 minutes as needed for Chest pain up to max of 3 total doses.  If no relief after 1 dose, call 911. 11/9/21  Yes Kailey Mercado MD   acetaminophen (TYLENOL) 500 MG tablet Take 500 mg by mouth every 6 hours as needed for Pain    Yes Historical Provider, MD   aspirin 81 MG chewable tablet Take 1 tablet by mouth daily 5/5/18  Yes Marlyn Castellano MD   traZODone (DESYREL) 50 MG tablet Take 0.5-1 tablets by mouth nightly  Patient not taking: No sig reported 9/22/22   Kailey Mercado MD   potassium chloride (KLOR-CON) 10 MEQ extended release tablet TAKE 1 TABLET BY MOUTH DAILY WITH LASIX/FUROSEMIDE  Patient not taking: No sig reported 9/22/22   Aryan Castro MD   pantoprazole (PROTONIX) 40 MG tablet Take 40 mg by mouth daily  Patient not taking: No sig reported 6/2/22   Historical Provider, MD   tiZANidine (ZANAFLEX) 2 MG tablet Take 1-2 tablets by mouth every 8 hours as needed (Muscle spasm)  Patient not taking: No sig reported 8/4/22   RIGOBERTO Kauffman - CNP       Future Appointments   Date Time Provider Sybil Maldonado   12/22/2022 11:00 AM Aryan Castro MD [facility]

## 2022-12-02 ENCOUNTER — HOSPITAL ENCOUNTER (OUTPATIENT)
Dept: ULTRASOUND IMAGING | Age: 87
Discharge: HOME | End: 2022-12-02
Payer: MEDICARE

## 2022-12-02 DIAGNOSIS — Z87.442 PERSONAL HISTORY OF URINARY CALCULI: ICD-10-CM

## 2022-12-02 PROCEDURE — 76770 US EXAM ABDO BACK WALL COMP: CPT

## 2022-12-07 ENCOUNTER — CARE COORDINATION (OUTPATIENT)
Dept: CARE COORDINATION | Age: 87
End: 2022-12-07

## 2022-12-07 NOTE — CARE COORDINATION
Ambulatory Care Coordination Note  12/7/2022    ACC: Dhruv Finnegan, RN    ACM spoke with Vince Johnson to follow up on care. She has a history of CAD, NSTEMI, CABG, HTN, OA, GERD and Hyperlipidemia. Today, she reports that she is doing well. She denies any new symptoms. No LE edema which she will have at times but usually related to diet. She is recovering from a fall on 11/7/2022 she had no fractures but had bruising on face, neg CT head. She reports a mechanical fall in her yard. She does have difficulty with ambulation due to OA and will use a cane in public. She has walker but will only use it if weak from flu or illness. She is independent with all ADL's but feels that she could use help with housekeeping. She does have family that will help with chores when needed. Lorre Brittle was referred to RD but has not been able to follow up. Lorre Brittle would like to lose weight and she feels this would help with ambulation. She has had COVID vac X 2 and flu vac. ACTION: established care. Reviewed meds, purpose, directions and compliance. Offered to make referral to COA- pt declined. Discussed fall prevention and safety  measures. Suggested senior chair exercises. Advised to contact customer service number on her ins card regarding hearing aids. Sent handouts : fall Prevention and Senior Chair exercises    PLAN:  F/u call in 2 weeks. Offered patient enrollment in the Remote Patient Monitoring (RPM) program for in-home monitoring: Patient is not eligible for RPM program.    Lab Results       None            Care Coordination Interventions    Referral from Primary Care Provider: No  Suggested Interventions and Community Resources  Fall Risk Prevention: In Process  Physical Therapy: Not Started  Registered Dietician: Completed  Senior Services:  In Process  Other Services or Interventions: fall prevention, senior chair exercises          Goals Addressed                   This Visit's Progress     Reduce Falls         I will reduce my risk of falls by the following:  I will read fall prevention handout sent in mail  by 1/2023    Barriers: overwhelmed by complexity of regimen  Plan for overcoming my barriers: follow up by ACM with teaching and resources sent in mail  Confidence: 8/10  Anticipated Goal Completion Date: 1/2023              Prior to Admission medications    Medication Sig Start Date End Date Taking? Authorizing Provider   albuterol sulfate HFA (PROVENTIL;VENTOLIN;PROAIR) 108 (90 Base) MCG/ACT inhaler Inhale 1-2 puffs into the lungs every 4-6 hours as needed for Wheezing Only used with bronchitis 11/6/22  Yes Historical Provider, MD   omeprazole (PRILOSEC) 40 MG delayed release capsule Take 1 capsule by mouth daily 10/27/22  Yes Historical Provider, MD   Cholecalciferol (VITAMIN D3) 50 MCG (2000 UT) CAPS Take 1 capsule by mouth daily 11/3/22  Yes Juvenal Tomas MD   Cyanocobalamin 1000 MCG SUBL Place 1,000 mcg under the tongue daily Place under the tongue 11/3/22  Yes Juvenal Tomas MD   diclofenac sodium (VOLTAREN) 1 % GEL Apply 2 g topically 2 times daily   Yes Historical Provider, MD   atorvastatin (LIPITOR) 80 MG tablet TAKE 1 TABLET BY MOUTH  DAILY 10/10/22  Yes RIGOBERTO Knight CNP   metoprolol succinate (TOPROL XL) 50 MG extended release tablet TAKE 1 TABLET BY MOUTH  DAILY 9/20/22  Yes Juvenal Tomas MD   triamterene-hydroCHLOROthiazide [facility]) 37.5-25 MG per tablet TAKE 1 TABLET BY MOUTH  DAILY 6/27/22  Yes RIGOBERTO Hong CNP   nitroGLYCERIN (NITROSTAT) 0.4 MG SL tablet Place 1 tablet under the tongue every 5 minutes as needed for Chest pain up to max of 3 total doses.  If no relief after 1 dose, call 911. 11/9/21  Yes Juvenal Tomas MD   acetaminophen (TYLENOL) 500 MG tablet Take 500 mg by mouth every 6 hours as needed for Pain    Yes Historical Provider, MD   aspirin 81 MG chewable tablet Take 1 tablet by mouth daily 5/5/18  Yes Fernando Montes MD   traZODone (DESYREL) 50 MG tablet Take 0.5-1 tablets by mouth nightly  Patient not taking: Reported on 12/7/2022 9/22/22   Larry Gage MD   furosemide (LASIX) 20 MG tablet TAKE 1 TABLET BY MOUTH DAILY AS NEEDED FOR SEVERE SWELLING  Patient not taking: Reported on 12/7/2022 9/22/22   Larry Gage MD   pantoprazole (PROTONIX) 40 MG tablet Take 40 mg by mouth daily  Patient not taking: No sig reported 6/2/22   Historical Provider, MD       Future Appointments   Date Time Provider Sybil Maldonado   12/22/2022 11:00 AM Larry Gage MD [facility]

## 2022-12-13 DIAGNOSIS — I10 ESSENTIAL HYPERTENSION: ICD-10-CM

## 2022-12-14 RX ORDER — METOPROLOL SUCCINATE 50 MG/1
TABLET, EXTENDED RELEASE ORAL
Qty: 90 TABLET | Refills: 0 | Status: SHIPPED | OUTPATIENT
Start: 2022-12-14

## 2022-12-22 ENCOUNTER — OFFICE VISIT (OUTPATIENT)
Dept: FAMILY MEDICINE CLINIC | Age: 87
End: 2022-12-22
Payer: MEDICARE

## 2022-12-22 VITALS
SYSTOLIC BLOOD PRESSURE: 130 MMHG | OXYGEN SATURATION: 97 % | WEIGHT: 174 LBS | DIASTOLIC BLOOD PRESSURE: 72 MMHG | HEART RATE: 66 BPM | BODY MASS INDEX: 32.02 KG/M2 | HEIGHT: 62 IN

## 2022-12-22 DIAGNOSIS — M81.0 AGE-RELATED OSTEOPOROSIS WITHOUT CURRENT PATHOLOGICAL FRACTURE: ICD-10-CM

## 2022-12-22 DIAGNOSIS — E78.5 HYPERLIPIDEMIA, UNSPECIFIED HYPERLIPIDEMIA TYPE: ICD-10-CM

## 2022-12-22 DIAGNOSIS — Z00.00 MEDICARE ANNUAL WELLNESS VISIT, SUBSEQUENT: Primary | ICD-10-CM

## 2022-12-22 DIAGNOSIS — I48.0 PAROXYSMAL ATRIAL FIBRILLATION (HCC): ICD-10-CM

## 2022-12-22 DIAGNOSIS — I25.10 CORONARY ARTERY DISEASE INVOLVING NATIVE CORONARY ARTERY OF NATIVE HEART WITHOUT ANGINA PECTORIS: ICD-10-CM

## 2022-12-22 DIAGNOSIS — F51.04 CHRONIC INSOMNIA: ICD-10-CM

## 2022-12-22 DIAGNOSIS — N39.0 RECURRENT UTI: ICD-10-CM

## 2022-12-22 DIAGNOSIS — L57.0 ACTINIC KERATOSES: ICD-10-CM

## 2022-12-22 DIAGNOSIS — M15.9 PRIMARY OSTEOARTHRITIS INVOLVING MULTIPLE JOINTS: ICD-10-CM

## 2022-12-22 DIAGNOSIS — K21.9 GASTROESOPHAGEAL REFLUX DISEASE, UNSPECIFIED WHETHER ESOPHAGITIS PRESENT: ICD-10-CM

## 2022-12-22 DIAGNOSIS — I10 PRIMARY HYPERTENSION: ICD-10-CM

## 2022-12-22 PROCEDURE — G0439 PPPS, SUBSEQ VISIT: HCPCS | Performed by: FAMILY MEDICINE

## 2022-12-22 PROCEDURE — 3074F SYST BP LT 130 MM HG: CPT | Performed by: FAMILY MEDICINE

## 2022-12-22 PROCEDURE — 3078F DIAST BP <80 MM HG: CPT | Performed by: FAMILY MEDICINE

## 2022-12-22 PROCEDURE — 17000 DESTRUCT PREMALG LESION: CPT | Performed by: FAMILY MEDICINE

## 2022-12-22 PROCEDURE — 17003 DESTRUCT PREMALG LES 2-14: CPT | Performed by: FAMILY MEDICINE

## 2022-12-22 PROCEDURE — 1123F ACP DISCUSS/DSCN MKR DOCD: CPT | Performed by: FAMILY MEDICINE

## 2022-12-22 RX ORDER — DENOSUMAB 60 MG/ML
60 INJECTION SUBCUTANEOUS ONCE
Qty: 1 ML | Refills: 0 | Status: SHIPPED | OUTPATIENT
Start: 2022-12-22 | End: 2022-12-22

## 2022-12-22 ASSESSMENT — PATIENT HEALTH QUESTIONNAIRE - PHQ9
SUM OF ALL RESPONSES TO PHQ QUESTIONS 1-9: 0
1. LITTLE INTEREST OR PLEASURE IN DOING THINGS: 0
2. FEELING DOWN, DEPRESSED OR HOPELESS: 0
SUM OF ALL RESPONSES TO PHQ9 QUESTIONS 1 & 2: 0
SUM OF ALL RESPONSES TO PHQ QUESTIONS 1-9: 0

## 2022-12-22 NOTE — PROGRESS NOTES
Medicare Annual Wellness Visit    Bertha Francis is here for Medicare AWV (MEDICARE AWV, A LOT OF PAIN IN RIGHT KNEE AND BACK WANTING SOMETHING FOR PAIN JUST HAD STEROID INJECTION DID NOT HELP )    Assessment & Plan   Medicare annual wellness visit, subsequent  Primary osteoarthritis involving multiple joints  Gastroesophageal reflux disease, unspecified whether esophagitis present  Coronary artery disease involving native coronary artery of native heart without angina pectoris  Hyperlipidemia, unspecified hyperlipidemia type  Paroxysmal atrial fibrillation ([address]  Primary hypertension  Chronic insomnia  Age-related osteoporosis without current pathological fracture  Recurrent UTI    Refer to rheum in future for ? PsA  On ca/ vit d and b12 daily   Fall precautions - using cane  Routine f/u ortho/ neurosurgery - [facility]  Labs 9/22 reviewed  Had J[employer] covid shot and booster - declines 2nd booster - had covid x2  Had flu shot  Utd on pneumovax? Dexa 2018 - ca/ vit d - encouraged fosamax - never took fosamax  In ER for fall/ head injury 11/7  Seen by GI - dr. Berry Yi - changed ppi?   BP Readings from Last 3 Encounters:   12/22/22 130/72   11/07/22 (!) 168/77   09/22/22 120/74     Pulse Readings from Last 3 Encounters:   12/22/22 66   11/07/22 66   09/22/22 68     Wt Readings from Last 3 Encounters:   12/22/22 174 lb (78.9 kg)   11/07/22 169 lb 14.4 oz (77.1 kg)   10/21/22 177 lb (80.3 kg)     Lipitor 80 daily for lipids w/ low dose asa for hx of cad  Toprol/ maxzide  w/ good bp control - sees cardio routinely    Recommendations for Preventive Services Due: see orders and patient instructions/AVS.  Recommended screening schedule for the next 5-10 years is provided to the patient in written form: see Patient Instructions/AVS.          Subjective   The following acute and/or chronic problems were also addressed today:  Arthritis pain all over - general aches/ pains - seen by foot doctor last week - has oa in base of toes  Using cane to get around  Tripped on uneven driveway -hitting head 11/7 - ct head reviewed and no acute changes  No recent low back pain/ sciatic pain for weeks but a lot of pain right knee - radiating down shin  Sees ortho routinely - dr. Olson Miss - had cortisone injection - left knee also bothersome w/ left low back  Okay in morning usually - using patches but not big effect - lidoderm  Changing health insurance  - change from [employer] to [employer] scripts for refills. Using tylenol tid 500 routinely w/ occ extra 500 - can't tolerate nsaids   On ppi daily for chronic gerd  Seen by dr. Taya Warren - gyn-urology - on abx again - recent u/s - cysts on left kidney not concerning - recurrent uti  Uses cane w/ her in house  Mood/ memory screening okay  No angina -but indigestion  Has psoriasis/ ? PsA - sees derm -dr. Sita Barajas - appt 3/23    Patient's complete Health Risk Assessment and screening values have been reviewed and are found in Flowsheets. The following problems were reviewed today and where indicated follow up appointments were made and/or referrals ordered. Positive Risk Factor Screenings with Interventions:    Fall Risk:  Do you feel unsteady or are you worried about falling? : (!) yes  2 or more falls in past year?: no  Fall with injury in past year?: (!) yes     Interventions:    Home safety d/w pt - using cane - getting 2nd shower bar            General HRA Questions:  Select all that apply: (!) New or Increased Pain    Pain Interventions:  Tylenol/ voltaren gel - consider rheum for PsA       Weight and Activity:  Physical Activity: Inactive    Days of Exercise per Week: 0 days    Minutes of Exercise per Session: 0 min     On average, how many days per week do you engage in moderate to strenuous exercise (like a brisk walk)?: 0 days  Have you lost any weight without trying in the past 3 months?: No  Body mass index: (!) 31.82      Inactivity Interventions:   Activity as tolerated - balanced diet d/w pt  Obesity Interventions: Activity limitations big problem               Advanced Directives:  Do you have a Living Will?: (!) No    Intervention:  Lw/ dpoa addressed         Plans to see audiologist soon regarding hearing worsening next month  Vision stable              Objective   Vitals:    12/22/22 1058   BP: 130/72   Site: Left Upper Arm   Position: Sitting   Cuff Size: Medium Adult   Pulse: 66   SpO2: 97%   Weight: 174 lb (78.9 kg)   Height: 5' 2\" (1.575 m)      Body mass index is 31.83 kg/m². Nad - speech clear  Actinic changes on nose - cryo x 3 pending derm appt      Allergies   Allergen Reactions    Latex Itching    Mucinex [Guaifenesin Er] Other (See Comments)     Coughs more, dry cough, feels like she has bronchitis    Tetanus Toxoids      HAS REACTION- TOLD TO TAKE ALTERNATIVE    Redness and swelling to site    Bactrim [Sulfamethoxazole-Trimethoprim] Nausea And Vomiting and Other (See Comments)     stomach pain, weakness, cant urinate, affected her taste, dizziness. Morphine Nausea And Vomiting    Pneumococcal Vaccines Rash     Prior to Visit Medications    Medication Sig Taking?  Authorizing Provider   metoprolol succinate (TOPROL XL) 50 MG extended release tablet TAKE 1 TABLET BY MOUTH  DAILY Yes Karissa Moore MD   albuterol sulfate HFA (PROVENTIL;VENTOLIN;PROAIR) 108 (90 Base) MCG/ACT inhaler Inhale 1-2 puffs into the lungs every 4-6 hours as needed for Wheezing Only used with bronchitis Yes Historical Provider, MD   omeprazole (PRILOSEC) 40 MG delayed release capsule Take 1 capsule by mouth daily Yes Historical Provider, MD   Cholecalciferol (VITAMIN D3) 50 MCG (2000 UT) CAPS Take 1 capsule by mouth daily Yes Karissa Moore MD   Cyanocobalamin 1000 MCG SUBL Place 1,000 mcg under the tongue daily Place under the tongue Yes Karissa Moore MD   diclofenac sodium (VOLTAREN) 1 % GEL Apply 2 g topically 2 times daily Yes Historical Provider, MD   atorvastatin (LIPITOR) 80 MG tablet TAKE 1 TABLET BY MOUTH  DAILY Yes RIGOBERTO Henao CNP   traZODone (DESYREL) 50 MG tablet Take 0.5-1 tablets by mouth nightly Yes Ayaan Peters MD   furosemide (LASIX) 20 MG tablet TAKE 1 TABLET BY MOUTH DAILY AS NEEDED FOR SEVERE SWELLING Yes Ayaan Peters MD   pantoprazole (PROTONIX) 40 MG tablet Take 40 mg by mouth daily Yes Historical Provider, MD   triamterene-hydroCHLOROthiazide (MAXZIDE-25) 37.5-25 MG per tablet TAKE 1 TABLET BY MOUTH  DAILY Yes RIGOBERTO Gonzales CNP   nitroGLYCERIN (NITROSTAT) 0.4 MG SL tablet Place 1 tablet under the tongue every 5 minutes as needed for Chest pain up to max of 3 total doses. If no relief after 1 dose, call 911.  Yes Ayaan Peters MD   acetaminophen (TYLENOL) 500 MG tablet Take 500 mg by mouth every 6 hours as needed for Pain  Yes Historical Provider, MD   aspirin 81 MG chewable tablet Take 1 tablet by mouth daily Yes Erna Hoffman MD       [facility] (Including outside providers/suppliers regularly involved in providing care):   Patient Care Team:  Ayaan Peters MD as PCP - General (Family Medicine)  Ayaan Peters MD as PCP - [facility] Empaneled Provider  Kimo Shanks RN as Ambulatory Care Manager     Reviewed and updated this visit:  Tobacco  Allergies  Meds  Problems  Med Hx  Surg Hx  Soc Hx  Fam Hx

## 2022-12-22 NOTE — PATIENT INSTRUCTIONS
Preventing Falls: Care Instructions  Overview     Getting around your home safely can be a challenge if you have injuries or health problems that make it easy for you to fall. Loose rugs and furniture in walkways are among the dangers for many older people who have problems walking or who have poor eyesight. People who have conditions such as arthritis, osteoporosis, or dementia also have to be careful not to fall. You can make your home safer with a few simple measures. Follow-up care is a key part of your treatment and safety. Be sure to make and go to all appointments, and call your doctor if you are having problems. It's also a good idea to know your test results and keep a list of the medicines you take. How can you care for yourself at home? Taking care of yourself  Exercise regularly to improve your strength, muscle tone, and balance. Walk if you can. Swimming may be a good choice if you cannot walk easily. Have your vision and hearing checked each year or any time you notice a change. If you have trouble seeing and hearing, you might not be able to avoid objects and could lose your balance. Know the side effects of the medicines you take. Ask your doctor or pharmacist whether the medicines you take can affect your balance. Sleeping pills or sedatives can affect your balance. Limit the amount of alcohol you drink. Alcohol can impair your balance and other senses. Ask your doctor whether calluses or corns on your feet need to be removed. If you wear loose-fitting shoes because of calluses or corns, you can lose your balance and fall. Talk to your doctor if you have numbness in your feet. You may get dizzy if you do not drink enough water. To prevent dehydration, drink plenty of fluids. Choose water and other clear liquids. If you have kidney, heart, or liver disease and have to limit fluids, talk with your doctor before you increase the amount of fluids you drink.   Preventing falls at home  Remove raised doorway thresholds, throw rugs, and clutter. Repair loose carpet or raised areas in the floor. Move furniture and electrical cords to keep them out of walking paths. Use nonskid floor wax, and wipe up spills right away, especially on ceramic tile floors. If you use a walker or cane, put rubber tips on it. If you use crutches, clean the bottoms of them regularly with an abrasive pad, such as steel wool. Keep your house well lit, especially stairways, porches, and outside walkways. Use night-lights in areas such as hallways and bathrooms. Add extra light switches or use remote switches (such as switches that go on or off when you clap your hands) to make it easier to turn lights on if you have to get up during the night. Install sturdy handrails on stairways. Move items in your cabinets so that the things you use a lot are on the lower shelves (about waist level). Keep a cordless phone and a flashlight with new batteries by your bed. If possible, put a phone in each of the main rooms of your house, or carry a cell phone in case you fall and cannot reach a phone. Or, you can wear a device around your neck or wrist. You push a button that sends a signal for help. Wear low-heeled shoes that fit well and give your feet good support. Use footwear with nonskid soles. Check the heels and soles of your shoes for wear. Repair or replace worn heels or soles. Do not wear socks without shoes on smooth floors, such as wood. Walk on the grass when the sidewalks are slippery. If you live in an area that gets snow and ice in the winter, sprinkle salt on slippery steps and sidewalks. Or ask a family member or friend to do this for you. Preventing falls in the bath  Install grab bars and nonskid mats inside and outside your shower or tub and near the toilet and sinks. Use shower chairs and bath benches. Use a hand-held shower head that will allow you to sit while showering.   Get into a tub or shower by putting the weaker leg in first. Get out of a tub or shower with your strong side first.  Repair loose toilet seats and consider installing a raised toilet seat to make getting on and off the toilet easier. Keep your bathroom door unlocked while you are in the shower. Where can you learn more? Go to http://www.del castillo.com/ and enter G117 to learn more about \"Preventing Falls: Care Instructions. \"  Current as of: May 4, 2022               Content Version: 13.5  © 6423-9762 Healthwise, [employer]. Care instructions adapted under license by [facility] ([facility]). If you have questions about a medical condition or this instruction, always ask your healthcare professional. [address] any warranty or liability for your use of this information. Learning About Being Active as an Older Adult  Why is being active important as you get older? Being active is one of the best things you can do for your health. And it's never too late to start. Being active--or getting active, if you aren't already--has definite benefits. It can:  Give you more energy,  Keep your mind sharp. Improve balance to reduce your risk of falls. Help you manage chronic illness with fewer medicines. No matter how old you are, how fit you are, or what health problems you have, there is a form of activity that will work for you. And the more physical activity you can do, the better your overall health will be. What kinds of activity can help you stay healthy? Being more active will make your daily activities easier. Physical activity includes planned exercise and things you do in daily life. There are four types of activity:  Aerobic. Doing aerobic activity makes your heart and lungs strong. Includes walking, dancing, and gardening. Aim for at least 2½ hours spread throughout the week. It improves your energy and can help you sleep better. Muscle-strengthening.   This type of activity can help maintain muscle and strengthen bones. Includes climbing stairs, using resistance bands, and lifting or carrying heavy loads. Aim for at least twice a week. It can help protect the knees and other joints. Stretching. Stretching gives you better range of motion in joints and muscles. Includes upper arm stretches, calf stretches, and gentle yoga. Aim for at least twice a week, preferably after your muscles are warmed up from other activities. It can help you function better in daily life. Balancing. This helps you stay coordinated and have good posture. Includes heel-to-toe walking, mark chi, and certain types of yoga. Aim for at least 3 days a week. It can reduce your risk of falling. Even if you have a hard time meeting the recommendations, it's better to be more active than less active. All activity done in each category counts toward your weekly total. You'd be surprised how daily things like carrying groceries, keeping up with grandchildren, and taking the stairs can add up. What keeps you from being active? If you've had a hard time being more active, you're not alone. Maybe you remember being able to do more. Or maybe you've never thought of yourself as being active. It's frustrating when you can't do the things you want. Being more active can help. What's holding you back? Getting started. Have a goal, but break it into easy tasks. Small steps build into big accomplishments. Staying motivated. If you feel like skipping your activity, remember your goal. Maybe you want to move better and stay independent. Every activity gets you one step closer. Not feeling your best.  Start with 5 minutes of an activity you enjoy. Prove to yourself you can do it. As you get comfortable, increase your time. You may not be where you want to be. But you're in the process of getting there. Everyone starts somewhere. How can you find safe ways to stay active?   Talk with your doctor about any physical challenges you're facing. Make a plan with your doctor if you have a health problem or aren't sure how to get started with activity. If you're already active, ask your doctor if there is anything you should change to stay safe as your body and health change. If you tend to feel dizzy after you take medicine, avoid activity at that time. Try being active before you take your medicine. This will reduce your risk of falls. If you plan to be active at home, make sure to clear your space before you get started. Remove things like TV cords, coffee tables, and throw rugs. It's safest to have plenty of space to move freely. The key to getting more active is to take it slow and steady. Try to improve only a little bit at a time. Pick just one area to improve on at first. And if an activity hurts, stop and talk to your doctor. Where can you learn more? Go to http://[employer].del castillo.com/ and enter P600 to learn more about \"Learning About Being Active as an Older Adult. \"  Current as of: October 10, 2022               Content Version: 13.5  © 7776-5355 Healthwise, [employer]. Care instructions adapted under license by [facility] ([facility]). If you have questions about a medical condition or this instruction, always ask your healthcare professional. [address] any warranty or liability for your use of this information. Hearing Loss: Care Instructions  Overview     Hearing loss is a sudden or slow decrease in how well you hear. It can range from mild to severe. Permanent hearing loss can occur with aging. It also can happen when you are exposed long-term to loud noise. Examples include listening to loud music, riding motorcycles, or being around other loud machines. Hearing loss can affect your work and home life. It can make you feel lonely or depressed. You may feel that you have lost your independence. But hearing aids and other devices can help you hear better and feel connected to others.   Follow-up care is a key part of your treatment and safety. Be sure to make and go to all appointments, and call your doctor if you are having problems. It's also a good idea to know your test results and keep a list of the medicines you take. How can you care for yourself at home? Avoid loud noises whenever possible. This helps keep your hearing from getting worse. Always wear hearing protection around loud noises. Wear a hearing aid as directed. See a professional who can help you pick a hearing aid that fits you. Have hearing tests as your doctor suggests. They can show whether your hearing has changed. Your hearing aid may need to be adjusted. Use other devices as needed. These may include:  Telephone amplifiers and hearing aids that can connect to a television, stereo, radio, or microphone. Devices that use lights or vibrations. These alert you to the doorbell, a ringing telephone, or a baby monitor. Television closed-captioning. This shows the words at the bottom of the screen. Most new TVs can do this. TTY (text telephone). This lets you type messages back and forth on the telephone instead of talking or listening. These devices are also called TDD. When messages are typed on the keyboard, they are sent over the phone line to a receiving TTY. The message is shown on a monitor. Use text messaging, social media, and email if it is hard for you to communicate by telephone. Try to learn a listening technique called speechreading. It is not lipreading. You pay attention to people's gestures, expressions, posture, and tone of voice. These clues can help you understand what a person is saying. Face the person you are talking to, and have them face you. Make sure the lighting is good. You need to see the other person's face clearly. Think about counseling if you need help to adjust to your hearing loss. When should you call for help?   Watch closely for changes in your health, and be sure to contact your doctor if:    You think your hearing is getting worse. You have new symptoms, such as dizziness or nausea. Where can you learn more? Go to http://www.del castillo.com/ and enter R798 to learn more about \"Hearing Loss: Care Instructions. \"  Current as of: May 4, 2022               Content Version: 13.5  © 2006-2022 [employer]. Care instructions adapted under license by [facility] ([facility]). If you have questions about a medical condition or this instruction, always ask your healthcare professional. [place] [address] any warranty or liability for your use of this information. Advance Directives: Care Instructions  Overview  An advance directive is a legal way to state your wishes at the end of your life. It tells your family and your doctor what to do if you can't say what you want. There are two main types of advance directives. You can change them any time your wishes change. Living will. This form tells your family and your doctor your wishes about life support and other treatment. The form is also called a declaration. Medical power of . This form lets you name a person to make treatment decisions for you when you can't speak for yourself. This person is called a health care agent (health care proxy, health care surrogate). The form is also called a durable power of  for health care. If you do not have an advance directive, decisions about your medical care may be made by a family member, or by a doctor or a  who doesn't know you. It may help to think of an advance directive as a gift to the people who care for you. If you have one, they won't have to make tough decisions by themselves. For more information, including forms for your state, see the [address] website (www.caringinfo.org/planning/advance-directives/). Follow-up care is a key part of your treatment and safety.  Be sure to make and go to all appointments, and call your doctor if you are having problems. It's also a good idea to know your test results and keep a list of the medicines you take. What should you include in an advance directive? Many states have a unique advance directive form. (It may ask you to address specific issues.) Or you might use a universal form that's approved by many states. If your form doesn't tell you what to address, it may be hard to know what to include in your advance directive. Use the questions below to help you get started. Who do you want to make decisions about your medical care if you are not able to? What life-support measures do you want if you have a serious illness that gets worse over time or can't be cured? What are you most afraid of that might happen? (Maybe you're afraid of having pain, losing your independence, or being kept alive by machines.)  Where would you prefer to die? (Your home? A hospital? A nursing home?)  Do you want to donate your organs when you die? Do you want certain Orthodox practices performed before you die? When should you call for help? Be sure to contact your doctor if you have any questions. Where can you learn more? Go to http://www.del castillo.com/ and enter R264 to learn more about \"Advance Directives: Care Instructions. \"  Current as of: June 16, 2022               Content Version: 13.5  © 8982-9390 Healthwise, Incorporated. Care instructions adapted under license by [facility] ([facility]). If you have questions about a medical condition or this instruction, always ask your healthcare professional. [place] [address] any warranty or liability for your use of this information. Personalized Preventive Plan for Nimisha Weiner - 12/22/2022  Medicare offers a range of preventive health benefits. Some of the tests and screenings are paid in full while other may be subject to a deductible, co-insurance, and/or copay.     Some of these benefits include a comprehensive review of your medical history including lifestyle, illnesses that may run in your family, and various assessments and screenings as appropriate. After reviewing your medical record and screening and assessments performed today your provider may have ordered immunizations, labs, imaging, and/or referrals for you. A list of these orders (if applicable) as well as your Preventive Care list are included within your After Visit Summary for your review. Other Preventive Recommendations:    A preventive eye exam performed by an eye specialist is recommended every 1-2 years to screen for glaucoma; cataracts, macular degeneration, and other eye disorders. A preventive dental visit is recommended every 6 months. Try to get at least 150 minutes of exercise per week or 10,000 steps per day on a pedometer . Order or download the FREE \"Exercise & Physical Activity: Your Everyday Guide\" from [employer]. Call 3-168.249.7082 or search [employer] online. You need 4191-3658 mg of calcium and 1277-8083 IU of vitamin D per day. It is possible to meet your calcium requirement with diet alone, but a vitamin D supplement is usually necessary to meet this goal.  When exposed to the sun, use a sunscreen that protects against both UVA and UVB radiation with an SPF of 30 or greater. Reapply every 2 to 3 hours or after sweating, drying off with a towel, or swimming. Always wear a seat belt when traveling in a car. Always wear a helmet when riding a bicycle or motorcycle.

## 2022-12-27 ENCOUNTER — TELEPHONE (OUTPATIENT)
Dept: FAMILY MEDICINE CLINIC | Age: 87
End: 2022-12-27

## 2022-12-27 NOTE — TELEPHONE ENCOUNTER
Injection was for arthritic pain in back and knees mostly, although entire body affected. PX said pain is non-urgent and would like to hold off on getting injection until insurance changes in January. Px will call and schedule at that time.   AM

## 2022-12-27 NOTE — TELEPHONE ENCOUNTER
Patient is calling about a referral for a injection that she spoke with Dr. Breann Julio at her [facility] on 12/22/22. Please give her a call back.

## 2022-12-27 NOTE — TELEPHONE ENCOUNTER
That is fine. Spine injections would need to be done by back specialists. We can do knee injections in office or she can see orthopedics.

## 2022-12-30 ENCOUNTER — OFFICE VISIT (OUTPATIENT)
Dept: ORTHOPEDIC SURGERY | Age: 87
End: 2022-12-30
Payer: MEDICARE

## 2022-12-30 VITALS — BODY MASS INDEX: 32.39 KG/M2 | WEIGHT: 176 LBS | HEIGHT: 62 IN

## 2022-12-30 DIAGNOSIS — M17.11 PRIMARY OSTEOARTHRITIS OF RIGHT KNEE: Primary | ICD-10-CM

## 2022-12-30 PROCEDURE — 99213 OFFICE O/P EST LOW 20 MIN: CPT | Performed by: ORTHOPAEDIC SURGERY

## 2022-12-30 PROCEDURE — 1123F ACP DISCUSS/DSCN MKR DOCD: CPT | Performed by: ORTHOPAEDIC SURGERY

## 2022-12-30 NOTE — PROGRESS NOTES
ORTHOPAEDIC CONSULTATION NOTE    Chief Complaint   Patient presents with    Follow-up     Chronic pain of right knee       Knee Pain     12/30/22  Andmarkus Melchor returns to clinic today for follow-up of her right knee  She reports the previous steroid injection in October did not really help  She feels like the right knee pain is getting worse      10/21/22  Andmarkus Melchor presents to clinic today for new problem: Right knee pain   She reports the right knee has been painful for many years  She has history of meniscectomy approximately 12 years ago  The pain is described as mainly anteriorly but diffuse  Throbbing in nature  She also reports swelling/effusion  She has baseline bilateral peripheral edema  She has been using Tylenol as well as Voltaren  I saw her in May of this year and she was referred for IR left hip intra-articular steroid injection  She reports no significant relief with the left hip injection  She reports persistent low back pain and bilateral hip pain      5/9/22  82 y.o. female seen in consultation at the request of Baljinder Alba MD for evaluation of left hip pain:  Onset 2 years ago  Injury/trauma none  History of symptoms as above  Pain is located left lateral hip  Also has LBP, hx of sciatica  Worse with pain/sleeping on left side, hip range of motion, deep flexion, activity  Better with rest  She reports Dr. Jim Medley previously gave her left lateral hip steroid injection which helped somewhat  Back pain = yes  Radicular symptoms = intermittent  Numbness and/or tingling = intermittent      Review of Systems  ROS from the Patient History Form dated on 5/9/22  Pertinent positives include weight change, skin condition, headaches, hearing impairment, chest pain and shortness of breath, hypertension, peripheral edema, reflux, urinary frequency, kidney stone, back pain, chronic pain  Rest of 13 point ROS otherwise negative except per HPI, and scanned into the patient's chart under the Media tab.       Allergies Allergen Reactions    Latex Itching    Mucinex [Guaifenesin Er] Other (See Comments)     Coughs more, dry cough, feels like she has bronchitis    Tetanus Toxoids      HAS REACTION- TOLD TO TAKE ALTERNATIVE    Redness and swelling to site    Bactrim [Sulfamethoxazole-Trimethoprim] Nausea And Vomiting and Other (See Comments)     stomach pain, weakness, cant urinate, affected her taste, dizziness.      Morphine Nausea And Vomiting    Pneumococcal Vaccines Rash        Current Outpatient Medications   Medication Sig Dispense Refill    denosumab (PROLIA) 60 MG/ML SOSY SC injection Inject 1 mL into the skin once for 1 dose 1 mL 0    diclofenac sodium (VOLTAREN) 1 % GEL Apply 4 g topically 4 times daily as needed for Pain 350 g 0    metoprolol succinate (TOPROL XL) 50 MG extended release tablet TAKE 1 TABLET BY MOUTH  DAILY 90 tablet 0    albuterol sulfate HFA (PROVENTIL;VENTOLIN;PROAIR) 108 (90 Base) MCG/ACT inhaler Inhale 1-2 puffs into the lungs every 4-6 hours as needed for Wheezing Only used with bronchitis      omeprazole (PRILOSEC) 40 MG delayed release capsule Take 1 capsule by mouth daily      Cholecalciferol (VITAMIN D3) 50 MCG (2000 UT) CAPS Take 1 capsule by mouth daily 90 capsule 1    Cyanocobalamin 1000 MCG SUBL Place 1,000 mcg under the tongue daily Place under the tongue 90 tablet 1    diclofenac sodium (VOLTAREN) 1 % GEL Apply 2 g topically 2 times daily      atorvastatin (LIPITOR) 80 MG tablet TAKE 1 TABLET BY MOUTH  DAILY 90 tablet 0    traZODone (DESYREL) 50 MG tablet Take 0.5-1 tablets by mouth nightly 30 tablet 2    furosemide (LASIX) 20 MG tablet TAKE 1 TABLET BY MOUTH DAILY AS NEEDED FOR SEVERE SWELLING 90 tablet 0    pantoprazole (PROTONIX) 40 MG tablet Take 40 mg by mouth daily      triamterene-hydroCHLOROthiazide (MAXZIDE-25) 37.5-25 MG per tablet TAKE 1 TABLET BY MOUTH  DAILY 90 tablet 3    nitroGLYCERIN (NITROSTAT) 0.4 MG SL tablet Place 1 tablet under the tongue every 5 minutes as needed for Chest pain up to max of 3 total doses. If no relief after 1 dose, call 911. 25 tablet 1    acetaminophen (TYLENOL) 500 MG tablet Take 500 mg by mouth every 6 hours as needed for Pain       aspirin 81 MG chewable tablet Take 1 tablet by mouth daily 30 tablet 3     No current facility-administered medications for this visit.        Past Medical History:   Diagnosis Date    Arthritis     Atrial fibrillation ([facility] [address] 6/19/2018    CAD in native artery 6/1/2018    Calculus of ureter 9/1/2021    Cystitis, interstitial     Gastroesophageal reflux disease 3/15/2018    GERD (gastroesophageal reflux disease)     GI bleeding     was a frequent aspirin user at that time    Heart burn     History of colonic polyps 7/2/2015    Hypertension     Interstitial cystitis     Mixed hyperlipidemia 6/19/2018    NSTEMI (non-ST elevated myocardial infarction) (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 Sebastian Becerril - w/placement of IABP    CARDIAC CATHETERIZATION  2012    COLONOSCOPY  07/31/2015    Dr. Christoph davis/polypectomy    CORONARY ARTERY BYPASS GRAFT  05/29/2018    Dr. Sultana Villa - off pump x2 (LIMA-LAD, L SVG-D2)    CYSTOSCOPY Left 9/1/2021    CYSTOSCOPY WITH LEFT URETEROSCOPY, STONE MANIPULATION ,STONE BASKET REMOVAL performed by Adrian Howard MD at [facility] ARTHROSCOPY Right 02/2015    MOHS SURGERY Left     facial for skin CA    PARTIAL HYSTERECTOMY (CERVIX NOT REMOVED)      ROTATOR CUFF REPAIR Right 2014    TRANSESOPHAGEAL ECHOCARDIOGRAM  05/29/2018    during CABG       Family History   Problem Relation Age of Onset    Breast Cancer Mother         LUNG    Lung Cancer Father         LUNG    COPD Father     Tuberculosis Father     Lung Cancer Sister          of chemo    Lung Cancer Brother     Tuberculosis Brother        Social History     Socioeconomic History    Marital status:      Spouse name: Not on file    Number of children: Not on file    Years of education: Not on file    Highest education level: Not on file   Occupational History    Occupation: Retired department    Tobacco Use    Smoking status: Former     Packs/day: 0.25     Years: 6.00     Pack years: 1.50     Types: Cigarettes     Start date: 3/5/1955     Quit date: 5/3/1960     Years since quittin.7    Smokeless tobacco: Never    Tobacco comments:     AS A TEENAGER FOR ABOUT 6 YEARS    Vaping Use    Vaping Use: Never used   Substance and Sexual Activity    Alcohol use: No    Drug use: No    Sexual activity: Not on file   Other Topics Concern    Not on file   Social History Narrative    Uses walker. No exercise. 23. Social Determinants of Health     Financial Resource Strain: Low Risk     Difficulty of Paying Living Expenses: Not hard at all   Food Insecurity: No Food Insecurity    Worried About [address] [employer] in the Last Year: Never true    [facility] in the Last Year: Never true   Transportation Needs: No Transportation Needs    Lack of Transportation (Medical): No    Lack of Transportation (Non-Medical): No   Physical Activity: Inactive    Days of Exercise per Week: 0 days    Minutes of Exercise per Session: 0 min   Stress: Stress Concern Present    Feeling of Stress : To some extent   Social Connections:  Moderately Integrated    Frequency of Communication with Friends and Family: More than three times a week    Frequency of Social Gatherings with Friends and Family: Twice a week    Attends Yazidi Services: 1 to 4 times per year    Active Member of [employer] or Organizations: Yes    Attends Club or Organization Meetings: 1 to 4 times per year    Marital Status:    Intimate Partner Violence: Not At Risk    Fear of Current or Ex-Partner: No    Emotionally Abused: No    Physically Abused: No    Sexually Abused: No   Housing Stability: Low Risk     Unable to Pay for Housing in the Last Year: No    Number of Places Lived in the Last Year: 1    Unstable Housing in the Last Year: No        Vitals:    12/30/22 0946   Weight: 176 lb (79.8 kg)   Height: 5' 2\" (1.575 m)       Physical Exam  Prior exam:  Constitutional - well-groomed, well-nourished, Body mass index is 32.19 kg/m². Psychiatric - pleasant, normal mood & affect  Cardiovascular - RRR, positive BLE peripheral edema, posterior tibialis pulse 2+  Respiratory - respirations unlabored, on room air  Skin - no rashes, wounds seen on exposed skin  Neurological - BLE SILT SP/DP/T/sural/saphenous nerve distributions; EHL/FHL/TA/GS intact  Right knee:   neutral alignment    effusion noted   Moderate tenderness to palpation medial joint line   Moderate tenderness to palpation lateral joint line   Range of Motion:    Extension:  10    Flexion:  90   Special tests:    crepitus with ROM    Pain with patellar grind test   Ligamentous testing:    Varus stress stable    Valgus stress stable    Anterior and Posterior Drawer stable        Imaging:  Prior images reviewed today  Right knee 4 views performed 10/21/22 -   Overall alignment is neutral.    The medial compartment articular height is severely narrowed with small osteophytes seen. The lateral compartment articular height is severely narrowed with small osteophytes seen. The anterior compartment articular height is mildly to moderately narrowed with small osteophytes seen. There are no loose bodies appreciated. Bones appear demineralized. AP pelvis and Left hip 2 views performed 5/9/22 - moderate bilateral hip degeneration/arthritis seen. There is spurring laterally, left greater than right.   Bones appear demineralized. No obvious fractures or dislocation. Pelvic ring is intact. Lumbar spondylosis is partially visualized. There is also evidence of sacroiliac joint arthritis as well. Assessment & Plan:  82 y.o. female who presents with    Diagnosis Orders   1. Primary osteoarthritis of right knee  DUROLANE INJECTION (For Auth/Precert)              Procedures    DUROLANE INJECTION (For Auth/Precert)       Conservative treatment of knee symptoms/arthritis, according to AAOS Clinical Practice Guidelines:  1)  Recommend oral or topical NSAIDs to reduce pain and swelling. 2)  Recommend acetaminophen to reduce pain. 3)  Oral narcotics, including tramadol, result in a significant increase of adverse events and are not effective at improving pain or function for treatment of osteoarthritis of the knee. 4)  Recommend maintaining weight in a healthy range to reduce stresses on the knee, particularly the patellofemoral compartment which sees up to 6x body weight. 5)  Home exercise program, focusing on strengthening, low impact aerobic exercises, and neuromuscular education. 6)  Intra-articular corticosteroid injections are an option. Corticosteroid injections can be performed every 4 months as needed. 7)  Evidence for intra-articular hyaluronic acid injections (viscosupplementation) is not as strong, but may benefit a subset of patients who have failed other options. Viscosupplementation can be performed every 6 months as needed. 8)  Bracing and cane use can improve pain and function. Although not specifically listed in the CPGs, ice therapy and topical patches such as Salonpas are good options as well. No relief with the previous steroid injection  Definitive treatment would be right total knee arthroplasty, and is a reasonable option whenever she is ready for it.   She would like to consider viscosupplementation instead - we will submit to insurance for prior authorization      [facility] MD

## 2023-01-04 ENCOUNTER — TELEPHONE (OUTPATIENT)
Dept: ORTHOPEDIC SURGERY | Age: 88
End: 2023-01-04

## 2023-01-04 NOTE — TELEPHONE ENCOUNTER
FLAVIA  (QTY 1)   RIGHT KNEE        NO PA REQUIRED.  VALID & BILLABLE ON CLAIM YES. BUY AND BILL.        Lm/vm for her to call and schedule

## 2023-01-09 DIAGNOSIS — M17.11 PRIMARY OSTEOARTHRITIS OF RIGHT KNEE: Primary | ICD-10-CM

## 2023-01-12 ENCOUNTER — CARE COORDINATION (OUTPATIENT)
Dept: CARE COORDINATION | Age: 88
End: 2023-01-12

## 2023-01-12 NOTE — CARE COORDINATION
Ambulatory Care Coordination Note  1/12/2023    ACC: Thedore Kayser, RN    DORINDA spoke with [facility] to follow up today. She reports that she is doing fine. She does have swelling in her right LE and has followed up with Dr. Allison Reese. PT was recommended and did go one time. She has been advised that eventually she will need a knee replacement. Her sciatic pain has only flared up a couple times in the last few months. It was recommended that she follow up with [facility]. She does have shortness of breath with exertion. She believes this is related to her weight. She has not used her albuterol inhaler for long time and really only used it with bronchitis. 7/7/2021:   Left Ventricular Ejection Fraction High Value % 65        Luz Maria did get a Medical Alert as recommended. She has contact information for [facility] but is still unsure if she needs their services. Offered patient enrollment in the Remote Patient Monitoring (RPM) program for in-home monitoring: Patient is not eligible for RPM program.      ACTION: encouraged follow up on recommended PT. Offered to call [facility]. Praised follow up on Medical Alert. Reviewed meds and compliance. Discussed fall prevention and safety measures. PLAN:  Discharge     Lab Results       None            Care Coordination Interventions    Referral from Primary Care Provider: No  Suggested Interventions and Community Resources  Fall Risk Prevention: In Process  Physical Therapy: Not Started  Registered Dietician: Completed  Senior Services: In Process  Other Services or Interventions: fall prevention, senior chair exercises          Goals Addressed    None         Prior to Admission medications    Medication Sig Start Date End Date Taking?  Authorizing Provider   diclofenac sodium (VOLTAREN) 1 % GEL Apply 4 g topically 4 times daily as needed for Pain 12/22/22  Yes Luisito Bourgeois MD   metoprolol succinate (TOPROL XL) 50 MG extended release tablet TAKE 1 TABLET BY MOUTH  DAILY 12/14/22  Yes Destiny Farrell MD   omeprazole (PRILOSEC) 40 MG delayed release capsule Take 1 capsule by mouth daily 10/27/22  Yes Historical Provider, MD   Cholecalciferol (VITAMIN D3) 50 MCG (2000 UT) CAPS Take 1 capsule by mouth daily 11/3/22  Yes Destiny Farrell MD   Cyanocobalamin 1000 MCG SUBL Place 1,000 mcg under the tongue daily Place under the tongue 11/3/22  Yes Destiny Farrell MD   atorvastatin (LIPITOR) 80 MG tablet TAKE 1 TABLET BY MOUTH  DAILY 10/10/22  Yes RIGOBERTO Siddiqui CNP   triamterene-hydroCHLOROthiazide (MAXZIDE-25) 37.5-25 MG per tablet TAKE 1 TABLET BY MOUTH  DAILY 6/27/22  Yes RIGOBERTO Morgan CNP   nitroGLYCERIN (NITROSTAT) 0.4 MG SL tablet Place 1 tablet under the tongue every 5 minutes as needed for Chest pain up to max of 3 total doses.  If no relief after 1 dose, call 911. 11/9/21  Yes Destiny Farrell MD   acetaminophen (TYLENOL) 500 MG tablet Take 500 mg by mouth every 6 hours as needed for Pain    Yes Historical Provider, MD   aspirin 81 MG chewable tablet Take 1 tablet by mouth daily 5/5/18  Yes Giovanni Alegria MD   denosumab (PROLIA) 60 MG/ML SOSY SC injection Inject 1 mL into the skin once for 1 dose 12/22/22 12/22/22  Destiny Farrell MD   albuterol sulfate HFA (PROVENTIL;VENTOLIN;PROAIR) 108 (90 Base) MCG/ACT inhaler Inhale 1-2 puffs into the lungs every 4-6 hours as needed for Wheezing Only used with bronchitis  Patient not taking: Reported on 1/12/2023 11/6/22   Historical Provider, MD   traZODone (DESYREL) 50 MG tablet Take 0.5-1 tablets by mouth nightly  Patient not taking: Reported on 1/12/2023 9/22/22   Destiny Farrell MD   furosemide (LASIX) 20 MG tablet TAKE 1 TABLET BY MOUTH DAILY AS NEEDED FOR SEVERE SWELLING  Patient not taking: Reported on 1/12/2023 9/22/22   Destiny Farrell MD   pantoprazole (PROTONIX) 40 MG tablet Take 40 mg by mouth daily  Patient not taking: Reported on 1/12/2023 6/2/22   Historical Provider, MD       Future Appointments Date Time Provider Sybil Maldonado   6/22/2023 10:20 AM Shilpa Rey MD [facility]

## 2023-01-13 DIAGNOSIS — I10 ESSENTIAL HYPERTENSION: ICD-10-CM

## 2023-01-13 RX ORDER — ATORVASTATIN CALCIUM 80 MG/1
TABLET, FILM COATED ORAL
Qty: 90 TABLET | Refills: 2 | Status: SHIPPED | OUTPATIENT
Start: 2023-01-13

## 2023-01-13 RX ORDER — OMEPRAZOLE 40 MG/1
40 CAPSULE, DELAYED RELEASE ORAL DAILY
Qty: 30 CAPSULE | Refills: 2 | Status: SHIPPED | OUTPATIENT
Start: 2023-01-13

## 2023-01-13 RX ORDER — TRIAMTERENE AND HYDROCHLOROTHIAZIDE 37.5; 25 MG/1; MG/1
1 TABLET ORAL DAILY
Qty: 90 TABLET | Refills: 2 | Status: SHIPPED | OUTPATIENT
Start: 2023-01-13

## 2023-01-13 RX ORDER — METOPROLOL SUCCINATE 50 MG/1
TABLET, EXTENDED RELEASE ORAL
Qty: 90 TABLET | Refills: 2 | Status: SHIPPED | OUTPATIENT
Start: 2023-01-13

## 2023-01-19 DIAGNOSIS — M17.11 ARTHRITIS OF RIGHT KNEE: ICD-10-CM

## 2023-01-19 DIAGNOSIS — M17.11 PRIMARY OSTEOARTHRITIS OF RIGHT KNEE: Primary | ICD-10-CM

## 2023-02-17 ENCOUNTER — OFFICE VISIT (OUTPATIENT)
Dept: ORTHOPEDIC SURGERY | Age: 88
End: 2023-02-17

## 2023-02-17 VITALS — HEIGHT: 62 IN | WEIGHT: 176 LBS | BODY MASS INDEX: 32.39 KG/M2

## 2023-02-17 DIAGNOSIS — M17.11 PRIMARY OSTEOARTHRITIS OF RIGHT KNEE: Primary | ICD-10-CM

## 2023-02-17 NOTE — PROGRESS NOTES
ORTHOPAEDIC CONSULTATION NOTE    Chief Complaint   Patient presents with    Injections     Monovisc Rt Knee       Knee Pain     2/17/23  Benjamín Walker returns to clinic today for viscosupplementation      12/30/22  Benjamín Walker returns to clinic today for follow-up of her right knee  She reports the previous steroid injection in October did not really help  She feels like the right knee pain is getting worse      10/21/22  Benjamín Walker presents to clinic today for new problem: Right knee pain   She reports the right knee has been painful for many years  She has history of meniscectomy approximately 12 years ago  The pain is described as mainly anteriorly but diffuse  Throbbing in nature  She also reports swelling/effusion  She has baseline bilateral peripheral edema  She has been using Tylenol as well as Voltaren  I saw her in May of this year and she was referred for IR left hip intra-articular steroid injection  She reports no significant relief with the left hip injection  She reports persistent low back pain and bilateral hip pain      5/9/22  82 y.o. female seen in consultation at the request of Suzanne Torres MD for evaluation of left hip pain:  Onset 2 years ago  Injury/trauma none  History of symptoms as above  Pain is located left lateral hip  Also has LBP, hx of sciatica  Worse with pain/sleeping on left side, hip range of motion, deep flexion, activity  Better with rest  She reports Dr. Juan F Palmer previously gave her left lateral hip steroid injection which helped somewhat  Back pain = yes  Radicular symptoms = intermittent  Numbness and/or tingling = intermittent      Review of Systems  ROS from the Patient History Form dated on 5/9/22  Pertinent positives include weight change, skin condition, headaches, hearing impairment, chest pain and shortness of breath, hypertension, peripheral edema, reflux, urinary frequency, kidney stone, back pain, chronic pain  Rest of 13 point ROS otherwise negative except per HPI, and scanned into the patient's chart under the Media tab. Allergies   Allergen Reactions    Latex Itching    Mucinex [Guaifenesin Er] Other (See Comments)     Coughs more, dry cough, feels like she has bronchitis    Tetanus Toxoids      HAS REACTION- TOLD TO TAKE ALTERNATIVE    Redness and swelling to site    Bactrim [Sulfamethoxazole-Trimethoprim] Nausea And Vomiting and Other (See Comments)     stomach pain, weakness, cant urinate, affected her taste, dizziness. Morphine Nausea And Vomiting    Pneumococcal Vaccines Rash        Current Outpatient Medications   Medication Sig Dispense Refill    metoprolol succinate (TOPROL XL) 50 MG extended release tablet TAKE 1 TABLET BY MOUTH  DAILY 90 tablet 2    omeprazole (PRILOSEC) 40 MG delayed release capsule Take 1 capsule by mouth daily 30 capsule 2    atorvastatin (LIPITOR) 80 MG tablet TAKE 1 TABLET BY MOUTH  DAILY 90 tablet 2    triamterene-hydroCHLOROthiazide (MAXZIDE-25) 37.5-25 MG per tablet Take 1 tablet by mouth daily 90 tablet 2    diclofenac sodium (VOLTAREN) 1 % GEL Apply 4 g topically 4 times daily as needed for Pain 350 g 0    albuterol sulfate HFA (PROVENTIL;VENTOLIN;PROAIR) 108 (90 Base) MCG/ACT inhaler Inhale 1-2 puffs into the lungs every 4-6 hours as needed for Wheezing Only used with bronchitis      Cholecalciferol (VITAMIN D3) 50 MCG (2000 UT) CAPS Take 1 capsule by mouth daily 90 capsule 1    Cyanocobalamin 1000 MCG SUBL Place 1,000 mcg under the tongue daily Place under the tongue 90 tablet 1    traZODone (DESYREL) 50 MG tablet Take 0.5-1 tablets by mouth nightly 30 tablet 2    furosemide (LASIX) 20 MG tablet TAKE 1 TABLET BY MOUTH DAILY AS NEEDED FOR SEVERE SWELLING 90 tablet 0    pantoprazole (PROTONIX) 40 MG tablet Take 40 mg by mouth daily      nitroGLYCERIN (NITROSTAT) 0.4 MG SL tablet Place 1 tablet under the tongue every 5 minutes as needed for Chest pain up to max of 3 total doses.  If no relief after 1 dose, call 911. 25 tablet 1 acetaminophen (TYLENOL) 500 MG tablet Take 500 mg by mouth every 6 hours as needed for Pain       aspirin 81 MG chewable tablet Take 1 tablet by mouth daily 30 tablet 3    denosumab (PROLIA) 60 MG/ML SOSY SC injection Inject 1 mL into the skin once for 1 dose 1 mL 0     Current Facility-Administered Medications   Medication Dose Route Frequency Provider Last Rate Last Admin    hyaluronate (MONOVISC) injection 88 mg  88 mg Intra-artICUlar Once Edna Hand MD           Past Medical History:   Diagnosis Date    Arthritis     Atrial fibrillation ([facility] [address] 6/19/2018    CAD in native artery 6/1/2018    Calculus of ureter 9/1/2021    Cystitis, interstitial     Gastroesophageal reflux disease 3/15/2018    GERD (gastroesophageal reflux disease)     GI bleeding     was a frequent aspirin user at that time    Heart burn     History of colonic polyps 7/2/2015    Hypertension     Interstitial cystitis     Mixed hyperlipidemia 6/19/2018    NSTEMI (non-ST elevated myocardial infarction) ([facility] [address] 5/29/2018    Obesity 12/12/2012    Overview:  Replaced inactive diagnosis via diagnosis import    Patient is Taoist     no blood products    Psoriasis     Rotator cuff tear, right 3/25/2013    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 Mary Brown - w/placement of IABP    CARDIAC CATHETERIZATION  2012    COLONOSCOPY  07/31/2015    Dr. Jung Trent - ryan/polypectomy    CORONARY ARTERY BYPASS GRAFT  05/29/2018    Dr. Ji Briscoe - off pump x2 (LIMA-LAD, L SVG-D2)    CYSTOSCOPY Left 9/1/2021    CYSTOSCOPY WITH LEFT URETEROSCOPY, STONE MANIPULATION ,STONE BASKET REMOVAL performed by Laury Goldberg MD at [facility] ARTHROSCOPY Right 02/2015    MOHS SURGERY Left     facial for skin CA    PARTIAL HYSTERECTOMY (CERVIX NOT REMOVED) ROTATOR CUFF REPAIR Right 2014    TRANSESOPHAGEAL ECHOCARDIOGRAM  2018    during CABG       Family History   Problem Relation Age of Onset    Breast Cancer Mother         LUNG    Lung Cancer Father         LUNG    COPD Father     Tuberculosis Father     Lung Cancer Sister          of chemo    Lung Cancer Brother     Tuberculosis Brother        Social History     Socioeconomic History    Marital status:      Spouse name: Not on file    Number of children: Not on file    Years of education: Not on file    Highest education level: Not on file   Occupational History    Occupation: Retired department    Tobacco Use    Smoking status: Former     Packs/day: 0.25     Years: 6.00     Pack years: 1.50     Types: Cigarettes     Start date: 3/5/1955     Quit date: 5/3/1960     Years since quittin.8    Smokeless tobacco: Never    Tobacco comments:     AS A TEENAGER FOR ABOUT 6 YEARS    Vaping Use    Vaping Use: Never used   Substance and Sexual Activity    Alcohol use: No    Drug use: No    Sexual activity: Not on file   Other Topics Concern    Not on file   Social History Narrative    Uses walker. No exercise. 21. Social Determinants of Health     Financial Resource Strain: Low Risk     Difficulty of Paying Living Expenses: Not hard at all   Food Insecurity: No Food Insecurity    Worried About [facility] in the Last Year: Never true    [facility] in the Last Year: Never true   Transportation Needs: No Transportation Needs    Lack of Transportation (Medical): No    Lack of Transportation (Non-Medical): No   Physical Activity: Inactive    Days of Exercise per Week: 0 days    Minutes of Exercise per Session: 0 min   Stress: Stress Concern Present    Feeling of Stress : To some extent   Social Connections:  Moderately Integrated    Frequency of Communication with Friends and Family: More than three times a week    Frequency of Social Gatherings with Friends and Family: Twice a week    Attends Christian Services: 1 to 4 times per year    Active Member of Clubs or Organizations: Yes    Attends Club or Organization Meetings: 1 to 4 times per year    Marital Status:    Intimate Partner Violence: Not At Risk    Fear of Current or Ex-Partner: No    Emotionally Abused: No    Physically Abused: No    Sexually Abused: No   Housing Stability: Low Risk     Unable to Pay for Housing in the Last Year: No    Number of Places Lived in the Last Year: 1    Unstable Housing in the Last Year: No        Vitals:    02/17/23 1010   Weight: 176 lb (79.8 kg)   Height: 5' 2\" (1.575 m)       Physical Exam  Prior exam:  Constitutional - well-groomed, well-nourished, Body mass index is 32.19 kg/m². Psychiatric - pleasant, normal mood & affect  Cardiovascular - RRR, positive BLE peripheral edema, posterior tibialis pulse 2+  Respiratory - respirations unlabored, on room air  Skin - no rashes, wounds seen on exposed skin  Neurological - BLE SILT SP/DP/T/sural/saphenous nerve distributions; EHL/FHL/TA/GS intact  Right knee:   neutral alignment    effusion noted   Moderate tenderness to palpation medial joint line   Moderate tenderness to palpation lateral joint line   Range of Motion:    Extension:  10    Flexion:  90   Special tests:    crepitus with ROM    Pain with patellar grind test   Ligamentous testing:    Varus stress stable    Valgus stress stable    Anterior and Posterior Drawer stable        Imaging:  Prior images reviewed today  Right knee 4 views performed 10/21/22 -   Overall alignment is neutral.    The medial compartment articular height is severely narrowed with small osteophytes seen. The lateral compartment articular height is severely narrowed with small osteophytes seen. The anterior compartment articular height is mildly to moderately narrowed with small osteophytes seen. There are no loose bodies appreciated. Bones appear demineralized.     AP pelvis and Left hip 2 views performed 5/9/22 - moderate bilateral hip degeneration/arthritis seen.  There is spurring laterally, left greater than right.  Bones appear demineralized.  No obvious fractures or dislocation.  Pelvic ring is intact.  Lumbar spondylosis is partially visualized.  There is also evidence of sacroiliac joint arthritis as well.      Assessment & Plan:  87 y.o. female who presents with    Diagnosis Orders   1. Primary osteoarthritis of right knee  IA ARTHROCENTESIS ASPIR&/INJ MAJOR JT/BURSA W/O US    hyaluronate (MONOVISC) injection 88 mg              Procedures    IA ARTHROCENTESIS ASPIR&/INJ MAJOR JT/BURSA W/O US       Conservative treatment of knee symptoms/arthritis, according to AAOS Clinical Practice Guidelines:  1)  Recommend oral or topical NSAIDs to reduce pain and swelling.    2)  Recommend acetaminophen to reduce pain.    3)  Oral narcotics, including tramadol, result in a significant increase of adverse events and are not effective at improving pain or function for treatment of osteoarthritis of the knee.  4)  Recommend maintaining weight in a healthy range to reduce stresses on the knee, particularly the patellofemoral compartment which sees up to 6x body weight.    5)  Home exercise program, focusing on strengthening, low impact aerobic exercises, and neuromuscular education.  6)  Intra-articular corticosteroid injections are an option.  Corticosteroid injections can be performed every 4 months as needed.  7)  Evidence for intra-articular hyaluronic acid injections (viscosupplementation) is not as strong, but may benefit a subset of patients who have failed other options.  Viscosupplementation can be performed every 6 months as needed.   8)  Bracing and cane use can improve pain and function.    Although not specifically listed in the CPGs, ice therapy and topical patches such as Salonpas are good options as well.    No relief with the previous steroid injection  Definitive treatment would be right total  knee arthroplasty, and is a reasonable option whenever she is ready for it. She would like to consider viscosupplementation instead -approved by insurance    Risks and benefits of a viscosupplementation injection were discussed with the patient, including the possibility of adverse local site reactions such as infection or a pseudosepsis response. Although rare, an infection is possible and may necessitate surgical treatment if it occurs in the joint or develops into an abscess. The patient elected to proceed, and after verbal consent was obtained and drug allergies were reviewed, the injection site was prepped with alcohol and ChloraPrep. Monovisc 88mg was injected into the right knee. There were no immediate complications after the procedure. The patient was advised to ice the area intermittently over the next 24-48 hours until the injection becomes effective.       Lucía Lucio MD

## 2023-03-13 ENCOUNTER — TELEPHONE (OUTPATIENT)
Dept: CARDIOLOGY CLINIC | Age: 88
End: 2023-03-13

## 2023-03-13 ENCOUNTER — OFFICE VISIT (OUTPATIENT)
Dept: CARDIOLOGY CLINIC | Age: 88
End: 2023-03-13
Payer: MEDICARE

## 2023-03-13 VITALS
DIASTOLIC BLOOD PRESSURE: 76 MMHG | SYSTOLIC BLOOD PRESSURE: 150 MMHG | HEART RATE: 67 BPM | BODY MASS INDEX: 33.31 KG/M2 | HEIGHT: 62 IN | OXYGEN SATURATION: 94 % | WEIGHT: 181 LBS

## 2023-03-13 DIAGNOSIS — I10 PRIMARY HYPERTENSION: ICD-10-CM

## 2023-03-13 DIAGNOSIS — I25.810 CORONARY ARTERY DISEASE INVOLVING CORONARY BYPASS GRAFT OF NATIVE HEART WITHOUT ANGINA PECTORIS: Primary | ICD-10-CM

## 2023-03-13 DIAGNOSIS — E78.2 MIXED HYPERLIPIDEMIA: ICD-10-CM

## 2023-03-13 PROCEDURE — 99214 OFFICE O/P EST MOD 30 MIN: CPT | Performed by: NURSE PRACTITIONER

## 2023-03-13 PROCEDURE — 93000 ELECTROCARDIOGRAM COMPLETE: CPT | Performed by: NURSE PRACTITIONER

## 2023-03-13 PROCEDURE — 1123F ACP DISCUSS/DSCN MKR DOCD: CPT | Performed by: NURSE PRACTITIONER

## 2023-03-13 NOTE — PATIENT INSTRUCTIONS
Check your BP once a week and call me after 4 weeks with your readings    Echocardiogram : routine to reassess your heart's valves and squeeze    Appt in six months

## 2023-03-13 NOTE — PROGRESS NOTES
[facility]     Outpatient Follow Up Note    Ruben Zhang is 82 y.o. female who presents today with a history of CAD s/p CABG '18 with post-op AF, HTN, tricuspid regurg and hyperlipidemia     CHIEF COMPLAINT / HPI:  Follow Up secondary to coronary artery disease    Subjective:   She has no chest heaviness. When she tries to breath deeply, her chest hurts. She occ gets a pain form her Lt shoulder too. Her angina equivalent was indigestion, hard pain of which she denies recurrence. She does get indigestion for which she takes mylicon. She denies orthopnea/PND. The patient has swelling in her RLE from a bad knee ; her Lt knee is after having had surgery. Her Rt leg as been swollen for years. The patients weight is up the past 2 years. The only time she gets SOB is if she rushes or goes up steps . The patient is not experiencing palpitations or dizziness. These symptoms show no change since the last OV. With regard to medication therapy the patient has been compliant with prescribed regimen. They have tolerated therapy to date.      Past Medical History:   Diagnosis Date    Arthritis     Atrial fibrillation ([facility] [address] 6/19/2018    CAD in native artery 6/1/2018    Calculus of ureter 9/1/2021    Cystitis, interstitial     Gastroesophageal reflux disease 3/15/2018    GERD (gastroesophageal reflux disease)     GI bleeding     was a frequent aspirin user at that time    Heart burn     History of colonic polyps 7/2/2015    Hypertension     Interstitial cystitis     Mixed hyperlipidemia 6/19/2018    NSTEMI (non-ST elevated myocardial infarction) ([address] 5/29/2018    Obesity 12/12/2012    Overview:  Replaced inactive diagnosis via diagnosis import    Patient is Jehovah's witness     no blood products    Psoriasis     Rotator cuff tear, right 3/25/2013    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    Thoracic aortic aneurysm     cardiologist watching    Vitamin B 12 deficiency 1/7/2013     Social History: Social History     Tobacco Use   Smoking Status Former    Packs/day: 0.25    Years: 6.00    Pack years: 1.50    Types: Cigarettes    Start date: 3/5/1955    Quit date: 5/3/1960    Years since quittin.9   Smokeless Tobacco Never   Tobacco Comments    AS A TEENAGER FOR ABOUT 6 YEARS      Current Medications:  Current Outpatient Medications   Medication Sig Dispense Refill    metoprolol succinate (TOPROL XL) 50 MG extended release tablet TAKE 1 TABLET BY MOUTH  DAILY 90 tablet 2    omeprazole (PRILOSEC) 40 MG delayed release capsule Take 1 capsule by mouth daily 30 capsule 2    atorvastatin (LIPITOR) 80 MG tablet TAKE 1 TABLET BY MOUTH  DAILY 90 tablet 2    triamterene-hydroCHLOROthiazide (MAXZIDE-25) 37.5-25 MG per tablet Take 1 tablet by mouth daily 90 tablet 2    Cholecalciferol (VITAMIN D3) 50 MCG (2000 UT) CAPS Take 1 capsule by mouth daily 90 capsule 1    Cyanocobalamin 1000 MCG SUBL Place 1,000 mcg under the tongue daily Place under the tongue 90 tablet 1    acetaminophen (TYLENOL) 500 MG tablet Take 500 mg by mouth every 6 hours as needed for Pain       aspirin 81 MG chewable tablet Take 1 tablet by mouth daily 30 tablet 3    denosumab (PROLIA) 60 MG/ML SOSY SC injection Inject 1 mL into the skin once for 1 dose 1 mL 0    diclofenac sodium (VOLTAREN) 1 % GEL Apply 4 g topically 4 times daily as needed for Pain 350 g 0    albuterol sulfate HFA (PROVENTIL;VENTOLIN;PROAIR) 108 (90 Base) MCG/ACT inhaler Inhale 1-2 puffs into the lungs every 4-6 hours as needed for Wheezing Only used with bronchitis      nitroGLYCERIN (NITROSTAT) 0.4 MG SL tablet Place 1 tablet under the tongue every 5 minutes as needed for Chest pain up to max of 3 total doses. If no relief after 1 dose, call 911. (Patient not taking: Reported on 3/13/2023) 25 tablet 1     No current facility-administered medications for this visit.      REVIEW OF SYSTEMS:    CONSTITUTIONAL: No major weight gain or loss, fatigue, weakness, night sweats or fever.  HEENT: No new vision difficulties or ringing in the ears. RESPIRATORY: No new SOB, PND, orthopnea or cough. CARDIOVASCULAR: See HPI  GI: No nausea, vomiting, diarrhea, constipation, abdominal pain or changes in bowel habits. : No urinary frequency, urgency, incontinence hematuria or dysuria. SKIN: No cyanosis or skin lesions. MUSCULOSKELETAL: No new muscle or joint pain. NEUROLOGICAL: No syncope or TIA-like symptoms. PSYCHIATRIC: No anxiety, pain, insomnia or depression    Objective:   PHYSICAL EXAM:       Vitals:    03/13/23 1339 03/13/23 1402 03/13/23 1403   BP: 118/72 (!) 162/82 (!) 150/76   Site: Left Upper Arm Left Upper Arm Right Upper Arm   Position: Sitting     Cuff Size: Large Adult Medium Adult Medium Adult   Pulse: 67     SpO2: 94%     Weight: 181 lb (82.1 kg)     Height: 5' 2\" (1.575 m)          VITALS:  /72 (Site: Left Upper Arm, Position: Sitting, Cuff Size: Large Adult)   Pulse 67   Ht 5' 2\" (1.575 m)   Wt 181 lb (82.1 kg)   SpO2 94%   BMI 33.11 kg/m²   CONSTITUTIONAL: Cooperative, no apparent distress, and appears well nourished / developed  NEUROLOGIC:  Awake and orientated to person, place and time. PSYCH: Calm affect. SKIN: Warm and dry. HEENT: Sclera non-icteric, normocephalic, neck supple, no elevation of JVP, normal carotid pulses with no bruits and thyroid normal size. LUNGS:  No increased work of breathing and clear to auscultation, no crackles or wheezing  CARDIOVASCULAR:  Regular rate 64 and rhythm with + soft murmur 4th ICS Lt MCL, gallops, rubs, or abnormal heart sounds, normal PMI. The apical impulses not displaced  JVP less than 8 cm H2O  Heart tones are crisp and normal  Cervical veins are not engorged  The carotid upstroke is normal in amplitude and contour without delay or bruit  JVP is not elevated  ABDOMEN:  Normal bowel sounds, non-distended and non-tender to palpation  EXT: yasmin LE edema, + calf tenderness. Pulses are present bilaterally.     DATA: Lab Results   Component Value Date    ALT 17 2022    AST 19 2022    ALKPHOS 85 2022    BILITOT 1.2 (H) 2022     Lab Results   Component Value Date    CREATININE 0.6 2022    BUN 12 2022     2022    K 3.7 2022     2022    CO2 27 2022       Lab Results   Component Value Date    WBC 6.5 2022    HGB 14.9 2022    HCT 44.4 2022    MCV 93.0 2022     2022     No components found for: CHLPL  Lab Results   Component Value Date    TRIG 148 2022    TRIG 94 11/10/2021    TRIG 167 (H) 02/15/2019     Lab Results   Component Value Date    HDL 71 (H) 2022    HDL 75 (H) 11/10/2021    HDL 52 2021     Lab Results   Component Value Date    LDLCALC 73 2022    LDLCALC 53 11/10/2021    LDLCALC 54 2021     Lab Results   Component Value Date    LABVLDL 30 2022    LABVLDL 19 11/10/2021    LABVLDL 27 2021     Radiology Review:  Pertinent images / reports were reviewed as a part of this visit and reveals the following:    Echo: :  Summary   Normal left ventricle size and systolic function with an estimated ejection   fraction of 60-65%. There is mild concentric left ventricular hypertrophy. E'e is 8.9   Mild mitral regurgitation. Trace aortic regurgitation. Mild-to-moderate tricuspid regurgitation with estimated PASP of 28mmHg . Right ventricular function appears mildly reduced. TAPSE is 1.46cm. The left atrium is mildly dilated    Stress Test:    Summary    There is normal isotope uptake at stress and rest. There is no evidence of    myocardial ischemia or scar. Normal LV function. Left ventricular ejection fraction of 66 %. Overall findings represent a low risk scan. Cardiac Cath LV21  Anatomy:   LM-nml   LAD-mid 100% . Patent LIMA to distal LAD. Distal LAD diffuse disease up to 90%.  SVG to D1 patent  Cx-nml  OM- nml  RCA-prox 40%, distal 40%  RPDA- nml  LVEF- 65  LVG- nml  LVEDP- 2  Impression  ~Coronary Angiography w/ severe multi Vessel CAD  ~LVG with LVEF of 65 and no regional wall motion abnormalities      Assessment:      Diagnosis Orders   1. Coronary artery disease involving coronary bypass graft of native heart without angina pectoris   ~stable : denies angina  ~has atypical discomfort and hx arthritis  ~s/p CABG '18  ~patent bypass conduits by cath '21  ~ASA / BB / statin  Echo 2D w doppler w color complete      2. Primary hypertension   ~suboptimal / elevated with recheck  ~controlled on arrival  ~Maxzide / toprol  ~mild CLVH on echo EKG 12 lead      3. Mixed hyperlipidemia   ~well controlled on atorvastatin 80 mg daily with high HDL             I had the opportunity to review the clinical symptoms and presentation of Ira Hay. Plan:     EKG :sinus rhythm 66  Echo : surveillance of TR and edema   She will check her BP weekly and call after 4 weeks with her readings  F/U in 6 months    Overall the patient is stable from CV standpoint    I have addresed the patient's cardiac risk factors and adjusted pharmacologic treatment as needed. In addition, I have reinforced the need for patient directed risk factor modification. Further evaluation will be based upon the patient's clinical course and testing results. All questions and concerns were addressed to the patient Alternatives to my treatment were discussed. The patient is not currently smoking. The risks related to smoking were reviewed with the patient. Recommend maintaining a smoke-free lifestyle. Patient is on a beta-blocker  Patient is on an ace-i/ARB  Patient is on a statin    Antiplatelet therapy has been recommended / prescribed for this patient. Education conducted on adverse reactions including bleeding was discussed. The patient verbalizes understanding not to stop medications without discussing with us.     Discussed exercise  Discussed Low saturated fat/MABEL diet. Thank you for allowing to us to participate in the care of Rita Loera.     RIGOBERTO Pearson    Documentation of today's visit sent to PCP

## 2023-03-22 ENCOUNTER — TELEPHONE (OUTPATIENT)
Dept: FAMILY MEDICINE CLINIC | Age: 88
End: 2023-03-22

## 2023-03-22 RX ORDER — OMEPRAZOLE 40 MG/1
40 CAPSULE, DELAYED RELEASE ORAL DAILY
Qty: 90 CAPSULE | Refills: 0 | Status: SHIPPED | OUTPATIENT
Start: 2023-03-22

## 2023-05-16 ENCOUNTER — HOSPITAL ENCOUNTER (OUTPATIENT)
Dept: NON INVASIVE DIAGNOSTICS | Age: 88
Discharge: HOME | End: 2023-05-16
Payer: MEDICARE

## 2023-05-16 DIAGNOSIS — I25.810 CORONARY ARTERY DISEASE INVOLVING CORONARY BYPASS GRAFT OF NATIVE HEART WITHOUT ANGINA PECTORIS: ICD-10-CM

## 2023-05-16 LAB
LV EF: 60 %
LVEF MODALITY: NORMAL

## 2023-05-16 PROCEDURE — 93306 TTE W/DOPPLER COMPLETE: CPT

## 2023-06-06 RX ORDER — OMEPRAZOLE 40 MG/1
CAPSULE, DELAYED RELEASE ORAL
Qty: 90 CAPSULE | Refills: 1 | Status: SHIPPED | OUTPATIENT
Start: 2023-06-06

## 2023-06-22 ENCOUNTER — OFFICE VISIT (OUTPATIENT)
Dept: FAMILY MEDICINE CLINIC | Age: 88
End: 2023-06-22
Payer: MEDICARE

## 2023-06-22 ENCOUNTER — TELEPHONE (OUTPATIENT)
Dept: FAMILY MEDICINE CLINIC | Age: 88
End: 2023-06-22

## 2023-06-22 VITALS
HEART RATE: 54 BPM | HEIGHT: 62 IN | WEIGHT: 172 LBS | OXYGEN SATURATION: 99 % | SYSTOLIC BLOOD PRESSURE: 128 MMHG | DIASTOLIC BLOOD PRESSURE: 74 MMHG | BODY MASS INDEX: 31.65 KG/M2

## 2023-06-22 DIAGNOSIS — K21.9 GASTROESOPHAGEAL REFLUX DISEASE, UNSPECIFIED WHETHER ESOPHAGITIS PRESENT: ICD-10-CM

## 2023-06-22 DIAGNOSIS — I25.10 CORONARY ARTERY DISEASE INVOLVING NATIVE CORONARY ARTERY OF NATIVE HEART WITHOUT ANGINA PECTORIS: ICD-10-CM

## 2023-06-22 DIAGNOSIS — I71.21 ANEURYSM OF ASCENDING AORTA WITHOUT RUPTURE (HCC): ICD-10-CM

## 2023-06-22 DIAGNOSIS — D51.9 ANEMIA DUE TO VITAMIN B12 DEFICIENCY, UNSPECIFIED B12 DEFICIENCY TYPE: ICD-10-CM

## 2023-06-22 DIAGNOSIS — I48.0 PAROXYSMAL ATRIAL FIBRILLATION (HCC): ICD-10-CM

## 2023-06-22 DIAGNOSIS — G89.29 CHRONIC LEFT-SIDED LOW BACK PAIN, UNSPECIFIED WHETHER SCIATICA PRESENT: ICD-10-CM

## 2023-06-22 DIAGNOSIS — I10 PRIMARY HYPERTENSION: Primary | ICD-10-CM

## 2023-06-22 DIAGNOSIS — E80.4 GILBERT SYNDROME: ICD-10-CM

## 2023-06-22 DIAGNOSIS — E78.2 MIXED HYPERLIPIDEMIA: ICD-10-CM

## 2023-06-22 DIAGNOSIS — N30.10 INTERSTITIAL CYSTITIS: ICD-10-CM

## 2023-06-22 DIAGNOSIS — M54.50 CHRONIC LEFT-SIDED LOW BACK PAIN, UNSPECIFIED WHETHER SCIATICA PRESENT: ICD-10-CM

## 2023-06-22 DIAGNOSIS — M15.9 PRIMARY OSTEOARTHRITIS INVOLVING MULTIPLE JOINTS: ICD-10-CM

## 2023-06-22 PROBLEM — I21.4 NSTEMI (NON-ST ELEVATED MYOCARDIAL INFARCTION) (HCC): Status: RESOLVED | Noted: 2018-05-29 | Resolved: 2023-06-22

## 2023-06-22 PROCEDURE — 99214 OFFICE O/P EST MOD 30 MIN: CPT | Performed by: FAMILY MEDICINE

## 2023-06-22 PROCEDURE — 1123F ACP DISCUSS/DSCN MKR DOCD: CPT | Performed by: FAMILY MEDICINE

## 2023-06-22 RX ORDER — MELOXICAM 7.5 MG/1
7.5 TABLET ORAL DAILY
Qty: 30 TABLET | Refills: 3 | Status: SHIPPED | OUTPATIENT
Start: 2023-06-22

## 2023-06-22 ASSESSMENT — PATIENT HEALTH QUESTIONNAIRE - PHQ9
SUM OF ALL RESPONSES TO PHQ QUESTIONS 1-9: 0
2. FEELING DOWN, DEPRESSED OR HOPELESS: 0
SUM OF ALL RESPONSES TO PHQ QUESTIONS 1-9: 0
1. LITTLE INTEREST OR PLEASURE IN DOING THINGS: 0
SUM OF ALL RESPONSES TO PHQ9 QUESTIONS 1 & 2: 0
SUM OF ALL RESPONSES TO PHQ QUESTIONS 1-9: 0
SUM OF ALL RESPONSES TO PHQ QUESTIONS 1-9: 0

## 2023-06-22 NOTE — TELEPHONE ENCOUNTER
ATTEMPTED TO CONTACT PATIENT 2ND TIME, STILL GOT SAME RECORDING AS BEFORE, CLOSING MESSAGE FOR NOW AND WILL ADDRESS WHEN PATIENT RETURNS THE CALL.  SC

## 2023-06-22 NOTE — TELEPHONE ENCOUNTER
Patient would like to know if dr Graciela Obrien was still going to call in the mobic prescription.   The pharmacy does not have it       [address], [address] [place] [address]

## 2023-06-22 NOTE — PROGRESS NOTES
09/22/2022    LABGLOM >60 09/22/2022    GFRAA >60 09/22/2022    AGRATIO 2.2 09/22/2022    GLOB 2.5 09/01/2021       Lab Results   Component Value Date    CHOL 174 09/22/2022    CHOL 147 11/10/2021    CHOL 160 02/15/2019     Lab Results   Component Value Date    TRIG 148 09/22/2022    TRIG 94 11/10/2021    TRIG 167 (H) 02/15/2019     Lab Results   Component Value Date    HDL 71 (H) 09/22/2022    HDL 75 (H) 11/10/2021    HDL 52 07/08/2021     Lab Results   Component Value Date    LDLCALC 73 09/22/2022    LDLCALC 53 11/10/2021    LDLCALC 54 07/08/2021     Lab Results   Component Value Date    LABVLDL 30 09/22/2022    LABVLDL 19 11/10/2021    LABVLDL 27 07/08/2021     No results found for: [facility]         Patient Active Problem List    Diagnosis Date Noted    Anemia     Gilbert syndrome 09/22/2022    Calculus of ureter 09/01/2021    Leukocytosis 02/19/2020    Lactic acidosis 02/19/2020    Patient is U[place]din 02/19/2020    Hypokalemia 02/19/2020    Hypertension     Ganglion cyst of dorsum of left wrist 10/04/2018    Hyperlipidemia 06/19/2018    Atrial fibrillation ([address] 06/19/2018    Coronary artery disease involving native heart without angina pectoris 06/01/2018    NSTEMI (non-ST elevated myocardial infarction) ([address] 05/29/2018    Chest pain 05/03/2018    Gastroesophageal reflux disease 03/15/2018    Primary osteoarthritis involving multiple joints 03/15/2018    Psoriasis 03/15/2018    Interstitial cystitis 03/15/2018    Thoracic ascending aortic aneurysm ([address] 03/15/2018    History of colonic polyps 07/02/2015    Varicose veins of legs 01/23/2015    Rotator cuff tear, right 03/25/2013    Vitamin B 12 deficiency 01/07/2013    Obesity 12/12/2012     Past Medical History:   Diagnosis Date    Arthritis     Atrial fibrillation ([address] 6/19/2018    CAD in native artery 6/1/2018    Calculus of ureter 9/1/2021    Cystitis, interstitial     Gastroesophageal reflux disease 3/15/2018    GERD (gastroesophageal reflux

## 2023-06-22 NOTE — PATIENT INSTRUCTIONS

## 2023-06-22 NOTE — TELEPHONE ENCOUNTER
ATTEMPTED TO CONTACT PATIENT, VOICEMAIL STATES UNABLE TO TAKE MY CALL AND THEN HANG UP, UNABLE TO LEAVE MESSAGE.  SC

## 2023-06-29 ENCOUNTER — TELEPHONE (OUTPATIENT)
Dept: FAMILY MEDICINE CLINIC | Age: 88
End: 2023-06-29

## 2023-07-12 ENCOUNTER — OFFICE VISIT (OUTPATIENT)
Dept: ORTHOPEDIC SURGERY | Age: 88
End: 2023-07-12
Payer: MEDICARE

## 2023-07-12 VITALS — WEIGHT: 172 LBS | HEIGHT: 62 IN | BODY MASS INDEX: 31.65 KG/M2

## 2023-07-12 DIAGNOSIS — M16.12 ARTHRITIS OF LEFT HIP: Primary | ICD-10-CM

## 2023-07-12 DIAGNOSIS — M54.16 LUMBAR RADICULOPATHY: ICD-10-CM

## 2023-07-12 PROCEDURE — 99213 OFFICE O/P EST LOW 20 MIN: CPT | Performed by: ORTHOPAEDIC SURGERY

## 2023-07-12 PROCEDURE — 1123F ACP DISCUSS/DSCN MKR DOCD: CPT | Performed by: ORTHOPAEDIC SURGERY

## 2023-07-12 NOTE — PROGRESS NOTES
ORTHOPAEDIC CONSULTATION NOTE    Chief Complaint   Patient presents with    Follow-up     Lt Hip pain       Knee Pain     7/12/23  [facility] returns to clinic today for follow-up of her left hip pain  I saw her for this left hip back in May of last year  She was referred for an IR directed left hip steroid injection  She reports that helped moderately for a few days only  She does describe low back pain, intermittent sciatica/radicular symptoms  She also describes intermittent numbness and tingling in both feet/toes  She was told she has neuropathy      2/17/23  [facility] returns to clinic today for viscosupplementation      12/30/22  [facility] returns to clinic today for follow-up of her right knee  She reports the previous steroid injection in October did not really help  She feels like the right knee pain is getting worse      10/21/22  [facility] presents to clinic today for new problem: Right knee pain   She reports the right knee has been painful for many years  She has history of meniscectomy approximately 12 years ago  The pain is described as mainly anteriorly but diffuse  Throbbing in nature  She also reports swelling/effusion  She has baseline bilateral peripheral edema  She has been using Tylenol as well as Voltaren  I saw her in May of this year and she was referred for IR left hip intra-articular steroid injection  She reports no significant relief with the left hip injection  She reports persistent low back pain and bilateral hip pain      5/9/22  82 y.o. female seen in consultation at the request of Lorene oRland MD for evaluation of left hip pain:  Onset 2 years ago  Injury/trauma none  History of symptoms as above  Pain is located left lateral hip  Also has LBP, hx of sciatica  Worse with pain/sleeping on left side, hip range of motion, deep flexion, activity  Better with rest  She reports Dr. Cedric Walden previously gave her left lateral hip steroid injection which helped somewhat  Back pain = yes  Radicular

## 2023-07-18 ENCOUNTER — TELEPHONE (OUTPATIENT)
Dept: ORTHOPEDIC SURGERY | Age: 88
End: 2023-07-18

## 2023-07-18 NOTE — TELEPHONE ENCOUNTER
Prescription Refill     Medication Name:  [address]: [facility] [address], [address] [phone] [place] [phone]    Patient Contact Number:  [phone]    Pt HAS APPT WITH NEUROLOGY IN 2 WEEKS. IN THE MEANTIME, SHE ISN'T ABLE TO BE MOBILE WITH THE PAIN. IS THERE SOMETHING THAT CAN BE SENT IN TO HELP ALLEVIATE THE PAIN FOR THESE 2 WEEKS SO THE Pt CAN MAKE IT THROUGH THE DAY?       PLEASE ADVISE IF MED HAS BEEN SENT IN FOR THE Pt.

## 2023-07-19 NOTE — TELEPHONE ENCOUNTER
Unfortunately, we do not recommend any prescription pain meds for her issues. She is unable to take Meloxicam or Diclofenac due to her comorbidities. I would just suggest Tylenol for now. She can take 500-750mg every 6 hours.

## 2023-07-24 ENCOUNTER — TELEPHONE (OUTPATIENT)
Dept: ORTHOPEDIC SURGERY | Age: 88
End: 2023-07-24

## 2023-07-24 NOTE — TELEPHONE ENCOUNTER
General Question     Subject: LEFT HIP  Patient and /or Facility Request: Bill Bates  Contact Number: [phone]    PATIENT CALLING REQUESTING A CALL BACK FROM LISA IN  \[facility] [place] [facility]\"" OFFICE REGARDING THE PROVIDER DR Chino Sadler REFERRED HER TO FOR HER LEFT HIP     PLEASE CALL THE PATIENT BACK AT THE ABOVE NUMBER

## 2023-07-24 NOTE — TELEPHONE ENCOUNTER
Called and this # is protected by call blocker and is not accepting calls from this #.     Shandra Herrera referred her for her lumbar spine to Dr. Gary Taylor for spine referral.

## 2023-09-07 DIAGNOSIS — I10 ESSENTIAL HYPERTENSION: ICD-10-CM

## 2023-09-07 RX ORDER — METOPROLOL SUCCINATE 50 MG/1
TABLET, EXTENDED RELEASE ORAL
Qty: 90 TABLET | Refills: 1 | Status: SHIPPED | OUTPATIENT
Start: 2023-09-07

## 2023-09-14 ENCOUNTER — OFFICE VISIT (OUTPATIENT)
Dept: CARDIOLOGY CLINIC | Age: 88
End: 2023-09-14
Payer: MEDICARE

## 2023-09-14 VITALS
HEIGHT: 62 IN | HEART RATE: 61 BPM | DIASTOLIC BLOOD PRESSURE: 70 MMHG | SYSTOLIC BLOOD PRESSURE: 132 MMHG | BODY MASS INDEX: 30.46 KG/M2 | WEIGHT: 165.5 LBS | OXYGEN SATURATION: 97 %

## 2023-09-14 DIAGNOSIS — I25.810 CORONARY ARTERY DISEASE INVOLVING CORONARY BYPASS GRAFT OF NATIVE HEART WITHOUT ANGINA PECTORIS: Primary | ICD-10-CM

## 2023-09-14 DIAGNOSIS — E78.2 MIXED HYPERLIPIDEMIA: ICD-10-CM

## 2023-09-14 DIAGNOSIS — I10 PRIMARY HYPERTENSION: ICD-10-CM

## 2023-09-14 PROCEDURE — 1123F ACP DISCUSS/DSCN MKR DOCD: CPT | Performed by: NURSE PRACTITIONER

## 2023-09-14 PROCEDURE — 99214 OFFICE O/P EST MOD 30 MIN: CPT | Performed by: NURSE PRACTITIONER

## 2023-09-14 NOTE — PROGRESS NOTES
regurgitation with estimated PASP of 28mmHg . Right ventricular function appears mildly reduced. TAPSE is 1.46cm. The left atrium is mildly dilated    Stress Test:    Summary    There is normal isotope uptake at stress and rest. There is no evidence of    myocardial ischemia or scar. Normal LV function. Left ventricular ejection fraction of 66 %. Overall findings represent a low risk scan. Cardiac Cath LV21  Anatomy:   LM-nml   LAD-mid 100% . Patent LIMA to distal LAD. Distal LAD diffuse disease up to 90%. SVG to D1 patent  Cx-nml  OM- nml  RCA-prox 40%, distal 40%  RPDA- nml  LVEF- 65  LVG- nml  LVEDP- 2  Impression  ~Coronary Angiography w/ severe multi Vessel CAD  ~LVG with LVEF of 65 and no regional wall motion abnormalities    Echo: May '23  Summary   *Off-axis parasternals due to patients limited mobility. *Left ventricle - normal size, thickness function EF 60%   *Aortic valve - sclerotic, not well seen, trivial regurgitation, cannot rule   out subaortic membrane (image 10)   *Tricuspid valve - mild regurgitation with RVSP of 27mmHg     Assessment:      Diagnosis Orders   1. Coronary artery disease involving coronary bypass graft of native heart without angina pectoris   ~stable : denies angina   ~s/p CABG ' ; reporting surgical discomfort improved-lessened  ~patent bypass conduits by cath   ~ASA / BB / statin        2. Primary hypertension   ~controlled  ~Maxzide / toprol  ~mild CLVH on echo       3. Mixed hyperlipidemia   ~well controlled on atorvastatin 80 mg daily with high HDL on lat profile             I had the opportunity to review the clinical symptoms and presentation of Gisell Goode. Plan:     Continue present management  F/U in six months with Dr. Annette Perez    Overall the patient is stable from CV standpoint    I have addresed the patient's cardiac risk factors and adjusted pharmacologic treatment as needed.  In addition, I have reinforced the need

## 2023-10-04 NOTE — TELEPHONE ENCOUNTER
Pt was given a sample of voltaren at her last appt. Pt wants to know if we can send an RX. To the pharmacy to see if her insurance will cover it.       Pt would like a 90 day supply sent to [place] on [address]

## 2023-10-13 ENCOUNTER — APPOINTMENT (OUTPATIENT)
Dept: CT IMAGING | Age: 88
End: 2023-10-13
Payer: MEDICARE

## 2023-10-13 ENCOUNTER — HOSPITAL ENCOUNTER (EMERGENCY)
Age: 88
Discharge: HOME | End: 2023-10-13
Payer: MEDICARE

## 2023-10-13 VITALS
OXYGEN SATURATION: 98 % | DIASTOLIC BLOOD PRESSURE: 78 MMHG | SYSTOLIC BLOOD PRESSURE: 189 MMHG | RESPIRATION RATE: 23 BRPM | BODY MASS INDEX: 30.73 KG/M2 | WEIGHT: 168 LBS | HEART RATE: 55 BPM | TEMPERATURE: 98.2 F
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
## 2023-10-13 DIAGNOSIS — S39.012A BACK STRAIN, INITIAL ENCOUNTER: ICD-10-CM

## 2023-10-13 PROCEDURE — 72131 CT LUMBAR SPINE W/O DYE: CPT

## 2023-10-13 PROCEDURE — 99284 EMERGENCY DEPT VISIT MOD MDM: CPT

## 2023-10-13 PROCEDURE — 6370000000 HC RX 637 (ALT 250 FOR IP): Performed by: PHYSICIAN ASSISTANT

## 2023-10-13 PROCEDURE — 70450 CT HEAD/BRAIN W/O DYE: CPT

## 2023-10-13 PROCEDURE — 71250 CT THORAX DX C-: CPT

## 2023-10-13 PROCEDURE — 72125 CT NECK SPINE W/O DYE: CPT

## 2023-10-13 RX ORDER — ONDANSETRON 4 MG/1
4 TABLET, ORALLY DISINTEGRATING ORAL ONCE
Status: COMPLETED | OUTPATIENT
Start: 2023-10-13 | End: 2023-10-13

## 2023-10-13 RX ORDER — HYDROCODONE BITARTRATE AND ACETAMINOPHEN 5; 325 MG/1; MG/1
2 TABLET ORAL ONCE
Status: COMPLETED | OUTPATIENT
Start: 2023-10-13 | End: 2023-10-13

## 2023-10-13 RX ORDER — LIDOCAINE 50 MG/G
1 PATCH TOPICAL DAILY
Qty: 10 PATCH | Refills: 0 | Status: SHIPPED | OUTPATIENT
Start: 2023-10-13 | End: 2023-10-23

## 2023-10-13 RX ORDER — METHOCARBAMOL 500 MG/1
500 TABLET, FILM COATED ORAL 4 TIMES DAILY
Qty: 40 TABLET | Refills: 0 | Status: SHIPPED | OUTPATIENT
Start: 2023-10-13 | End: 2023-10-23

## 2023-10-13 RX ADMIN — HYDROCODONE BITARTRATE AND ACETAMINOPHEN 2 TABLET: 5; 325 TABLET ORAL at 18:45

## 2023-10-13 RX ADMIN — ONDANSETRON 4 MG: 4 TABLET, ORALLY DISINTEGRATING ORAL at 18:45

## 2023-10-13 ASSESSMENT — ENCOUNTER SYMPTOMS
DIARRHEA: 0
RHINORRHEA: 0
COUGH: 0
NAUSEA: 0
BACK PAIN: 1
ABDOMINAL PAIN: 0
SHORTNESS OF BREATH: 0
VOMITING: 0

## 2023-10-13 ASSESSMENT — PAIN DESCRIPTION - LOCATION: LOCATION: BACK

## 2023-10-13 ASSESSMENT — PAIN SCALES - GENERAL: PAINLEVEL_OUTOF10: 10

## 2023-10-13 NOTE — ED PROVIDER NOTES
[facility]        Pt Name: Stefan Romeo  MRN: [id number]  [address] [place] 1935  Date of evaluation: 10/13/2023  Provider: Anjum Mchugh PA-C  PCP: Samantha Schultz MD  Note Started: 7:07 PM EDT 10/13/23      MARIBEL. I have evaluated this patient. CHIEF COMPLAINT       Chief Complaint   Patient presents with    Fall     Pt via daughter from home, missed the chair sitting this morning and fell backwards, states has pain in back of head and all down spine, no LOC and takes baby aspirin. States now is sob       HISTORY OF PRESENT ILLNESS: 1 or more Elements     History From: Patient, daughter at bedside  Limitations to history : None    Stefan Romeo is a 82 y.o. female who presents to the emergency department today for evaluation for a fall which occurred around 8 AM this morning. The patient states that she was talking on the phone, and she states that she missed the chair, causing her to fall, landing directly on her back. The patient states that she did hit her head however there is no loss of consciousness or vomiting. She is on aspirin but no other blood thinners. Patient denies feeling dizzy, lightheaded, having chest pain or shortness of breath before her fall her fall was mechanical in nature. The patient states that she did call EMS, and she states that she \"got checked out\". However she continued pain to the back, which prompted her visit to the ED. Patient is complaining of pain to her lower back, as well as her mid back, and the left side of her ribs. The patient states that her pain is rated as a 10/10 and her pain is constant, worse with touch and certain movements. Although the triage note reports that she is short of breath she tells me that she was short of breath from the pain and she states she otherwise does not have any shortness of breath. She has no chest pain.   She has no numbness, tingling or midline tenderness noted to the mid thoracic spine as well as to the upper lumbar spine. No bony step-offs or crepitus. Skin:     General: Skin is warm and dry. Neurological:      General: No focal deficit present. Mental Status: She is alert and oriented to person, place, and time. GCS: GCS eye subscore is 4. GCS verbal subscore is 5. GCS motor subscore is 6. Cranial Nerves: Cranial nerves 2-12 are intact. Sensory: Sensation is intact. Motor: Motor function is intact. Coordination: Coordination is intact. Gait: Gait is intact. Psychiatric:         Behavior: Behavior normal.             DIAGNOSTIC RESULTS   LABS:    Labs Reviewed - No data to display    When ordered only abnormal lab results are displayed. All other labs were within normal range or not returned as of this dictation. EKG: When ordered, EKG's are interpreted by the Emergency Department Physician in the absence of a cardiologist.  Please see their note for interpretation of EKG. RADIOLOGY:   Non-plain film images such as CT, Ultrasound and MRI are read by the radiologist. Plain radiographic images are visualized and preliminarily interpreted by the ED Provider with the below findings:        Interpretation per the Radiologist below, if available at the time of this note:    CT HEAD WO CONTRAST   Final Result   Chronic findings in the brain without acute CT abnormality identified. No acute osseous abnormality identified in the cervical spine. CT CERVICAL SPINE WO CONTRAST   Final Result   Chronic findings in the brain without acute CT abnormality identified. No acute osseous abnormality identified in the cervical spine. CT CHEST WO CONTRAST   Preliminary Result   No acute abnormality in the chest.  No rib fracture identified. Cholelithiasis. Mildly dilated ascending thoracic aorta up to 44 mm.          CT THORACIC SPINE BONY RECONSTRUCTION   Final Result   No acute

## 2023-10-16 ENCOUNTER — TELEPHONE (OUTPATIENT)
Dept: FAMILY MEDICINE CLINIC | Age: 88
End: 2023-10-16

## 2023-10-16 NOTE — TELEPHONE ENCOUNTER
I SCHEDULED THE PT FOR TOMORROW AT 3:20 PM WITH LISSA.   SPOKE TO PT. [facility]

## 2023-10-16 NOTE — TELEPHONE ENCOUNTER
Patient fell last Friday 8 am in the morning and her daughter took her to the ED and she was diagnose with a sprain back. Nothing was broken from the CT results. She was told to see her pcp in 2 days. Please give her a call back.

## 2023-10-17 ENCOUNTER — OFFICE VISIT (OUTPATIENT)
Dept: FAMILY MEDICINE CLINIC | Age: 88
End: 2023-10-17
Payer: MEDICARE

## 2023-10-17 VITALS
HEART RATE: 80 BPM | SYSTOLIC BLOOD PRESSURE: 166 MMHG | HEIGHT: 62 IN | WEIGHT: 169 LBS | BODY MASS INDEX: 31.1 KG/M2 | DIASTOLIC BLOOD PRESSURE: 86 MMHG

## 2023-10-17 DIAGNOSIS — S39.012D BACK STRAIN, SUBSEQUENT ENCOUNTER: Primary | ICD-10-CM

## 2023-10-17 DIAGNOSIS — I10 ESSENTIAL HYPERTENSION: ICD-10-CM
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
## 2023-10-17 PROCEDURE — 1123F ACP DISCUSS/DSCN MKR DOCD: CPT | Performed by: NURSE PRACTITIONER

## 2023-10-17 PROCEDURE — 99213 OFFICE O/P EST LOW 20 MIN: CPT | Performed by: NURSE PRACTITIONER

## 2023-10-17 RX ORDER — PREDNISONE 10 MG/1
TABLET ORAL
Qty: 12 TABLET | Refills: 0 | Status: SHIPPED | OUTPATIENT
Start: 2023-10-17

## 2023-10-17 RX ORDER — PANTOPRAZOLE SODIUM 40 MG/1
1 TABLET, DELAYED RELEASE ORAL
COMMUNITY

## 2023-10-17 SDOH — ECONOMIC STABILITY: FOOD INSECURITY: WITHIN THE PAST 12 MONTHS, THE FOOD YOU BOUGHT JUST DIDN'T LAST AND YOU DIDN'T HAVE MONEY TO GET MORE.: NEVER TRUE

## 2023-10-17 SDOH — ECONOMIC STABILITY: FOOD INSECURITY: WITHIN THE PAST 12 MONTHS, YOU WORRIED THAT YOUR FOOD WOULD RUN OUT BEFORE YOU GOT MONEY TO BUY MORE.: NEVER TRUE

## 2023-10-17 SDOH — ECONOMIC STABILITY: INCOME INSECURITY: HOW HARD IS IT FOR YOU TO PAY FOR THE VERY BASICS LIKE FOOD, HOUSING, MEDICAL CARE, AND HEATING?: NOT HARD AT ALL

## 2023-10-17 ASSESSMENT — ENCOUNTER SYMPTOMS: BACK PAIN: 1

## 2023-10-17 NOTE — PATIENT INSTRUCTIONS
You may receive a survey regarding the care you received during your visit. Your input is valuable to us. We encourage you to complete and return your survey. We hope you will choose us in the future for your healthcare needs. GENERAL OFFICE POLICIES      Telephone Calls: Messages will be answered within 1-2 business days, unless the provider is out of the office. If it is urgent a covering provider will answer. (this does not include Medication refills). MyChart: We recommend all patients sign up for [facility]. Through this portal you can see your lab results, request refills, schedule appointments, pay your bill and send messages to the office. [facility] messages will be answered within 1-2 business days unless the provider is out of the office. For urgent matters, please call the office. Appointments:  All appointments must be scheduled. We ask all patients to schedule their next follow up appointment before they leave the office to make sure you will be able to be seen before you run out of medications. 24 hours notice is required to cancel or reschedule an appointment to avoid being marked as a no show. You may be dismissed from the practice after 3 no shows. LATE for Appointment: If you are 15 or more minutes late for your appointment, you may be asked to reschedule. MA/LAB APPTS: Must be scheduled, cannot accept walk in lab visits. We only draw labs for patients established in our office. We only do injections for medications ordered by our office. Acute Sick Visits:  Nothing other than acute complaint will be addressed at this visit. TRADITIONAL MEDICARE  DOES NOT COVER PHYSICALS  MEDICARE WELLNESS VISITS: These are NOT physicals but the free annual visit offered by Medicare to discuss wellness issues. Medication refills, checkups, etc. will not be addressed during this visit.   Medication Refills: Refills are handled electronically so please contact your pharmacy for medication refills

## 2023-10-17 NOTE — PROGRESS NOTES
Ridge Meraz (:  1935) is a 82 y.o. female,Established patient, here for evaluation of the following chief complaint(s):    ED Follow-up and Fall (10/13 slipped out of a chair ER follow up)      SUBJECTIVE/OBJECTIVE:  HPI  ER FOLLOW UP    SHE WAS SITTING IN A CHAIR- GOT UP WENT TO SIT DOWN AND MISSED THE CHAIR SHE FELL -HIT HER BOTTOM- [place] - DENIES LOC- SHE WAS ABLE TO GET BACK UP MADE IT TO HER WALKER USED HER LIFELINE - SQUAD TOOK HER TO [facility]- CT SPINE AND HEAD WERE NORMAL- SHE  FEELS A LOT BETTER TODAY  HAS SOME BACK PAIN  SHE TOOK HER ROBAXIN AND USED THE LIDOCAINE PATCHES FOR LEFT HIP AND TAILBONE THIS IS A ACHING PAIN -SHE HAS ARTHRITIS IN HER SPINE HAS HAD MONIQUE    Review of Systems   Musculoskeletal:  Positive for back pain. Physical Exam  Vitals reviewed. Constitutional:       General: She is awake. She is not in acute distress. Appearance: Normal appearance. She is well-developed and well-groomed. She is not ill-appearing, toxic-appearing or diaphoretic. Cardiovascular:      Heart sounds: Normal heart sounds, S1 normal and S2 normal.   Neurological:      Mental Status: She is alert and oriented to person, place, and time. Psychiatric:         Attention and Perception: Attention and perception normal.         Mood and Affect: Mood and affect normal.         Speech: Speech normal.         Behavior: Behavior normal. Behavior is cooperative. Thought Content: Thought content normal.         Cognition and Memory: Cognition and memory normal.           Emanuel Tomas was seen today for ed follow-up and fall. Diagnoses and all orders for this visit:    Back strain, subsequent encounter  ER NOTES- IMAGING REVIEWED    predniSONE (DELTASONE) 10 MG tablet; 3 tabs x 2 d 2 tabs x 2 d 1  tab x 2 d in am with food  avoid NSAIDs while on this medication  CONTINUE BACLOFEN - LIDODERM PATCHES AS NEEDED  Apply cold compresses intermittently as needed.   As pain recedes, begin

## 2023-10-18 DIAGNOSIS — I10 ESSENTIAL HYPERTENSION: ICD-10-CM

## 2023-10-18 RX ORDER — TRIAMTERENE AND HYDROCHLOROTHIAZIDE 37.5; 25 MG/1; MG/1
1 TABLET ORAL DAILY
Qty: 14 TABLET | Refills: 0 | Status: SHIPPED | OUTPATIENT
Start: 2023-10-18 | End: 2023-11-01

## 2023-10-18 RX ORDER — ATORVASTATIN CALCIUM 80 MG/1
80 TABLET, FILM COATED ORAL DAILY
Qty: 14 TABLET | Refills: 0 | Status: SHIPPED | OUTPATIENT
Start: 2023-10-18 | End: 2023-11-01

## 2023-10-18 RX ORDER — TRIAMTERENE AND HYDROCHLOROTHIAZIDE 37.5; 25 MG/1; MG/1
1 TABLET ORAL DAILY
Qty: 90 TABLET | Refills: 0 | Status: SHIPPED | OUTPATIENT
Start: 2023-10-18

## 2023-10-18 RX ORDER — ATORVASTATIN CALCIUM 80 MG/1
TABLET, FILM COATED ORAL
Qty: 90 TABLET | Refills: 0 | Status: SHIPPED | OUTPATIENT
Start: 2023-10-18

## 2023-10-18 NOTE — TELEPHONE ENCOUNTER
Patient needs a refill on her medications:    TRIAMTERENE-HYDROCHLOROTHIAZIDE (MIXZIDE-25) 37.5-25 MG PER TABLET - 1 TABLET DAILY    ATORVASTATIN (LIPITOR) 80 MG TABLET - 1 TABLET PER DAY      [facility] - [address] [facility] [address] [place] - PHONE NO. [phone]    PLEASE GIVE HER A CALL BACK.      She would like for us to send over a 7 to 14 day supply of both these medication to [facility] phone [phone]

## 2023-10-20 ENCOUNTER — TELEPHONE (OUTPATIENT)
Dept: FAMILY MEDICINE CLINIC | Age: 88
End: 2023-10-20

## 2023-10-20 RX ORDER — OMEPRAZOLE 40 MG/1
40 CAPSULE, DELAYED RELEASE ORAL DAILY
Qty: 90 CAPSULE | Refills: 1 | Status: SHIPPED | OUTPATIENT
Start: 2023-10-20

## 2023-10-23 ENCOUNTER — TELEPHONE (OUTPATIENT)
Dept: FAMILY MEDICINE CLINIC | Age: 88
End: 2023-10-23

## 2023-10-23 NOTE — TELEPHONE ENCOUNTER
----- Message from Zulma Osborn sent at 10/23/2023 12:20 PM EDT -----  Subject: Message to Provider    QUESTIONS  Information for Provider? [facility]   from 06/22/2023, please fax to [phone]. Hipolito as high priority.   ---------------------------------------------------------------------------  --------------  Kenny Cox INFO  [phone]; Do not leave any message, patient will call back for answer  ---------------------------------------------------------------------------  --------------  SCRIPT ANSWERS  Relationship to Patient? Covered Entity  Covered Entity Type? Health Insurance? Representative Name?  Kristin Case

## 2023-10-24 RX ORDER — LIDOCAINE 50 MG/G
1 PATCH TOPICAL DAILY
Qty: 10 PATCH | Refills: 0 | Status: SHIPPED | OUTPATIENT
Start: 2023-10-24 | End: 2023-11-03

## 2023-10-24 NOTE — TELEPHONE ENCOUNTER
We can refill until seen by KELSY RICARDO [facility]. Sending to him as ANGELAI.

## 2023-10-25 ENCOUNTER — TELEPHONE (OUTPATIENT)
Dept: ADMINISTRATIVE | Age: 88
End: 2023-10-25

## 2023-10-25 NOTE — TELEPHONE ENCOUNTER
Submitted PA for Lidocaine 5% patches  [facility] Key: [id number] STATUS: PENDING. Follow up done daily; if no response in three days we will refax for status check. If another three days goes by with no response we will call the insurance for status.

## 2023-10-26 NOTE — TELEPHONE ENCOUNTER
The medication is APPROVED [facility] 10/24/2024. If this requires a response please respond to the pool ( [phone]). Thank you please advise patient.

## 2023-10-31 ENCOUNTER — IMMUNIZATION (OUTPATIENT)
Dept: FAMILY MEDICINE CLINIC | Age: 88
End: 2023-10-31
Payer: MEDICARE

## 2023-10-31 DIAGNOSIS — Z23 NEED FOR IMMUNIZATION AGAINST INFLUENZA: Primary | ICD-10-CM

## 2023-10-31 PROCEDURE — G0008 ADMIN INFLUENZA VIRUS VAC: HCPCS

## 2023-10-31 PROCEDURE — 90694 VACC AIIV4 NO PRSRV 0.5ML IM: CPT

## 2023-10-31 NOTE — PROGRESS NOTES
Immunizations Administered       Name Date Dose Route    Influenza, FLUAD, (age 72 y+), Adjuvanted, 0.5mL 10/31/2023 0.5 mL Intramuscular    Site: Deltoid- Left    Lot: [id number]    [address]: [id number]

## 2023-11-03 ENCOUNTER — APPOINTMENT (OUTPATIENT)
Dept: CT IMAGING | Age: 88
End: 2023-11-03
Payer: MEDICARE

## 2023-11-03 ENCOUNTER — HOSPITAL ENCOUNTER (EMERGENCY)
Age: 88
Discharge: HOME | End: 2023-11-03
Payer: MEDICARE

## 2023-11-03 VITALS
DIASTOLIC BLOOD PRESSURE: 80 MMHG | HEIGHT: 62 IN | BODY MASS INDEX: 31.1 KG/M2 | OXYGEN SATURATION: 98 % | SYSTOLIC BLOOD PRESSURE: 155 MMHG | RESPIRATION RATE: 16 BRPM | WEIGHT: 169 LBS | HEART RATE: 61 BPM | TEMPERATURE: 98.6 F

## 2023-11-03 DIAGNOSIS — M25.552 LEFT HIP PAIN: ICD-10-CM

## 2023-11-03 DIAGNOSIS — S32.10XA CLOSED FRACTURE OF SACRUM, UNSPECIFIED PORTION OF SACRUM, INITIAL ENCOUNTER (HCC): Primary | ICD-10-CM

## 2023-11-03 PROCEDURE — 99284 EMERGENCY DEPT VISIT MOD MDM: CPT

## 2023-11-03 PROCEDURE — 73700 CT LOWER EXTREMITY W/O DYE: CPT

## 2023-11-03 PROCEDURE — 6370000000 HC RX 637 (ALT 250 FOR IP): Performed by: PHYSICIAN ASSISTANT

## 2023-11-03 RX ORDER — OXYCODONE HYDROCHLORIDE AND ACETAMINOPHEN 5; 325 MG/1; MG/1
1 TABLET ORAL EVERY 6 HOURS PRN
Qty: 12 TABLET | Refills: 0 | Status: SHIPPED | OUTPATIENT
Start: 2023-11-03 | End: 2023-11-06

## 2023-11-03 RX ORDER — OXYCODONE HYDROCHLORIDE AND ACETAMINOPHEN 5; 325 MG/1; MG/1
1 TABLET ORAL ONCE
Status: COMPLETED | OUTPATIENT
Start: 2023-11-03 | End: 2023-11-03

## 2023-11-03 RX ADMIN — OXYCODONE HYDROCHLORIDE AND ACETAMINOPHEN 1 TABLET: 5; 325 TABLET ORAL at 15:11

## 2023-11-03 ASSESSMENT — ENCOUNTER SYMPTOMS
ABDOMINAL PAIN: 0
RECTAL PAIN: 0
DIARRHEA: 0
CONSTIPATION: 0
WHEEZING: 0
COLOR CHANGE: 0
NAUSEA: 0
BLOOD IN STOOL: 0
VOMITING: 0
BACK PAIN: 0
COUGH: 0
SHORTNESS OF BREATH: 0
STRIDOR: 0

## 2023-11-03 ASSESSMENT — PAIN DESCRIPTION - ORIENTATION: ORIENTATION: LEFT

## 2023-11-03 ASSESSMENT — PATIENT HEALTH QUESTIONNAIRE - PHQ9
1. LITTLE INTEREST OR PLEASURE IN DOING THINGS: 0
SUM OF ALL RESPONSES TO PHQ QUESTIONS 1-9: 0
SUM OF ALL RESPONSES TO PHQ QUESTIONS 1-9: 0
2. FEELING DOWN, DEPRESSED OR HOPELESS: 0
SUM OF ALL RESPONSES TO PHQ QUESTIONS 1-9: 0
SUM OF ALL RESPONSES TO PHQ QUESTIONS 1-9: 0
SUM OF ALL RESPONSES TO PHQ9 QUESTIONS 1 & 2: 0

## 2023-11-03 ASSESSMENT — PAIN DESCRIPTION - LOCATION: LOCATION: HIP

## 2023-11-03 ASSESSMENT — PAIN - FUNCTIONAL ASSESSMENT: PAIN_FUNCTIONAL_ASSESSMENT: 0-10

## 2023-11-03 ASSESSMENT — PAIN SCALES - GENERAL: PAINLEVEL_OUTOF10: 8

## 2023-11-03 NOTE — ED NOTES
Discharge and education instructions reviewed. Patient verbalized understanding, teach-back successful. Patient denied questions at this time. No acute distress noted. Patient instructed to follow-up as noted - return to emergency department if symptoms worsen. Patient verbalized understanding. Discharged per EDMD with discharged instructions.        Wilfred Cabrera RN  11/03/23 [id number]

## 2023-11-03 NOTE — ED PROVIDER NOTES
[facility]        Pt Name: Shannon Dudley  MRN: [id number]  [address] 1935  Date of evaluation: 11/3/2023  Provider: Lorenza Gaines PA-C  PCP: Erik Perry MD  Note Started: 3:17 PM EDT 11/3/23      MARIBEL. I have evaluated this patient. CHIEF COMPLAINT       Chief Complaint   Patient presents with    Hip Pain     From home via [address] [facility] cc left hip pain. Pt states hx arthritis in left hip, has chronic hip pain, but today's pain is increased. No known injury. HISTORY OF PRESENT ILLNESS: 1 or more Elements     History from : Patient    Limitations to history : None    Shannon Dudley is a 82 y.o. female who presents to the emergency department complaining of left hip pain. She has known arthritis in her left hip and has had pain in her left hip for years. However, today she woke up with extreme pain in her left hip without any known injury today. She does have chronic low back pain and known scoliosis in her spine. She saw Dr. Patricia Lopez, her orthopedist, 2 months ago to address her left hip pain. He did an x-ray of the left hip and confirmed severe arthritis. However, because she was having spine issues, he referred her to spine/pain specialist.  She saw the specialist after receiving the referral and was told that she could get an epidural in her spine for pain relief which she declined. She has not called Dr. Patricia Lopez back since. She is requesting a pain pill for pain control and she plans on following up with her orthopedist.  On 10/13/2023, she was seen in the emergency department for a mechanical fall. At that time, she was not complaining of acute left hip pain. She did not have any imaging of her left hip at that time. She did however have imaging of her spine which appeared stable. She is able to ambulate with use of her cane.   She denies bowel or bladder incontinence or retention, recent

## 2023-11-06 ENCOUNTER — OFFICE VISIT (OUTPATIENT)
Dept: ORTHOPEDIC SURGERY | Age: 88
End: 2023-11-06
Payer: MEDICARE

## 2023-11-06 ENCOUNTER — TELEPHONE (OUTPATIENT)
Dept: ORTHOPEDIC SURGERY | Age: 88
End: 2023-11-06

## 2023-11-06 VITALS — BODY MASS INDEX: 31.1 KG/M2 | WEIGHT: 169 LBS | HEIGHT: 62 IN

## 2023-11-06 DIAGNOSIS — S32.130A CLOSED NONDISPLACED ZONE III FRACTURE OF SACRUM, INITIAL ENCOUNTER (HCC): Primary | ICD-10-CM

## 2023-11-06 PROCEDURE — 1123F ACP DISCUSS/DSCN MKR DOCD: CPT | Performed by: PHYSICIAN ASSISTANT

## 2023-11-06 PROCEDURE — 99213 OFFICE O/P EST LOW 20 MIN: CPT | Performed by: PHYSICIAN ASSISTANT

## 2023-11-06 NOTE — PROGRESS NOTES
hip, slightly more pronounced on the left. Atherosclerosis is noted. Calcified phleboliths are noted in the pelvis. The urinary bladder is unremarkable. There is no presacral hematoma. IMPRESSION:  Acute nondisplaced sacral fracture involving the posterior sacrum at the S3  level. Osteopenia. Assessment & Plan:  82 y.o. female who presents with    Diagnosis Orders   1. Closed nondisplaced zone III fracture of sacrum, initial encounter ([facility])  diclofenac sodium (VOLTAREN) 1 % GEL          No orders of the defined types were placed in this encounter.       CT on 11/3/23 reveals nondisplaced sacral fracture  This likely occurred during her fall on 10/13/23    Pain already seems to be improving with conservative management  Recommend continuing conservative treatment:  Ice and Tylenol as needed for pain  Recommend donut cushion to sit on as needed  Sent Voltaren gel prescription to pharmacy    Follow-up in 6 weeks with repeat x-rays of the pelvis      Ev Sanchez PA-C

## 2023-12-14 ENCOUNTER — TRANSCRIBE ORDERS (OUTPATIENT)
Dept: ADMINISTRATIVE | Age: 88
End: 2023-12-14

## 2023-12-14 DIAGNOSIS — R10.9: Primary | ICD-10-CM

## 2023-12-15 ENCOUNTER — HOSPITAL ENCOUNTER (OUTPATIENT)
Dept: ULTRASOUND IMAGING | Age: 88
Discharge: HOME | End: 2023-12-15
Attending: UROLOGY
Payer: MEDICARE

## 2023-12-15 DIAGNOSIS — R10.9: ICD-10-CM

## 2023-12-15 PROCEDURE — 76770 US EXAM ABDO BACK WALL COMP: CPT

## 2023-12-26 ENCOUNTER — OFFICE VISIT (OUTPATIENT)
Dept: FAMILY MEDICINE CLINIC | Age: 88
End: 2023-12-26
Payer: MEDICARE

## 2023-12-26 VITALS
HEIGHT: 62 IN | OXYGEN SATURATION: 95 % | SYSTOLIC BLOOD PRESSURE: 132 MMHG | RESPIRATION RATE: 16 BRPM | BODY MASS INDEX: 31.14 KG/M2 | WEIGHT: 169.2 LBS | HEART RATE: 57 BPM | DIASTOLIC BLOOD PRESSURE: 68 MMHG

## 2023-12-26 DIAGNOSIS — H91.93 BILATERAL HEARING LOSS, UNSPECIFIED HEARING LOSS TYPE: ICD-10-CM

## 2023-12-26 DIAGNOSIS — J32.9 CHRONIC SINUSITIS, UNSPECIFIED LOCATION: ICD-10-CM

## 2023-12-26 DIAGNOSIS — K21.9 GASTROESOPHAGEAL REFLUX DISEASE, UNSPECIFIED WHETHER ESOPHAGITIS PRESENT: ICD-10-CM

## 2023-12-26 DIAGNOSIS — I25.10 CORONARY ARTERY DISEASE INVOLVING NATIVE CORONARY ARTERY OF NATIVE HEART WITHOUT ANGINA PECTORIS: ICD-10-CM

## 2023-12-26 DIAGNOSIS — I48.0 PAROXYSMAL ATRIAL FIBRILLATION (HCC): ICD-10-CM

## 2023-12-26 DIAGNOSIS — Z00.00 MEDICARE ANNUAL WELLNESS VISIT, SUBSEQUENT: Primary | ICD-10-CM

## 2023-12-26 LAB
ALBUMIN SERPL-MCNC: 4.5 G/DL (ref 3.4–5)
ALBUMIN/GLOB SERPL: 2.1 {RATIO} (ref 1.1–2.2)
ALP SERPL-CCNC: 105 U/L (ref 40–129)
ALT SERPL-CCNC: 22 U/L (ref 10–40)
ANION GAP SERPL CALCULATED.3IONS-SCNC: 11 MMOL/L (ref 3–16)
AST SERPL-CCNC: 24 U/L (ref 15–37)
BASOPHILS # BLD: 0 K/UL (ref 0–0.2)
BASOPHILS NFR BLD: 0.6 %
BILIRUB SERPL-MCNC: 1.2 MG/DL (ref 0–1)
BUN SERPL-MCNC: 14 MG/DL (ref 7–20)
CALCIUM SERPL-MCNC: 10.1 MG/DL (ref 8.3–10.6)
CHLORIDE SERPL-SCNC: 105 MMOL/L (ref 99–110)
CHOLEST SERPL-MCNC: 167 MG/DL (ref 0–199)
CO2 SERPL-SCNC: 26 MMOL/L (ref 21–32)
CREAT SERPL-MCNC: 0.7 MG/DL (ref 0.6–1.2)
DEPRECATED RDW RBC AUTO: 14.8 % (ref 12.4–15.4)
EOSINOPHIL # BLD: 0.1 K/UL (ref 0–0.6)
EOSINOPHIL NFR BLD: 2 %
GFR SERPLBLD CREATININE-BSD FMLA CKD-EPI: >60 ML/MIN/{1.73_M2}
GLUCOSE SERPL-MCNC: 90 MG/DL (ref 70–99)
HCT VFR BLD AUTO: 43.2 % (ref 36–48)
HDLC SERPL-MCNC: 80 MG/DL (ref 40–60)
HGB BLD-MCNC: 14.5 G/DL (ref 12–16)
LDLC SERPL CALC-MCNC: 57 MG/DL
LYMPHOCYTES # BLD: 2.3 K/UL (ref 1–5.1)
LYMPHOCYTES NFR BLD: 33.8 %
MCH RBC QN AUTO: 30.8 PG (ref 26–34)
MCHC RBC AUTO-ENTMCNC: 33.5 G/DL (ref 31–36)
MCV RBC AUTO: 91.9 FL (ref 80–100)
MONOCYTES # BLD: 0.6 K/UL (ref 0–1.3)
MONOCYTES NFR BLD: 8.3 %
NEUTROPHILS # BLD: 3.7 K/UL (ref 1.7–7.7)
NEUTROPHILS NFR BLD: 55.3 %
PLATELET # BLD AUTO: 250 K/UL (ref 135–450)
PMV BLD AUTO: 8.6 FL (ref 5–10.5)
POTASSIUM SERPL-SCNC: 4.2 MMOL/L (ref 3.5–5.1)
PROT SERPL-MCNC: 6.6 G/DL (ref 6.4–8.2)
RBC # BLD AUTO: 4.71 M/UL (ref 4–5.2)
SODIUM SERPL-SCNC: 142 MMOL/L (ref 136–145)
TRIGL SERPL-MCNC: 151 MG/DL (ref 0–150)
VLDLC SERPL CALC-MCNC: 30 MG/DL
WBC # BLD AUTO: 6.7 K/UL (ref 4–11)

## 2023-12-26 PROCEDURE — 1123F ACP DISCUSS/DSCN MKR DOCD: CPT

## 2023-12-26 PROCEDURE — G0439 PPPS, SUBSEQ VISIT: HCPCS

## 2023-12-26 PROCEDURE — 36415 COLL VENOUS BLD VENIPUNCTURE: CPT

## 2023-12-26 RX ORDER — PANTOPRAZOLE SODIUM 40 MG/1
40 TABLET, DELAYED RELEASE ORAL 2 TIMES DAILY
Qty: 180 TABLET | Refills: 3 | Status: SHIPPED | OUTPATIENT
Start: 2023-12-26

## 2023-12-26 RX ORDER — FLUOCINOLONE ACETONIDE 0.11 MG/ML
OIL TOPICAL
COMMUNITY
Start: 2023-11-01

## 2023-12-26 NOTE — PATIENT INSTRUCTIONS
You may receive a survey regarding the care you received during your visit. Your input is valuable to us. We encourage you to complete and return your survey. We hope you will choose us in the future for your healthcare needs. GENERAL OFFICE POLICIES      Telephone Calls: Messages will be answered within 1-2 business days, unless the provider is out of the office. If it is urgent a covering provider will answer. (this does not include Medication refills). MyChart: We recommend all patients sign up for [facility]. Through this portal you can see your lab results, request refills, schedule appointments, pay your bill and send messages to the office. [facility] messages will be answered within 1-2 business days unless the provider is out of the office. For urgent matters, please call the office. Appointments:  All appointments must be scheduled. We ask all patients to schedule their next follow up appointment before they leave the office to make sure you will be able to be seen before you run out of medications. 24 hours notice is required to cancel or reschedule an appointment to avoid being marked as a no show. You may be dismissed from the practice after 3 no shows. LATE for Appointment: If you are 15 or more minutes late for your appointment, you may be asked to reschedule. MA/LAB APPTS: Must be scheduled, cannot accept walk in lab visits. We only draw labs for patients established in our office. We only do injections for medications ordered by our office. Acute Sick Visits:  Nothing other than acute complaint will be addressed at this visit. TRADITIONAL MEDICARE  DOES NOT COVER PHYSICALS  MEDICARE WELLNESS VISITS: These are NOT physicals but the free annual visit offered by Medicare to discuss wellness issues. Medication refills, checkups, etc. will not be addressed during this visit.   Medication Refills: Refills are handled electronically so please contact your pharmacy for medication refills

## 2023-12-26 NOTE — PROGRESS NOTES
Medicare Annual Wellness Visit    Magaly Yousif is here for Medicare AWV (AWV- pt is fasting for blood work ) and Other (Pt would like to get higher dose then 40mg pantoprazole )    Assessment & Plan   Medicare annual wellness visit, subsequent  -Personal medical history, surgical history, family history reviewed. Medications reconciled. -Care gaps addressed. Agreeable to screenings, deferred vaccinations today. -AWV healthcare topics discussed.  -Educated on office policies and procedures.  -Follow-up in 6 months chronic conditions. Chronic sinusitis, unspecified location  -Referral placed. Information provided to the patient. Educated it is their responsibility to follow-up on this. The patient verbalized understanding.  -Follow-up with ENT. -     [address] Payam Gimenez DO, Otolaryngology, [facility]  Bilateral hearing loss, unspecified hearing loss type  -Referral placed. Information provided to the patient. Educated it is their responsibility to follow-up on this. The patient verbalized understanding.  -Follow-up with ENT. -    825 Payam Gimenez DO, Otolaryngology, [facility]  Paroxysmal atrial fibrillation [facility])  -Managed with metoprolol, aspirin.  -Continue following with cardiology. -     Comprehensive Metabolic Panel; Future  -     CBC with Auto Differential; Future  Coronary artery disease involving native coronary artery of native heart without angina pectoris  -Managed with metoprolol, atorvastatin, aspirin.  -Continue following with cardiology. -     Lipid Panel; Future  Gastroesophageal reflux disease, unspecified whether esophagitis present  -Managing with Protonix twice daily.  -Refill sent to the pharmacy. -Recommending avoidance of triggers.  -Follow-up as needed. -     pantoprazole (PROTONIX) 40 MG tablet;  Take 1 tablet by mouth 2 times daily, Disp-180 tablet, R-3Normal    Recommendations for Preventive Services Due: see orders and patient

## 2024-01-11 DIAGNOSIS — I10 ESSENTIAL HYPERTENSION: ICD-10-CM

## 2024-01-11 RX ORDER — ATORVASTATIN CALCIUM 80 MG/1
TABLET, FILM COATED ORAL
Qty: 90 TABLET | Refills: 3 | Status: SHIPPED | OUTPATIENT
Start: 2024-01-11

## 2024-01-11 RX ORDER — TRIAMTERENE AND HYDROCHLOROTHIAZIDE 37.5; 25 MG/1; MG/1
1 TABLET ORAL DAILY
Qty: 90 TABLET | Refills: 3 | Status: SHIPPED | OUTPATIENT
Start: 2024-01-11

## 2024-02-20 ENCOUNTER — TELEPHONE (OUTPATIENT)
Dept: FAMILY MEDICINE CLINIC | Age: 89
End: 2024-02-20

## 2024-02-20 NOTE — TELEPHONE ENCOUNTER
Patient was calling to get a prescription or letter to get a Handicap sticker.     Can we please give her a call

## 2024-03-05 DIAGNOSIS — I10 ESSENTIAL HYPERTENSION: ICD-10-CM

## 2024-03-05 RX ORDER — METOPROLOL SUCCINATE 50 MG/1
TABLET, EXTENDED RELEASE ORAL
Qty: 90 TABLET | Refills: 3 | Status: SHIPPED | OUTPATIENT
Start: 2024-03-05

## 2024-05-01 ENCOUNTER — HOSPITAL ENCOUNTER (OUTPATIENT)
Age: 89
Discharge: HOME | End: 2024-05-01
Payer: MEDICARE

## 2024-05-01 ENCOUNTER — OFFICE VISIT (OUTPATIENT)
Dept: CARDIOLOGY CLINIC | Age: 89
End: 2024-05-01
Payer: MEDICARE

## 2024-05-01 VITALS
WEIGHT: 174 LBS | HEIGHT: 62 IN | DIASTOLIC BLOOD PRESSURE: 82 MMHG | OXYGEN SATURATION: 99 % | SYSTOLIC BLOOD PRESSURE: 144 MMHG | BODY MASS INDEX: 32.02 KG/M2 | HEART RATE: 59 BPM

## 2024-05-01 DIAGNOSIS — R06.02 SHORTNESS OF BREATH: ICD-10-CM

## 2024-05-01 DIAGNOSIS — E78.2 MIXED HYPERLIPIDEMIA: ICD-10-CM

## 2024-05-01 DIAGNOSIS — I25.810 CORONARY ARTERY DISEASE INVOLVING CORONARY BYPASS GRAFT OF NATIVE HEART WITHOUT ANGINA PECTORIS: Primary | ICD-10-CM

## 2024-05-01 DIAGNOSIS — I10 PRIMARY HYPERTENSION: ICD-10-CM

## 2024-05-01 LAB
ALBUMIN SERPL-MCNC: 4.1 G/DL (ref 3.4–5)
ALBUMIN/GLOB SERPL: 1.8 {RATIO} (ref 1.1–2.2)
ALP SERPL-CCNC: 93 U/L (ref 40–129)
ALT SERPL-CCNC: 21 U/L (ref 10–40)
ANION GAP SERPL CALCULATED.3IONS-SCNC: 13 MMOL/L (ref 3–16)
AST SERPL-CCNC: 23 U/L (ref 15–37)
BILIRUB SERPL-MCNC: 1.6 MG/DL (ref 0–1)
BUN SERPL-MCNC: 14 MG/DL (ref 7–20)
CALCIUM SERPL-MCNC: 10 MG/DL (ref 8.3–10.6)
CHLORIDE SERPL-SCNC: 102 MMOL/L (ref 99–110)
CHOLEST SERPL-MCNC: 172 MG/DL (ref 0–199)
CO2 SERPL-SCNC: 26 MMOL/L (ref 21–32)
CREAT SERPL-MCNC: 0.7 MG/DL (ref 0.6–1.2)
GFR SERPLBLD CREATININE-BSD FMLA CKD-EPI: 84 ML/MIN/{1.73_M2}
GLUCOSE SERPL-MCNC: 87 MG/DL (ref 70–99)
HDLC SERPL-MCNC: 76 MG/DL (ref 40–60)
LDLC SERPL CALC-MCNC: 62 MG/DL
NT-PROBNP SERPL-MCNC: 543 PG/ML (ref 0–449)
POTASSIUM SERPL-SCNC: 4 MMOL/L (ref 3.5–5.1)
PROT SERPL-MCNC: 6.4 G/DL (ref 6.4–8.2)
SODIUM SERPL-SCNC: 141 MMOL/L (ref 136–145)
TRIGL SERPL-MCNC: 169 MG/DL (ref 0–150)
VLDLC SERPL CALC-MCNC: 34 MG/DL

## 2024-05-01 PROCEDURE — 80061 LIPID PANEL: CPT

## 2024-05-01 PROCEDURE — 99214 OFFICE O/P EST MOD 30 MIN: CPT | Performed by: NURSE PRACTITIONER

## 2024-05-01 PROCEDURE — 36415 COLL VENOUS BLD VENIPUNCTURE: CPT

## 2024-05-01 PROCEDURE — 80053 COMPREHEN METABOLIC PANEL: CPT

## 2024-05-01 PROCEDURE — 1123F ACP DISCUSS/DSCN MKR DOCD: CPT | Performed by: NURSE PRACTITIONER

## 2024-05-01 PROCEDURE — 83880 ASSAY OF NATRIURETIC PEPTIDE: CPT

## 2024-05-01 RX ORDER — OMEPRAZOLE 20 MG/1
40 CAPSULE, DELAYED RELEASE ORAL DAILY
COMMUNITY
End: 2024-05-01 | Stop reason: ALTCHOICE

## 2024-05-01 NOTE — PATIENT INSTRUCTIONS
Labs today     Chemical stress test : reassess your heart's circulation since you've been more winded and having indigestion     Appt in six months depending on your test results

## 2024-05-01 NOTE — PROGRESS NOTES
is mildly dilated    Stress Test:    Summary    There is normal isotope uptake at stress and rest. There is no evidence of    myocardial ischemia or scar.    Normal LV function.    Left ventricular ejection fraction of 66 %.    Overall findings represent a low risk scan.      Cardiac Cath LV21  Anatomy:   LM-nml   LAD-mid 100% . Patent LIMA to distal LAD. Distal LAD diffuse disease up to 90%. SVG to D1 patent  Cx-nml  OM- nml  RCA-prox 40%, distal 40%  RPDA- nml  LVEF- 65  LVG- nml  LVEDP- 2  Impression  ~Coronary Angiography w/ severe multi Vessel CAD  ~LVG with LVEF of 65 and no regional wall motion abnormalities    Echo: May '23  Summary   *Off-axis parasternals due to patients limited mobility.   *Left ventricle - normal size, thickness function EF 60%   *Aortic valve - sclerotic, not well seen, trivial regurgitation, cannot rule   out subaortic membrane (image 10)   *Tricuspid valve - mild regurgitation with RVSP of 27mmHg     Assessment:      Diagnosis Orders   1. Coronary artery disease involving coronary bypass graft of native heart without angina pectoris   ~possible change as evidence by indigestion / SOB / fatigue vs GI / allergies / lack of sleep  ~normal LVEF by echo May '23  ~s/p CABG ' ; reporting reproducible surgical discomfort  ~patent bypass conduits by cath   ~ASA / BB / statin        2. Primary hypertension   ~reasonably controlled   ~Maxzide / toprol  ~mild CLVH on echo       3. Mixed hyperlipidemia   ~remains controlled  ~high intensity atorvastatin          I had the opportunity to review the clinical symptoms and presentation of Marli Alcantar.   Plan:     LexiScan myoview : anginal equivalent indigestion in addition to worsening SOB and fatigue  CMP/BNP today with non-fasting lipid profile  F/U in six months with Dr. Hill / test dependent     Overall the patient is stable from CV standpoint    I have addresed the patient's cardiac risk factors and adjusted

## 2024-05-15 ENCOUNTER — HOSPITAL ENCOUNTER (OUTPATIENT)
Age: 89
Discharge: HOME | End: 2024-05-17
Payer: MEDICARE

## 2024-05-15 VITALS
BODY MASS INDEX: 31.28 KG/M2 | HEIGHT: 62 IN | SYSTOLIC BLOOD PRESSURE: 174 MMHG | DIASTOLIC BLOOD PRESSURE: 78 MMHG | WEIGHT: 170 LBS | HEART RATE: 54 BPM

## 2024-05-15 DIAGNOSIS — I25.810 CORONARY ARTERY DISEASE INVOLVING CORONARY BYPASS GRAFT OF NATIVE HEART WITHOUT ANGINA PECTORIS: ICD-10-CM

## 2024-05-15 DIAGNOSIS — R06.02 SHORTNESS OF BREATH: ICD-10-CM

## 2024-05-15 LAB
ECHO BSA: 1.84 M2
NUC STRESS EJECTION FRACTION: 75 %
NUC STRESS LV EDV: 92 ML (ref 56–104)
NUC STRESS LV ESV: 23 ML (ref 19–49)
NUC STRESS LV MASS: 134 G
STRESS BASELINE DIAS BP: 78 MMHG
STRESS BASELINE HR: 48 BPM
STRESS BASELINE SYS BP: 174 MMHG
STRESS ESTIMATED WORKLOAD: 1 METS
STRESS EXERCISE DUR MIN: 4 MIN
STRESS EXERCISE DUR SEC: 0 SEC
STRESS O2 SAT PEAK: 96 %
STRESS O2 SAT REST: 96 %
STRESS PEAK DIAS BP: 95 MMHG
STRESS PEAK SYS BP: 200 MMHG
STRESS PERCENT HR ACHIEVED: 75 %
STRESS POST PEAK HR: 100 BPM
STRESS RATE PRESSURE PRODUCT: NORMAL BPM*MMHG
STRESS TARGET HR: 133 BPM
TID: 1.2

## 2024-05-15 PROCEDURE — 78452 HT MUSCLE IMAGE SPECT MULT: CPT

## 2024-05-15 PROCEDURE — 93017 CV STRESS TEST TRACING ONLY: CPT

## 2024-05-15 PROCEDURE — 6360000002 HC RX W HCPCS: Performed by: INTERNAL MEDICINE

## 2024-05-15 RX ORDER — REGADENOSON 0.08 MG/ML
0.4 INJECTION, SOLUTION INTRAVENOUS
Status: COMPLETED | OUTPATIENT
Start: 2024-05-15 | End: 2024-05-15

## 2024-05-15 RX ORDER — AMINOPHYLLINE 25 MG/ML
100 INJECTION, SOLUTION INTRAVENOUS ONCE
Status: COMPLETED | OUTPATIENT
Start: 2024-05-15 | End: 2024-05-15

## 2024-05-15 RX ADMIN — AMINOPHYLLINE 100 MG: 25 INJECTION, SOLUTION INTRAVENOUS at 14:12

## 2024-05-15 RX ADMIN — REGADENOSON 0.4 MG: 0.08 INJECTION, SOLUTION INTRAVENOUS at 14:06

## 2024-05-20 ENCOUNTER — TELEPHONE (OUTPATIENT)
Dept: CARDIOLOGY CLINIC | Age: 89
End: 2024-05-20

## 2024-05-20 NOTE — TELEPHONE ENCOUNTER
Pt states she was advised not to use omeprazole for heart burn, but she needs something desperately to help the heart burn.    Please advise.

## 2024-05-20 NOTE — TELEPHONE ENCOUNTER
Patient called in to get results from blood work but she had some problems.    Complains of feeling off-balance, chronic SOB with exertion and achy pain in chest.    BP  144/82

## 2024-11-01 ENCOUNTER — OFFICE VISIT (OUTPATIENT)
Dept: CARDIOLOGY CLINIC | Age: 89
End: 2024-11-01

## 2024-11-01 VITALS
SYSTOLIC BLOOD PRESSURE: 148 MMHG | HEART RATE: 55 BPM | DIASTOLIC BLOOD PRESSURE: 80 MMHG | HEIGHT: 62 IN | WEIGHT: 174 LBS | OXYGEN SATURATION: 98 % | BODY MASS INDEX: 32.02 KG/M2

## 2024-11-01 DIAGNOSIS — I25.810 CORONARY ARTERY DISEASE INVOLVING CORONARY BYPASS GRAFT OF NATIVE HEART WITHOUT ANGINA PECTORIS: Primary | ICD-10-CM

## 2024-11-01 DIAGNOSIS — I10 PRIMARY HYPERTENSION: ICD-10-CM

## 2024-11-01 DIAGNOSIS — E78.2 MIXED HYPERLIPIDEMIA: ICD-10-CM

## 2024-11-01 RX ORDER — AMLODIPINE BESYLATE 2.5 MG/1
2.5 TABLET ORAL NIGHTLY
Qty: 30 TABLET | Refills: 3 | Status: SHIPPED | OUTPATIENT
Start: 2024-11-01

## 2024-11-01 RX ORDER — AMLODIPINE BESYLATE 2.5 MG/1
2.5 TABLET ORAL NIGHTLY
Qty: 90 TABLET | OUTPATIENT
Start: 2024-11-01

## 2024-11-01 NOTE — PATIENT INSTRUCTIONS
Begin amlodipine / norvasc 2.5 mg every evening to help with better BP control    BP check in 3 weeks then appointment in six months

## 2024-11-01 NOTE — PROGRESS NOTES
mild concentric left ventricular hypertrophy.   E'e is 8.9   Mild mitral regurgitation.   Trace aortic regurgitation.   Mild-to-moderate tricuspid regurgitation with estimated PASP of 28mmHg .   Right ventricular function appears mildly reduced. TAPSE is 1.46cm.   The left atrium is mildly dilated    Stress Test:    Summary    There is normal isotope uptake at stress and rest. There is no evidence of    myocardial ischemia or scar.    Normal LV function.    Left ventricular ejection fraction of 66 %.    Overall findings represent a low risk scan.      Cardiac Cath LV21  Anatomy:   LM-nml   LAD-mid 100% . Patent LIMA to distal LAD. Distal LAD diffuse disease up to 90%. SVG to D1 patent  Cx-nml  OM- nml  RCA-prox 40%, distal 40%  RPDA- nml  LVEF- 65  LVG- nml  LVEDP- 2  Impression  ~Coronary Angiography w/ severe multi Vessel CAD  ~LVG with LVEF of 65 and no regional wall motion abnormalities    Echo: May '23  Summary   *Off-axis parasternals due to patients limited mobility.   *Left ventricle - normal size, thickness function EF 60%   *Aortic valve - sclerotic, not well seen, trivial regurgitation, cannot rule   out subaortic membrane (image 10)   *Tricuspid valve - mild regurgitation with RVSP of 27mmHg    Myoview: May '24      Stress Combined Conclusion: Findings suggest a low risk of cardiac events.    Stress Function: Left ventricular function post-stress is normal. Post-stress ejection fraction is 75%. Stress end diastolic volume: 92 mL. Stress end systolic volume: 23 mL. LV mass: 134.0 g.    Perfusion Comments: LV perfusion is normal. There is no evidence of inducible ischemia.    Perfusion Conclusion: TID ratio is 1.20.    Image quality is good.    ECG: Resting ECG demonstrates normal sinus rhythm and interventricular conduction delay.    ECG: The stress ECG was not diagnostic due to pharmacologic protocol.     Assessment:      Diagnosis Orders   1. Coronary artery disease involving coronary

## 2024-11-05 ENCOUNTER — TELEPHONE (OUTPATIENT)
Dept: CARDIOLOGY CLINIC | Age: 89
End: 2024-11-05

## 2024-11-05 NOTE — TELEPHONE ENCOUNTER
Tried to call pt and relay NPTS message. Unable to reach pt as our number is blocked from reaching her. Also tried to call Hipolito son on HIPAA and no answer.

## 2024-11-05 NOTE — TELEPHONE ENCOUNTER
Pt asking for medication clarification. States she was given a new medication and asking if she is to continue with metoprolol and triamterene - hydrochlorothiazide. Please call to advise.

## 2024-11-06 NOTE — TELEPHONE ENCOUNTER
Attempted to call pt. Smart Call Blocker would not allow the call to go through.    Tried the mobile number, no answer and no VM available.

## 2024-11-07 NOTE — TELEPHONE ENCOUNTER
Attempted to call pt. Smart Call Blocker would not allow the call to go through.     Tried the mobile number, no answer and no VM available.    Called daughter who is on HIPAA and left a VM with her to call back.

## 2024-11-12 DIAGNOSIS — K21.9 GASTROESOPHAGEAL REFLUX DISEASE, UNSPECIFIED WHETHER ESOPHAGITIS PRESENT: ICD-10-CM

## 2024-11-12 RX ORDER — PANTOPRAZOLE SODIUM 40 MG/1
40 TABLET, DELAYED RELEASE ORAL 2 TIMES DAILY
Qty: 180 TABLET | Refills: 0 | Status: SHIPPED | OUTPATIENT
Start: 2024-11-12

## 2024-11-25 ENCOUNTER — NURSE ONLY (OUTPATIENT)
Dept: CARDIOLOGY CLINIC | Age: 89
End: 2024-11-25

## 2024-11-25 ENCOUNTER — TELEPHONE (OUTPATIENT)
Dept: CARDIOLOGY CLINIC | Age: 89
End: 2024-11-25

## 2024-11-25 VITALS — SYSTOLIC BLOOD PRESSURE: 138 MMHG | DIASTOLIC BLOOD PRESSURE: 60 MMHG

## 2024-11-25 DIAGNOSIS — I10 PRIMARY HYPERTENSION: Primary | ICD-10-CM

## 2024-11-25 NOTE — PROGRESS NOTES
I took pt BP. She is not tolerating the new med  very well  notes that she has weakness and dizziness more than normal,She notes that she has CP since her double bypass but was advised that that is normal. Pt was advised that NPTS not available and that should s/s worsen to go to the ER She says she feels spaced out and not in control of herself any more.    Recent home checks    11/13  125/75  11/15 175/83  11/15  142/79  11/16  128/66  11/17  155/79  11/19  168/80             169/78  11/21  149/73              168/80  11/23  147/78  11/24  188/88

## 2024-11-25 NOTE — TELEPHONE ENCOUNTER
Attempted to call and discuss with pt. No answer. Unable to leave VM.    PSC recommends that she see PCP as soon as able for BP check appt.

## 2024-11-25 NOTE — TELEPHONE ENCOUNTER
I took pt BP. She is not tolerating the new med  very well  notes that she has weakness and dizziness more than normal,She notes that she has CP since her double bypass but was advised that that is normal. Pt was advised that NPTS not available and that should s/s worsen to go to the ER She says she feels spaced out and not in control of herself any more.     Recent home checks     11/13  125/75  11/15 175/83  11/15  142/79  11/16  128/66  11/17  155/79  11/19  168/80             169/78  11/21  149/73              168/80  11/23  147/78  11/24  188/88         Pt came in for a BP check. Not feeling well on new medication.   148/80 1st check  138/60 2nd

## 2024-11-29 NOTE — TELEPHONE ENCOUNTER
Tried both numbers, the first one is blocked, the second on does not have a VM set up. LM for daughter (on HIPAA) to call back about message below.

## 2024-12-30 ENCOUNTER — OFFICE VISIT (OUTPATIENT)
Dept: FAMILY MEDICINE CLINIC | Age: 89
End: 2024-12-30
Payer: MEDICARE

## 2024-12-30 VITALS
WEIGHT: 176 LBS | DIASTOLIC BLOOD PRESSURE: 78 MMHG | HEIGHT: 62 IN | SYSTOLIC BLOOD PRESSURE: 130 MMHG | BODY MASS INDEX: 32.39 KG/M2 | OXYGEN SATURATION: 97 % | HEART RATE: 64 BPM

## 2024-12-30 DIAGNOSIS — Z00.00 MEDICARE ANNUAL WELLNESS VISIT, SUBSEQUENT: Primary | ICD-10-CM

## 2024-12-30 PROCEDURE — 1123F ACP DISCUSS/DSCN MKR DOCD: CPT

## 2024-12-30 PROCEDURE — 1159F MED LIST DOCD IN RCRD: CPT

## 2024-12-30 PROCEDURE — 1160F RVW MEDS BY RX/DR IN RCRD: CPT

## 2024-12-30 PROCEDURE — G0439 PPPS, SUBSEQ VISIT: HCPCS

## 2024-12-30 RX ORDER — KETOCONAZOLE 20 MG/ML
SHAMPOO, SUSPENSION TOPICAL
COMMUNITY
Start: 2024-10-09

## 2024-12-30 RX ORDER — MELOXICAM 7.5 MG/1
7.5 TABLET ORAL DAILY PRN
Qty: 90 TABLET | Refills: 1 | Status: SHIPPED | OUTPATIENT
Start: 2024-12-30

## 2024-12-30 SDOH — ECONOMIC STABILITY: FOOD INSECURITY: WITHIN THE PAST 12 MONTHS, YOU WORRIED THAT YOUR FOOD WOULD RUN OUT BEFORE YOU GOT MONEY TO BUY MORE.: NEVER TRUE

## 2024-12-30 SDOH — ECONOMIC STABILITY: FOOD INSECURITY: WITHIN THE PAST 12 MONTHS, THE FOOD YOU BOUGHT JUST DIDN'T LAST AND YOU DIDN'T HAVE MONEY TO GET MORE.: NEVER TRUE

## 2024-12-30 SDOH — ECONOMIC STABILITY: INCOME INSECURITY: HOW HARD IS IT FOR YOU TO PAY FOR THE VERY BASICS LIKE FOOD, HOUSING, MEDICAL CARE, AND HEATING?: NOT HARD AT ALL

## 2024-12-30 ASSESSMENT — PATIENT HEALTH QUESTIONNAIRE - PHQ9
2. FEELING DOWN, DEPRESSED OR HOPELESS: NOT AT ALL
SUM OF ALL RESPONSES TO PHQ9 QUESTIONS 1 & 2: 0
SUM OF ALL RESPONSES TO PHQ QUESTIONS 1-9: 0
1. LITTLE INTEREST OR PLEASURE IN DOING THINGS: NOT AT ALL
SUM OF ALL RESPONSES TO PHQ QUESTIONS 1-9: 0

## 2024-12-30 ASSESSMENT — LIFESTYLE VARIABLES
HOW MANY STANDARD DRINKS CONTAINING ALCOHOL DO YOU HAVE ON A TYPICAL DAY: PATIENT DOES NOT DRINK
HOW OFTEN DO YOU HAVE A DRINK CONTAINING ALCOHOL: NEVER

## 2024-12-30 NOTE — PROGRESS NOTES
getting up for bathroom overnight. Takes steps 1 at a time going down, son takes laundry down the stairs for her.    Reviewed medications, home hazards, visual acuity, and co-morbidities that can increase risk for falls  Patient declines any further evaluation or treatment            General HRA Questions:  Select all that apply: (!) New or Increased Pain, New or Increased Fatigue  Interventions - Pain:  Patient comments: arthritis causes pain, thinks it impacts BP. Pain can get up to 8/10, hips, knees, feet, shoulder. Takes tylenol, has to get up and walk overnight. Uses voltaren gel on knees, helps   Patient advised to follow up in the office for further evaluation and treatment  Interventions Fatigue:  Patient comments: secondary to pain, interrupted sleep  See above        Abnormal BMI (obese):  Body mass index is 32.19 kg/m². (!) Abnormal  Interventions:  Patient comments: diet worse from children/grandchildren, limited activity at home w/ cane   Patient declines any further evaluation or treatment         Hearing Screen:  Do you or your family notice any trouble with your hearing that hasn't been managed with hearing aids?: (!) Yes    Interventions:  Patient comments: ears hurt from previous hearing aids, notes PND. Too expensive for now  Patient declines any further evaluation or treatment     Safety:  Do you have any tripping hazards - loose or unsecured carpets or rugs?: (!) Yes  Interventions:  Patient comments: some around the house, on the stairs down stairs. Throw rug in bathroom     Advanced Directives:  Do you have a Living Will?: (!) No    Intervention:  has NO advanced directive - not interested in additional information                     Objective   Vitals:    12/30/24 0955   BP: 130/78   Site: Left Upper Arm   Position: Sitting   Cuff Size: Medium Adult   Pulse: 64   SpO2: 97%   Weight: 79.8 kg (176 lb)   Height: 1.575 m (5' 2\")      Body mass index is 32.19 kg/m².        General Appearance:  <-- Click to add NO pertinent Family History

## 2024-12-30 NOTE — PATIENT INSTRUCTIONS
them about why you are trying to lose weight, and ask them to help. They can help by participating in exercise and having meals with you, even if they may be eating something different.  Find what works best for you. If you do not have time or do not like to cook, a program that offers meal replacement bars or shakes may be better for you. Or if you like to prepare meals, finding a plan that includes daily menus and recipes may be best.  Ask your doctor about other health professionals who can help you achieve your weight loss goals.  A dietitian can help you make healthy changes in your diet.  An exercise specialist or  can help you develop a safe and effective exercise program.  A counselor or psychiatrist can help you cope with issues such as depression, anxiety, or family problems that can make it hard to focus on weight loss.  Consider joining a support group for people who are trying to lose weight. Your doctor can suggest groups in your area.  Where can you learn more?  Go to https://www.T[employer].net/patientEd and enter U357 to learn more about \"Starting a Weight Loss Plan: Care Instructions.\"  Current as of: September 20, 2023  Content Version: 14.2  © 2024 [employer].   Care instructions adapted under license by [employer]. If you have questions about a medical condition or this instruction, always ask your healthcare professional. [employer] disclaims any warranty or liability for your use of this information.           Advance Directives: Care Instructions  Overview  An advance directive is a legal way to state your wishes at the end of your life. It tells your family and your doctor what to do if you can't say what you want.  There are two main types of advance directives. You can change them any time your wishes change.  Living will.  This form tells your family and your doctor your wishes about life support and other treatment. The form is also called a

## 2025-01-06 DIAGNOSIS — I10 ESSENTIAL HYPERTENSION: ICD-10-CM

## 2025-01-06 RX ORDER — ATORVASTATIN CALCIUM 80 MG/1
TABLET, FILM COATED ORAL
Qty: 90 TABLET | Refills: 3 | Status: SHIPPED | OUTPATIENT
Start: 2025-01-06

## 2025-01-06 RX ORDER — TRIAMTERENE AND HYDROCHLOROTHIAZIDE 37.5; 25 MG/1; MG/1
1 TABLET ORAL DAILY
Qty: 90 TABLET | Refills: 3 | Status: SHIPPED | OUTPATIENT
Start: 2025-01-06

## 2025-01-13 ENCOUNTER — OFFICE VISIT (OUTPATIENT)
Dept: FAMILY MEDICINE CLINIC | Age: 89
End: 2025-01-13
Payer: MEDICARE

## 2025-01-13 VITALS
HEART RATE: 65 BPM | OXYGEN SATURATION: 97 % | BODY MASS INDEX: 32.39 KG/M2 | HEIGHT: 62 IN | DIASTOLIC BLOOD PRESSURE: 82 MMHG | SYSTOLIC BLOOD PRESSURE: 124 MMHG | WEIGHT: 176 LBS

## 2025-01-13 DIAGNOSIS — E78.2 MIXED HYPERLIPIDEMIA: Primary | ICD-10-CM

## 2025-01-13 DIAGNOSIS — Z13.1 SCREENING FOR DIABETES MELLITUS: ICD-10-CM

## 2025-01-13 DIAGNOSIS — R06.02 SHORTNESS OF BREATH: ICD-10-CM

## 2025-01-13 DIAGNOSIS — D51.9 ANEMIA DUE TO VITAMIN B12 DEFICIENCY, UNSPECIFIED B12 DEFICIENCY TYPE: ICD-10-CM

## 2025-01-13 PROCEDURE — 36415 COLL VENOUS BLD VENIPUNCTURE: CPT

## 2025-01-13 PROCEDURE — 99212 OFFICE O/P EST SF 10 MIN: CPT

## 2025-01-13 PROCEDURE — 1160F RVW MEDS BY RX/DR IN RCRD: CPT

## 2025-01-13 PROCEDURE — 1123F ACP DISCUSS/DSCN MKR DOCD: CPT

## 2025-01-13 PROCEDURE — 1159F MED LIST DOCD IN RCRD: CPT

## 2025-01-13 SDOH — ECONOMIC STABILITY: FOOD INSECURITY: WITHIN THE PAST 12 MONTHS, THE FOOD YOU BOUGHT JUST DIDN'T LAST AND YOU DIDN'T HAVE MONEY TO GET MORE.: NEVER TRUE

## 2025-01-13 SDOH — ECONOMIC STABILITY: FOOD INSECURITY: WITHIN THE PAST 12 MONTHS, YOU WORRIED THAT YOUR FOOD WOULD RUN OUT BEFORE YOU GOT MONEY TO BUY MORE.: NEVER TRUE

## 2025-01-13 ASSESSMENT — ENCOUNTER SYMPTOMS
COLOR CHANGE: 0
BACK PAIN: 1
CHEST TIGHTNESS: 0
SHORTNESS OF BREATH: 0
WHEEZING: 0
VOMITING: 0
COUGH: 0
ABDOMINAL PAIN: 0
NAUSEA: 0

## 2025-01-13 ASSESSMENT — PATIENT HEALTH QUESTIONNAIRE - PHQ9
SUM OF ALL RESPONSES TO PHQ QUESTIONS 1-9: 0
SUM OF ALL RESPONSES TO PHQ QUESTIONS 1-9: 0
SUM OF ALL RESPONSES TO PHQ9 QUESTIONS 1 & 2: 0
SUM OF ALL RESPONSES TO PHQ QUESTIONS 1-9: 0
2. FEELING DOWN, DEPRESSED OR HOPELESS: NOT AT ALL
1. LITTLE INTEREST OR PLEASURE IN DOING THINGS: NOT AT ALL
SUM OF ALL RESPONSES TO PHQ QUESTIONS 1-9: 0

## 2025-01-13 NOTE — ASSESSMENT & PLAN NOTE
Chronic, at goal (stable), obtain blood work, follow-up based on results    Orders:    Lipid Panel; Future

## 2025-01-13 NOTE — PROGRESS NOTES
Marli Alcantar (:  1935) is a 89 y.o. female,Established patient, here for evaluation of the following chief complaint(s):  Follow-up Chronic Condition (Follow up on chronic conditions)         Assessment & Plan  Mixed hyperlipidemia   Chronic, at goal (stable), obtain blood work, follow-up based on results    Orders:    Lipid Panel; Future    Anemia due to vitamin B12 deficiency, unspecified B12 deficiency type   Chronic, at goal (stable), obtain blood work, follow-up based on results    Orders:    CBC with Auto Differential; Future    Screening for diabetes mellitus       Orders:    Comprehensive Metabolic Panel; Future    Hemoglobin A1C; Future    Shortness of breath   Chronic, at goal (stable), obtain blood work, continue follow-up with cardiology    Orders:    Brain Natriuretic Peptide; Future      Return in about 5 months (around 2025) for Chronic Conditions.       Subjective   HPI  -Arthritis is a concern for her  -bilateral leg pains  -meloxicam helps, wants to take 2 a day  -having some bladder issues, sees urology  -no longer taking baby aspirin or tylenol, just meloxicam  -cannot sleep well on her R side   -likes to eat bread  -Fasting for blood work today  -No new concerns from 3 weeks ago      Review of Systems   Constitutional:  Negative for activity change, diaphoresis, fatigue and unexpected weight change.   Eyes:  Negative for visual disturbance.   Respiratory:  Negative for cough, chest tightness, shortness of breath and wheezing.    Cardiovascular:  Negative for chest pain, palpitations and leg swelling.   Gastrointestinal:  Negative for abdominal pain, nausea and vomiting.   Genitourinary:  Negative for decreased urine volume.   Musculoskeletal:  Positive for arthralgias, back pain and gait problem (Cane dependent). Negative for joint swelling, myalgias and neck pain.   Skin:  Negative for color change, pallor and wound.   Neurological:  Negative for dizziness, syncope,

## 2025-01-13 NOTE — ASSESSMENT & PLAN NOTE
Chronic, at goal (stable), obtain blood work, follow-up based on results    Orders:    CBC with Auto Differential; Future

## 2025-01-14 LAB
ALBUMIN SERPL-MCNC: 4.5 G/DL (ref 3.4–5)
ALBUMIN/GLOB SERPL: 2.4 {RATIO} (ref 1.1–2.2)
ALP SERPL-CCNC: 94 U/L (ref 40–129)
ALT SERPL-CCNC: 26 U/L (ref 10–40)
ANION GAP SERPL CALCULATED.3IONS-SCNC: 11 MMOL/L (ref 3–16)
AST SERPL-CCNC: 27 U/L (ref 15–37)
BASOPHILS # BLD: 0.1 K/UL (ref 0–0.2)
BASOPHILS NFR BLD: 1.3 %
BILIRUB SERPL-MCNC: 1.7 MG/DL (ref 0–1)
BUN SERPL-MCNC: 12 MG/DL (ref 7–20)
CALCIUM SERPL-MCNC: 10.2 MG/DL (ref 8.3–10.6)
CHLORIDE SERPL-SCNC: 103 MMOL/L (ref 99–110)
CHOLEST SERPL-MCNC: 172 MG/DL (ref 0–199)
CO2 SERPL-SCNC: 26 MMOL/L (ref 21–32)
CREAT SERPL-MCNC: 0.8 MG/DL (ref 0.6–1.2)
DEPRECATED RDW RBC AUTO: 14.3 % (ref 12.4–15.4)
EOSINOPHIL # BLD: 0.2 K/UL (ref 0–0.6)
EOSINOPHIL NFR BLD: 2.4 %
EST. AVERAGE GLUCOSE BLD GHB EST-MCNC: 102.5 MG/DL
GFR SERPLBLD CREATININE-BSD FMLA CKD-EPI: 71 ML/MIN/{1.73_M2}
GLUCOSE SERPL-MCNC: 86 MG/DL (ref 70–99)
HBA1C MFR BLD: 5.2 %
HCT VFR BLD AUTO: 42.9 % (ref 36–48)
HDLC SERPL-MCNC: 82 MG/DL (ref 40–60)
HGB BLD-MCNC: 14.5 G/DL (ref 12–16)
LDLC SERPL CALC-MCNC: 66 MG/DL
LYMPHOCYTES # BLD: 2.6 K/UL (ref 1–5.1)
LYMPHOCYTES NFR BLD: 38.2 %
MCH RBC QN AUTO: 31.7 PG (ref 26–34)
MCHC RBC AUTO-ENTMCNC: 33.9 G/DL (ref 31–36)
MCV RBC AUTO: 93.5 FL (ref 80–100)
MONOCYTES # BLD: 0.6 K/UL (ref 0–1.3)
MONOCYTES NFR BLD: 8.6 %
NEUTROPHILS # BLD: 3.3 K/UL (ref 1.7–7.7)
NEUTROPHILS NFR BLD: 49.5 %
NT-PROBNP SERPL-MCNC: 291 PG/ML (ref 0–449)
PLATELET # BLD AUTO: 235 K/UL (ref 135–450)
PMV BLD AUTO: 9.6 FL (ref 5–10.5)
POTASSIUM SERPL-SCNC: 3.9 MMOL/L (ref 3.5–5.1)
PROT SERPL-MCNC: 6.4 G/DL (ref 6.4–8.2)
RBC # BLD AUTO: 4.59 M/UL (ref 4–5.2)
SODIUM SERPL-SCNC: 140 MMOL/L (ref 136–145)
TRIGL SERPL-MCNC: 120 MG/DL (ref 0–150)
VLDLC SERPL CALC-MCNC: 24 MG/DL
WBC # BLD AUTO: 6.8 K/UL (ref 4–11)

## 2025-02-10 DIAGNOSIS — K21.9 GASTROESOPHAGEAL REFLUX DISEASE, UNSPECIFIED WHETHER ESOPHAGITIS PRESENT: ICD-10-CM

## 2025-02-10 RX ORDER — PANTOPRAZOLE SODIUM 40 MG/1
40 TABLET, DELAYED RELEASE ORAL 2 TIMES DAILY
Qty: 180 TABLET | Refills: 3 | Status: SHIPPED | OUTPATIENT
Start: 2025-02-10

## 2025-02-24 ENCOUNTER — OFFICE VISIT (OUTPATIENT)
Dept: ORTHOPEDIC SURGERY | Age: 89
End: 2025-02-24
Payer: MEDICARE

## 2025-02-24 ENCOUNTER — TELEPHONE (OUTPATIENT)
Dept: ORTHOPEDIC SURGERY | Age: 89
End: 2025-02-24

## 2025-02-24 VITALS — BODY MASS INDEX: 32.39 KG/M2 | HEIGHT: 62 IN | WEIGHT: 176 LBS

## 2025-02-24 DIAGNOSIS — R60.0 FLUID RETENTION IN LEGS: ICD-10-CM

## 2025-02-24 DIAGNOSIS — M17.11 ARTHRITIS OF RIGHT KNEE: Primary | ICD-10-CM

## 2025-02-24 PROCEDURE — 99214 OFFICE O/P EST MOD 30 MIN: CPT | Performed by: PHYSICIAN ASSISTANT

## 2025-02-24 PROCEDURE — 1160F RVW MEDS BY RX/DR IN RCRD: CPT | Performed by: PHYSICIAN ASSISTANT

## 2025-02-24 PROCEDURE — 1123F ACP DISCUSS/DSCN MKR DOCD: CPT | Performed by: PHYSICIAN ASSISTANT

## 2025-02-24 PROCEDURE — 1125F AMNT PAIN NOTED PAIN PRSNT: CPT | Performed by: PHYSICIAN ASSISTANT

## 2025-02-24 PROCEDURE — 1159F MED LIST DOCD IN RCRD: CPT | Performed by: PHYSICIAN ASSISTANT

## 2025-02-24 NOTE — PROGRESS NOTES
facility-administered medications for this visit.       Past Medical History:   Diagnosis Date    Arthritis     Atrial fibrillation (HCC) 2018    CAD in native artery 2018    Calculus of ureter 2021    Cystitis, interstitial     Gastroesophageal reflux disease 3/15/2018    GERD (gastroesophageal reflux disease)     GI bleeding     was a frequent aspirin user at that time    Heart burn     History of colonic polyps 2015    Hypertension     Interstitial cystitis     Mixed hyperlipidemia 2018    NSTEMI (non-ST elevated myocardial infarction) ([facility]) 2018    Obesity 2012    Overview:  Replaced inactive diagnosis via diagnosis import    Patient is [employer]     no blood products    Psoriasis     Rotator cuff tear, right 3/25/2013    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 Solis - w/placement of IABP    CARDIAC CATHETERIZATION      COLONOSCOPY  2015    Dr. Quiroga - w/polypectomy    CORONARY ARTERY BYPASS GRAFT  2018    Dr. Peck - off pump x2 (LIMA-LAD, L SVG-D2)    CYSTOSCOPY Left 2021    CYSTOSCOPY WITH LEFT URETEROSCOPY, STONE MANIPULATION ,STONE BASKET REMOVAL performed by Yoni Ca MD at [facility] OR    KIDNEY STONE REMOVAL      KNEE ARTHROSCOPY Right 2015    MOHS SURGERY Left     facial for skin CA    PARTIAL HYSTERECTOMY (CERVIX NOT REMOVED)      ROTATOR CUFF REPAIR Right     TRANSESOPHAGEAL ECHOCARDIOGRAM  2018    during CABG       Family History   Problem Relation Age of Onset    Breast Cancer Mother         LUNG    Lung Cancer Father         LUNG    COPD Father     Tuberculosis Father     Lung Cancer Sister          of chemo    Lung Cancer Brother     Tuberculosis Brother        Social History     Socioeconomic History    Marital

## 2025-02-25 ENCOUNTER — HOSPITAL ENCOUNTER (OUTPATIENT)
Age: 89
Discharge: HOME | End: 2025-02-25
Payer: MEDICARE

## 2025-02-25 ENCOUNTER — OFFICE VISIT (OUTPATIENT)
Dept: CARDIOLOGY CLINIC | Age: 89
End: 2025-02-25
Payer: MEDICARE

## 2025-02-25 VITALS
WEIGHT: 178 LBS | SYSTOLIC BLOOD PRESSURE: 142 MMHG | HEART RATE: 70 BPM | DIASTOLIC BLOOD PRESSURE: 62 MMHG | OXYGEN SATURATION: 99 % | BODY MASS INDEX: 32.76 KG/M2 | HEIGHT: 62 IN

## 2025-02-25 DIAGNOSIS — R06.02 SHORTNESS OF BREATH: ICD-10-CM

## 2025-02-25 DIAGNOSIS — I25.810 CORONARY ARTERY DISEASE INVOLVING CORONARY BYPASS GRAFT OF NATIVE HEART WITHOUT ANGINA PECTORIS: ICD-10-CM

## 2025-02-25 DIAGNOSIS — R06.9 UNSPECIFIED ABNORMALITIES OF BREATHING: ICD-10-CM

## 2025-02-25 DIAGNOSIS — R60.9 SWELLING: Primary | ICD-10-CM

## 2025-02-25 DIAGNOSIS — I10 PRIMARY HYPERTENSION: ICD-10-CM

## 2025-02-25 DIAGNOSIS — E78.2 MIXED HYPERLIPIDEMIA: ICD-10-CM

## 2025-02-25 PROCEDURE — 1159F MED LIST DOCD IN RCRD: CPT | Performed by: NURSE PRACTITIONER

## 2025-02-25 PROCEDURE — 1160F RVW MEDS BY RX/DR IN RCRD: CPT | Performed by: NURSE PRACTITIONER

## 2025-02-25 PROCEDURE — 36415 COLL VENOUS BLD VENIPUNCTURE: CPT

## 2025-02-25 PROCEDURE — 93000 ELECTROCARDIOGRAM COMPLETE: CPT | Performed by: NURSE PRACTITIONER

## 2025-02-25 PROCEDURE — 83880 ASSAY OF NATRIURETIC PEPTIDE: CPT

## 2025-02-25 PROCEDURE — 1123F ACP DISCUSS/DSCN MKR DOCD: CPT | Performed by: NURSE PRACTITIONER

## 2025-02-25 PROCEDURE — G2211 COMPLEX E/M VISIT ADD ON: HCPCS | Performed by: NURSE PRACTITIONER

## 2025-02-25 PROCEDURE — 99214 OFFICE O/P EST MOD 30 MIN: CPT | Performed by: NURSE PRACTITIONER

## 2025-02-25 RX ORDER — FUROSEMIDE 20 MG/1
20 TABLET ORAL 2 TIMES DAILY
Qty: 60 TABLET | Refills: 1 | Status: SHIPPED | OUTPATIENT
Start: 2025-02-25

## 2025-02-25 RX ORDER — FUROSEMIDE 20 MG/1
20 TABLET ORAL 2 TIMES DAILY
Qty: 180 TABLET | OUTPATIENT
Start: 2025-02-25

## 2025-02-25 NOTE — PATIENT INSTRUCTIONS
Stop triamtrene-hydrochlorothiazide and begin lasix 20 mg tablets : 1 tablet twice a day (morning and midday)    Labs today     Ultrasound of your legs    Ultrasound of your heart    Appt in 2-3 weeks

## 2025-02-25 NOTE — PROGRESS NOTES
[facility]     Outpatient Follow Up Note    Marli Alcantar is 89 y.o. female who presents today with a history of CAD s/p CABG '18 with post-op AF, HTN, tricuspid regurg and hyperlipidemia     CHIEF COMPLAINT / HPI:  Follow Up secondary to LE edema    Subjective:   She was at the ortho yesterday and talked about her leg swelling. Her legs are always puffy but a month ago the Rt leg started swelling more. Then a week ago, she banged into something and injured her right lower leg. It bruised and hurts.     As long as she has her cane, walks slowly she's ok with breathing. She gets out of breath if she doesn't have anything to support herself. She becomes SOB talking; lifting stuff, stretching to reach dishes. Its persistent since her bypass.   She has SOB all the time and actually worse over the past month. She denies orthopnea/PND. Her wt is up ~ 4# / four months.  She has chest tightness and sometimes a actual pain that goes to left under her arm. It comes/goes. It last quite a while. Its a hard pain. Its the soreness she had when last seen.     She denies palpitations / dizziness.   She gets gas for which she takes Gas-X and PPI. She can tell the difference from her angina since she belches and passes flatus.  Her angina equivalent was indigestion, hard pain of which she denies recurrence.     Complaints at her last appt in Nov '24:  The patient has swelling in her legs from arthritis. Her Rt leg as been swollen for years. She's supposed get a Rt hip and knee replaced. She keeps putting everything off since she has house guest that aren't planning on moving out.  Her fatigue is chronic. She doesn't sleep well and gets up several times to go to the BR. When she lays on her back she finds herself with her mouth open and waking up from snoring. She's not interested in having a sleep study right now (has son, daughter and her 2 kids in addition to 2 dogs living with her)    These symptoms are somewhat

## 2025-02-25 NOTE — TELEPHONE ENCOUNTER
Received refill request for  furosemide (LASIX) 20 MG tablet  from [facility] pharmacy.     Last OV: 2/25/2025 NPTS    Next OV: 5/5/2025 NPTS    Last Labs: 1/13/2025 CMP    Last Filled:

## 2025-02-26 ENCOUNTER — TELEPHONE (OUTPATIENT)
Dept: CARDIOLOGY CLINIC | Age: 89
End: 2025-02-26

## 2025-02-26 ENCOUNTER — HOSPITAL ENCOUNTER (OUTPATIENT)
Dept: VASCULAR LAB | Age: 89
Discharge: HOME | End: 2025-02-28
Payer: MEDICARE

## 2025-02-26 DIAGNOSIS — R06.9 UNSPECIFIED ABNORMALITIES OF BREATHING: ICD-10-CM

## 2025-02-26 DIAGNOSIS — R60.9 SWELLING: ICD-10-CM

## 2025-02-26 LAB — NT-PROBNP SERPL-MCNC: 237 PG/ML (ref 0–449)

## 2025-02-26 PROCEDURE — 93970 EXTREMITY STUDY: CPT

## 2025-02-26 NOTE — TELEPHONE ENCOUNTER
LMOM for pt with vascular study results per NPTS. Advised to call back with questions or concerns.

## 2025-02-26 NOTE — TELEPHONE ENCOUNTER
----- Message from RIGOBERTO Miranda CNP sent at 2/26/2025  8:29 AM EST -----  Level not elevated ; diuretic changed at OV yesterday for LE edema

## 2025-02-28 DIAGNOSIS — I10 ESSENTIAL HYPERTENSION: ICD-10-CM

## 2025-02-28 RX ORDER — METOPROLOL SUCCINATE 50 MG/1
TABLET, EXTENDED RELEASE ORAL
Qty: 90 TABLET | Refills: 3 | Status: SHIPPED | OUTPATIENT
Start: 2025-02-28

## 2025-03-06 ENCOUNTER — TELEPHONE (OUTPATIENT)
Dept: ORTHOPEDIC SURGERY | Age: 89
End: 2025-03-06

## 2025-03-06 DIAGNOSIS — M17.11 ARTHRITIS OF RIGHT KNEE: Primary | ICD-10-CM

## 2025-03-06 NOTE — TELEPHONE ENCOUNTER
General Question     Subject: GEL INJ / APPROVAL   Patient and /or Facility Request: Marli Alcantar   Contact Number: [phone]     PATIENT CALLING TO FOLLOW UP ON GEL INJ FOR PATIENT RT KNEE     PLEASE ADVISE

## 2025-03-06 NOTE — TELEPHONE ENCOUNTER
[employer] Medicare preferred are:   ORTHOVISC, MONOVISC, SYNVISC, & SYNVISC-ONE.      PLEASE PUT IN A NEW ORDER FOR PREFERRED BE SURE TO USE OA DX.    Will place order for Monovisc for approval

## 2025-03-17 ENCOUNTER — TELEPHONE (OUTPATIENT)
Dept: ORTHOPEDIC SURGERY | Age: 89
End: 2025-03-17

## 2025-03-17 NOTE — TELEPHONE ENCOUNTER
3/12/2025.  MONOVISC () series of 1. APPROVED.  # [id number].  DATES 3/11/2025-9/10/2025. RIGHT KNEE M17.11.  BUY AND BILL YES.  Per FAX from INSURANCE.         Called to schedule her and the # was busy

## 2025-03-17 NOTE — TELEPHONE ENCOUNTER
Tried calling patient and # is using smartblocker and our number is not authorized and automated voice said please hang up -- it patient calls please schedule her for monovisc inj right knee -- it has been approved

## 2025-03-19 ENCOUNTER — HOSPITAL ENCOUNTER (OUTPATIENT)
Age: 89
Discharge: HOME | End: 2025-03-21
Payer: MEDICARE

## 2025-03-19 VITALS
BODY MASS INDEX: 32.76 KG/M2 | HEIGHT: 62 IN | WEIGHT: 178 LBS | DIASTOLIC BLOOD PRESSURE: 76 MMHG | SYSTOLIC BLOOD PRESSURE: 163 MMHG

## 2025-03-19 DIAGNOSIS — I25.810 CORONARY ARTERY DISEASE INVOLVING CORONARY BYPASS GRAFT OF NATIVE HEART WITHOUT ANGINA PECTORIS: ICD-10-CM

## 2025-03-19 LAB
ECHO AO ASC DIAM: 4.5 CM
ECHO AO ASCENDING AORTA INDEX: 2.47 CM/M2
ECHO AO ROOT DIAM: 3.2 CM
ECHO AO ROOT INDEX: 1.76 CM/M2
ECHO AV AREA PEAK VELOCITY: 1.6 CM2
ECHO AV AREA VTI: 1.6 CM2
ECHO AV AREA/BSA PEAK VELOCITY: 0.9 CM2/M2
ECHO AV AREA/BSA VTI: 0.9 CM2/M2
ECHO AV MEAN GRADIENT: 9 MMHG
ECHO AV MEAN VELOCITY: 1.4 M/S
ECHO AV PEAK GRADIENT: 16 MMHG
ECHO AV PEAK VELOCITY: 2 M/S
ECHO AV VELOCITY RATIO: 0.7
ECHO AV VTI: 51 CM
ECHO BSA: 1.88 M2
ECHO IVC INSP: 0.4 CM
ECHO IVC PROX: 0.9 CM
ECHO LA AREA 2C: 31.2 CM2
ECHO LA AREA 4C: 32.8 CM2
ECHO LA MAJOR AXIS: 6.5 CM
ECHO LA MINOR AXIS: 7 CM
ECHO LA VOL BP: 126 ML (ref 22–52)
ECHO LA VOL MOD A2C: 111 ML (ref 22–52)
ECHO LA VOL MOD A4C: 134 ML (ref 22–52)
ECHO LA VOL/BSA BIPLANE: 69 ML/M2 (ref 16–34)
ECHO LA VOLUME INDEX MOD A2C: 61 ML/M2 (ref 16–34)
ECHO LA VOLUME INDEX MOD A4C: 74 ML/M2 (ref 16–34)
ECHO LV E' LATERAL VELOCITY: 11.4 CM/S
ECHO LV E' SEPTAL VELOCITY: 5.7 CM/S
ECHO LV EDV 3D: 129 ML
ECHO LV EDV INDEX 3D: 71 ML/M2
ECHO LV EF PHYSICIAN: 65 %
ECHO LV EJECTION FRACTION 3D: 65 %
ECHO LV ESV 3D: 45 ML
ECHO LV ESV INDEX 3D: 25 ML/M2
ECHO LV FRACTIONAL SHORTENING: 19 % (ref 28–44)
ECHO LV INTERNAL DIMENSION DIASTOLE INDEX: 1.43 CM/M2
ECHO LV INTERNAL DIMENSION DIASTOLIC: 2.6 CM (ref 3.9–5.3)
ECHO LV INTERNAL DIMENSION SYSTOLIC INDEX: 1.15 CM/M2
ECHO LV INTERNAL DIMENSION SYSTOLIC: 2.1 CM
ECHO LV IVSD: 1.5 CM (ref 0.6–0.9)
ECHO LV MASS 2D: 103 G (ref 67–162)
ECHO LV MASS 3D INDEX: 123.6 G/M2
ECHO LV MASS 3D: 225 G
ECHO LV MASS INDEX 2D: 56.6 G/M2 (ref 43–95)
ECHO LV POSTERIOR WALL DIASTOLIC: 1.1 CM (ref 0.6–0.9)
ECHO LV RELATIVE WALL THICKNESS RATIO: 0.85
ECHO LVOT AREA: 2.3 CM2
ECHO LVOT AV VTI INDEX: 0.7
ECHO LVOT DIAM: 1.7 CM
ECHO LVOT MEAN GRADIENT: 5 MMHG
ECHO LVOT PEAK GRADIENT: 8 MMHG
ECHO LVOT PEAK VELOCITY: 1.4 M/S
ECHO LVOT STROKE VOLUME INDEX: 44.4 ML/M2
ECHO LVOT SV: 80.8 ML
ECHO LVOT VTI: 35.6 CM
ECHO MV A VELOCITY: 0.96 M/S
ECHO MV E VELOCITY: 1 M/S
ECHO MV E/A RATIO: 1.04
ECHO MV E/E' LATERAL: 8.77
ECHO MV E/E' RATIO (AVERAGED): 13.16
ECHO MV E/E' SEPTAL: 17.54
ECHO PV MAX VELOCITY: 0.9 M/S
ECHO PV PEAK GRADIENT: 3 MMHG
ECHO RA AREA 4C: 21.1 CM2
ECHO RA END SYSTOLIC VOLUME APICAL 4 CHAMBER INDEX BSA: 33 ML/M2
ECHO RA VOLUME: 60 ML
ECHO RV BASAL DIMENSION: 4.3 CM
ECHO RV FREE WALL PEAK S': 9.9 CM/S
ECHO RV LONGITUDINAL DIMENSION: 7.1 CM
ECHO RV MID DIMENSION: 2.9 CM
ECHO RV TAPSE: 2 CM (ref 1.7–?)
ECHO TV REGURGITANT MAX VELOCITY: 2.61 M/S
ECHO TV REGURGITANT PEAK GRADIENT: 27 MMHG

## 2025-03-19 PROCEDURE — 93306 TTE W/DOPPLER COMPLETE: CPT | Performed by: INTERNAL MEDICINE

## 2025-03-19 PROCEDURE — 93306 TTE W/DOPPLER COMPLETE: CPT

## 2025-03-19 PROCEDURE — 76376 3D RENDER W/INTRP POSTPROCES: CPT | Performed by: INTERNAL MEDICINE

## 2025-03-21 ENCOUNTER — OFFICE VISIT (OUTPATIENT)
Dept: ORTHOPEDIC SURGERY | Age: 89
End: 2025-03-21

## 2025-03-21 VITALS — RESPIRATION RATE: 18 BRPM | WEIGHT: 178 LBS | HEIGHT: 62 IN | BODY MASS INDEX: 32.76 KG/M2

## 2025-03-21 DIAGNOSIS — M17.11 ARTHRITIS OF RIGHT KNEE: Primary | ICD-10-CM

## 2025-03-21 NOTE — PROGRESS NOTES
Administrations This Visit       hyaluronate (MONOVISC) injection 88 mg       Admin Date  03/21/2025  10:55 Action  Given Dose  88 mg Route  Intra-artICUlar Site  Knee Right Documented By  Dottie Osman MA    NDC: 78474-409-40    Lot#: [id number]    : DEPUY MITEK    Patient Supplied?: No

## 2025-03-21 NOTE — PROGRESS NOTES
ORTHOPAEDIC OFFICE NOTE    Chief Complaint   Patient presents with    Follow-up     Fu right knee -- Monovisc Inj       3/21/25  Here for right knee viscosupplementation      2/24/25  Ms. Alcantar returns today for follow-up of her right knee pain  This knee has not been evaluated since February 2023, when she received viscosupplementation  She states this did give her relief for about a year and a half  She started having gradually worsening pain in the knee starting in August of last year  Her main concern is that she has had increased swelling in the knee and lower leg for the past month or so  She does admit that she has been on her feet a lot more in the past couple months because she has had some relatives move in with her, so she is doing a lot more cooking and housework  Patient states that ice, heat, and Voltaren gel do help a bit, but only temporary relief      11/6/23  Marli is here today for new problem: Lower back pain   Patient states that she fell on 10/13/23 and landed on her backside   However, she states she did not have pain in the lower back until 4 days ago   She states that she just woke up with sharp pain in the lower back/tailbone   Worse with bending over and walking   She states this pain is completely different than her typical left hip pain   Patient went to ED on 11/3/2023 where they diagnosed her with a sacral fracture   She has tried Percocet, heat, and Voltaren gel, with good relief   She states the pain is very mild today   Denies N/T  Of note, the patient did see Dr. Melvin after her last appointment with Dr. Schultz for evaluation of her lumbar spine.  She states that he only recommended epidural injections, which she was not interested in due to a bad experience with a previous epidural injection years ago.  Left hip pain unchanged since last visit      7/12/23  Marli returns to clinic today for follow-up of her left hip pain  I saw her for this left hip back in May of last

## 2025-03-24 ENCOUNTER — RESULTS FOLLOW-UP (OUTPATIENT)
Age: 89
End: 2025-03-24

## 2025-03-24 NOTE — TELEPHONE ENCOUNTER
Medication Refill    Medication needing refilled:    amLODIPine (NORVASC) 2.5 MG   Dosage of the medication:    How are you taking this medication (QD, BID, TID, QID, PRN):1 tablet by mouth at bedtime     30 or 90 day supply called in: 90 day supply    When will you run out of your medication:    Which Pharmacy are we sending the medication to?:    [id number] [place] [id number] - P [phone] - F [phone]         
Received refill request for Amlodipine  from [facility] pharmacy.     Last OV: 2/25/2025 NPTS     Next OV: 5/5/2025 NPTS     Last Labs: 1/13/2025 CMP, 2/25/2025 EKG      
pt c/o generalized abdominal pain x 1 week with nausea, vomiting, diarrhea. denies fever/chills

## 2025-03-25 RX ORDER — AMLODIPINE BESYLATE 2.5 MG/1
2.5 TABLET ORAL NIGHTLY
Qty: 90 TABLET | Refills: 2 | Status: SHIPPED | OUTPATIENT
Start: 2025-03-25

## 2025-05-05 ENCOUNTER — OFFICE VISIT (OUTPATIENT)
Dept: CARDIOLOGY CLINIC | Age: 89
End: 2025-05-05
Payer: MEDICARE

## 2025-05-05 VITALS
WEIGHT: 170 LBS | HEART RATE: 70 BPM | SYSTOLIC BLOOD PRESSURE: 140 MMHG | OXYGEN SATURATION: 98 % | DIASTOLIC BLOOD PRESSURE: 62 MMHG | BODY MASS INDEX: 31.28 KG/M2 | HEIGHT: 62 IN

## 2025-05-05 DIAGNOSIS — E78.2 MIXED HYPERLIPIDEMIA: ICD-10-CM

## 2025-05-05 DIAGNOSIS — R60.9 SWELLING: Primary | ICD-10-CM

## 2025-05-05 DIAGNOSIS — I25.810 CORONARY ARTERY DISEASE INVOLVING CORONARY BYPASS GRAFT OF NATIVE HEART WITHOUT ANGINA PECTORIS: ICD-10-CM

## 2025-05-05 DIAGNOSIS — I10 PRIMARY HYPERTENSION: ICD-10-CM

## 2025-05-05 PROCEDURE — 99214 OFFICE O/P EST MOD 30 MIN: CPT | Performed by: NURSE PRACTITIONER

## 2025-05-05 PROCEDURE — 1123F ACP DISCUSS/DSCN MKR DOCD: CPT | Performed by: NURSE PRACTITIONER

## 2025-05-05 RX ORDER — SPIRONOLACTONE 25 MG/1
25 TABLET ORAL DAILY
Qty: 30 TABLET | Refills: 3 | Status: SHIPPED | OUTPATIENT
Start: 2025-05-05

## 2025-05-05 RX ORDER — ASPIRIN 81 MG/1
81 TABLET, CHEWABLE ORAL DAILY
COMMUNITY

## 2025-05-05 NOTE — PROGRESS NOTES
[facility]     Outpatient Follow Up Note    Marli Alcantar is 89 y.o. female who presents today with a history of CAD s/p CABG '18 with post-op AF, HTN, tricuspid regurg and hyperlipidemia     CHIEF COMPLAINT / HPI:  Follow Up secondary to LE edema starting lasix 20 mg bid    Subjective:   Lasix has helped with her swelling. They look the best in the morning. She was told though that it was from arthritis. She also has varicose veins. They're very painful. Many years ago, she had her veins stripped but they've come back    She gets a sharp pain down the front of her Rt leg if she turns too quickly    As long as she has her cane, walks slowly she's ok with breathing. She gets out of breath if she doesn't have anything to support herself. She becomes SOB talking; lifting stuff, stretching to reach dishes. Its persistent since her bypass.   She has SOB all the time. Its no better taking lasix. She denies orthopnea/PND. Her wt is down ~ 8# / 8 weeks.    She had Lt chest tightness / heaviness which she thought to be indigestion though she ended up taking NTG SL.     She gets gas for which she takes Gas-X and PPI. She can tell the difference from her angina since she belches and passes flatus.  Her angina equivalent was indigestion, hard pain of which she denies recurrence.     She denies palpitations / dizziness.     These symptoms are essentially unchanged since the last OV.   With regard to medication therapy the patient has been compliant with prescribed regimen. They have tolerated therapy to date.     Past Medical History:   Diagnosis Date    Arthritis     Atrial fibrillation (HCC) 6/19/2018    CAD in native artery 6/1/2018    Calculus of ureter 9/1/2021    Cystitis, interstitial     Gastroesophageal reflux disease 3/15/2018    GERD (gastroesophageal reflux disease)     GI bleeding     was a frequent aspirin user at that time    Heart burn     History of colonic polyps 7/2/2015    Hypertension

## 2025-05-09 NOTE — TELEPHONE ENCOUNTER
Received refill request for FUROSEMIDE 20MG TABLETS  from [facility] pharmacy.     Last OV: 5/5/25    Next OV: 6/2/25    Last Labs: cmp 1/1325    Last Filled: 2/25/25

## 2025-05-10 RX ORDER — FUROSEMIDE 20 MG/1
20 TABLET ORAL 2 TIMES DAILY
Qty: 60 TABLET | Refills: 1 | Status: SHIPPED | OUTPATIENT
Start: 2025-05-10

## 2025-06-02 ENCOUNTER — OFFICE VISIT (OUTPATIENT)
Dept: CARDIOLOGY CLINIC | Age: 89
End: 2025-06-02
Payer: MEDICARE

## 2025-06-02 VITALS
SYSTOLIC BLOOD PRESSURE: 128 MMHG | BODY MASS INDEX: 32 KG/M2 | HEIGHT: 62 IN | WEIGHT: 173.9 LBS | HEART RATE: 61 BPM | OXYGEN SATURATION: 97 % | DIASTOLIC BLOOD PRESSURE: 62 MMHG

## 2025-06-02 DIAGNOSIS — E78.2 MIXED HYPERLIPIDEMIA: ICD-10-CM

## 2025-06-02 DIAGNOSIS — I25.810 CORONARY ARTERY DISEASE INVOLVING CORONARY BYPASS GRAFT OF NATIVE HEART WITHOUT ANGINA PECTORIS: ICD-10-CM

## 2025-06-02 DIAGNOSIS — I10 PRIMARY HYPERTENSION: ICD-10-CM

## 2025-06-02 DIAGNOSIS — R60.9 SWELLING: Primary | ICD-10-CM

## 2025-06-02 PROCEDURE — 1159F MED LIST DOCD IN RCRD: CPT | Performed by: NURSE PRACTITIONER

## 2025-06-02 PROCEDURE — 99214 OFFICE O/P EST MOD 30 MIN: CPT | Performed by: NURSE PRACTITIONER

## 2025-06-02 PROCEDURE — 1123F ACP DISCUSS/DSCN MKR DOCD: CPT | Performed by: NURSE PRACTITIONER

## 2025-06-02 NOTE — PROGRESS NOTES
[facility]     Outpatient Follow Up Note    Marli Alcantar is 89 y.o. female who presents today with a history of CAD s/p CABG '18 with post-op AF, HTN, tricuspid regurg and hyperlipidemia     CHIEF COMPLAINT / HPI:  Follow Up secondary to LE edema starting spironolactone. Its helped    Subjective:   In the morning, her ankles and feet look more normal . Has soon as she gets up OOB, the Rt starts to swell more than the left. With that said, by the end of the day they look the same as they did the last time she was here  She also has varicose veins. They're very painful. Many years ago, she had her veins stripped but they've come back    She has pain in her chest that feels like something is poking her. She's had no angina.   Her angina equivalent was indigestion, hard pain of which she denies recurrence.  She has SOB all the time    As long as she has her cane, walks slowly she's ok with breathing. She gets out of breath if she doesn't have anything to support herself. She becomes SOB talking; lifting stuff, stretching to reach dishes. Its persistent since her bypass. She denies orthopnea/PND. Her wt is unchanged     She denies palpitations / dizziness.     These symptoms have improved since the last OV.   With regard to medication therapy the patient has been compliant with prescribed regimen. They have tolerated therapy to date.     Past Medical History:   Diagnosis Date    Arthritis     Atrial fibrillation (HCC) 6/19/2018    CAD in native artery 6/1/2018    Calculus of ureter 9/1/2021    Cystitis, interstitial     Gastroesophageal reflux disease 3/15/2018    GERD (gastroesophageal reflux disease)     GI bleeding     was a frequent aspirin user at that time    Heart burn     History of colonic polyps 7/2/2015    Hypertension     Interstitial cystitis     Mixed hyperlipidemia 6/19/2018    NSTEMI (non-ST elevated myocardial infarction) (HCC) 5/29/2018    Obesity 12/12/2012    Overview:  Replaced

## 2025-06-02 NOTE — PATIENT INSTRUCTIONS
Ask Yolanda about renewing your inhaler    Appt with Dr. Molina: varicose veins causing pain     Appt with Dr. Hill in 3-4 months

## 2025-06-11 ENCOUNTER — OFFICE VISIT (OUTPATIENT)
Dept: ENT CLINIC | Age: 89
End: 2025-06-11
Payer: MEDICARE

## 2025-06-11 VITALS
BODY MASS INDEX: 31.83 KG/M2 | SYSTOLIC BLOOD PRESSURE: 130 MMHG | TEMPERATURE: 97.1 F | WEIGHT: 173 LBS | DIASTOLIC BLOOD PRESSURE: 66 MMHG | OXYGEN SATURATION: 94 % | HEIGHT: 62 IN | HEART RATE: 56 BPM

## 2025-06-11 DIAGNOSIS — H60.8X3 CHRONIC ECZEMATOID OTITIS EXTERNA OF BOTH EARS: Primary | ICD-10-CM

## 2025-06-11 DIAGNOSIS — H91.93 BILATERAL HEARING LOSS, UNSPECIFIED HEARING LOSS TYPE: ICD-10-CM

## 2025-06-11 DIAGNOSIS — M26.623 BILATERAL TEMPOROMANDIBULAR JOINT PAIN: ICD-10-CM

## 2025-06-11 DIAGNOSIS — J30.9 ALLERGIC RHINITIS, UNSPECIFIED SEASONALITY, UNSPECIFIED TRIGGER: ICD-10-CM

## 2025-06-11 PROCEDURE — 1159F MED LIST DOCD IN RCRD: CPT | Performed by: STUDENT IN AN ORGANIZED HEALTH CARE EDUCATION/TRAINING PROGRAM

## 2025-06-11 PROCEDURE — 1123F ACP DISCUSS/DSCN MKR DOCD: CPT | Performed by: STUDENT IN AN ORGANIZED HEALTH CARE EDUCATION/TRAINING PROGRAM

## 2025-06-11 PROCEDURE — 99204 OFFICE O/P NEW MOD 45 MIN: CPT | Performed by: STUDENT IN AN ORGANIZED HEALTH CARE EDUCATION/TRAINING PROGRAM

## 2025-06-11 RX ORDER — LORATADINE 10 MG/1
10 TABLET ORAL DAILY
Qty: 30 TABLET | Refills: 3 | Status: SHIPPED | OUTPATIENT
Start: 2025-06-11

## 2025-06-11 RX ORDER — FLUOCINOLONE ACETONIDE 0.11 MG/ML
3 OIL AURICULAR (OTIC) 2 TIMES DAILY PRN
Qty: 1 EACH | Refills: 2 | Status: SHIPPED | OUTPATIENT
Start: 2025-06-11

## 2025-06-11 NOTE — PROGRESS NOTES
[facility]  DIVISION OF OTOLARYNGOLOGY- HEAD & NECK SURGERY  NEW PATIENT HISTORY AND PHYSICAL NOTE      Patient Name: Marli Alcantar  Medical Record Number:  [id number]  Primary Care Physician:  Yolanda Metzger APRN - [facility]    ChiefComplaint     Chief Complaint   Patient presents with    Ear Problem     Ear itchy, pressure in ears,        History of Present Illness     Marli Alcantar is an 89 y.o. female presenting with itching in her left ears.  She has a history of psoriatic arthritis with lesions on her scalp, nose, legs.  She recently obtained her 6 pair of hearing aids in the past 3 years and had to return these back to Dr. De because she thinks she has an allergy to the latex stone.  These seem to make her ears itch.  Her ears are very dry and itchy at baseline.  She uses tea tree oil in her ears for moisturization.    She has pressure and pain around her ears that comes and goes.  She follows with Dr. Kristal De, audiology.  Her last hearing test through Dr. De's office was approximately 3 weeks ago.  Denies recent acute hearing changes. + constant bilateral tinnitus.  No otorrhea.  No history of chronic middle ear infections.  No history of otologic surgery.  No known family history of early onset hearing loss.  No loud noise exposures. Denies room spinning vertigo.     Recently had 2 dogs move into her household. Allergies have flared up and she finds her self sneezing more.  Does not take any allergy medications.    Past Medical History     Past Medical History:   Diagnosis Date    Arthritis     Atrial fibrillation (HCC) 6/19/2018    CAD in native artery 6/1/2018    Calculus of ureter 9/1/2021    Cystitis, interstitial     Gastroesophageal reflux disease 3/15/2018    GERD (gastroesophageal reflux disease)     GI bleeding     was a frequent aspirin user at that time    Heart burn     History of colonic polyps 7/2/2015    Hypertension     Interstitial cystitis     Mixed hyperlipidemia

## 2025-06-11 NOTE — PATIENT INSTRUCTIONS
Temporomandibular Joint (TMJ) Pain:    - Recommend taking NSAID (such as Ibuprofen, Advil, Aleve, Naproxen) or Tylenol as needed for TMJ associated ear pain.    - Start conservative measure as discussed including no gum chewing, eating a softer diet, cutting bigger and tougher bites into smaller pieces, warm compresses to the affected side, self massages to the affected side.  - Consider use of mouthguard to prevent nocturnal bruxism (teeth grinding/clenching).  A dentist may be able to provide you with a custom-made mouthguard or you can obtain one over-the-counter that you mold yourself at any pharmacy.

## 2025-06-13 ENCOUNTER — OFFICE VISIT (OUTPATIENT)
Dept: FAMILY MEDICINE CLINIC | Age: 89
End: 2025-06-13
Payer: MEDICARE

## 2025-06-13 VITALS
DIASTOLIC BLOOD PRESSURE: 72 MMHG | SYSTOLIC BLOOD PRESSURE: 110 MMHG | HEART RATE: 61 BPM | OXYGEN SATURATION: 97 % | BODY MASS INDEX: 31.65 KG/M2 | WEIGHT: 172 LBS | HEIGHT: 62 IN

## 2025-06-13 DIAGNOSIS — I25.10 CORONARY ARTERY DISEASE INVOLVING NATIVE CORONARY ARTERY OF NATIVE HEART WITHOUT ANGINA PECTORIS: ICD-10-CM

## 2025-06-13 DIAGNOSIS — R06.02 SHORTNESS OF BREATH: Primary | ICD-10-CM

## 2025-06-13 LAB
ANION GAP SERPL CALCULATED.3IONS-SCNC: 10 MMOL/L (ref 3–16)
BUN SERPL-MCNC: 16 MG/DL (ref 7–20)
CALCIUM SERPL-MCNC: 10.2 MG/DL (ref 8.3–10.6)
CHLORIDE SERPL-SCNC: 105 MMOL/L (ref 99–110)
CO2 SERPL-SCNC: 24 MMOL/L (ref 21–32)
CREAT SERPL-MCNC: 0.8 MG/DL (ref 0.6–1.2)
GFR SERPLBLD CREATININE-BSD FMLA CKD-EPI: 71 ML/MIN/{1.73_M2}
GLUCOSE SERPL-MCNC: 99 MG/DL (ref 70–99)
POTASSIUM SERPL-SCNC: 4.3 MMOL/L (ref 3.5–5.1)
SODIUM SERPL-SCNC: 139 MMOL/L (ref 136–145)

## 2025-06-13 PROCEDURE — 1160F RVW MEDS BY RX/DR IN RCRD: CPT | Performed by: REGISTERED NURSE

## 2025-06-13 PROCEDURE — 1123F ACP DISCUSS/DSCN MKR DOCD: CPT | Performed by: REGISTERED NURSE

## 2025-06-13 PROCEDURE — 99214 OFFICE O/P EST MOD 30 MIN: CPT | Performed by: REGISTERED NURSE

## 2025-06-13 PROCEDURE — 1159F MED LIST DOCD IN RCRD: CPT | Performed by: REGISTERED NURSE

## 2025-06-13 PROCEDURE — 36415 COLL VENOUS BLD VENIPUNCTURE: CPT | Performed by: REGISTERED NURSE

## 2025-06-13 RX ORDER — ALBUTEROL SULFATE 90 UG/1
1-2 INHALANT RESPIRATORY (INHALATION)
Qty: 18 G | Refills: 3 | Status: SHIPPED | OUTPATIENT
Start: 2025-06-13

## 2025-06-13 NOTE — PROGRESS NOTES
Assessment & Plan  1. Hypercholesterolemia.  - Continues atorvastatin as prescribed.    2. Hypertension.  managed per cardiology, continue with current regimen.     3. Atrial Fibrillation.  - Recent cardiologist visit on 06/02/2025.  - No changes to the current treatment plan.    4. History of Non-STEMI.  - No new symptoms or concerns reported.    5. Thoracic Aortic Aneurysm.  - No new symptoms or concerns reported.    6. Back Pain.  - Chronic back pain due to arthritis and scoliosis.  - Uses Voltaren gel and takes Tylenol daily for pain management.  - Lidocaine patches or Salonpas recommended for additional pain relief.    8. Venous Stasis.  - Presents with +2 pitting edema in lower extremities.  - Continues taking Lasix as prescribed by the cardiologist.  - pt is seeing     11. Shortness of Breath.  - Experiences shortness of breath, especially after bypass surgery.  - Albuterol inhaler will be reordered.    12. Allergic rhinitis  - recommended Flonase NS BID.    Follow-up  - Follow-up in 6 months for an annual wellness visit.    Subjective   History of Present Illness  The patient is an 89-year-old female here for a chronic condition follow-up. Her past medical history includes high cholesterol, hypertension, atrial fibrillation, non-STEMI, thoracic aortic aneurysm, and osteoarthritis.    She reports that her cholesterol levels were well-managed during her last visit and continues her atorvastatin regimen.    She has been experiencing increased swelling in her feet. The swelling intensifies by the afternoon, affecting both legs. She also reports soreness in her lower legs, which she believes is due to arthritis.  She is on Lasix twice daily. She sees cardiology, just saw them about 10 days ago.     She uses an albuterol inhaler for shortness of breath, a symptom that emerged post-bypass surgery, needs this refilled.    She has a history of hearing issues and has tried various hearing aids over the past 3 years,

## 2025-08-04 ENCOUNTER — OFFICE VISIT (OUTPATIENT)
Dept: CARDIOLOGY CLINIC | Age: 89
End: 2025-08-04
Payer: MEDICARE

## 2025-08-04 VITALS
DIASTOLIC BLOOD PRESSURE: 68 MMHG | WEIGHT: 172.2 LBS | HEART RATE: 66 BPM | BODY MASS INDEX: 31.69 KG/M2 | HEIGHT: 62 IN | OXYGEN SATURATION: 97 % | SYSTOLIC BLOOD PRESSURE: 116 MMHG

## 2025-08-04 DIAGNOSIS — I83.12 VARICOSE VEINS OF BOTH LOWER EXTREMITIES WITH INFLAMMATION: Primary | ICD-10-CM

## 2025-08-04 DIAGNOSIS — I83.11 VARICOSE VEINS OF BOTH LOWER EXTREMITIES WITH INFLAMMATION: Primary | ICD-10-CM

## 2025-08-04 PROCEDURE — 99204 OFFICE O/P NEW MOD 45 MIN: CPT | Performed by: STUDENT IN AN ORGANIZED HEALTH CARE EDUCATION/TRAINING PROGRAM

## 2025-08-04 PROCEDURE — 1125F AMNT PAIN NOTED PAIN PRSNT: CPT | Performed by: STUDENT IN AN ORGANIZED HEALTH CARE EDUCATION/TRAINING PROGRAM

## 2025-08-04 PROCEDURE — 1123F ACP DISCUSS/DSCN MKR DOCD: CPT | Performed by: STUDENT IN AN ORGANIZED HEALTH CARE EDUCATION/TRAINING PROGRAM

## 2025-08-04 PROCEDURE — 1159F MED LIST DOCD IN RCRD: CPT | Performed by: STUDENT IN AN ORGANIZED HEALTH CARE EDUCATION/TRAINING PROGRAM

## 2025-09-04 ENCOUNTER — TELEPHONE (OUTPATIENT)
Dept: CARDIOLOGY CLINIC | Age: 89
End: 2025-09-04

## 2025-09-05 RX ORDER — FUROSEMIDE 20 MG/1
20 TABLET ORAL 2 TIMES DAILY
Qty: 60 TABLET | Refills: 1 | OUTPATIENT
Start: 2025-09-05

## 2025-09-05 RX ORDER — SPIRONOLACTONE 25 MG/1
25 TABLET ORAL DAILY
Qty: 30 TABLET | Refills: 3 | OUTPATIENT
Start: 2025-09-05

## (undated) DEVICE — Device: Brand: MEDEX

## (undated) DEVICE — CYSTO PACK: Brand: MEDLINE INDUSTRIES, INC.

## (undated) DEVICE — SYRINGE MED 10ML SLIP TIP BLNT FILL AND LUERLOCK DISP

## (undated) DEVICE — Z DUP USE 2522782 SOLUTION IRRIG 1000ML STRL H2O PLAS CONTAINER UROMATIC

## (undated) DEVICE — ADAPTER URO SCP UROLOK LL

## (undated) DEVICE — BAG DRAINAGE NS

## (undated) DEVICE — SOLUTION IV IRRIG WATER 1000ML POUR BRL 2F7114

## (undated) DEVICE — TRAY PREP DRY W/ PREM GLV 2 APPL 6 SPNG 2 UNDPD 1 OVERWRAP

## (undated) DEVICE — NITINOL STONE RETRIEVAL BASKET: Brand: ZERO TIP

## (undated) DEVICE — GLOVE ORANGE PI 7   MSG9070

## (undated) DEVICE — GUIDEWIRE ENDOSCP L150CM DIA0.035IN TIP 3CM PTFE NIT

## (undated) DEVICE — Y-TYPE TUR/BLADDER IRRIGATION SET, REGULATING CLAMP